# Patient Record
Sex: MALE | Race: OTHER | HISPANIC OR LATINO | URBAN - METROPOLITAN AREA
[De-identification: names, ages, dates, MRNs, and addresses within clinical notes are randomized per-mention and may not be internally consistent; named-entity substitution may affect disease eponyms.]

---

## 2018-05-01 ENCOUNTER — OUTPATIENT (OUTPATIENT)
Dept: OUTPATIENT SERVICES | Facility: HOSPITAL | Age: 83
LOS: 1 days | End: 2018-05-01

## 2018-05-02 ENCOUNTER — INPATIENT (INPATIENT)
Facility: HOSPITAL | Age: 83
LOS: 5 days | Discharge: HOME | DRG: 194 | End: 2018-05-08
Attending: INTERNAL MEDICINE | Admitting: INTERNAL MEDICINE
Payer: MEDICAID

## 2018-05-02 VITALS
WEIGHT: 125 LBS | HEART RATE: 85 BPM | HEIGHT: 62 IN | TEMPERATURE: 98 F | OXYGEN SATURATION: 97 % | DIASTOLIC BLOOD PRESSURE: 80 MMHG | RESPIRATION RATE: 18 BRPM | SYSTOLIC BLOOD PRESSURE: 146 MMHG

## 2018-05-02 DIAGNOSIS — N40.0 BENIGN PROSTATIC HYPERPLASIA WITHOUT LOWER URINARY TRACT SYMPTOMS: ICD-10-CM

## 2018-05-02 DIAGNOSIS — I10 ESSENTIAL (PRIMARY) HYPERTENSION: ICD-10-CM

## 2018-05-02 DIAGNOSIS — M79.89 OTHER SPECIFIED SOFT TISSUE DISORDERS: ICD-10-CM

## 2018-05-02 DIAGNOSIS — J18.9 PNEUMONIA, UNSPECIFIED ORGANISM: ICD-10-CM

## 2018-05-02 DIAGNOSIS — Z29.9 ENCOUNTER FOR PROPHYLACTIC MEASURES, UNSPECIFIED: ICD-10-CM

## 2018-05-02 LAB
ALBUMIN SERPL ELPH-MCNC: 2.9 G/DL — LOW (ref 3.5–5)
ALP SERPL-CCNC: 110 U/L — SIGNIFICANT CHANGE UP (ref 40–120)
ALT FLD-CCNC: 17 U/L DA — SIGNIFICANT CHANGE UP (ref 10–60)
ANION GAP SERPL CALC-SCNC: 5 MMOL/L — SIGNIFICANT CHANGE UP (ref 5–17)
APPEARANCE UR: CLEAR — SIGNIFICANT CHANGE UP
AST SERPL-CCNC: 41 U/L — HIGH (ref 10–40)
BASE EXCESS BLDV CALC-SCNC: 4.2 MMOL/L — HIGH (ref -2–2)
BASOPHILS # BLD AUTO: 0 K/UL — SIGNIFICANT CHANGE UP (ref 0–0.2)
BASOPHILS NFR BLD AUTO: 0.2 % — SIGNIFICANT CHANGE UP (ref 0–2)
BILIRUB SERPL-MCNC: 0.7 MG/DL — SIGNIFICANT CHANGE UP (ref 0.2–1.2)
BILIRUB UR-MCNC: NEGATIVE — SIGNIFICANT CHANGE UP
BUN SERPL-MCNC: 21 MG/DL — HIGH (ref 7–18)
CALCIUM SERPL-MCNC: 10.3 MG/DL — SIGNIFICANT CHANGE UP (ref 8.4–10.5)
CHLORIDE SERPL-SCNC: 105 MMOL/L — SIGNIFICANT CHANGE UP (ref 96–108)
CO2 SERPL-SCNC: 31 MMOL/L — SIGNIFICANT CHANGE UP (ref 22–31)
COLOR SPEC: YELLOW — SIGNIFICANT CHANGE UP
CREAT SERPL-MCNC: 1.06 MG/DL — SIGNIFICANT CHANGE UP (ref 0.5–1.3)
DIFF PNL FLD: ABNORMAL
EOSINOPHIL # BLD AUTO: 0 K/UL — SIGNIFICANT CHANGE UP (ref 0–0.5)
EOSINOPHIL NFR BLD AUTO: 0 % — SIGNIFICANT CHANGE UP (ref 0–6)
GLUCOSE SERPL-MCNC: 150 MG/DL — HIGH (ref 70–99)
GLUCOSE UR QL: NEGATIVE — SIGNIFICANT CHANGE UP
HCO3 BLDV-SCNC: 30 MMOL/L — HIGH (ref 21–29)
HCT VFR BLD CALC: 39.5 % — SIGNIFICANT CHANGE UP (ref 39–50)
HGB BLD-MCNC: 12.4 G/DL — LOW (ref 13–17)
HOROWITZ INDEX BLDV+IHG-RTO: 21 — SIGNIFICANT CHANGE UP
KETONES UR-MCNC: NEGATIVE — SIGNIFICANT CHANGE UP
LACTATE SERPL-SCNC: 2.1 MMOL/L — HIGH (ref 0.7–2)
LACTATE SERPL-SCNC: 2.9 MMOL/L — HIGH (ref 0.7–2)
LEUKOCYTE ESTERASE UR-ACNC: ABNORMAL
LYMPHOCYTES # BLD AUTO: 1.1 K/UL — SIGNIFICANT CHANGE UP (ref 1–3.3)
LYMPHOCYTES # BLD AUTO: 8.7 % — LOW (ref 13–44)
MCHC RBC-ENTMCNC: 27.7 PG — SIGNIFICANT CHANGE UP (ref 27–34)
MCHC RBC-ENTMCNC: 31.5 GM/DL — LOW (ref 32–36)
MCV RBC AUTO: 88 FL — SIGNIFICANT CHANGE UP (ref 80–100)
MONOCYTES # BLD AUTO: 0.5 K/UL — SIGNIFICANT CHANGE UP (ref 0–0.9)
MONOCYTES NFR BLD AUTO: 4.1 % — SIGNIFICANT CHANGE UP (ref 2–14)
NEUTROPHILS # BLD AUTO: 10.8 K/UL — HIGH (ref 1.8–7.4)
NEUTROPHILS NFR BLD AUTO: 87 % — HIGH (ref 43–77)
NITRITE UR-MCNC: NEGATIVE — SIGNIFICANT CHANGE UP
PCO2 BLDV: 54 MMHG — HIGH (ref 35–50)
PH BLDV: 7.37 — SIGNIFICANT CHANGE UP (ref 7.35–7.45)
PH UR: 6 — SIGNIFICANT CHANGE UP (ref 5–8)
PLATELET # BLD AUTO: 122 K/UL — LOW (ref 150–400)
PO2 BLDV: 45 MMHG — SIGNIFICANT CHANGE UP (ref 25–45)
POTASSIUM SERPL-MCNC: 3.2 MMOL/L — LOW (ref 3.5–5.3)
POTASSIUM SERPL-SCNC: 3.2 MMOL/L — LOW (ref 3.5–5.3)
PROT SERPL-MCNC: 7 G/DL — SIGNIFICANT CHANGE UP (ref 6–8.3)
PROT UR-MCNC: 30 MG/DL
RBC # BLD: 4.49 M/UL — SIGNIFICANT CHANGE UP (ref 4.2–5.8)
RBC # FLD: 13.1 % — SIGNIFICANT CHANGE UP (ref 10.3–14.5)
SAO2 % BLDV: 78 % — SIGNIFICANT CHANGE UP (ref 67–88)
SODIUM SERPL-SCNC: 141 MMOL/L — SIGNIFICANT CHANGE UP (ref 135–145)
SP GR SPEC: 1.02 — SIGNIFICANT CHANGE UP (ref 1.01–1.02)
TROPONIN I SERPL-MCNC: 0.04 NG/ML — SIGNIFICANT CHANGE UP (ref 0–0.04)
UROBILINOGEN FLD QL: 1
WBC # BLD: 12.4 K/UL — HIGH (ref 3.8–10.5)
WBC # FLD AUTO: 12.4 K/UL — HIGH (ref 3.8–10.5)

## 2018-05-02 PROCEDURE — 99285 EMERGENCY DEPT VISIT HI MDM: CPT

## 2018-05-02 PROCEDURE — 71045 X-RAY EXAM CHEST 1 VIEW: CPT | Mod: 26

## 2018-05-02 PROCEDURE — 73590 X-RAY EXAM OF LOWER LEG: CPT | Mod: 26,RT

## 2018-05-02 PROCEDURE — 93971 EXTREMITY STUDY: CPT | Mod: 26,RT

## 2018-05-02 PROCEDURE — 73620 X-RAY EXAM OF FOOT: CPT | Mod: 26,RT

## 2018-05-02 RX ORDER — SODIUM CHLORIDE 9 MG/ML
1000 INJECTION INTRAMUSCULAR; INTRAVENOUS; SUBCUTANEOUS
Qty: 0 | Refills: 0 | Status: DISCONTINUED | OUTPATIENT
Start: 2018-05-02 | End: 2018-05-02

## 2018-05-02 RX ORDER — CEFTRIAXONE 500 MG/1
1 INJECTION, POWDER, FOR SOLUTION INTRAMUSCULAR; INTRAVENOUS ONCE
Qty: 0 | Refills: 0 | Status: COMPLETED | OUTPATIENT
Start: 2018-05-02 | End: 2018-05-02

## 2018-05-02 RX ORDER — CEFTRIAXONE 500 MG/1
1 INJECTION, POWDER, FOR SOLUTION INTRAMUSCULAR; INTRAVENOUS EVERY 24 HOURS
Qty: 0 | Refills: 0 | Status: DISCONTINUED | OUTPATIENT
Start: 2018-05-02 | End: 2018-05-08

## 2018-05-02 RX ORDER — SODIUM CHLORIDE 9 MG/ML
1000 INJECTION INTRAMUSCULAR; INTRAVENOUS; SUBCUTANEOUS ONCE
Qty: 0 | Refills: 0 | Status: COMPLETED | OUTPATIENT
Start: 2018-05-02 | End: 2018-05-02

## 2018-05-02 RX ORDER — AZITHROMYCIN 500 MG/1
500 TABLET, FILM COATED ORAL EVERY 24 HOURS
Qty: 0 | Refills: 0 | Status: COMPLETED | OUTPATIENT
Start: 2018-05-02 | End: 2018-05-05

## 2018-05-02 RX ORDER — SODIUM CHLORIDE 9 MG/ML
1000 INJECTION, SOLUTION INTRAVENOUS
Qty: 0 | Refills: 0 | Status: DISCONTINUED | OUTPATIENT
Start: 2018-05-02 | End: 2018-05-04

## 2018-05-02 RX ORDER — AMLODIPINE BESYLATE 2.5 MG/1
5 TABLET ORAL DAILY
Qty: 0 | Refills: 0 | Status: DISCONTINUED | OUTPATIENT
Start: 2018-05-02 | End: 2018-05-07

## 2018-05-02 RX ORDER — ENOXAPARIN SODIUM 100 MG/ML
40 INJECTION SUBCUTANEOUS DAILY
Qty: 0 | Refills: 0 | Status: DISCONTINUED | OUTPATIENT
Start: 2018-05-02 | End: 2018-05-08

## 2018-05-02 RX ORDER — TAMSULOSIN HYDROCHLORIDE 0.4 MG/1
0.4 CAPSULE ORAL AT BEDTIME
Qty: 0 | Refills: 0 | Status: DISCONTINUED | OUTPATIENT
Start: 2018-05-02 | End: 2018-05-08

## 2018-05-02 RX ORDER — AZITHROMYCIN 500 MG/1
500 TABLET, FILM COATED ORAL ONCE
Qty: 0 | Refills: 0 | Status: COMPLETED | OUTPATIENT
Start: 2018-05-02 | End: 2018-05-02

## 2018-05-02 RX ORDER — POTASSIUM CHLORIDE 20 MEQ
40 PACKET (EA) ORAL EVERY 4 HOURS
Qty: 0 | Refills: 0 | Status: COMPLETED | OUTPATIENT
Start: 2018-05-02 | End: 2018-05-02

## 2018-05-02 RX ADMIN — SODIUM CHLORIDE 100 MILLILITER(S): 9 INJECTION, SOLUTION INTRAVENOUS at 18:08

## 2018-05-02 RX ADMIN — CEFTRIAXONE 100 GRAM(S): 500 INJECTION, POWDER, FOR SOLUTION INTRAMUSCULAR; INTRAVENOUS at 12:17

## 2018-05-02 RX ADMIN — Medication 40 MILLIEQUIVALENT(S): at 20:02

## 2018-05-02 RX ADMIN — AMLODIPINE BESYLATE 5 MILLIGRAM(S): 2.5 TABLET ORAL at 23:28

## 2018-05-02 RX ADMIN — SODIUM CHLORIDE 4000 MILLILITER(S): 9 INJECTION INTRAMUSCULAR; INTRAVENOUS; SUBCUTANEOUS at 10:50

## 2018-05-02 RX ADMIN — TAMSULOSIN HYDROCHLORIDE 0.4 MILLIGRAM(S): 0.4 CAPSULE ORAL at 23:27

## 2018-05-02 RX ADMIN — AZITHROMYCIN 250 MILLIGRAM(S): 500 TABLET, FILM COATED ORAL at 13:19

## 2018-05-02 RX ADMIN — Medication 40 MILLIEQUIVALENT(S): at 16:17

## 2018-05-02 RX ADMIN — SODIUM CHLORIDE 4000 MILLILITER(S): 9 INJECTION INTRAMUSCULAR; INTRAVENOUS; SUBCUTANEOUS at 13:17

## 2018-05-02 NOTE — H&P ADULT - ASSESSMENT
86 y/o M with no pmhx came in with c/o cough since 5 days. Patient is admitted for pnemumonia and R LE swelling.

## 2018-05-02 NOTE — ED ADULT TRIAGE NOTE - CHIEF COMPLAINT QUOTE
Cough x last Friday. No fever. Has swelling to both feet x Thursday. Also c/o Frequency of urination.

## 2018-05-02 NOTE — H&P ADULT - NSHPPHYSICALEXAM_GEN_ALL_CORE
Vital Signs Last 24 Hrs  T(C): 37.2 (02 May 2018 11:15), Max: 37.2 (02 May 2018 11:15)  T(F): 98.9 (02 May 2018 11:15), Max: 98.9 (02 May 2018 11:15)  HR: 65 (02 May 2018 11:15) (65 - 85)  BP: 155/71 (02 May 2018 11:15) (146/80 - 155/71)  BP(mean): --  RR: 16 (02 May 2018 11:15) (16 - 18)  SpO2: 100% (02 May 2018 11:15) (97% - 100%)    GENERAL: NAD  HEAD:  Atraumatic, Normocephalic  EYES: EOMI, PERRLA, conjunctiva and sclera clear  ENMT: Moist mucous membranes  NECK: Supple  NERVOUS SYSTEM:  Alert & Oriented X3  CHEST/LUNG: Clear to auscultation bilaterally  HEART: Regular rate and rhythm; No murmurs, rubs, or gallops  ABDOMEN: Soft, Nontender, Nondistended; Bowel sounds present  EXTREMITIES:  2+ Peripheral Pulses, R leg non pitting edema, no erythema or tenderness at the joints. Normal active and passive ROM. No pain, no bruises, no calf tenderness

## 2018-05-02 NOTE — H&P ADULT - NSHPREVIEWOFSYSTEMS_GEN_ALL_CORE
REVIEW OF SYSTEMS:  CONSTITUTIONAL: No fever, weight loss, or fatigue  EYES: No eye pain, visual disturbances, or discharge  ENMT:  No difficulty hearing, tinnitus, vertigo; No sinus or throat pain  RESPIRATORY: cough productive of yellow sputum; No shortness of breath  CARDIOVASCULAR: No chest pain, palpitations, dizziness, or leg swelling  GASTROINTESTINAL: No abdominal or epigastric pain. No nausea, vomiting, or hematemesis; No diarrhea or constipation. No melena or hematochezia.  GENITOURINARY: No dysuria, frequency, hematuria, or incontinence  NEUROLOGICAL: No headaches, memory loss, loss of strength, numbness, or tremors  ENDOCRINE: No heat or cold intolerance; No hair loss  MUSCULOSKELETAL: R leg swelling  HEME/LYMPH: No easy bruising, or bleeding gums  ALLERY AND IMMUNOLOGIC: No hives or eczema

## 2018-05-02 NOTE — H&P ADULT - PROBLEM SELECTOR PLAN 1
Leukocytosis, cough, and elevated lactate  CXR- R lower lung infilitrate  - start ceftriaxone and azithromycin  - f/u blood cx  - f/u sputum cx  - trend lactate

## 2018-05-02 NOTE — CONSULT NOTE ADULT - SUBJECTIVE AND OBJECTIVE BOX
PULMONARY CONSULT NOTE      ABDIRAHMAN PADILLA  MRN-[id number]    Patient is a 85y old  Male who presents with a chief complaint of cough (02 May 2018 13:43)    History of Present Illness:  Reason for Admission: cough	  History of Present Illness: 	  84 y/o M with no pmhx came in with c/o cough since 5 days. Patient said he has been using cough medication but it did not help. Cough is productive of yellow sputum. Denies fever, chills, nausea, or vomiting. Denies travel history. Denies sick contact. Denies SOB or respiratory distress. He also complaints of R LE swelling since fall almost 1 week ago. He said he fell on his right side while going to the bathroom. He denies pain, and is able to ambulate using cane since the fall.         HISTORY OF PRESENT ILLNESS: As above. Awake, alert, comfortable in bed in NAD.    MEDICATIONS  (STANDING):  amLODIPine   Tablet 5 milliGRAM(s) Oral daily  azithromycin  IVPB 500 milliGRAM(s) IV Intermittent every 24 hours  cefTRIAXone   IVPB 1 Gram(s) IV Intermittent every 24 hours  enoxaparin Injectable 40 milliGRAM(s) SubCutaneous daily  potassium chloride    Tablet ER 40 milliEquivalent(s) Oral every 4 hours  sodium chloride 0.9%. 1000 milliLiter(s) (75 mL/Hr) IV Continuous <Continuous>  tamsulosin 0.4 milliGRAM(s) Oral at bedtime      MEDICATIONS  (PRN):      Allergies    No Known Allergies    Intolerances        PAST MEDICAL & SURGICAL HISTORY:  No pertinent past medical history  No significant past surgical history      FAMILY HISTORY:      SOCIAL HISTORY  Smoking History:     REVIEW OF SYSTEMS:    CONSTITUTIONAL:  No fevers, chills, sweats    HEENT:  Eyes:  No diplopia or blurred vision. ENT:  No earache, sore throat or runny nose.    CARDIOVASCULAR:  No pressure, squeezing, tightness, or heaviness about the chest; no palpitations.    RESPIRATORY:  Per HPI    GASTROINTESTINAL:  No abdominal pain, nausea, vomiting or diarrhea.    GENITOURINARY:  No dysuria, frequency or urgency.    NEUROLOGIC:  No paresthesias, fasciculations, seizures or weakness.    PSYCHIATRIC:  No disorder of thought or mood.    Vital Signs Last 24 Hrs  T(C): 37.1 (02 May 2018 15:50), Max: 37.2 (02 May 2018 11:15)  T(F): 98.7 (02 May 2018 15:50), Max: 98.9 (02 May 2018 11:15)  HR: 70 (02 May 2018 15:50) (65 - 85)  BP: 151/63 (02 May 2018 15:50) (146/80 - 155/71)  BP(mean): --  RR: 16 (02 May 2018 15:50) (16 - 18)  SpO2: 97% (02 May 2018 15:50) (97% - 100%)  I&O's Detail      PHYSICAL EXAMINATION:    GENERAL: The patient is a well-developed, well-nourished _____in no apparent distress.     HEENT: Head is normocephalic and atraumatic. Extraocular muscles are intact. Mucous membranes are moist.     NECK: Supple.     LUNGS: Bilateral rales and ronchi    HEART: Regular rate and rhythm without murmur.    ABDOMEN: Soft, nontender, and nondistended.  No hepatosplenomegaly is noted.    EXTREMITIES: Without any cyanosis, clubbing, rash, lesions or edema.    NEUROLOGIC: Grossly intact.      LABS:                        12.4   12.4  )-----------( 122      ( 02 May 2018 10:25 )             39.5     05-    141  |  105  |  21<H>  ----------------------------<  150<H>  3.2<L>   |  31  |  1.06    Ca    10.3      02 May 2018 10:35    TPro  7.0  /  Alb  2.9<L>  /  TBili  0.7  /  DBili  x   /  AST  41<H>  /  ALT  17  /  AlkPhos  110  05-02      Urinalysis Basic - ( 02 May 2018 10:28 )    Color: Yellow / Appearance: Clear / S.020 / pH: x  Gluc: x / Ketone: Negative  / Bili: Negative / Urobili: 1   Blood: x / Protein: 30 mg/dL / Nitrite: Negative   Leuk Esterase: Small / RBC: 2-5 /HPF / WBC 0-2 /HPF   Sq Epi: x / Non Sq Epi: Moderate /HPF / Bacteria: Moderate /HPF        CARDIAC MARKERS ( 02 May 2018 10:36 )  0.041 ng/mL / x     / x     / x     / x              Lactate, Blood: 2.1 mmol/L (18 @ 15:09)  Lactate, Blood: 2.9 mmol/L (18 @ 10:25)        MICROBIOLOGY:    RADIOLOGY & ADDITIONAL STUDIES:    CXR:  < from: Xray Chest 1 View AP/PA (18 @ 11:25) >  IMPRESSION: Right lower lung field infiltrate.    < end of copied text >    Ct scan chest:    ekg;    echo:

## 2018-05-02 NOTE — H&P ADULT - NSHPLABSRESULTS_GEN_ALL_CORE
12.4   12.4  )-----------( 122      ( 02 May 2018 10:25 )             39.5     05-02    141  |  105  |  21<H>  ----------------------------<  150<H>  3.2<L>   |  31  |  1.06    Ca    10.3      02 May 2018 10:35    TPro  7.0  /  Alb  2.9<L>  /  TBili  0.7  /  DBili  x   /  AST  41<H>  /  ALT  17  /  AlkPhos  110  05-02    < from: Xray Chest 1 View AP/PA (05.02.18 @ 11:25) >    EXAM:  XR CHEST AP OR PA 1V                            PROCEDURE DATE:  05/02/2018          INTERPRETATION:  AP semierect chest on May 2, 2018 at 10:54 AM. Patient   has cough.    COMPARISON: None available.    The heart is magnified by technique. The aorta is somewhat dilated and   uncoiled.    Vaguely defined density in the right lower lung field suggests infiltrate.    IMPRESSION: Right lower lung field infiltrate.      < end of copied text >

## 2018-05-02 NOTE — ED PROVIDER NOTE - OBJECTIVE STATEMENT
84 y/o M pt with no significant PMHx presents to the ED c/o cough and leg swelling x a few days. Family member also reports tremors. Pt denies fever, chills, chest pain or any other complaints. NKDA. 84 y/o M pt with no significant PMHx presents to the ED c/o cough and leg swelling x a few days. Family member also reports tremors. Pt denies fever, chills, chest pain or any other complaints. NKDA. PMD Les [place]

## 2018-05-02 NOTE — H&P ADULT - PROBLEM SELECTOR PLAN 3
[] Previous VTE                                                3  [] Thrombophilia                                             2  [] Lower limb paralysis                                   2    [] Current Cancer                                             2   [X] Immobilization > 24 hrs                              1  [] ICU/CCU stay > 24 hours                             1  [X] Age > 60                                                         1    IMPROVE VTE Score: 2  Lovenox Elevated blood pressure x 2 until now  - will start norvasc with parameters

## 2018-05-02 NOTE — ED ADULT NURSE NOTE - OBJECTIVE STATEMENT
Pt presented w/ bilateral crackles lower lobes and nonproductive cough. A&Ox3, ambulatory with cane strong equal radial and pedal pulses, bilateral edema lower extremities , c/o cough and urine  frequency x today

## 2018-05-02 NOTE — H&P ADULT - HISTORY OF PRESENT ILLNESS
86 y/o M with no pmhx came in with c/o cough since 5 days. Patient said he has been using cough medication but it did not help. Cough is productive of yellow sputum. Denies fever, chills, nausea, or vomiting. Denies travel history. Denies sick contact. Denies SOB or respiratory distress. He also complaints of R LE swelling since fall almost 1 week ago. He said he fell on his right side while going to the bathroom. He denies pain, and is able to ambulate using cane since the fall.     SH: Denies smoking, alcohol or illicit drug use. Lives with daughter in law at home. Ambulates with cane

## 2018-05-03 DIAGNOSIS — J11.1 INFLUENZA DUE TO UNIDENTIFIED INFLUENZA VIRUS WITH OTHER RESPIRATORY MANIFESTATIONS: ICD-10-CM

## 2018-05-03 DIAGNOSIS — R69 ILLNESS, UNSPECIFIED: ICD-10-CM

## 2018-05-03 LAB
ANION GAP SERPL CALC-SCNC: 5 MMOL/L — SIGNIFICANT CHANGE UP (ref 5–17)
BASOPHILS # BLD AUTO: 0 K/UL — SIGNIFICANT CHANGE UP (ref 0–0.2)
BASOPHILS NFR BLD AUTO: 0.5 % — SIGNIFICANT CHANGE UP (ref 0–2)
BUN SERPL-MCNC: 16 MG/DL — SIGNIFICANT CHANGE UP (ref 7–18)
CALCIUM SERPL-MCNC: 8.9 MG/DL — SIGNIFICANT CHANGE UP (ref 8.4–10.5)
CHLORIDE SERPL-SCNC: 109 MMOL/L — HIGH (ref 96–108)
CHOLEST SERPL-MCNC: 72 MG/DL — SIGNIFICANT CHANGE UP (ref 10–199)
CO2 SERPL-SCNC: 27 MMOL/L — SIGNIFICANT CHANGE UP (ref 22–31)
CREAT SERPL-MCNC: 0.63 MG/DL — SIGNIFICANT CHANGE UP (ref 0.5–1.3)
CULTURE RESULTS: SIGNIFICANT CHANGE UP
EOSINOPHIL # BLD AUTO: 0 K/UL — SIGNIFICANT CHANGE UP (ref 0–0.5)
EOSINOPHIL NFR BLD AUTO: 0.1 % — SIGNIFICANT CHANGE UP (ref 0–6)
FLUBV RNA SPEC QL NAA+PROBE: DETECTED
GLUCOSE SERPL-MCNC: 94 MG/DL — SIGNIFICANT CHANGE UP (ref 70–99)
HBA1C BLD-MCNC: 5.5 % — SIGNIFICANT CHANGE UP (ref 4–5.6)
HCT VFR BLD CALC: 35.4 % — LOW (ref 39–50)
HDLC SERPL-MCNC: 27 MG/DL — LOW (ref 40–125)
HGB BLD-MCNC: 10.9 G/DL — LOW (ref 13–17)
LACTATE SERPL-SCNC: 1.1 MMOL/L — SIGNIFICANT CHANGE UP (ref 0.7–2)
LIPID PNL WITH DIRECT LDL SERPL: 32 MG/DL — SIGNIFICANT CHANGE UP
LYMPHOCYTES # BLD AUTO: 1.2 K/UL — SIGNIFICANT CHANGE UP (ref 1–3.3)
LYMPHOCYTES # BLD AUTO: 12.4 % — LOW (ref 13–44)
MCHC RBC-ENTMCNC: 27.1 PG — SIGNIFICANT CHANGE UP (ref 27–34)
MCHC RBC-ENTMCNC: 30.7 GM/DL — LOW (ref 32–36)
MCV RBC AUTO: 88.2 FL — SIGNIFICANT CHANGE UP (ref 80–100)
MONOCYTES # BLD AUTO: 0.7 K/UL — SIGNIFICANT CHANGE UP (ref 0–0.9)
MONOCYTES NFR BLD AUTO: 7.9 % — SIGNIFICANT CHANGE UP (ref 2–14)
NEUTROPHILS # BLD AUTO: 7.4 K/UL — SIGNIFICANT CHANGE UP (ref 1.8–7.4)
NEUTROPHILS NFR BLD AUTO: 79.1 % — HIGH (ref 43–77)
PLATELET # BLD AUTO: 118 K/UL — LOW (ref 150–400)
POTASSIUM SERPL-MCNC: 3.8 MMOL/L — SIGNIFICANT CHANGE UP (ref 3.5–5.3)
POTASSIUM SERPL-SCNC: 3.8 MMOL/L — SIGNIFICANT CHANGE UP (ref 3.5–5.3)
RAPID RVP RESULT: DETECTED
RBC # BLD: 4.01 M/UL — LOW (ref 4.2–5.8)
RBC # FLD: 12.9 % — SIGNIFICANT CHANGE UP (ref 10.3–14.5)
SODIUM SERPL-SCNC: 141 MMOL/L — SIGNIFICANT CHANGE UP (ref 135–145)
SPECIMEN SOURCE: SIGNIFICANT CHANGE UP
TOTAL CHOLESTEROL/HDL RATIO MEASUREMENT: 2.7 RATIO — LOW (ref 3.4–9.6)
TRIGL SERPL-MCNC: 63 MG/DL — SIGNIFICANT CHANGE UP (ref 10–149)
TSH SERPL-MCNC: 3.46 UU/ML — SIGNIFICANT CHANGE UP (ref 0.34–4.82)
WBC # BLD: 9.4 K/UL — SIGNIFICANT CHANGE UP (ref 3.8–10.5)
WBC # FLD AUTO: 9.4 K/UL — SIGNIFICANT CHANGE UP (ref 3.8–10.5)

## 2018-05-03 RX ORDER — LANOLIN ALCOHOL/MO/W.PET/CERES
3 CREAM (GRAM) TOPICAL AT BEDTIME
Qty: 0 | Refills: 0 | Status: DISCONTINUED | OUTPATIENT
Start: 2018-05-03 | End: 2018-05-08

## 2018-05-03 RX ADMIN — AZITHROMYCIN 250 MILLIGRAM(S): 500 TABLET, FILM COATED ORAL at 12:40

## 2018-05-03 RX ADMIN — Medication 3 MILLIGRAM(S): at 01:43

## 2018-05-03 RX ADMIN — SODIUM CHLORIDE 100 MILLILITER(S): 9 INJECTION, SOLUTION INTRAVENOUS at 01:45

## 2018-05-03 RX ADMIN — CEFTRIAXONE 100 GRAM(S): 500 INJECTION, POWDER, FOR SOLUTION INTRAMUSCULAR; INTRAVENOUS at 12:41

## 2018-05-03 RX ADMIN — Medication 75 MILLIGRAM(S): at 17:20

## 2018-05-03 RX ADMIN — ENOXAPARIN SODIUM 40 MILLIGRAM(S): 100 INJECTION SUBCUTANEOUS at 12:41

## 2018-05-03 RX ADMIN — AMLODIPINE BESYLATE 5 MILLIGRAM(S): 2.5 TABLET ORAL at 06:13

## 2018-05-03 RX ADMIN — TAMSULOSIN HYDROCHLORIDE 0.4 MILLIGRAM(S): 0.4 CAPSULE ORAL at 21:53

## 2018-05-03 NOTE — PATIENT PROFILE ADULT. - ABILITY TO HEAR (WITH HEARING AID OR HEARING APPLIANCE IF NORMALLY USED):
Mildly to Moderately Impaired: difficulty hearing in some environments or speaker may need to increase volume or speak distinctly/B/L [place]

## 2018-05-03 NOTE — PATIENT PROFILE ADULT. - INFORMATION COULD NOT BE OBTAINED DETAILS
pt doesn't understand but has several children all involved in his care dtr will bring dr name & phn # in the morning

## 2018-05-03 NOTE — PROGRESS NOTE ADULT - SUBJECTIVE AND OBJECTIVE BOX
PGY 1 Note discussed with supervising resident and primary attending    Patient is a 85y old  Male who presents with a chief complaint of cough (02 May 2018 13:43)      INTERVAL HPI/OVERNIGHT EVENTS: offers no new complaints; current symptoms resolving    MEDICATIONS  (STANDING):  amLODIPine   Tablet 5 milliGRAM(s) Oral daily  azithromycin  IVPB 500 milliGRAM(s) IV Intermittent every 24 hours  cefTRIAXone   IVPB 1 Gram(s) IV Intermittent every 24 hours  enoxaparin Injectable 40 milliGRAM(s) SubCutaneous daily  lactated ringers. 1000 milliLiter(s) (100 mL/Hr) IV Continuous <Continuous>  oseltamivir 75 milliGRAM(s) Oral two times a day  tamsulosin 0.4 milliGRAM(s) Oral at bedtime    MEDICATIONS  (PRN):  melatonin 3 milliGRAM(s) Oral at bedtime PRN Insomnia      __________________________________________________  REVIEW OF SYSTEMS:    CONSTITUTIONAL: No fever,   EYES: no acute visual disturbances  NECK: No pain or stiffness  RESPIRATORY: No cough; No shortness of breath  CARDIOVASCULAR: No chest pain, no palpitations  GASTROINTESTINAL: No pain. No nausea or vomiting; No diarrhea   NEUROLOGICAL: No headache or numbness, no tremors  MUSCULOSKELETAL: No joint pain, no muscle pain  GENITOURINARY: no dysuria, no frequency, no hesitancy  PSYCHIATRY: no depression , no anxiety  ALL OTHER  ROS negative        Vital Signs Last 24 Hrs  T(C): 37.1 (03 May 2018 04:52), Max: 37.3 (02 May 2018 21:39)  T(F): 98.8 (03 May 2018 04:52), Max: 99.2 (02 May 2018 21:39)  HR: 66 (03 May 2018 04:52) (65 - 76)  BP: 117/72 (03 May 2018 04:52) (117/72 - 158/87)  BP(mean): --  RR: 18 (03 May 2018 04:52) (16 - 19)  SpO2: 96% (03 May 2018 04:52) (96% - 100%)    ________________________________________________  PHYSICAL EXAM:  GENERAL: NAD  HEENT: Normocephalic;  conjunctivae and sclerae clear; moist mucous membranes;   NECK : supple  CHEST/LUNG: Clear to auscultation bilaterally with good air entry   HEART: S1 S2  regular; no murmurs, gallops or rubs  ABDOMEN: Soft, Nontender, Nondistended; Bowel sounds present  EXTREMITIES: no cyanosis; no edema; no calf tenderness  SKIN: warm and dry; no rash  NERVOUS SYSTEM:  Awake and alert; Oriented  to place, person and time ; no new deficits    _________________________________________________  LABS:                        10.9   9.4   )-----------( 118      ( 03 May 2018 06:07 )             35.4     05-03    141  |  109<H>  |  16  ----------------------------<  94  3.8   |  27  |  0.63    Ca    8.9      03 May 2018 06:07    TPro  7.0  /  Alb  2.9<L>  /  TBili  0.7  /  DBili  x   /  AST  41<H>  /  ALT  17  /  AlkPhos  110  05-02      Urinalysis Basic - ( 02 May 2018 10:28 )    Color: Yellow / Appearance: Clear / S.020 / pH: x  Gluc: x / Ketone: Negative  / Bili: Negative / Urobili: 1   Blood: x / Protein: 30 mg/dL / Nitrite: Negative   Leuk Esterase: Small / RBC: 2-5 /HPF / WBC 0-2 /HPF   Sq Epi: x / Non Sq Epi: Moderate /HPF / Bacteria: Moderate /HPF      CAPILLARY BLOOD GLUCOSE            RADIOLOGY & ADDITIONAL TESTS:    Imaging Personally Reviewed:  YES/NO    Consultant(s) Notes Reviewed:   YES/ No    Care Discussed with Consultants :     Plan of care was discussed with patient and /or primary care giver; all questions and concerns were addressed and care was aligned with patient's wishes. PGY 1 Note discussed with supervising resident and primary attending    Patient is a 85y old  Male who presents with a chief complaint of cough (02 May 2018 13:43)      INTERVAL HPI/OVERNIGHT EVENTS: complaints of cough with sputum.     MEDICATIONS  (STANDING):  amLODIPine   Tablet 5 milliGRAM(s) Oral daily  azithromycin  IVPB 500 milliGRAM(s) IV Intermittent every 24 hours  cefTRIAXone   IVPB 1 Gram(s) IV Intermittent every 24 hours  enoxaparin Injectable 40 milliGRAM(s) SubCutaneous daily  lactated ringers. 1000 milliLiter(s) (100 mL/Hr) IV Continuous <Continuous>  oseltamivir 75 milliGRAM(s) Oral two times a day  tamsulosin 0.4 milliGRAM(s) Oral at bedtime    MEDICATIONS  (PRN):  melatonin 3 milliGRAM(s) Oral at bedtime PRN Insomnia      __________________________________________________  REVIEW OF SYSTEMS:    CONSTITUTIONAL: No fever,   EYES: no acute visual disturbances  NECK: No pain or stiffness  RESPIRATORY: No cough; No shortness of breath  CARDIOVASCULAR: No chest pain, no palpitations  GASTROINTESTINAL: No pain. No nausea or vomiting; No diarrhea   NEUROLOGICAL: No headache or numbness, no tremors  MUSCULOSKELETAL: No joint pain, no muscle pain  GENITOURINARY: no dysuria, no frequency, no hesitancy  PSYCHIATRY: no depression , no anxiety  ALL OTHER  ROS negative        Vital Signs Last 24 Hrs  T(C): 37.1 (03 May 2018 04:52), Max: 37.3 (02 May 2018 21:39)  T(F): 98.8 (03 May 2018 04:52), Max: 99.2 (02 May 2018 21:39)  HR: 66 (03 May 2018 04:52) (65 - 76)  BP: 117/72 (03 May 2018 04:52) (117/72 - 158/87)  BP(mean): --  RR: 18 (03 May 2018 04:52) (16 - 19)  SpO2: 96% (03 May 2018 04:52) (96% - 100%)    ________________________________________________  PHYSICAL EXAM:  GENERAL: NAD  HEENT: Normocephalic;  conjunctivae and sclerae clear; moist mucous membranes;   NECK : supple  CHEST/LUNG: Clear to auscultation bilaterally with good air entry   HEART: S1 S2  regular; no murmurs, gallops or rubs  ABDOMEN: Soft, Nontender, Nondistended; Bowel sounds present  EXTREMITIES: no cyanosis; no edema; no calf tenderness  SKIN: warm and dry; no rash  NERVOUS SYSTEM:  Awake and alert; Oriented  to place, person and time ; no new deficits    _________________________________________________  LABS:                        10.9   9.4   )-----------( 118      ( 03 May 2018 06:07 )             35.4     05-03    141  |  109<H>  |  16  ----------------------------<  94  3.8   |  27  |  0.63    Ca    8.9      03 May 2018 06:07    TPro  7.0  /  Alb  2.9<L>  /  TBili  0.7  /  DBili  x   /  AST  41<H>  /  ALT  17  /  AlkPhos  110  05-02      Urinalysis Basic - ( 02 May 2018 10:28 )    Color: Yellow / Appearance: Clear / S.020 / pH: x  Gluc: x / Ketone: Negative  / Bili: Negative / Urobili: 1   Blood: x / Protein: 30 mg/dL / Nitrite: Negative   Leuk Esterase: Small / RBC: 2-5 /HPF / WBC 0-2 /HPF   Sq Epi: x / Non Sq Epi: Moderate /HPF / Bacteria: Moderate /HPF      CAPILLARY BLOOD GLUCOSE            RADIOLOGY & ADDITIONAL TESTS:    Imaging Personally Reviewed:  YES    Consultant(s) Notes Reviewed:   YES    Care Discussed with Consultants :     Plan of care was discussed with patient and /or primary care giver; all questions and concerns were addressed and care was aligned with patient's wishes.

## 2018-05-03 NOTE — PROGRESS NOTE ADULT - SUBJECTIVE AND OBJECTIVE BOX
Patient is a 85y old  Male who presents with a chief complaint of cough (02 May 2018 13:43)  Awake, alert, comfortable in bed in NAD. Having urinary incontinence.    INTERVAL HPI/OVERNIGHT EVENTS:      VITAL SIGNS:  T(F): 98.8 (18 @ 04:52)  HR: 75 (18 @ 11:37)  BP: 128/64 (18 @ 11:37)  RR: 18 (18 @ 04:52)  SpO2: 92% (18 @ 11:37)  Wt(kg): --  I&O's Detail          REVIEW OF SYSTEMS:    CONSTITUTIONAL:  No fevers, chills, sweats    HEENT:  Eyes:  No diplopia or blurred vision. ENT:  No earache, sore throat or runny nose.    CARDIOVASCULAR:  No pressure, squeezing, tightness, or heaviness about the chest; no palpitations.    RESPIRATORY:  Per HPI    GASTROINTESTINAL:  No abdominal pain, nausea, vomiting or diarrhea.    GENITOURINARY:  No dysuria, frequency or urgency.    NEUROLOGIC:  No paresthesias, fasciculations, seizures or weakness.    PSYCHIATRIC:  No disorder of thought or mood.      PHYSICAL EXAM:    Constitutional: Well developed and nourished  Eyes:Perrla  ENMT: normal  Neck:supple  Respiratory: rales and ronchi bilaterally  Cardiovascular: S1 S2 regular  Gastrointestinal: Soft, Non tender  Extremities: No edema  Vascular:normal  Neurological:Awake, alert,Ox3  Musculoskeletal:Normal      MEDICATIONS  (STANDING):  amLODIPine   Tablet 5 milliGRAM(s) Oral daily  azithromycin  IVPB 500 milliGRAM(s) IV Intermittent every 24 hours  cefTRIAXone   IVPB 1 Gram(s) IV Intermittent every 24 hours  enoxaparin Injectable 40 milliGRAM(s) SubCutaneous daily  lactated ringers. 1000 milliLiter(s) (100 mL/Hr) IV Continuous <Continuous>  oseltamivir 75 milliGRAM(s) Oral two times a day  tamsulosin 0.4 milliGRAM(s) Oral at bedtime    MEDICATIONS  (PRN):  melatonin 3 milliGRAM(s) Oral at bedtime PRN Insomnia      Allergies    No Known Allergies    Intolerances        LABS:                        10.9   9.4   )-----------( 118      ( 03 May 2018 06:07 )             35.4     05-03    141  |  109<H>  |  16  ----------------------------<  94  3.8   |  27  |  0.63    Ca    8.9      03 May 2018 06:07    TPro  7.0  /  Alb  2.9<L>  /  TBili  0.7  /  DBili  x   /  AST  41<H>  /  ALT  17  /  AlkPhos  110  05-02      Urinalysis Basic - ( 02 May 2018 10:28 )    Color: Yellow / Appearance: Clear / S.020 / pH: x  Gluc: x / Ketone: Negative  / Bili: Negative / Urobili: 1   Blood: x / Protein: 30 mg/dL / Nitrite: Negative   Leuk Esterase: Small / RBC: 2-5 /HPF / WBC 0-2 /HPF   Sq Epi: x / Non Sq Epi: Moderate /HPF / Bacteria: Moderate /HPF        CARDIAC MARKERS ( 02 May 2018 10:36 )  0.041 ng/mL / x     / x     / x     / x          CAPILLARY BLOOD GLUCOSE            RADIOLOGY & ADDITIONAL TESTS:    CXR:  < from: Xray Chest 1 View AP/PA (18 @ 11:25) >  IMPRESSION: Right lower lung field infiltrate.    < end of copied text >    Ct scan chest:    ekg;    echo:

## 2018-05-03 NOTE — PROGRESS NOTE ADULT - SUBJECTIVE AND OBJECTIVE BOX
86 y/o M with pmhx bph, htn came in with c/o cough since 5 days. Patient said he has been using cough medication but it did not help. Cough is productive of yellow sputum. Denies fever, chills, nausea, or vomiting. Denies travel history. Denies sick contact. Denies SOB or respiratory distress. He also complaints of R LE swelling since fall almost 1 week ago. He said he fell on his right side while going to the bathroom. He denies pain, and is able to ambulate using cane since the fall.     SH: Denies smoking, alcohol or illicit drug use. Lives with daughter in law at home. Ambulates with cane    Review of Systems:  Review of Systems: REVIEW OF SYSTEMS:  CONSTITUTIONAL: No fever, weight loss, or fatigue  EYES: No eye pain, visual disturbances, or discharge  ENMT:  No difficulty hearing, tinnitus, vertigo; No sinus or throat pain  RESPIRATORY: cough productive of yellow sputum; No shortness of breath  CARDIOVASCULAR: No chest pain, palpitations, dizziness, or leg swelling  GASTROINTESTINAL: No abdominal or epigastric pain. No nausea, vomiting, or hematemesis; No diarrhea or constipation. No melena or hematochezia.  GENITOURINARY: No dysuria, frequency, hematuria, or incontinence  NEUROLOGICAL: No headaches, memory loss, loss of strength, numbness, or tremors  ENDOCRINE: No heat or cold intolerance; No hair loss  MUSCULOSKELETAL: R leg swelling  HEME/LYMPH: No easy bruising, or bleeding gums ALLERY AND IMMUNOLOGIC: No hives or eczema	      pt seen in icu [  ], reg med floor [  x ], bed [ x ], chair at bedside [   ], a+o x3 [x  ], lethargic [  ],  nad [x  ]          Allergies    No Known Allergies        Vitals    T(F): 98.8 (05-03-18 @ 04:52), Max: 99.2 (05-02-18 @ 21:39)  HR: 66 (05-03-18 @ 04:52) (65 - 76)  BP: 117/72 (05-03-18 @ 04:52) (117/72 - 158/87)  RR: 18 (05-03-18 @ 04:52) (16 - 19)  SpO2: 96% (05-03-18 @ 04:52) (96% - 100%)  Wt(kg): --  CAPILLARY BLOOD GLUCOSE          Labs                          10.9   9.4   )-----------( 118      ( 03 May 2018 06:07 )             35.4       05-03    141  |  109<H>  |  16  ----------------------------<  94  3.8   |  27  |  0.63    Ca    8.9      03 May 2018 06:07    TPro  7.0  /  Alb  2.9<L>  /  TBili  0.7  /  DBili  x   /  AST  41<H>  /  ALT  17  /  AlkPhos  110  05-02      CARDIAC MARKERS ( 02 May 2018 10:36 )  0.041 ng/mL / x     / x     / x     / x            Radiology Results      < from: Xray Chest 1 View AP/PA (05.02.18 @ 11:25) >  IMPRESSION: Right lower lung field infiltrate.    < end of copied text >      < from: Xray Tibia + Fibula 2 Views, Right (05.02.18 @ 14:41) >  IMPRESSION:   1. No fracture-subluxation demonstrated.  2. Chronic degenerative changes as above.  3. Right ankle soft tissue swelling.    < end of copied text >      < from: Xray Foot AP + Lateral, Right (05.02.18 @ 14:40) >  IMPRESSION: No fracture-subluxation demonstrated.    < end of copied text >      Meds    MEDICATIONS  (STANDING):  amLODIPine   Tablet 5 milliGRAM(s) Oral daily  azithromycin  IVPB 500 milliGRAM(s) IV Intermittent every 24 hours  cefTRIAXone   IVPB 1 Gram(s) IV Intermittent every 24 hours  enoxaparin Injectable 40 milliGRAM(s) SubCutaneous daily  lactated ringers. 1000 milliLiter(s) (100 mL/Hr) IV Continuous <Continuous>  oseltamivir 75 milliGRAM(s) Oral two times a day  tamsulosin 0.4 milliGRAM(s) Oral at bedtime      MEDICATIONS  (PRN):  melatonin 3 milliGRAM(s) Oral at bedtime PRN Insomnia      Physical Exam    Neuro :  no focal deficits  Respiratory: r lower lobe ronchi  CV: RRR, S1S2, no murmurs,   Abdominal: Soft, NT, ND +BS,  Extremities: right ankle edema and tenderness, + peripheral pulses    ASSESSMENT    Pneumonia  flu b infxn  s/p fall  right ankle pain and swelling likely 2nd to djd vs sprain  htn  h/o bph        PLAN    cont rocephin and zith  cont tamiflu to complete 5 days  pulm f/u  cxr result noted above  add robitussin prn  cont sup O2  ibuprofen prn pain  foot and leg xrays noted above  ortho cons   phys tx eval  cont current meds

## 2018-05-04 LAB
ANION GAP SERPL CALC-SCNC: 7 MMOL/L — SIGNIFICANT CHANGE UP (ref 5–17)
BASOPHILS # BLD AUTO: 0 K/UL — SIGNIFICANT CHANGE UP (ref 0–0.2)
BASOPHILS NFR BLD AUTO: 0.6 % — SIGNIFICANT CHANGE UP (ref 0–2)
BUN SERPL-MCNC: 13 MG/DL — SIGNIFICANT CHANGE UP (ref 7–18)
CALCIUM SERPL-MCNC: 9 MG/DL — SIGNIFICANT CHANGE UP (ref 8.4–10.5)
CHLORIDE SERPL-SCNC: 107 MMOL/L — SIGNIFICANT CHANGE UP (ref 96–108)
CO2 SERPL-SCNC: 26 MMOL/L — SIGNIFICANT CHANGE UP (ref 22–31)
CREAT SERPL-MCNC: 0.72 MG/DL — SIGNIFICANT CHANGE UP (ref 0.5–1.3)
EOSINOPHIL # BLD AUTO: 0 K/UL — SIGNIFICANT CHANGE UP (ref 0–0.5)
EOSINOPHIL NFR BLD AUTO: 0.4 % — SIGNIFICANT CHANGE UP (ref 0–6)
GLUCOSE SERPL-MCNC: 100 MG/DL — HIGH (ref 70–99)
HCT VFR BLD CALC: 36 % — LOW (ref 39–50)
HGB BLD-MCNC: 11 G/DL — LOW (ref 13–17)
LYMPHOCYTES # BLD AUTO: 1.2 K/UL — SIGNIFICANT CHANGE UP (ref 1–3.3)
LYMPHOCYTES # BLD AUTO: 19.8 % — SIGNIFICANT CHANGE UP (ref 13–44)
MAGNESIUM SERPL-MCNC: 2 MG/DL — SIGNIFICANT CHANGE UP (ref 1.6–2.6)
MCHC RBC-ENTMCNC: 27.1 PG — SIGNIFICANT CHANGE UP (ref 27–34)
MCHC RBC-ENTMCNC: 30.6 GM/DL — LOW (ref 32–36)
MCV RBC AUTO: 88.6 FL — SIGNIFICANT CHANGE UP (ref 80–100)
MONOCYTES # BLD AUTO: 0.7 K/UL — SIGNIFICANT CHANGE UP (ref 0–0.9)
MONOCYTES NFR BLD AUTO: 11 % — SIGNIFICANT CHANGE UP (ref 2–14)
NEUTROPHILS # BLD AUTO: 4 K/UL — SIGNIFICANT CHANGE UP (ref 1.8–7.4)
NEUTROPHILS NFR BLD AUTO: 68.1 % — SIGNIFICANT CHANGE UP (ref 43–77)
PHOSPHATE SERPL-MCNC: 2.4 MG/DL — LOW (ref 2.5–4.5)
PLATELET # BLD AUTO: 152 K/UL — SIGNIFICANT CHANGE UP (ref 150–400)
POTASSIUM SERPL-MCNC: 3.4 MMOL/L — LOW (ref 3.5–5.3)
POTASSIUM SERPL-SCNC: 3.4 MMOL/L — LOW (ref 3.5–5.3)
RBC # BLD: 4.07 M/UL — LOW (ref 4.2–5.8)
RBC # FLD: 12.9 % — SIGNIFICANT CHANGE UP (ref 10.3–14.5)
SODIUM SERPL-SCNC: 140 MMOL/L — SIGNIFICANT CHANGE UP (ref 135–145)
WBC # BLD: 5.9 K/UL — SIGNIFICANT CHANGE UP (ref 3.8–10.5)
WBC # FLD AUTO: 5.9 K/UL — SIGNIFICANT CHANGE UP (ref 3.8–10.5)

## 2018-05-04 RX ORDER — LACTULOSE 10 G/15ML
10 SOLUTION ORAL DAILY
Qty: 0 | Refills: 0 | Status: DISCONTINUED | OUTPATIENT
Start: 2018-05-04 | End: 2018-05-05

## 2018-05-04 RX ORDER — POTASSIUM CHLORIDE 20 MEQ
20 PACKET (EA) ORAL
Qty: 0 | Refills: 0 | Status: COMPLETED | OUTPATIENT
Start: 2018-05-04 | End: 2018-05-04

## 2018-05-04 RX ORDER — SENNA PLUS 8.6 MG/1
2 TABLET ORAL AT BEDTIME
Qty: 0 | Refills: 0 | Status: DISCONTINUED | OUTPATIENT
Start: 2018-05-04 | End: 2018-05-08

## 2018-05-04 RX ORDER — POTASSIUM PHOSPHATE, MONOBASIC POTASSIUM PHOSPHATE, DIBASIC 236; 224 MG/ML; MG/ML
15 INJECTION, SOLUTION INTRAVENOUS ONCE
Qty: 0 | Refills: 0 | Status: COMPLETED | OUTPATIENT
Start: 2018-05-04 | End: 2018-05-04

## 2018-05-04 RX ORDER — POLYETHYLENE GLYCOL 3350 17 G/17G
17 POWDER, FOR SOLUTION ORAL DAILY
Qty: 0 | Refills: 0 | Status: DISCONTINUED | OUTPATIENT
Start: 2018-05-04 | End: 2018-05-05

## 2018-05-04 RX ADMIN — AZITHROMYCIN 250 MILLIGRAM(S): 500 TABLET, FILM COATED ORAL at 12:12

## 2018-05-04 RX ADMIN — POTASSIUM PHOSPHATE, MONOBASIC POTASSIUM PHOSPHATE, DIBASIC 62.5 MILLIMOLE(S): 236; 224 INJECTION, SOLUTION INTRAVENOUS at 12:12

## 2018-05-04 RX ADMIN — AMLODIPINE BESYLATE 5 MILLIGRAM(S): 2.5 TABLET ORAL at 06:06

## 2018-05-04 RX ADMIN — Medication 20 MILLIEQUIVALENT(S): at 13:42

## 2018-05-04 RX ADMIN — Medication 75 MILLIGRAM(S): at 17:35

## 2018-05-04 RX ADMIN — Medication 20 MILLIEQUIVALENT(S): at 12:12

## 2018-05-04 RX ADMIN — Medication 20 MILLIEQUIVALENT(S): at 17:35

## 2018-05-04 RX ADMIN — Medication 75 MILLIGRAM(S): at 06:07

## 2018-05-04 RX ADMIN — LACTULOSE 10 GRAM(S): 10 SOLUTION ORAL at 13:42

## 2018-05-04 RX ADMIN — ENOXAPARIN SODIUM 40 MILLIGRAM(S): 100 INJECTION SUBCUTANEOUS at 12:13

## 2018-05-04 RX ADMIN — POLYETHYLENE GLYCOL 3350 17 GRAM(S): 17 POWDER, FOR SOLUTION ORAL at 13:42

## 2018-05-04 RX ADMIN — TAMSULOSIN HYDROCHLORIDE 0.4 MILLIGRAM(S): 0.4 CAPSULE ORAL at 21:53

## 2018-05-04 RX ADMIN — CEFTRIAXONE 100 GRAM(S): 500 INJECTION, POWDER, FOR SOLUTION INTRAMUSCULAR; INTRAVENOUS at 13:42

## 2018-05-04 NOTE — PROGRESS NOTE ADULT - SUBJECTIVE AND OBJECTIVE BOX
Patient is a 85y old  Male who presents with a chief complaint of cough (02 May 2018 13:43)    pt seen in icu [  ], reg med floor [  x ], bed [ x ], chair at bedside [   ], a+o x3 [x  ], lethargic [  ],  nad [x  ]      Allergies    No Known Allergies        Vitals    T(F): 97.8 (05-04-18 @ 04:49), Max: 98.3 (05-03-18 @ 14:46)  HR: 106 (05-04-18 @ 04:49) (61 - 106)  BP: 114/58 (05-04-18 @ 04:49) (114/58 - 154/93)  RR: 17 (05-04-18 @ 04:49) (17 - 18)  SpO2: 96% (05-04-18 @ 04:49) (96% - 100%)  Wt(kg): --  CAPILLARY BLOOD GLUCOSE          Labs                          11.0   5.9   )-----------( 152      ( 04 May 2018 07:02 )             36.0       05-04    140  |  107  |  13  ----------------------------<  100<H>  3.4<L>   |  26  |  0.72    Ca    9.0      04 May 2018 07:02  Phos  2.4     05-04  Mg     2.0     05-04              .Urine Clean Catch (Midstream)  05-02 @ 21:56   <10,000 CFU/ml Normal Urogenital kevin present  --  --      .Blood Blood  05-02 @ 19:43   No growth to date.  --  --      .Blood Blood  05-02 @ 19:42   No growth to date.  --  --          Radiology Results      Meds    MEDICATIONS  (STANDING):  amLODIPine   Tablet 5 milliGRAM(s) Oral daily  azithromycin  IVPB 500 milliGRAM(s) IV Intermittent every 24 hours  cefTRIAXone   IVPB 1 Gram(s) IV Intermittent every 24 hours  enoxaparin Injectable 40 milliGRAM(s) SubCutaneous daily  lactulose Syrup 10 Gram(s) Oral daily  oseltamivir 75 milliGRAM(s) Oral two times a day  polyethylene glycol 3350 17 Gram(s) Oral daily  potassium chloride    Tablet ER 20 milliEquivalent(s) Oral every 2 hours  senna 2 Tablet(s) Oral at bedtime  tamsulosin 0.4 milliGRAM(s) Oral at bedtime      MEDICATIONS  (PRN):  melatonin 3 milliGRAM(s) Oral at bedtime PRN Insomnia      Physical Exam    Neuro :  no focal deficits  Respiratory: r lower lobe ronchi  CV: RRR, S1S2, no murmurs,   Abdominal: Soft, NT, ND +BS,  Extremities: right ankle mild edema, nontender, + peripheral pulses    ASSESSMENT    Pneumonia  flu b infxn  s/p fall  right ankle pain and swelling likely 2nd to djd vs sprain  htn  h/o bph        PLAN    cont rocephin and zith  bld cx neg noted above  ucx with normal urogenital kevin  cont tamiflu to complete 5 days  pulm f/u  cxr result noted above  cont robitussin prn  cont sup O2  ibuprofen prn pain  foot and leg xrays noted above  phys tx eval  cont current meds

## 2018-05-04 NOTE — PROGRESS NOTE ADULT - SUBJECTIVE AND OBJECTIVE BOX
PGY 1 Note discussed with supervising resident and primary attending    Patient is a 85y old  Male who presents with a chief complaint of cough (02 May 2018 13:43)      INTERVAL HPI/OVERNIGHT EVENTS: small amount of blood tinged sputum noted.     MEDICATIONS  (STANDING):  amLODIPine   Tablet 5 milliGRAM(s) Oral daily  azithromycin  IVPB 500 milliGRAM(s) IV Intermittent every 24 hours  cefTRIAXone   IVPB 1 Gram(s) IV Intermittent every 24 hours  enoxaparin Injectable 40 milliGRAM(s) SubCutaneous daily  lactulose Syrup 10 Gram(s) Oral daily  oseltamivir 75 milliGRAM(s) Oral two times a day  polyethylene glycol 3350 17 Gram(s) Oral daily  potassium chloride    Tablet ER 20 milliEquivalent(s) Oral every 2 hours  senna 2 Tablet(s) Oral at bedtime  tamsulosin 0.4 milliGRAM(s) Oral at bedtime    MEDICATIONS  (PRN):  melatonin 3 milliGRAM(s) Oral at bedtime PRN Insomnia      __________________________________________________  REVIEW OF SYSTEMS:    CONSTITUTIONAL: No fever,   EYES: no acute visual disturbances  NECK: No pain or stiffness  RESPIRATORY: No cough; No shortness of breath  CARDIOVASCULAR: No chest pain, no palpitations  GASTROINTESTINAL: No pain. No nausea or vomiting; No diarrhea   NEUROLOGICAL: No headache or numbness, no tremors  MUSCULOSKELETAL: No joint pain, no muscle pain  GENITOURINARY: no dysuria, no frequency, no hesitancy  PSYCHIATRY: no depression , no anxiety  ALL OTHER  ROS negative        Vital Signs Last 24 Hrs  T(C): 36.6 (04 May 2018 04:49), Max: 36.8 (03 May 2018 14:46)  T(F): 97.8 (04 May 2018 04:49), Max: 98.3 (03 May 2018 14:46)  HR: 106 (04 May 2018 04:49) (61 - 106)  BP: 114/58 (04 May 2018 04:49) (114/58 - 154/93)  BP(mean): --  RR: 17 (04 May 2018 04:49) (17 - 18)  SpO2: 96% (04 May 2018 04:49) (96% - 100%)    ________________________________________________  PHYSICAL EXAM:  GENERAL: NAD  HEENT: Normocephalic;  conjunctivae and sclerae clear; moist mucous membranes;   NECK : supple  CHEST/LUNG: Clear to auscultation bilaterally with good air entry   HEART: S1 S2  regular; no murmurs, gallops or rubs  ABDOMEN: Soft, Nontender, Nondistended; Bowel sounds present  EXTREMITIES: no cyanosis; no edema; no calf tenderness  SKIN: warm and dry; no rash  NERVOUS SYSTEM:  Awake and alert; Oriented  to place, person and time ; no new deficits    _________________________________________________  LABS:                        11.0   5.9   )-----------( 152      ( 04 May 2018 07:02 )             36.0     05-04    140  |  107  |  13  ----------------------------<  100<H>  3.4<L>   |  26  |  0.72    Ca    9.0      04 May 2018 07:02  Phos  2.4     05-04  Mg     2.0     05-04          CAPILLARY BLOOD GLUCOSE            RADIOLOGY & ADDITIONAL TESTS:    Imaging Personally Reviewed:  YES    Consultant(s) Notes Reviewed:   YES    Care Discussed with Consultants :     Plan of care was discussed with patient and /or primary care giver; all questions and concerns were addressed and care was aligned with patient's wishes.

## 2018-05-04 NOTE — PROGRESS NOTE ADULT - SUBJECTIVE AND OBJECTIVE BOX
Patient is a 85y old  Male who presents with a chief complaint of cough (02 May 2018 13:43)  Awake, alert, comfortable in bed in NAD    INTERVAL HPI/OVERNIGHT EVENTS:      VITAL SIGNS:  T(F): 97.8 (05-04-18 @ 04:49)  HR: 106 (05-04-18 @ 04:49)  BP: 114/58 (05-04-18 @ 04:49)  RR: 17 (05-04-18 @ 04:49)  SpO2: 96% (05-04-18 @ 04:49)  Wt(kg): --  I&O's Detail          REVIEW OF SYSTEMS:    CONSTITUTIONAL:  No fevers, chills, sweats    HEENT:  Eyes:  No diplopia or blurred vision. ENT:  No earache, sore throat or runny nose.    CARDIOVASCULAR:  No pressure, squeezing, tightness, or heaviness about the chest; no palpitations.    RESPIRATORY:  Per HPI    GASTROINTESTINAL:  No abdominal pain, nausea, vomiting or diarrhea.    GENITOURINARY:  No dysuria, frequency or urgency.    NEUROLOGIC:  No paresthesias, fasciculations, seizures or weakness.    PSYCHIATRIC:  No disorder of thought or mood.      PHYSICAL EXAM:    Constitutional: Well developed and nourished  Eyes:Perrla  ENMT: normal  Neck:supple  Respiratory: good air entry  Cardiovascular: S1 S2 regular  Gastrointestinal: Soft, Non tender  Extremities: No edema  Vascular:normal  Neurological:Awake, alert  Musculoskeletal:Normal      MEDICATIONS  (STANDING):  amLODIPine   Tablet 5 milliGRAM(s) Oral daily  azithromycin  IVPB 500 milliGRAM(s) IV Intermittent every 24 hours  cefTRIAXone   IVPB 1 Gram(s) IV Intermittent every 24 hours  enoxaparin Injectable 40 milliGRAM(s) SubCutaneous daily  lactulose Syrup 10 Gram(s) Oral daily  oseltamivir 75 milliGRAM(s) Oral two times a day  polyethylene glycol 3350 17 Gram(s) Oral daily  potassium chloride    Tablet ER 20 milliEquivalent(s) Oral every 2 hours  senna 2 Tablet(s) Oral at bedtime  tamsulosin 0.4 milliGRAM(s) Oral at bedtime    MEDICATIONS  (PRN):  melatonin 3 milliGRAM(s) Oral at bedtime PRN Insomnia      Allergies    No Known Allergies    Intolerances        LABS:                        11.0   5.9   )-----------( 152      ( 04 May 2018 07:02 )             36.0     05-04    140  |  107  |  13  ----------------------------<  100<H>  3.4<L>   |  26  |  0.72    Ca    9.0      04 May 2018 07:02  Phos  2.4     05-04  Mg     2.0     05-04                CAPILLARY BLOOD GLUCOSE            RADIOLOGY & ADDITIONAL TESTS:    CXR:  < from: Xray Chest 1 View AP/PA (05.02.18 @ 11:25) >  IMPRESSION: Right lower lung field infiltrate.    < end of copied text >    Ct scan chest:    ekg;    echo:

## 2018-05-05 LAB
ANION GAP SERPL CALC-SCNC: 5 MMOL/L — SIGNIFICANT CHANGE UP (ref 5–17)
APPEARANCE UR: CLEAR — SIGNIFICANT CHANGE UP
BASOPHILS # BLD AUTO: 0 K/UL — SIGNIFICANT CHANGE UP (ref 0–0.2)
BASOPHILS NFR BLD AUTO: 0.8 % — SIGNIFICANT CHANGE UP (ref 0–2)
BILIRUB UR-MCNC: NEGATIVE — SIGNIFICANT CHANGE UP
BUN SERPL-MCNC: 10 MG/DL — SIGNIFICANT CHANGE UP (ref 7–18)
CALCIUM SERPL-MCNC: 8.8 MG/DL — SIGNIFICANT CHANGE UP (ref 8.4–10.5)
CHLORIDE SERPL-SCNC: 108 MMOL/L — SIGNIFICANT CHANGE UP (ref 96–108)
CO2 SERPL-SCNC: 26 MMOL/L — SIGNIFICANT CHANGE UP (ref 22–31)
COLOR SPEC: YELLOW — SIGNIFICANT CHANGE UP
CREAT SERPL-MCNC: 0.69 MG/DL — SIGNIFICANT CHANGE UP (ref 0.5–1.3)
DIFF PNL FLD: ABNORMAL
EOSINOPHIL # BLD AUTO: 0 K/UL — SIGNIFICANT CHANGE UP (ref 0–0.5)
EOSINOPHIL NFR BLD AUTO: 0.9 % — SIGNIFICANT CHANGE UP (ref 0–6)
GLUCOSE SERPL-MCNC: 83 MG/DL — SIGNIFICANT CHANGE UP (ref 70–99)
GLUCOSE UR QL: NEGATIVE — SIGNIFICANT CHANGE UP
HCT VFR BLD CALC: 35.8 % — LOW (ref 39–50)
HGB BLD-MCNC: 11 G/DL — LOW (ref 13–17)
KETONES UR-MCNC: NEGATIVE — SIGNIFICANT CHANGE UP
LEUKOCYTE ESTERASE UR-ACNC: ABNORMAL
LYMPHOCYTES # BLD AUTO: 1.2 K/UL — SIGNIFICANT CHANGE UP (ref 1–3.3)
LYMPHOCYTES # BLD AUTO: 25.6 % — SIGNIFICANT CHANGE UP (ref 13–44)
MAGNESIUM SERPL-MCNC: 2 MG/DL — SIGNIFICANT CHANGE UP (ref 1.6–2.6)
MCHC RBC-ENTMCNC: 27.1 PG — SIGNIFICANT CHANGE UP (ref 27–34)
MCHC RBC-ENTMCNC: 30.7 GM/DL — LOW (ref 32–36)
MCV RBC AUTO: 88 FL — SIGNIFICANT CHANGE UP (ref 80–100)
MONOCYTES # BLD AUTO: 0.7 K/UL — SIGNIFICANT CHANGE UP (ref 0–0.9)
MONOCYTES NFR BLD AUTO: 14.6 % — HIGH (ref 2–14)
NEUTROPHILS # BLD AUTO: 2.8 K/UL — SIGNIFICANT CHANGE UP (ref 1.8–7.4)
NEUTROPHILS NFR BLD AUTO: 58.2 % — SIGNIFICANT CHANGE UP (ref 43–77)
NITRITE UR-MCNC: NEGATIVE — SIGNIFICANT CHANGE UP
PH UR: 7 — SIGNIFICANT CHANGE UP (ref 5–8)
PHOSPHATE SERPL-MCNC: 2.3 MG/DL — LOW (ref 2.5–4.5)
PLATELET # BLD AUTO: 193 K/UL — SIGNIFICANT CHANGE UP (ref 150–400)
POTASSIUM SERPL-MCNC: 3.8 MMOL/L — SIGNIFICANT CHANGE UP (ref 3.5–5.3)
POTASSIUM SERPL-SCNC: 3.8 MMOL/L — SIGNIFICANT CHANGE UP (ref 3.5–5.3)
PROT UR-MCNC: NEGATIVE — SIGNIFICANT CHANGE UP
RBC # BLD: 4.06 M/UL — LOW (ref 4.2–5.8)
RBC # FLD: 12.9 % — SIGNIFICANT CHANGE UP (ref 10.3–14.5)
SODIUM SERPL-SCNC: 139 MMOL/L — SIGNIFICANT CHANGE UP (ref 135–145)
SP GR SPEC: 1.01 — SIGNIFICANT CHANGE UP (ref 1.01–1.02)
UROBILINOGEN FLD QL: 1
WBC # BLD: 4.9 K/UL — SIGNIFICANT CHANGE UP (ref 3.8–10.5)
WBC # FLD AUTO: 4.9 K/UL — SIGNIFICANT CHANGE UP (ref 3.8–10.5)

## 2018-05-05 RX ADMIN — AMLODIPINE BESYLATE 5 MILLIGRAM(S): 2.5 TABLET ORAL at 05:47

## 2018-05-05 RX ADMIN — TAMSULOSIN HYDROCHLORIDE 0.4 MILLIGRAM(S): 0.4 CAPSULE ORAL at 22:23

## 2018-05-05 RX ADMIN — Medication 75 MILLIGRAM(S): at 05:47

## 2018-05-05 RX ADMIN — AZITHROMYCIN 250 MILLIGRAM(S): 500 TABLET, FILM COATED ORAL at 12:07

## 2018-05-05 RX ADMIN — Medication 75 MILLIGRAM(S): at 17:20

## 2018-05-05 RX ADMIN — ENOXAPARIN SODIUM 40 MILLIGRAM(S): 100 INJECTION SUBCUTANEOUS at 12:07

## 2018-05-05 RX ADMIN — Medication 3 MILLIGRAM(S): at 22:24

## 2018-05-05 RX ADMIN — CEFTRIAXONE 100 GRAM(S): 500 INJECTION, POWDER, FOR SOLUTION INTRAMUSCULAR; INTRAVENOUS at 13:07

## 2018-05-05 NOTE — PROGRESS NOTE ADULT - SUBJECTIVE AND OBJECTIVE BOX
Patient is a 85y old  Male who presents with a chief complaint of cough (02 May 2018 13:43)    pt seen in icu [  ], reg med floor [  x ], bed [ x ], chair at bedside [   ], a+o x3 [x  ], lethargic [  ],  nad [x  ]      Allergies    No Known Allergies        Vitals    T(F): 98.4 (05-05-18 @ 05:36), Max: 98.4 (05-05-18 @ 05:36)  HR: 83 (05-05-18 @ 05:36) (82 - 86)  BP: 152/85 (05-05-18 @ 05:36) (137/70 - 152/85)  RR: 16 (05-05-18 @ 05:36) (15 - 16)  SpO2: 97% (05-05-18 @ 05:36) (95% - 97%)  Wt(kg): --  CAPILLARY BLOOD GLUCOSE          Labs                          11.0   4.9   )-----------( 193      ( 05 May 2018 06:14 )             35.8       05-05    139  |  108  |  10  ----------------------------<  83  3.8   |  26  |  0.69    Ca    8.8      05 May 2018 06:14  Phos  2.3     05-05  Mg     2.0     05-05              .Urine Clean Catch (Midstream)  05-02 @ 21:56   <10,000 CFU/ml Normal Urogenital kevin present  --  --      .Blood Blood  05-02 @ 19:43   No growth to date.  --  --      .Blood Blood  05-02 @ 19:42   No growth to date.  --  --          Radiology Results      Meds    MEDICATIONS  (STANDING):  amLODIPine   Tablet 5 milliGRAM(s) Oral daily  azithromycin  IVPB 500 milliGRAM(s) IV Intermittent every 24 hours  cefTRIAXone   IVPB 1 Gram(s) IV Intermittent every 24 hours  enoxaparin Injectable 40 milliGRAM(s) SubCutaneous daily  lactulose Syrup 10 Gram(s) Oral daily  oseltamivir 75 milliGRAM(s) Oral two times a day  polyethylene glycol 3350 17 Gram(s) Oral daily  senna 2 Tablet(s) Oral at bedtime  tamsulosin 0.4 milliGRAM(s) Oral at bedtime      MEDICATIONS  (PRN):  melatonin 3 milliGRAM(s) Oral at bedtime PRN Insomnia      Physical Exam      Neuro :  no focal deficits  Respiratory: r lower lobe ronchi  CV: RRR, S1S2, no murmurs,   Abdominal: Soft, NT, ND +BS,  Extremities: right ankle mild edema, nontender, + peripheral pulses    ASSESSMENT    Pneumonia  flu b infxn  s/p fall  right ankle pain and swelling likely 2nd to djd vs sprain  htn  h/o bph        PLAN    cont rocephin and zith  bld cx neg noted above  ucx with normal urogenital kevin  cont tamiflu to complete 5 days  pulm f/u  cxr result noted above  cont robitussin prn  cont sup O2  ibuprofen prn pain  foot and leg xrays noted above  phys tx eval  cont current meds

## 2018-05-05 NOTE — PROGRESS NOTE ADULT - SUBJECTIVE AND OBJECTIVE BOX
Patient is a 85y old  Male who presents with a chief complaint of cough (02 May 2018 13:43)      INTERVAL HPI/OVERNIGHT EVENTS:    PT was examined at bedside, Pt is A&Ox3, in nad, reports no new complaints at this time.    VITAL SIGNS:  T(F): 97.8 (05-05-18 @ 15:01)  HR: 97 (05-05-18 @ 15:01)  BP: 133/81 (05-05-18 @ 15:01)  RR: 15 (05-05-18 @ 15:01)  SpO2: 96% (05-05-18 @ 15:01)  Wt(kg): --  I&O's Detail          REVIEW OF SYSTEMS:    CONSTITUTIONAL:  No fevers, chills, sweats    HEENT:  Eyes:  No diplopia or blurred vision. ENT:  No earache, sore throat or runny nose.    CARDIOVASCULAR:  No pressure, squeezing, tightness, or heaviness about the chest; no palpitations.    RESPIRATORY:  Per HPI    GASTROINTESTINAL:  No abdominal pain, nausea, vomiting or diarrhea.    GENITOURINARY:  No dysuria, frequency or urgency.    NEUROLOGIC:  No paresthesias, fasciculations, seizures or weakness.    PSYCHIATRIC:  No disorder of thought or mood.      PHYSICAL EXAM:    Constitutional: Well developed and nourished  Eyes:Perrla  ENMT: normal  Neck:supple  Respiratory: +R LL ronchi noted  Cardiovascular: S1 S2 regular  Gastrointestinal: Soft, Non tender  Extremities: right ankle mild edema, nontender, + peripheral pulses  Vascular:normal  Neurological:Awake, alert,Ox3  Musculoskeletal:Normal      MEDICATIONS  (STANDING):  amLODIPine   Tablet 5 milliGRAM(s) Oral daily  cefTRIAXone   IVPB 1 Gram(s) IV Intermittent every 24 hours  enoxaparin Injectable 40 milliGRAM(s) SubCutaneous daily  oseltamivir 75 milliGRAM(s) Oral two times a day  senna 2 Tablet(s) Oral at bedtime  tamsulosin 0.4 milliGRAM(s) Oral at bedtime    MEDICATIONS  (PRN):  melatonin 3 milliGRAM(s) Oral at bedtime PRN Insomnia      Allergies    No Known Allergies    Intolerances        LABS:                        11.0   4.9   )-----------( 193      ( 05 May 2018 06:14 )             35.8     05-05    139  |  108  |  10  ----------------------------<  83  3.8   |  26  |  0.69    Ca    8.8      05 May 2018 06:14  Phos  2.3     05-05  Mg     2.0     05-05                CAPILLARY BLOOD GLUCOSE            RADIOLOGY & ADDITIONAL TESTS:    CXR:    Ct scan chest:    ekg;    echo: Patient is a 85y old  Male who presents with a chief complaint of cough (02 May 2018 13:43)      INTERVAL HPI/OVERNIGHT EVENTS:    PT was examined at bedside, Pt is A&Ox3, in nad, reports no new complaints at this time.    VITAL SIGNS:  T(F): 97.8 (05-05-18 @ 15:01)  HR: 97 (05-05-18 @ 15:01)  BP: 133/81 (05-05-18 @ 15:01)  RR: 15 (05-05-18 @ 15:01)  SpO2: 96% (05-05-18 @ 15:01)  Wt(kg): --  I&O's Detail          REVIEW OF SYSTEMS:    CONSTITUTIONAL:  No fevers, chills, sweats    HEENT:  Eyes:  No diplopia or blurred vision. ENT:  No earache, sore throat or runny nose.    CARDIOVASCULAR:  No pressure, squeezing, tightness, or heaviness about the chest; no palpitations.    RESPIRATORY:  Per HPI    GASTROINTESTINAL:  No abdominal pain, nausea, vomiting or diarrhea.    GENITOURINARY:  No dysuria, frequency or urgency.    NEUROLOGIC:  No paresthesias, fasciculations, seizures or weakness.    PSYCHIATRIC:  No disorder of thought or mood.      PHYSICAL EXAM:    Constitutional: Well developed and nourished  Eyes:Perrla  ENMT: normal  Neck:supple  Respiratory: +R LL ronchi noted  Cardiovascular: S1 S2 regular  Gastrointestinal: Soft, Non tender  Extremities: right ankle mild edema, nontender, + peripheral pulses  Vascular:normal  Neurological:Awake, alert,Ox3  Musculoskeletal:Normal      MEDICATIONS  (STANDING):  amLODIPine   Tablet 5 milliGRAM(s) Oral daily  cefTRIAXone   IVPB 1 Gram(s) IV Intermittent every 24 hours  enoxaparin Injectable 40 milliGRAM(s) SubCutaneous daily  oseltamivir 75 milliGRAM(s) Oral two times a day  senna 2 Tablet(s) Oral at bedtime  tamsulosin 0.4 milliGRAM(s) Oral at bedtime    MEDICATIONS  (PRN):  melatonin 3 milliGRAM(s) Oral at bedtime PRN Insomnia      Allergies    No Known Allergies    Intolerances        LABS:                        11.0   4.9   )-----------( 193      ( 05 May 2018 06:14 )             35.8     05-05    139  |  108  |  10  ----------------------------<  83  3.8   |  26  |  0.69    Ca    8.8      05 May 2018 06:14  Phos  2.3     05-05  Mg     2.0     05-05                CAPILLARY BLOOD GLUCOSE            RADIOLOGY & ADDITIONAL TESTS:    CXR:  < from: Xray Chest 1 View AP/PA (05.02.18 @ 11:25) >  IMPRESSION: Right lower lung field infiltrate.    < end of copied text >    Ct scan chest:    ekg;    echo:

## 2018-05-06 RX ORDER — AZITHROMYCIN 500 MG/1
250 TABLET, FILM COATED ORAL DAILY
Qty: 0 | Refills: 0 | Status: DISCONTINUED | OUTPATIENT
Start: 2018-05-06 | End: 2018-05-08

## 2018-05-06 RX ADMIN — SENNA PLUS 2 TABLET(S): 8.6 TABLET ORAL at 21:34

## 2018-05-06 RX ADMIN — ENOXAPARIN SODIUM 40 MILLIGRAM(S): 100 INJECTION SUBCUTANEOUS at 12:12

## 2018-05-06 RX ADMIN — Medication 75 MILLIGRAM(S): at 06:21

## 2018-05-06 RX ADMIN — TAMSULOSIN HYDROCHLORIDE 0.4 MILLIGRAM(S): 0.4 CAPSULE ORAL at 21:34

## 2018-05-06 RX ADMIN — AZITHROMYCIN 250 MILLIGRAM(S): 500 TABLET, FILM COATED ORAL at 12:12

## 2018-05-06 RX ADMIN — Medication 75 MILLIGRAM(S): at 17:22

## 2018-05-06 RX ADMIN — Medication 3 MILLIGRAM(S): at 23:07

## 2018-05-06 RX ADMIN — CEFTRIAXONE 100 GRAM(S): 500 INJECTION, POWDER, FOR SOLUTION INTRAMUSCULAR; INTRAVENOUS at 12:12

## 2018-05-06 RX ADMIN — AMLODIPINE BESYLATE 5 MILLIGRAM(S): 2.5 TABLET ORAL at 06:21

## 2018-05-06 NOTE — PROGRESS NOTE ADULT - SUBJECTIVE AND OBJECTIVE BOX
Patient is a 85y old  Male who presents with a chief complaint of cough (02 May 2018 13:43)    PT was examined at bedside, Pt is A&Ox3, in nad, reports no new complaints at this time.    INTERVAL HPI/OVERNIGHT EVENTS:      VITAL SIGNS:  T(F): 97.1 (18 @ 05:30)  HR: 86 (18 @ 05:30)  BP: 154/85 (18 @ 05:30)  RR: 16 (18 @ 05:30)  SpO2: 96% (18 @ 05:30)  Wt(kg): --  I&O's Detail    05 May 2018 07:01  -  06 May 2018 07:00  --------------------------------------------------------  IN:    Oral Fluid: 240 mL  Total IN: 240 mL    OUT:  Total OUT: 0 mL    Total NET: 240 mL              REVIEW OF SYSTEMS:    CONSTITUTIONAL:  No fevers, chills, sweats    HEENT:  Eyes:  No diplopia or blurred vision. ENT:  No earache, sore throat or runny nose.    CARDIOVASCULAR:  No pressure, squeezing, tightness, or heaviness about the chest; no palpitations.    RESPIRATORY:  Per HPI    GASTROINTESTINAL:  No abdominal pain, nausea, vomiting or diarrhea.    GENITOURINARY:  No dysuria, frequency or urgency.    NEUROLOGIC:  No paresthesias, fasciculations, seizures or weakness.    PSYCHIATRIC:  No disorder of thought or mood.      PHYSICAL EXAM:    Constitutional: Well developed and nourished  Eyes:Perrla  ENMT: normal  Neck:supple  Respiratory: +R LL ronchi noted  Cardiovascular: S1 S2 regular  Gastrointestinal: Soft, Non tender  Extremities:  right ankle mild edema, nontender, + peripheral pulses  Vascular:normal  Neurological:Awake, alert,Ox3  Musculoskeletal:Normal      MEDICATIONS  (STANDING):  amLODIPine   Tablet 5 milliGRAM(s) Oral daily  azithromycin   Tablet 250 milliGRAM(s) Oral daily  cefTRIAXone   IVPB 1 Gram(s) IV Intermittent every 24 hours  enoxaparin Injectable 40 milliGRAM(s) SubCutaneous daily  oseltamivir 75 milliGRAM(s) Oral two times a day  senna 2 Tablet(s) Oral at bedtime  tamsulosin 0.4 milliGRAM(s) Oral at bedtime    MEDICATIONS  (PRN):  melatonin 3 milliGRAM(s) Oral at bedtime PRN Insomnia      Allergies    No Known Allergies    Intolerances        LABS:                        11.0   4.9   )-----------( 193      ( 05 May 2018 06:14 )             35.8     05-    139  |  108  |  10  ----------------------------<  83  3.8   |  26  |  0.69    Ca    8.8      05 May 2018 06:14  Phos  2.3     05-05  Mg     2.0     05-05        Urinalysis Basic - ( 05 May 2018 17:00 )    Color: Yellow / Appearance: Clear / S.010 / pH: x  Gluc: x / Ketone: Negative  / Bili: Negative / Urobili: 1   Blood: x / Protein: Negative / Nitrite: Negative   Leuk Esterase: Moderate / RBC: 2-5 /HPF / WBC 11-25 /HPF   Sq Epi: x / Non Sq Epi: Many /HPF / Bacteria: Moderate /HPF            CAPILLARY BLOOD GLUCOSE            RADIOLOGY & ADDITIONAL TESTS:    CXR:  < from: Xray Chest 1 View AP/PA (18 @ 11:25) >  IMPRESSION: Right lower lung field infiltrate.    < end of copied text >      Ct scan chest:        ekg;        echo: Patient is a 85y old  Male who presents with a chief complaint of cough (02 May 2018 13:43)    PT was examined at bedside, Pt is Awake, alert in NAD. Feels better    INTERVAL HPI/OVERNIGHT EVENTS:      VITAL SIGNS:  T(F): 97.1 (18 @ 05:30)  HR: 86 (18 @ 05:30)  BP: 154/85 (18 @ 05:30)  RR: 16 (18 @ 05:30)  SpO2: 96% (18 @ 05:30)  Wt(kg): --  I&O's Detail    05 May 2018 07:01  -  06 May 2018 07:00  --------------------------------------------------------  IN:    Oral Fluid: 240 mL  Total IN: 240 mL    OUT:  Total OUT: 0 mL    Total NET: 240 mL              REVIEW OF SYSTEMS:    CONSTITUTIONAL:  No fevers, chills, sweats    HEENT:  Eyes:  No diplopia or blurred vision. ENT:  No earache, sore throat or runny nose.    CARDIOVASCULAR:  No pressure, squeezing, tightness, or heaviness about the chest; no palpitations.    RESPIRATORY:  Per HPI    GASTROINTESTINAL:  No abdominal pain, nausea, vomiting or diarrhea.    GENITOURINARY:  No dysuria, frequency or urgency.    NEUROLOGIC:  No paresthesias, fasciculations, seizures or weakness.    PSYCHIATRIC:  No disorder of thought or mood.      PHYSICAL EXAM:    Constitutional: Well developed and nourished  Eyes:Perrla  ENMT: normal  Neck:supple  Respiratory: +R LL ronchi noted  Cardiovascular: S1 S2 regular  Gastrointestinal: Soft, Non tender  Extremities:  right ankle mild edema, nontender, + peripheral pulses  Vascular:normal  Neurological:Awake, alert,Ox3  Musculoskeletal:Normal      MEDICATIONS  (STANDING):  amLODIPine   Tablet 5 milliGRAM(s) Oral daily  azithromycin   Tablet 250 milliGRAM(s) Oral daily  cefTRIAXone   IVPB 1 Gram(s) IV Intermittent every 24 hours  enoxaparin Injectable 40 milliGRAM(s) SubCutaneous daily  oseltamivir 75 milliGRAM(s) Oral two times a day  senna 2 Tablet(s) Oral at bedtime  tamsulosin 0.4 milliGRAM(s) Oral at bedtime    MEDICATIONS  (PRN):  melatonin 3 milliGRAM(s) Oral at bedtime PRN Insomnia      Allergies    No Known Allergies    Intolerances        LABS:                        11.0   4.9   )-----------( 193      ( 05 May 2018 06:14 )             35.8     05-    139  |  108  |  10  ----------------------------<  83  3.8   |  26  |  0.69    Ca    8.8      05 May 2018 06:14  Phos  2.3     05-05  Mg     2.0     05-05        Urinalysis Basic - ( 05 May 2018 17:00 )    Color: Yellow / Appearance: Clear / S.010 / pH: x  Gluc: x / Ketone: Negative  / Bili: Negative / Urobili: 1   Blood: x / Protein: Negative / Nitrite: Negative   Leuk Esterase: Moderate / RBC: 2-5 /HPF / WBC 11-25 /HPF   Sq Epi: x / Non Sq Epi: Many /HPF / Bacteria: Moderate /HPF            CAPILLARY BLOOD GLUCOSE            RADIOLOGY & ADDITIONAL TESTS:    CXR:  < from: Xray Chest 1 View AP/PA (18 @ 11:25) >  IMPRESSION: Right lower lung field infiltrate.    < end of copied text >      Ct scan chest:        ekg;        echo:

## 2018-05-06 NOTE — PROGRESS NOTE ADULT - SUBJECTIVE AND OBJECTIVE BOX
PGY 1 Note discussed with supervising resident and primary attending    Patient is a 85y old  Male who presents with a chief complaint of cough (02 May 2018 13:43)      INTERVAL HPI/OVERNIGHT EVENTS: offers no new complaints; current symptoms resolving    MEDICATIONS  (STANDING):  amLODIPine   Tablet 5 milliGRAM(s) Oral daily  cefTRIAXone   IVPB 1 Gram(s) IV Intermittent every 24 hours  enoxaparin Injectable 40 milliGRAM(s) SubCutaneous daily  oseltamivir 75 milliGRAM(s) Oral two times a day  senna 2 Tablet(s) Oral at bedtime  tamsulosin 0.4 milliGRAM(s) Oral at bedtime    MEDICATIONS  (PRN):  melatonin 3 milliGRAM(s) Oral at bedtime PRN Insomnia      __________________________________________________  REVIEW OF SYSTEMS:    CONSTITUTIONAL: No fever,   EYES: no acute visual disturbances  NECK: No pain or stiffness  RESPIRATORY: No cough; No shortness of breath  CARDIOVASCULAR: No chest pain, no palpitations  GASTROINTESTINAL: No pain. No nausea or vomiting; No diarrhea   NEUROLOGICAL: No headache or numbness, no tremors  MUSCULOSKELETAL: No joint pain, no muscle pain  GENITOURINARY: no dysuria, no frequency, no hesitancy  PSYCHIATRY: no depression , no anxiety  ALL OTHER  ROS negative        Vital Signs Last 24 Hrs  T(C): 36.2 (06 May 2018 05:30), Max: 36.6 (05 May 2018 15:01)  T(F): 97.1 (06 May 2018 05:30), Max: 97.8 (05 May 2018 15:01)  HR: 86 (06 May 2018 05:30) (76 - 97)  BP: 154/85 (06 May 2018 05:30) (133/81 - 154/85)  BP(mean): --  RR: 16 (06 May 2018 05:30) (15 - 16)  SpO2: 96% (06 May 2018 05:30) (96% - 97%)    ________________________________________________  PHYSICAL EXAM:  GENERAL: NAD  HEENT: Normocephalic;  conjunctivae and sclerae clear; moist mucous membranes;   NECK : supple  CHEST/LUNG: Clear to auscultation bilaterally with good air entry   HEART: S1 S2  regular; no murmurs, gallops or rubs  ABDOMEN: Soft, Nontender, Nondistended; Bowel sounds present  EXTREMITIES: no cyanosis; no edema; no calf tenderness  SKIN: warm and dry; no rash  NERVOUS SYSTEM:  Awake and alert; Oriented  to place, person and time ; no new deficits    _________________________________________________  LABS:                        11.0   4.9   )-----------( 193      ( 05 May 2018 06:14 )             35.8     05-05    139  |  108  |  10  ----------------------------<  83  3.8   |  26  |  0.69    Ca    8.8      05 May 2018 06:14  Phos  2.3     05-05  Mg     2.0     05-05        Urinalysis Basic - ( 05 May 2018 17:00 )    Color: Yellow / Appearance: Clear / S.010 / pH: x  Gluc: x / Ketone: Negative  / Bili: Negative / Urobili: 1   Blood: x / Protein: Negative / Nitrite: Negative   Leuk Esterase: Moderate / RBC: 2-5 /HPF / WBC 11-25 /HPF   Sq Epi: x / Non Sq Epi: Many /HPF / Bacteria: Moderate /HPF      CAPILLARY BLOOD GLUCOSE            RADIOLOGY & ADDITIONAL TESTS:    Imaging Personally Reviewed:  YES    Consultant(s) Notes Reviewed:   YES  Care Discussed with Consultants :     Plan of care was discussed with patient and /or primary care giver; all questions and concerns were addressed and care was aligned with patient's wishes.

## 2018-05-07 LAB
ANION GAP SERPL CALC-SCNC: 7 MMOL/L — SIGNIFICANT CHANGE UP (ref 5–17)
BASOPHILS # BLD AUTO: 0.1 K/UL — SIGNIFICANT CHANGE UP (ref 0–0.2)
BASOPHILS NFR BLD AUTO: 1.1 % — SIGNIFICANT CHANGE UP (ref 0–2)
BUN SERPL-MCNC: 8 MG/DL — SIGNIFICANT CHANGE UP (ref 7–18)
CALCIUM SERPL-MCNC: 9 MG/DL — SIGNIFICANT CHANGE UP (ref 8.4–10.5)
CHLORIDE SERPL-SCNC: 108 MMOL/L — SIGNIFICANT CHANGE UP (ref 96–108)
CO2 SERPL-SCNC: 26 MMOL/L — SIGNIFICANT CHANGE UP (ref 22–31)
CREAT SERPL-MCNC: 0.75 MG/DL — SIGNIFICANT CHANGE UP (ref 0.5–1.3)
CULTURE RESULTS: SIGNIFICANT CHANGE UP
CULTURE RESULTS: SIGNIFICANT CHANGE UP
EOSINOPHIL # BLD AUTO: 0.1 K/UL — SIGNIFICANT CHANGE UP (ref 0–0.5)
EOSINOPHIL NFR BLD AUTO: 2.1 % — SIGNIFICANT CHANGE UP (ref 0–6)
GLUCOSE SERPL-MCNC: 87 MG/DL — SIGNIFICANT CHANGE UP (ref 70–99)
HCT VFR BLD CALC: 39.6 % — SIGNIFICANT CHANGE UP (ref 39–50)
HGB BLD-MCNC: 12.3 G/DL — LOW (ref 13–17)
LYMPHOCYTES # BLD AUTO: 1.1 K/UL — SIGNIFICANT CHANGE UP (ref 1–3.3)
LYMPHOCYTES # BLD AUTO: 24 % — SIGNIFICANT CHANGE UP (ref 13–44)
MAGNESIUM SERPL-MCNC: 2.2 MG/DL — SIGNIFICANT CHANGE UP (ref 1.6–2.6)
MCHC RBC-ENTMCNC: 27 PG — SIGNIFICANT CHANGE UP (ref 27–34)
MCHC RBC-ENTMCNC: 31 GM/DL — LOW (ref 32–36)
MCV RBC AUTO: 87.2 FL — SIGNIFICANT CHANGE UP (ref 80–100)
MONOCYTES # BLD AUTO: 0.7 K/UL — SIGNIFICANT CHANGE UP (ref 0–0.9)
MONOCYTES NFR BLD AUTO: 13.9 % — SIGNIFICANT CHANGE UP (ref 2–14)
NEUTROPHILS # BLD AUTO: 2.8 K/UL — SIGNIFICANT CHANGE UP (ref 1.8–7.4)
NEUTROPHILS NFR BLD AUTO: 58.9 % — SIGNIFICANT CHANGE UP (ref 43–77)
PHOSPHATE SERPL-MCNC: 2.6 MG/DL — SIGNIFICANT CHANGE UP (ref 2.5–4.5)
PLATELET # BLD AUTO: 222 K/UL — SIGNIFICANT CHANGE UP (ref 150–400)
POTASSIUM SERPL-MCNC: 3.6 MMOL/L — SIGNIFICANT CHANGE UP (ref 3.5–5.3)
POTASSIUM SERPL-SCNC: 3.6 MMOL/L — SIGNIFICANT CHANGE UP (ref 3.5–5.3)
RBC # BLD: 4.54 M/UL — SIGNIFICANT CHANGE UP (ref 4.2–5.8)
RBC # FLD: 13.1 % — SIGNIFICANT CHANGE UP (ref 10.3–14.5)
SODIUM SERPL-SCNC: 141 MMOL/L — SIGNIFICANT CHANGE UP (ref 135–145)
SPECIMEN SOURCE: SIGNIFICANT CHANGE UP
SPECIMEN SOURCE: SIGNIFICANT CHANGE UP
WBC # BLD: 4.7 K/UL — SIGNIFICANT CHANGE UP (ref 3.8–10.5)
WBC # FLD AUTO: 4.7 K/UL — SIGNIFICANT CHANGE UP (ref 3.8–10.5)

## 2018-05-07 RX ORDER — AMLODIPINE BESYLATE 2.5 MG/1
5 TABLET ORAL ONCE
Qty: 0 | Refills: 0 | Status: COMPLETED | OUTPATIENT
Start: 2018-05-07 | End: 2018-05-07

## 2018-05-07 RX ORDER — DRONABINOL 2.5 MG
2.5 CAPSULE ORAL
Qty: 0 | Refills: 0 | Status: DISCONTINUED | OUTPATIENT
Start: 2018-05-07 | End: 2018-05-08

## 2018-05-07 RX ORDER — AMLODIPINE BESYLATE 2.5 MG/1
10 TABLET ORAL DAILY
Qty: 0 | Refills: 0 | Status: DISCONTINUED | OUTPATIENT
Start: 2018-05-07 | End: 2018-05-08

## 2018-05-07 RX ADMIN — ENOXAPARIN SODIUM 40 MILLIGRAM(S): 100 INJECTION SUBCUTANEOUS at 11:58

## 2018-05-07 RX ADMIN — Medication 75 MILLIGRAM(S): at 18:02

## 2018-05-07 RX ADMIN — AMLODIPINE BESYLATE 5 MILLIGRAM(S): 2.5 TABLET ORAL at 11:58

## 2018-05-07 RX ADMIN — Medication 2.5 MILLIGRAM(S): at 18:02

## 2018-05-07 RX ADMIN — AMLODIPINE BESYLATE 5 MILLIGRAM(S): 2.5 TABLET ORAL at 05:44

## 2018-05-07 RX ADMIN — AZITHROMYCIN 250 MILLIGRAM(S): 500 TABLET, FILM COATED ORAL at 11:58

## 2018-05-07 RX ADMIN — Medication 75 MILLIGRAM(S): at 05:44

## 2018-05-07 RX ADMIN — Medication 3 MILLIGRAM(S): at 22:44

## 2018-05-07 RX ADMIN — SENNA PLUS 2 TABLET(S): 8.6 TABLET ORAL at 22:43

## 2018-05-07 RX ADMIN — TAMSULOSIN HYDROCHLORIDE 0.4 MILLIGRAM(S): 0.4 CAPSULE ORAL at 22:43

## 2018-05-07 RX ADMIN — CEFTRIAXONE 100 GRAM(S): 500 INJECTION, POWDER, FOR SOLUTION INTRAMUSCULAR; INTRAVENOUS at 11:58

## 2018-05-07 NOTE — DISCHARGE NOTE ADULT - PLAN OF CARE
complete resolution of infection presented with cough- Chest xray was done and pneumonia noted   started on azithromycin and rocephin on admission   continue antibiotics as per guided in hospital Flu noted positive for influenza B  completed 5 days of tamiflu BP was noted high on admission   started on amlodipine during hospital stay   continue amlodipine 10 mg daily on discharge   follow up with PCP in 2 weeks for repeat BP check left sided chronic hernia noted   CT scan done and chronic hernia noted   please follow up with outpatient surgery for hernia repair.

## 2018-05-07 NOTE — PROGRESS NOTE ADULT - SUBJECTIVE AND OBJECTIVE BOX
Patient is a 85y old  Male who presents with a chief complaint of cough (02 May 2018 13:43)    pt seen in icu [  ], reg med floor [  x ], bed [ x ], chair at bedside [   ], a+o x3 [x  ], lethargic [  ],  nad [x  ]      Allergies    No Known Allergies        Vitals    T(F): 97 (05-07-18 @ 05:31), Max: 97.8 (05-06-18 @ 13:51)  HR: 80 (05-07-18 @ 05:31) (76 - 87)  BP: 154/67 (05-07-18 @ 05:31) (137/58 - 157/83)  RR: 17 (05-07-18 @ 05:31) (16 - 17)  SpO2: 99% (05-07-18 @ 05:31) (98% - 100%)  Wt(kg): --  CAPILLARY BLOOD GLUCOSE          Labs                          12.3   4.7   )-----------( 222      ( 07 May 2018 05:54 )             39.6       05-07    141  |  108  |  8   ----------------------------<  87  3.6   |  26  |  0.75    Ca    9.0      07 May 2018 05:54  Phos  2.6     05-07  Mg     2.2     05-07              .Urine Clean Catch (Midstream)  05-02 @ 21:56   <10,000 CFU/ml Normal Urogenital kevin present  --  --      .Blood Blood  05-02 @ 19:43   No growth to date.  --  --      .Blood Blood  05-02 @ 19:42   No growth to date.  --  --          Radiology Results      Meds    MEDICATIONS  (STANDING):  amLODIPine   Tablet 10 milliGRAM(s) Oral daily  azithromycin   Tablet 250 milliGRAM(s) Oral daily  cefTRIAXone   IVPB 1 Gram(s) IV Intermittent every 24 hours  enoxaparin Injectable 40 milliGRAM(s) SubCutaneous daily  oseltamivir 75 milliGRAM(s) Oral two times a day  senna 2 Tablet(s) Oral at bedtime  tamsulosin 0.4 milliGRAM(s) Oral at bedtime      MEDICATIONS  (PRN):  melatonin 3 milliGRAM(s) Oral at bedtime PRN Insomnia      Physical Exam      Neuro :  no focal deficits  Respiratory: b/l ronchi  CV: RRR, S1S2, no murmurs,   Abdominal: Soft, NT, ND +BS,  Extremities: no edema, + peripheral pulses    ASSESSMENT    Pneumonia  flu b infxn  s/p fall  right ankle pain and swelling likely 2nd to djd vs sprain  htn  uti  h/o bph        PLAN    cont rocephin and zith  bld cx neg noted above  ucx with normal urogenital kevin  rept ua positive noted above  f/u rept ucx  cont tamiflu to complete 5 days  pulm f/u  cxr result noted above  cont robitussin prn  cont sup O2  ibuprofen prn pain  foot and leg xrays noted above  phys tx   cont current meds

## 2018-05-07 NOTE — DISCHARGE NOTE ADULT - CARE PLAN
Principal Discharge DX:	Pneumonia  Goal:	complete resolution of infection  Assessment and plan of treatment:	presented with cough- Chest xray was done and pneumonia noted   started on azithromycin and rocephin on admission   continue antibiotics as per guided in hospital  Secondary Diagnosis:	Flu  Assessment and plan of treatment:	Flu noted positive for influenza B  completed 5 days of tamiflu  Secondary Diagnosis:	HTN (hypertension)  Assessment and plan of treatment:	BP was noted high on admission   started on amlodipine during hospital stay   continue amlodipine 10 mg daily on discharge   follow up with PCP in 2 weeks for repeat BP check Principal Discharge DX:	Pneumonia  Goal:	complete resolution of infection  Assessment and plan of treatment:	presented with cough- Chest xray was done and pneumonia noted   started on azithromycin and rocephin on admission   continue antibiotics as per guided in hospital  Secondary Diagnosis:	Flu  Assessment and plan of treatment:	Flu noted positive for influenza B  completed 5 days of tamiflu  Secondary Diagnosis:	HTN (hypertension)  Assessment and plan of treatment:	BP was noted high on admission   started on amlodipine during hospital stay   continue amlodipine 10 mg daily on discharge   follow up with PCP in 2 weeks for repeat BP check  Secondary Diagnosis:	Left inguinal hernia  Assessment and plan of treatment:	left sided chronic hernia noted   CT scan done and chronic hernia noted   please follow up with outpatient surgery for hernia repair.

## 2018-05-07 NOTE — DISCHARGE NOTE ADULT - ABILITY TO HEAR (WITH HEARING AID OR HEARING APPLIANCE IF NORMALLY USED):
B/L [place]/Mildly to Moderately Impaired: difficulty hearing in some environments or speaker may need to increase volume or speak distinctly

## 2018-05-07 NOTE — PROGRESS NOTE ADULT - SUBJECTIVE AND OBJECTIVE BOX
PGY 1 Note discussed with supervising resident and primary attending    Patient is a 85y old  Male who presents with a chief complaint of cough (02 May 2018 13:43)      INTERVAL HPI/OVERNIGHT EVENTS: no new complaints resolving cough    MEDICATIONS  (STANDING):  amLODIPine   Tablet 10 milliGRAM(s) Oral daily  azithromycin   Tablet 250 milliGRAM(s) Oral daily  cefTRIAXone   IVPB 1 Gram(s) IV Intermittent every 24 hours  dronabinol 2.5 milliGRAM(s) Oral two times a day  enoxaparin Injectable 40 milliGRAM(s) SubCutaneous daily  oseltamivir 75 milliGRAM(s) Oral two times a day  senna 2 Tablet(s) Oral at bedtime  tamsulosin 0.4 milliGRAM(s) Oral at bedtime    MEDICATIONS  (PRN):  melatonin 3 milliGRAM(s) Oral at bedtime PRN Insomnia      __________________________________________________  REVIEW OF SYSTEMS:    CONSTITUTIONAL: No fever,   EYES: no acute visual disturbances  NECK: No pain or stiffness  RESPIRATORY: No cough; No shortness of breath  CARDIOVASCULAR: No chest pain, no palpitations  GASTROINTESTINAL: No pain. No nausea or vomiting; No diarrhea   NEUROLOGICAL: No headache or numbness, no tremors  MUSCULOSKELETAL: No joint pain, no muscle pain  GENITOURINARY: no dysuria, no frequency, no hesitancy  PSYCHIATRY: no depression , no anxiety  ALL OTHER  ROS negative        Vital Signs Last 24 Hrs  T(C): 36.1 (07 May 2018 05:31), Max: 36.6 (06 May 2018 13:51)  T(F): 97 (07 May 2018 05:31), Max: 97.8 (06 May 2018 13:51)  HR: 80 (07 May 2018 05:31) (76 - 87)  BP: 154/67 (07 May 2018 05:31) (137/58 - 157/83)  BP(mean): --  RR: 17 (07 May 2018 05:31) (16 - 17)  SpO2: 99% (07 May 2018 05:31) (98% - 100%)    ________________________________________________  PHYSICAL EXAM:  GENERAL: NAD  HEENT: Normocephalic;  conjunctivae and sclerae clear; moist mucous membranes;   NECK : supple  CHEST/LUNG: Clear to auscultation bilaterally with good air entry   HEART: S1 S2  regular; no murmurs, gallops or rubs  ABDOMEN: Soft, Nontender, Nondistended; Bowel sounds present  EXTREMITIES: no cyanosis; no edema; no calf tenderness  SKIN: warm and dry; no rash  NERVOUS SYSTEM:  Awake and alert; Oriented  to place, person and time ; no new deficits    _________________________________________________  LABS:                        12.3   4.7   )-----------( 222      ( 07 May 2018 05:54 )             39.6     05-07    141  |  108  |  8   ----------------------------<  87  3.6   |  26  |  0.75    Ca    9.0      07 May 2018 05:54  Phos  2.6     05-07  Mg     2.2     05-07        Urinalysis Basic - ( 05 May 2018 17:00 )    Color: Yellow / Appearance: Clear / S.010 / pH: x  Gluc: x / Ketone: Negative  / Bili: Negative / Urobili: 1   Blood: x / Protein: Negative / Nitrite: Negative   Leuk Esterase: Moderate / RBC: 2-5 /HPF / WBC 11-25 /HPF   Sq Epi: x / Non Sq Epi: Many /HPF / Bacteria: Moderate /HPF      CAPILLARY BLOOD GLUCOSE            RADIOLOGY & ADDITIONAL TESTS:    Imaging Personally Reviewed:  YES    Consultant(s) Notes Reviewed:   YES    Care Discussed with Consultants :     Plan of care was discussed with patient and /or primary care giver; all questions and concerns were addressed and care was aligned with patient's wishes.

## 2018-05-07 NOTE — DISCHARGE NOTE ADULT - HOSPITAL COURSE
HPI:  84 y/o M with no pmhx came in with c/o cough since 5 days. Patient said he has been using cough medication but it did not help. Cough is productive of yellow sputum. Denies fever, chills, nausea, or vomiting. Denies travel history. Denies sick contact. Denies SOB or respiratory distress. He also complaints of R LE swelling since fall almost 1 week ago. He said he fell on his right side while going to the bathroom. He denies pain, and is able to ambulate using cane since the fall.     SH: Denies smoking, alcohol or illicit drug use. Lives with daughter in law at home. Ambulates with cane (02 May 2018 13:43)    Hospital course:  Patient was admitted with cough, Chest xray was done and pneumonia noted   started on azithromycin and rocephin on admission.  RVP noted influenza B positive - treated with Tamiflu for 5 days.  on admission urine culture - normal kevin noted, repeat urine culture was sent following continues dysuria.   patient is hemodynamically stable and ready for discharge.

## 2018-05-07 NOTE — PROGRESS NOTE ADULT - SUBJECTIVE AND OBJECTIVE BOX
Patient is a 85y old  Male who presents with a chief complaint of cough.   PT was examined at bedside, Pt is Awake, alert in NAD. Feels better.      INTERVAL HPI/OVERNIGHT EVENTS:      VITAL SIGNS:  T(F): 97 (18 @ 05:31)  HR: 80 (18 @ 05:31)  BP: 154/67 (18 @ 05:31)  RR: 17 (18 @ 05:31)  SpO2: 99% (18 @ 05:31)  Wt(kg): --  I&O's Detail          REVIEW OF SYSTEMS:    CONSTITUTIONAL:  No fevers, chills, sweats    HEENT:  Eyes:  No diplopia or blurred vision. ENT:  No earache, sore throat or runny nose.    CARDIOVASCULAR:  No pressure, squeezing, tightness, or heaviness about the chest; no palpitations.    RESPIRATORY:  Per HPI    GASTROINTESTINAL:  No abdominal pain, nausea, vomiting or diarrhea.    GENITOURINARY:  No dysuria, frequency or urgency.    NEUROLOGIC:  No paresthesias, fasciculations, seizures or weakness.    PSYCHIATRIC:  No disorder of thought or mood.      PHYSICAL EXAM:    Constitutional: Well developed and nourished  Eyes:Perrla  ENMT: normal  Neck:supple  Respiratory: good air entry  Cardiovascular: S1 S2 regular  Gastrointestinal: Soft, Non tender  Extremities: No edema  Vascular:normal  Neurological:Awake, alert,Ox3  Musculoskeletal:Normal      MEDICATIONS  (STANDING):  amLODIPine   Tablet 10 milliGRAM(s) Oral daily  amLODIPine   Tablet 5 milliGRAM(s) Oral once  azithromycin   Tablet 250 milliGRAM(s) Oral daily  cefTRIAXone   IVPB 1 Gram(s) IV Intermittent every 24 hours  enoxaparin Injectable 40 milliGRAM(s) SubCutaneous daily  oseltamivir 75 milliGRAM(s) Oral two times a day  senna 2 Tablet(s) Oral at bedtime  tamsulosin 0.4 milliGRAM(s) Oral at bedtime    MEDICATIONS  (PRN):  melatonin 3 milliGRAM(s) Oral at bedtime PRN Insomnia      Allergies    No Known Allergies    Intolerances        LABS:                        12.3   4.7   )-----------( 222      ( 07 May 2018 05:54 )             39.6     -07    141  |  108  |  8   ----------------------------<  87  3.6   |  26  |  0.75    Ca    9.0      07 May 2018 05:54  Phos  2.6     05-  Mg     2.2     05-07        Urinalysis Basic - ( 05 May 2018 17:00 )    Color: Yellow / Appearance: Clear / S.010 / pH: x  Gluc: x / Ketone: Negative  / Bili: Negative / Urobili: 1   Blood: x / Protein: Negative / Nitrite: Negative   Leuk Esterase: Moderate / RBC: 2-5 /HPF / WBC 11-25 /HPF   Sq Epi: x / Non Sq Epi: Many /HPF / Bacteria: Moderate /HPF            CAPILLARY BLOOD GLUCOSE            RADIOLOGY & ADDITIONAL TESTS:    CXR:    Ct scan chest:    ekg;    echo: Patient is a 85y old  Male who presents with a chief complaint of cough.   PT was examined at bedside, Pt is Awake, alert in NAD. Feels better. Poor appetite      INTERVAL HPI/OVERNIGHT EVENTS:      VITAL SIGNS:  T(F): 97 (18 @ 05:31)  HR: 80 (18 @ 05:31)  BP: 154/67 (18 @ 05:31)  RR: 17 (18 @ 05:31)  SpO2: 99% (18 @ 05:31)  Wt(kg): --  I&O's Detail          REVIEW OF SYSTEMS:    CONSTITUTIONAL:  No fevers, chills, sweats    HEENT:  Eyes:  No diplopia or blurred vision. ENT:  No earache, sore throat or runny nose.    CARDIOVASCULAR:  No pressure, squeezing, tightness, or heaviness about the chest; no palpitations.    RESPIRATORY:  Per HPI    GASTROINTESTINAL:  No abdominal pain, nausea, vomiting or diarrhea.    GENITOURINARY:  No dysuria, frequency or urgency.    NEUROLOGIC:  No paresthesias, fasciculations, seizures or weakness.    PSYCHIATRIC:  No disorder of thought or mood.      PHYSICAL EXAM:    Constitutional: Well developed and nourished  Eyes:Perrla  ENMT: normal  Neck:supple  Respiratory: good air entry  Cardiovascular: S1 S2 regular  Gastrointestinal: Soft, Non tender  Extremities: No edema  Vascular:normal  Neurological:Awake, alert  Musculoskeletal:Normal      MEDICATIONS  (STANDING):  amLODIPine   Tablet 10 milliGRAM(s) Oral daily  amLODIPine   Tablet 5 milliGRAM(s) Oral once  azithromycin   Tablet 250 milliGRAM(s) Oral daily  cefTRIAXone   IVPB 1 Gram(s) IV Intermittent every 24 hours  enoxaparin Injectable 40 milliGRAM(s) SubCutaneous daily  oseltamivir 75 milliGRAM(s) Oral two times a day  senna 2 Tablet(s) Oral at bedtime  tamsulosin 0.4 milliGRAM(s) Oral at bedtime    MEDICATIONS  (PRN):  melatonin 3 milliGRAM(s) Oral at bedtime PRN Insomnia      Allergies    No Known Allergies    Intolerances        LABS:                        12.3   4.7   )-----------( 222      ( 07 May 2018 05:54 )             39.6     -07    141  |  108  |  8   ----------------------------<  87  3.6   |  26  |  0.75    Ca    9.0      07 May 2018 05:54  Phos  2.6     05-07  Mg     2.2     05-07        Urinalysis Basic - ( 05 May 2018 17:00 )    Color: Yellow / Appearance: Clear / S.010 / pH: x  Gluc: x / Ketone: Negative  / Bili: Negative / Urobili: 1   Blood: x / Protein: Negative / Nitrite: Negative   Leuk Esterase: Moderate / RBC: 2-5 /HPF / WBC 11-25 /HPF   Sq Epi: x / Non Sq Epi: Many /HPF / Bacteria: Moderate /HPF            CAPILLARY BLOOD GLUCOSE            RADIOLOGY & ADDITIONAL TESTS:    CXR:  < from: Xray Chest 1 View AP/PA (18 @ 11:25) >  IMPRESSION: Right lower lung field infiltrate.    < end of copied text >    Ct scan chest:    ekg;    echo:

## 2018-05-07 NOTE — DISCHARGE NOTE ADULT - MEDICATION SUMMARY - MEDICATIONS TO TAKE
I will START or STAY ON the medications listed below when I get home from the hospital:    tamsulosin 0.4 mg oral capsule  -- 1 cap(s) by mouth once a day (at bedtime)  -- Indication: For BPH (benign prostatic hyperplasia)    amLODIPine 10 mg oral tablet  -- 1 tab(s) by mouth once a day  -- Indication: For HTN (hypertension)    senna oral tablet  -- 2 tab(s) by mouth once a day (at bedtime)  -- Indication: For constipation     Bactrim 400 mg-80 mg oral tablet  -- 1 tab(s) by mouth 2 times a day   -- Avoid prolonged or excessive exposure to direct and/or artificial sunlight while taking this medication.  Finish all this medication unless otherwise directed by prescriber.  Medication should be taken with plenty of water.    -- Indication: For Infection

## 2018-05-08 VITALS
HEART RATE: 80 BPM | RESPIRATION RATE: 17 BRPM | DIASTOLIC BLOOD PRESSURE: 59 MMHG | OXYGEN SATURATION: 99 % | SYSTOLIC BLOOD PRESSURE: 131 MMHG

## 2018-05-08 DIAGNOSIS — K40.90 UNILATERAL INGUINAL HERNIA, WITHOUT OBSTRUCTION OR GANGRENE, NOT SPECIFIED AS RECURRENT: ICD-10-CM

## 2018-05-08 LAB
-  AMPICILLIN: SIGNIFICANT CHANGE UP
-  CIPROFLOXACIN: SIGNIFICANT CHANGE UP
-  NITROFURANTOIN: SIGNIFICANT CHANGE UP
-  TETRACYCLINE: SIGNIFICANT CHANGE UP
-  VANCOMYCIN: SIGNIFICANT CHANGE UP
ANION GAP SERPL CALC-SCNC: 6 MMOL/L — SIGNIFICANT CHANGE UP (ref 5–17)
BASOPHILS # BLD AUTO: 0 K/UL — SIGNIFICANT CHANGE UP (ref 0–0.2)
BASOPHILS NFR BLD AUTO: 0.8 % — SIGNIFICANT CHANGE UP (ref 0–2)
BUN SERPL-MCNC: 13 MG/DL — SIGNIFICANT CHANGE UP (ref 7–18)
CALCIUM SERPL-MCNC: 9.4 MG/DL — SIGNIFICANT CHANGE UP (ref 8.4–10.5)
CHLORIDE SERPL-SCNC: 106 MMOL/L — SIGNIFICANT CHANGE UP (ref 96–108)
CO2 SERPL-SCNC: 28 MMOL/L — SIGNIFICANT CHANGE UP (ref 22–31)
CREAT SERPL-MCNC: 0.91 MG/DL — SIGNIFICANT CHANGE UP (ref 0.5–1.3)
CULTURE RESULTS: SIGNIFICANT CHANGE UP
EOSINOPHIL # BLD AUTO: 0.1 K/UL — SIGNIFICANT CHANGE UP (ref 0–0.5)
EOSINOPHIL NFR BLD AUTO: 1.1 % — SIGNIFICANT CHANGE UP (ref 0–6)
GLUCOSE SERPL-MCNC: 91 MG/DL — SIGNIFICANT CHANGE UP (ref 70–99)
HCT VFR BLD CALC: 40 % — SIGNIFICANT CHANGE UP (ref 39–50)
HGB BLD-MCNC: 12.3 G/DL — LOW (ref 13–17)
LYMPHOCYTES # BLD AUTO: 1.3 K/UL — SIGNIFICANT CHANGE UP (ref 1–3.3)
LYMPHOCYTES # BLD AUTO: 21.2 % — SIGNIFICANT CHANGE UP (ref 13–44)
MAGNESIUM SERPL-MCNC: 2.3 MG/DL — SIGNIFICANT CHANGE UP (ref 1.6–2.6)
MCHC RBC-ENTMCNC: 27.1 PG — SIGNIFICANT CHANGE UP (ref 27–34)
MCHC RBC-ENTMCNC: 30.6 GM/DL — LOW (ref 32–36)
MCV RBC AUTO: 88.4 FL — SIGNIFICANT CHANGE UP (ref 80–100)
METHOD TYPE: SIGNIFICANT CHANGE UP
MONOCYTES # BLD AUTO: 0.8 K/UL — SIGNIFICANT CHANGE UP (ref 0–0.9)
MONOCYTES NFR BLD AUTO: 12.8 % — SIGNIFICANT CHANGE UP (ref 2–14)
NEUTROPHILS # BLD AUTO: 4 K/UL — SIGNIFICANT CHANGE UP (ref 1.8–7.4)
NEUTROPHILS NFR BLD AUTO: 64.2 % — SIGNIFICANT CHANGE UP (ref 43–77)
ORGANISM # SPEC MICROSCOPIC CNT: SIGNIFICANT CHANGE UP
ORGANISM # SPEC MICROSCOPIC CNT: SIGNIFICANT CHANGE UP
PHOSPHATE SERPL-MCNC: 2.8 MG/DL — SIGNIFICANT CHANGE UP (ref 2.5–4.5)
PLATELET # BLD AUTO: 283 K/UL — SIGNIFICANT CHANGE UP (ref 150–400)
POTASSIUM SERPL-MCNC: 3.8 MMOL/L — SIGNIFICANT CHANGE UP (ref 3.5–5.3)
POTASSIUM SERPL-SCNC: 3.8 MMOL/L — SIGNIFICANT CHANGE UP (ref 3.5–5.3)
RBC # BLD: 4.53 M/UL — SIGNIFICANT CHANGE UP (ref 4.2–5.8)
RBC # FLD: 13.5 % — SIGNIFICANT CHANGE UP (ref 10.3–14.5)
SODIUM SERPL-SCNC: 140 MMOL/L — SIGNIFICANT CHANGE UP (ref 135–145)
SPECIMEN SOURCE: SIGNIFICANT CHANGE UP
WBC # BLD: 6.2 K/UL — SIGNIFICANT CHANGE UP (ref 3.8–10.5)
WBC # FLD AUTO: 6.2 K/UL — SIGNIFICANT CHANGE UP (ref 3.8–10.5)

## 2018-05-08 PROCEDURE — 74176 CT ABD & PELVIS W/O CONTRAST: CPT | Mod: 26,76

## 2018-05-08 PROCEDURE — 99222 1ST HOSP IP/OBS MODERATE 55: CPT

## 2018-05-08 RX ORDER — AMLODIPINE BESYLATE 2.5 MG/1
1 TABLET ORAL
Qty: 14 | Refills: 0 | OUTPATIENT
Start: 2018-05-08 | End: 2018-05-21

## 2018-05-08 RX ORDER — TAMSULOSIN HYDROCHLORIDE 0.4 MG/1
1 CAPSULE ORAL
Qty: 14 | Refills: 0 | OUTPATIENT
Start: 2018-05-08 | End: 2018-05-21

## 2018-05-08 RX ORDER — SENNA PLUS 8.6 MG/1
2 TABLET ORAL
Qty: 28 | Refills: 0 | OUTPATIENT
Start: 2018-05-08 | End: 2018-05-21

## 2018-05-08 RX ADMIN — AZITHROMYCIN 250 MILLIGRAM(S): 500 TABLET, FILM COATED ORAL at 13:32

## 2018-05-08 RX ADMIN — Medication 2.5 MILLIGRAM(S): at 18:07

## 2018-05-08 RX ADMIN — ENOXAPARIN SODIUM 40 MILLIGRAM(S): 100 INJECTION SUBCUTANEOUS at 13:32

## 2018-05-08 RX ADMIN — Medication 75 MILLIGRAM(S): at 06:08

## 2018-05-08 RX ADMIN — Medication 2.5 MILLIGRAM(S): at 06:07

## 2018-05-08 RX ADMIN — AMLODIPINE BESYLATE 10 MILLIGRAM(S): 2.5 TABLET ORAL at 06:08

## 2018-05-08 RX ADMIN — CEFTRIAXONE 100 GRAM(S): 500 INJECTION, POWDER, FOR SOLUTION INTRAMUSCULAR; INTRAVENOUS at 13:32

## 2018-05-08 NOTE — PROGRESS NOTE ADULT - PROBLEM SELECTOR PROBLEM 1
Pneumonia

## 2018-05-08 NOTE — CONSULT NOTE ADULT - SUBJECTIVE AND OBJECTIVE BOX
HPI:  84 y/o M with no pmhx came in with c/o cough since 5 days. Patient said he has been using cough medication but it did not help. Cough is productive of yellow sputum. Denies fever, chills, nausea, or vomiting. Denies travel history. Denies sick contact. Denies SOB or respiratory distress. He also complaints of R LE swelling since fall almost 1 week ago. He said he fell on his right side while going to the bathroom. He denies pain, and is able to ambulate using cane since the fall.     Consult called due to Left Inguinal Hernia. Patient states that he has had the hernia for greater then 3 years. +BM, tolerating regular diet. No nausea or vomiting.     PAST MEDICAL & SURGICAL HISTORY:  No pertinent past medical history  No significant past surgical history    Review of Systems: Contained within HPI    MEDICATIONS  (STANDING):  amLODIPine   Tablet 10 milliGRAM(s) Oral daily  azithromycin   Tablet 250 milliGRAM(s) Oral daily  cefTRIAXone   IVPB 1 Gram(s) IV Intermittent every 24 hours  dronabinol 2.5 milliGRAM(s) Oral two times a day  enoxaparin Injectable 40 milliGRAM(s) SubCutaneous daily  senna 2 Tablet(s) Oral at bedtime  tamsulosin 0.4 milliGRAM(s) Oral at bedtime    MEDICATIONS  (PRN):  melatonin 3 milliGRAM(s) Oral at bedtime PRN Insomnia    Allergies: No Known Allergies    SH: Denies smoking, alcohol or illicit drug use. Lives with daughter in law at home. Ambulates with cane (02 May 2018 13:43)    Vital Signs Last 24 Hrs  T(C): 37 (08 May 2018 05:21), Max: 37 (08 May 2018 05:21)  T(F): 98.6 (08 May 2018 05:21), Max: 98.6 (08 May 2018 05:21)  HR: 85 (08 May 2018 05:21) (79 - 88)  BP: 132/63 (08 May 2018 05:21) (120/91 - 132/63)  RR: 18 (08 May 2018 05:21) (17 - 18)  SpO2: 99% (08 May 2018 05:21) (98% - 100%)    Physical Exam:    General:  Appears stated age, well-groomed, well-nourished, no distress  Eyes : EOMI  Chest:  respirations nonlabored	  Abdomen:  soft, nontender, nondistended. Left Inguinal Hernia palpable, irreducible, pain with attempts to reduce hernia  Skin: warm and dry  Neuro:  Alert    LABS:                        12.3   6.2   )-----------( 283      ( 08 May 2018 06:41 )             40.0     05-08    140  |  106  |  13  ----------------------------<  91  3.8   |  28  |  0.91    Ca    9.4      08 May 2018 06:41  Phos  2.8     05-08  Mg     2.3     05-08    RADIOLOGY & ADDITIONAL STUDIES:  CT abdomen and pelvis with oral contrast pending

## 2018-05-08 NOTE — PROGRESS NOTE ADULT - PROBLEM SELECTOR PROBLEM 5
BPH (benign prostatic hyperplasia)
Inguinal hernia of left side without obstruction or gangrene
BPH (benign prostatic hyperplasia)

## 2018-05-08 NOTE — PROGRESS NOTE ADULT - SUBJECTIVE AND OBJECTIVE BOX
Patient is a 85y old  Male who presents with a chief complaint of cough (07 May 2018 16:25)    pt seen in icu [  ], reg med floor [  x ], bed [ x ], chair at bedside [   ], a+o x3 [x  ], lethargic [  ],  nad [x  ]      Allergies    No Known Allergies        Vitals    T(F): 98.6 (05-08-18 @ 05:21), Max: 98.6 (05-08-18 @ 05:21)  HR: 85 (05-08-18 @ 05:21) (72 - 88)  BP: 132/63 (05-08-18 @ 05:21) (120/91 - 149/89)  RR: 18 (05-08-18 @ 05:21) (17 - 18)  SpO2: 99% (05-08-18 @ 05:21) (96% - 100%)  Wt(kg): --  CAPILLARY BLOOD GLUCOSE          Labs                          12.3   6.2   )-----------( 283      ( 08 May 2018 06:41 )             40.0       05-08    140  |  106  |  13  ----------------------------<  91  3.8   |  28  |  0.91    Ca    9.4      08 May 2018 06:41  Phos  2.8     05-08  Mg     2.3     05-08              .Urine Clean Catch (Midstream)  05-06 @ 21:36   10,000 - 49,000 CFU/mL Enterococcus faecium  --  --      .Urine Clean Catch (Midstream)  05-02 @ 21:56   <10,000 CFU/ml Normal Urogenital kevin present  --  --      .Blood Blood  05-02 @ 19:43   No growth at 5 days.  --  --      .Blood Blood  05-02 @ 19:42   No growth at 5 days.  --  --          Radiology Results      Meds    MEDICATIONS  (STANDING):  amLODIPine   Tablet 10 milliGRAM(s) Oral daily  azithromycin   Tablet 250 milliGRAM(s) Oral daily  cefTRIAXone   IVPB 1 Gram(s) IV Intermittent every 24 hours  dronabinol 2.5 milliGRAM(s) Oral two times a day  enoxaparin Injectable 40 milliGRAM(s) SubCutaneous daily  senna 2 Tablet(s) Oral at bedtime  tamsulosin 0.4 milliGRAM(s) Oral at bedtime      MEDICATIONS  (PRN):  melatonin 3 milliGRAM(s) Oral at bedtime PRN Insomnia      Physical Exam      Neuro :  no focal deficits  Respiratory: b/l ronchi  CV: RRR, S1S2, no murmurs,   Abdominal: Soft, NT, ND +BS,  Extremities: no edema, + peripheral pulses    ASSESSMENT    Pneumonia  flu b infxn  s/p fall  right ankle pain and swelling likely 2nd to djd vs sprain  htn  uti  h/o bph        PLAN    cont rocephin and zith  bld cx neg noted above  ucx with normal urogenital kevin  rept ua positive noted above  rept ucx with Enterococcus faecium   completed tamiflu x 5 days  pulm f/u  cxr result noted   cont robitussin prn  cont sup O2  ibuprofen prn pain  foot and leg xrays noted   phys tx   cont current meds  d/c plan pending receipt of sens of ucx Patient is a 85y old  Male who presents with a chief complaint of cough (07 May 2018 16:25)    pt seen in icu [  ], reg med floor [  x ], bed [ x ], chair at bedside [   ], a+o x3 [x  ], lethargic [  ],  nad [x  ]      Allergies    No Known Allergies        Vitals    T(F): 98.6 (05-08-18 @ 05:21), Max: 98.6 (05-08-18 @ 05:21)  HR: 85 (05-08-18 @ 05:21) (72 - 88)  BP: 132/63 (05-08-18 @ 05:21) (120/91 - 149/89)  RR: 18 (05-08-18 @ 05:21) (17 - 18)  SpO2: 99% (05-08-18 @ 05:21) (96% - 100%)  Wt(kg): --  CAPILLARY BLOOD GLUCOSE          Labs                          12.3   6.2   )-----------( 283      ( 08 May 2018 06:41 )             40.0       05-08    140  |  106  |  13  ----------------------------<  91  3.8   |  28  |  0.91    Ca    9.4      08 May 2018 06:41  Phos  2.8     05-08  Mg     2.3     05-08              .Urine Clean Catch (Midstream)  05-06 @ 21:36   10,000 - 49,000 CFU/mL Enterococcus faecium  --  --      .Urine Clean Catch (Midstream)  05-02 @ 21:56   <10,000 CFU/ml Normal Urogenital kevin present  --  --      .Blood Blood  05-02 @ 19:43   No growth at 5 days.  --  --      .Blood Blood  05-02 @ 19:42   No growth at 5 days.  --  --          Radiology Results      Meds    MEDICATIONS  (STANDING):  amLODIPine   Tablet 10 milliGRAM(s) Oral daily  azithromycin   Tablet 250 milliGRAM(s) Oral daily  cefTRIAXone   IVPB 1 Gram(s) IV Intermittent every 24 hours  dronabinol 2.5 milliGRAM(s) Oral two times a day  enoxaparin Injectable 40 milliGRAM(s) SubCutaneous daily  senna 2 Tablet(s) Oral at bedtime  tamsulosin 0.4 milliGRAM(s) Oral at bedtime      MEDICATIONS  (PRN):  melatonin 3 milliGRAM(s) Oral at bedtime PRN Insomnia      Physical Exam      Neuro :  no focal deficits  Respiratory: b/l ronchi  CV: RRR, S1S2, no murmurs,   Abdominal: Soft, NT, ND +BS, left lq  non-reducible swelling  Extremities: no edema, + peripheral pulses    ASSESSMENT    Pneumonia  flu b infxn  s/p fall  right ankle pain and swelling likely 2nd to djd vs sprain  htn  uti  r/o incarcerated hernia  h/o bph        PLAN    cont rocephin and zith  bld cx neg noted above  ucx with normal urogenital kevin  rept ua positive noted above  rept ucx with Enterococcus faecium   f/u sens ucx   completed tamiflu x 5 days  pulm f/u  cxr result noted   cont robitussin prn  cont sup O2  ibuprofen prn pain  foot and leg xrays noted   phys tx   f/u ct abd  surgery cons  cont current meds

## 2018-05-08 NOTE — PROGRESS NOTE ADULT - SUBJECTIVE AND OBJECTIVE BOX
Patient is a 85y old  Male who presents with a chief complaint of cough (07 May 2018 16:25)  Awake, alert, comfortable in bed in NAD. Having pain on left groin    INTERVAL HPI/OVERNIGHT EVENTS:      VITAL SIGNS:  T(F): 98.6 (05-08-18 @ 05:21)  HR: 85 (05-08-18 @ 05:21)  BP: 132/63 (05-08-18 @ 05:21)  RR: 18 (05-08-18 @ 05:21)  SpO2: 99% (05-08-18 @ 05:21)  Wt(kg): --  I&O's Detail    07 May 2018 07:01  -  08 May 2018 07:00  --------------------------------------------------------  IN:    Oral Fluid: 240 mL  Total IN: 240 mL    OUT:  Total OUT: 0 mL    Total NET: 240 mL              REVIEW OF SYSTEMS:    CONSTITUTIONAL:  No fevers, chills, sweats    HEENT:  Eyes:  No diplopia or blurred vision. ENT:  No earache, sore throat or runny nose.    CARDIOVASCULAR:  No pressure, squeezing, tightness, or heaviness about the chest; no palpitations.    RESPIRATORY:  Per HPI    GASTROINTESTINAL: Left groin pain    GENITOURINARY:  No dysuria, frequency or urgency.    NEUROLOGIC:  No paresthesias, fasciculations, seizures or weakness.    PSYCHIATRIC:  No disorder of thought or mood.      PHYSICAL EXAM:    Constitutional: Well developed and nourished  Eyes:Perrla  ENMT: normal  Neck:supple  Respiratory: good air entry  Cardiovascular: S1 S2 regular  Gastrointestinal: Soft, left groin hernia  Extremities: No edema  Vascular:normal  Neurological:Awake, alert,Ox3  Musculoskeletal:Normal      MEDICATIONS  (STANDING):  amLODIPine   Tablet 10 milliGRAM(s) Oral daily  azithromycin   Tablet 250 milliGRAM(s) Oral daily  cefTRIAXone   IVPB 1 Gram(s) IV Intermittent every 24 hours  dronabinol 2.5 milliGRAM(s) Oral two times a day  enoxaparin Injectable 40 milliGRAM(s) SubCutaneous daily  senna 2 Tablet(s) Oral at bedtime  tamsulosin 0.4 milliGRAM(s) Oral at bedtime    MEDICATIONS  (PRN):  melatonin 3 milliGRAM(s) Oral at bedtime PRN Insomnia      Allergies    No Known Allergies    Intolerances        LABS:                        12.3   6.2   )-----------( 283      ( 08 May 2018 06:41 )             40.0     05-08    140  |  106  |  13  ----------------------------<  91  3.8   |  28  |  0.91    Ca    9.4      08 May 2018 06:41  Phos  2.8     05-08  Mg     2.3     05-08                CAPILLARY BLOOD GLUCOSE            RADIOLOGY & ADDITIONAL TESTS:    CXR:    Ct scan chest:    ekg;    echo:

## 2018-05-08 NOTE — CONSULT NOTE ADULT - ASSESSMENT
85 year old male with chronic incarcerated Left Inguinal Hernia. Admitted with pneumonia and influenza

## 2018-05-08 NOTE — PROGRESS NOTE ADULT - SUBJECTIVE AND OBJECTIVE BOX
PGY 1 Note discussed with supervising resident and primary attending    Patient is a 85y old  Male who presents with a chief complaint of cough (07 May 2018 16:25)      INTERVAL HPI/OVERNIGHT EVENTS: offers no new complaints; current symptoms resolving    MEDICATIONS  (STANDING):  amLODIPine   Tablet 10 milliGRAM(s) Oral daily  azithromycin   Tablet 250 milliGRAM(s) Oral daily  cefTRIAXone   IVPB 1 Gram(s) IV Intermittent every 24 hours  dronabinol 2.5 milliGRAM(s) Oral two times a day  enoxaparin Injectable 40 milliGRAM(s) SubCutaneous daily  senna 2 Tablet(s) Oral at bedtime  tamsulosin 0.4 milliGRAM(s) Oral at bedtime    MEDICATIONS  (PRN):  melatonin 3 milliGRAM(s) Oral at bedtime PRN Insomnia      __________________________________________________  REVIEW OF SYSTEMS:    CONSTITUTIONAL: No fever,   EYES: no acute visual disturbances  NECK: No pain or stiffness  RESPIRATORY: No cough; No shortness of breath  CARDIOVASCULAR: No chest pain, no palpitations  GASTROINTESTINAL: No pain. No nausea or vomiting; No diarrhea   NEUROLOGICAL: No headache or numbness, no tremors  MUSCULOSKELETAL: No joint pain, no muscle pain  GENITOURINARY: no dysuria, no frequency, no hesitancy  PSYCHIATRY: no depression , no anxiety  ALL OTHER  ROS negative        Vital Signs Last 24 Hrs  T(C): 37 (08 May 2018 05:21), Max: 37 (08 May 2018 05:21)  T(F): 98.6 (08 May 2018 05:21), Max: 98.6 (08 May 2018 05:21)  HR: 85 (08 May 2018 05:21) (79 - 88)  BP: 132/63 (08 May 2018 05:21) (120/91 - 132/63)  BP(mean): --  RR: 18 (08 May 2018 05:21) (17 - 18)  SpO2: 99% (08 May 2018 05:21) (98% - 100%)    ________________________________________________  PHYSICAL EXAM:  GENERAL: NAD  HEENT: Normocephalic;  conjunctivae and sclerae clear; moist mucous membranes;   NECK : supple  CHEST/LUNG: Clear to auscultation bilaterally with good air entry   HEART: S1 S2  regular; no murmurs, gallops or rubs  ABDOMEN: Soft, Nontender, Nondistended; Bowel sounds present  EXTREMITIES: no cyanosis; no edema; no calf tenderness  SKIN: warm and dry; no rash  NERVOUS SYSTEM:  AAox3, no motor or sensory deficit    _________________________________________________  LABS:                        12.3   6.2   )-----------( 283      ( 08 May 2018 06:41 )             40.0     05-08    140  |  106  |  13  ----------------------------<  91  3.8   |  28  |  0.91    Ca    9.4      08 May 2018 06:41  Phos  2.8     05-08  Mg     2.3     05-08          CAPILLARY BLOOD GLUCOSE            RADIOLOGY & ADDITIONAL TESTS:    Imaging Personally Reviewed:  YES    Consultant(s) Notes Reviewed:   YES    Care Discussed with Consultants :     Plan of care was discussed with patient and /or primary care giver; all questions and concerns were addressed and care was aligned with patient's wishes.

## 2018-05-08 NOTE — PROGRESS NOTE ADULT - PROBLEM SELECTOR PROBLEM 3
HTN (hypertension)
Leg swelling
BPH (benign prostatic hyperplasia)
HTN (hypertension)
Leg swelling
HTN (hypertension)

## 2018-05-08 NOTE — PROGRESS NOTE ADULT - PROVIDER SPECIALTY LIST ADULT
Internal Medicine
Pulmonology
Pulmonology
Internal Medicine
Pulmonology
Internal Medicine

## 2018-05-08 NOTE — PROGRESS NOTE ADULT - PROBLEM SELECTOR PLAN 4
cont meds  Urology follow up.
cont meds  Urology follow up.
Elevated blood pressure x 2 until now  - continue norvasc with parameters
cont meds  Urology follow up
cont meds  Urology follow up
cont meds  Urology follow up.
Elevated blood pressure x 2 until now  - continue norvasc with parameters

## 2018-05-08 NOTE — PROGRESS NOTE ADULT - PROBLEM SELECTOR PLAN 6
[] Previous VTE                                                3  [] Thrombophilia                                             2  [] Lower limb paralysis                                   2    [] Current Cancer                                             2   [X] Immobilization > 24 hrs                              1  [] ICU/CCU stay > 24 hours                             1  [X] Age > 60                                                         1    IMPROVE VTE Score: 2  Lovenox

## 2018-05-08 NOTE — PROGRESS NOTE ADULT - PROBLEM SELECTOR PLAN 1
cont antibiotics  follow up CXR.
Leukocytosis, cough  flu positive   CXR- R lower lung infilitrate  - start ceftriaxone and azithromycin  - blood cx negative   lactate normalized with IV fluids
check pending cultures  cont antibiotics  follow up CXR
cont antibiotics  follow up CXR
check pending cultures  cont antibiotics  follow up CXR
cont antibiotics  follow up CXR.
Leukocytosis, cough  flu positive   CXR- R lower lung infilitrate  - start ceftriaxone and azithromycin  - blood cx negative   lactate normalized with IV fluids
Leukocytosis, cough  flu positive   CXR- R lower lung infilitrate  - start ceftriaxone and azithromycin  - blood cx negative   lactate normalized with IV fluids  repeat UA is positive - repeat Urine culture sent
Leukocytosis, cough  flu positive   CXR- R lower lung infilitrate  - start ceftriaxone and azithromycin  - blood cx negative   lactate normalized with IV fluids  repeat UA is positive - repeat Urine culture sent
Leukocytosis, cough, and elevated lactate  flu positive   CXR- R lower lung infilitrate  - start ceftriaxone and azithromycin  - f/u blood cx  - f/u sputum cx  lactate normalized with IV fluids
cont antibiotics  follow up CXR.

## 2018-05-08 NOTE — PROGRESS NOTE ADULT - PROBLEM SELECTOR PLAN 3
monitor BP  cont meds.
monitor BP  cont meds.
monitor BP  cont meds
monitor BP  cont meds
s/p fall  - leg XR- right ankle soft tissue swelling, no fracture or subluxation noted   - doppler US no evidence of DVT  - f/u PT evaluation
cont meds  Urology follow up
monitor BP  cont meds.
s/p fall  - leg XR- right ankle soft tissue swelling, no fracture or subluxation noted   - doppler US no evidence of DVT  - f/u PT evaluation

## 2018-05-08 NOTE — CONSULT NOTE ADULT - PROBLEM SELECTOR RECOMMENDATION 9
panculture  antibiotics  follow up CXR  oxygen supp
follow up results of CT abdomen and pelvis with contrast to rule out obstruction within hernia. PRN pain control. Consider surgical repair when medical issues resolved

## 2018-05-08 NOTE — PROGRESS NOTE ADULT - ASSESSMENT
84 y/o M with no pmhx came in with c/o cough since 5 days. Patient is admitted for pnemumonia and R LE swelling.

## 2018-05-23 PROCEDURE — 82803 BLOOD GASES ANY COMBINATION: CPT

## 2018-05-23 PROCEDURE — 96374 THER/PROPH/DIAG INJ IV PUSH: CPT

## 2018-05-23 PROCEDURE — 99285 EMERGENCY DEPT VISIT HI MDM: CPT | Mod: 25

## 2018-05-23 PROCEDURE — 87798 DETECT AGENT NOS DNA AMP: CPT

## 2018-05-23 PROCEDURE — 83036 HEMOGLOBIN GLYCOSYLATED A1C: CPT

## 2018-05-23 PROCEDURE — 83735 ASSAY OF MAGNESIUM: CPT

## 2018-05-23 PROCEDURE — 87086 URINE CULTURE/COLONY COUNT: CPT

## 2018-05-23 PROCEDURE — 87186 SC STD MICRODIL/AGAR DIL: CPT

## 2018-05-23 PROCEDURE — 87633 RESP VIRUS 12-25 TARGETS: CPT

## 2018-05-23 PROCEDURE — 97530 THERAPEUTIC ACTIVITIES: CPT

## 2018-05-23 PROCEDURE — 73590 X-RAY EXAM OF LOWER LEG: CPT

## 2018-05-23 PROCEDURE — 81001 URINALYSIS AUTO W/SCOPE: CPT

## 2018-05-23 PROCEDURE — 87040 BLOOD CULTURE FOR BACTERIA: CPT

## 2018-05-23 PROCEDURE — 74176 CT ABD & PELVIS W/O CONTRAST: CPT

## 2018-05-23 PROCEDURE — 84484 ASSAY OF TROPONIN QUANT: CPT

## 2018-05-23 PROCEDURE — 80053 COMPREHEN METABOLIC PANEL: CPT

## 2018-05-23 PROCEDURE — 97110 THERAPEUTIC EXERCISES: CPT

## 2018-05-23 PROCEDURE — 84100 ASSAY OF PHOSPHORUS: CPT

## 2018-05-23 PROCEDURE — 87581 M.PNEUMON DNA AMP PROBE: CPT

## 2018-05-23 PROCEDURE — 84443 ASSAY THYROID STIM HORMONE: CPT

## 2018-05-23 PROCEDURE — 87486 CHLMYD PNEUM DNA AMP PROBE: CPT

## 2018-05-23 PROCEDURE — 71045 X-RAY EXAM CHEST 1 VIEW: CPT

## 2018-05-23 PROCEDURE — 80061 LIPID PANEL: CPT

## 2018-05-23 PROCEDURE — 80048 BASIC METABOLIC PNL TOTAL CA: CPT

## 2018-05-23 PROCEDURE — 83605 ASSAY OF LACTIC ACID: CPT

## 2018-05-23 PROCEDURE — 93971 EXTREMITY STUDY: CPT

## 2018-05-23 PROCEDURE — 97116 GAIT TRAINING THERAPY: CPT

## 2018-05-23 PROCEDURE — 85027 COMPLETE CBC AUTOMATED: CPT

## 2018-05-23 PROCEDURE — 73620 X-RAY EXAM OF FOOT: CPT

## 2018-10-01 ENCOUNTER — OUTPATIENT (OUTPATIENT)
Dept: OUTPATIENT SERVICES | Facility: HOSPITAL | Age: 83
LOS: 1 days | End: 2018-10-01
Payer: MEDICAID

## 2018-10-01 PROCEDURE — G9001: CPT

## 2018-10-08 DIAGNOSIS — Z71.89 OTHER SPECIFIED COUNSELING: ICD-10-CM

## 2019-01-01 NOTE — PHYSICAL THERAPY INITIAL EVALUATION ADULT - AMBULATION SKILLS, REHAB EVAL
[Dear  ___] : Dear  [unfilled], [Consult Letter:] : I had the pleasure of evaluating your patient, [unfilled]. [Please see my note below.] : Please see my note below. [Consult Closing:] : Thank you very much for allowing me to participate in the care of this patient.  If you have any questions, please do not hesitate to contact me. [Sincerely,] : Sincerely, [FreeTextEntry3] : Fady\par \par Fady Sharif MD\par Chief, Pediatric Urology\par Professor of Urology and Pediatrics\par [employer]\par  needs device/S.cane/independent

## 2019-02-07 ENCOUNTER — INPATIENT (INPATIENT)
Facility: HOSPITAL | Age: 84
LOS: 17 days | Discharge: NURSING, REHAB OR LONG-TERM CARE | DRG: 853 | End: 2019-02-25
Attending: INTERNAL MEDICINE | Admitting: INTERNAL MEDICINE
Payer: MEDICAID

## 2019-02-07 VITALS
HEART RATE: 120 BPM | DIASTOLIC BLOOD PRESSURE: 58 MMHG | OXYGEN SATURATION: 90 % | WEIGHT: 119.93 LBS | RESPIRATION RATE: 20 BRPM | HEIGHT: 65 IN | SYSTOLIC BLOOD PRESSURE: 99 MMHG | TEMPERATURE: 98 F

## 2019-02-07 DIAGNOSIS — I10 ESSENTIAL (PRIMARY) HYPERTENSION: ICD-10-CM

## 2019-02-07 DIAGNOSIS — S72.009A FRACTURE OF UNSPECIFIED PART OF NECK OF UNSPECIFIED FEMUR, INITIAL ENCOUNTER FOR CLOSED FRACTURE: Chronic | ICD-10-CM

## 2019-02-07 DIAGNOSIS — Z29.9 ENCOUNTER FOR PROPHYLACTIC MEASURES, UNSPECIFIED: ICD-10-CM

## 2019-02-07 DIAGNOSIS — R09.89 OTHER SPECIFIED SYMPTOMS AND SIGNS INVOLVING THE CIRCULATORY AND RESPIRATORY SYSTEMS: ICD-10-CM

## 2019-02-07 DIAGNOSIS — J18.1 LOBAR PNEUMONIA, UNSPECIFIED ORGANISM: ICD-10-CM

## 2019-02-07 DIAGNOSIS — N17.9 ACUTE KIDNEY FAILURE, UNSPECIFIED: ICD-10-CM

## 2019-02-07 DIAGNOSIS — J96.91 RESPIRATORY FAILURE, UNSPECIFIED WITH HYPOXIA: ICD-10-CM

## 2019-02-07 DIAGNOSIS — A41.9 SEPSIS, UNSPECIFIED ORGANISM: ICD-10-CM

## 2019-02-07 DIAGNOSIS — N40.0 BENIGN PROSTATIC HYPERPLASIA WITHOUT LOWER URINARY TRACT SYMPTOMS: ICD-10-CM

## 2019-02-07 LAB
ALBUMIN SERPL ELPH-MCNC: 1.6 G/DL — LOW (ref 3.5–5)
ALBUMIN SERPL ELPH-MCNC: 1.8 G/DL — LOW (ref 3.5–5)
ALP SERPL-CCNC: 234 U/L — HIGH (ref 40–120)
ALP SERPL-CCNC: 277 U/L — HIGH (ref 40–120)
ALT FLD-CCNC: 16 U/L DA — SIGNIFICANT CHANGE UP (ref 10–60)
ALT FLD-CCNC: 19 U/L DA — SIGNIFICANT CHANGE UP (ref 10–60)
ANION GAP SERPL CALC-SCNC: 22 MMOL/L — HIGH (ref 5–17)
ANION GAP SERPL CALC-SCNC: 24 MMOL/L — HIGH (ref 5–17)
APPEARANCE UR: ABNORMAL
APTT BLD: 51.9 SEC — HIGH (ref 27.5–36.3)
AST SERPL-CCNC: 40 U/L — SIGNIFICANT CHANGE UP (ref 10–40)
AST SERPL-CCNC: 42 U/L — HIGH (ref 10–40)
BASE EXCESS BLDA CALC-SCNC: -17.5 MMOL/L — LOW (ref -2–2)
BASE EXCESS BLDA CALC-SCNC: -19.2 MMOL/L — LOW (ref -2–2)
BILIRUB SERPL-MCNC: 0.6 MG/DL — SIGNIFICANT CHANGE UP (ref 0.2–1.2)
BILIRUB SERPL-MCNC: 0.6 MG/DL — SIGNIFICANT CHANGE UP (ref 0.2–1.2)
BILIRUB UR-MCNC: NEGATIVE — SIGNIFICANT CHANGE UP
BLOOD GAS COMMENTS ARTERIAL: SIGNIFICANT CHANGE UP
BLOOD GAS COMMENTS ARTERIAL: SIGNIFICANT CHANGE UP
BUN SERPL-MCNC: 161 MG/DL — HIGH (ref 7–18)
BUN SERPL-MCNC: 177 MG/DL — HIGH (ref 7–18)
CALCIUM SERPL-MCNC: 8.3 MG/DL — LOW (ref 8.4–10.5)
CALCIUM SERPL-MCNC: 8.7 MG/DL — SIGNIFICANT CHANGE UP (ref 8.4–10.5)
CHLORIDE SERPL-SCNC: 110 MMOL/L — HIGH (ref 96–108)
CHLORIDE SERPL-SCNC: 113 MMOL/L — HIGH (ref 96–108)
CO2 SERPL-SCNC: 8 MMOL/L — CRITICAL LOW (ref 22–31)
CO2 SERPL-SCNC: 8 MMOL/L — CRITICAL LOW (ref 22–31)
COLOR SPEC: YELLOW — SIGNIFICANT CHANGE UP
CREAT SERPL-MCNC: 7.81 MG/DL — HIGH (ref 0.5–1.3)
CREAT SERPL-MCNC: 8.69 MG/DL — HIGH (ref 0.5–1.3)
DIFF PNL FLD: ABNORMAL
FLU A RESULT: SIGNIFICANT CHANGE UP
FLU A RESULT: SIGNIFICANT CHANGE UP
FLUAV AG NPH QL: SIGNIFICANT CHANGE UP
FLUBV AG NPH QL: SIGNIFICANT CHANGE UP
GLUCOSE SERPL-MCNC: 169 MG/DL — HIGH (ref 70–99)
GLUCOSE SERPL-MCNC: 81 MG/DL — SIGNIFICANT CHANGE UP (ref 70–99)
GLUCOSE UR QL: NEGATIVE — SIGNIFICANT CHANGE UP
HCO3 BLDA-SCNC: 6 MMOL/L — LOW (ref 23–27)
HCO3 BLDA-SCNC: 7 MMOL/L — LOW (ref 23–27)
HCT VFR BLD CALC: 28.5 % — LOW (ref 39–50)
HCT VFR BLD CALC: 31.4 % — LOW (ref 39–50)
HGB BLD-MCNC: 8.3 G/DL — LOW (ref 13–17)
HGB BLD-MCNC: 9.3 G/DL — LOW (ref 13–17)
HOROWITZ INDEX BLDA+IHG-RTO: 100 — SIGNIFICANT CHANGE UP
HOROWITZ INDEX BLDA+IHG-RTO: 80 — SIGNIFICANT CHANGE UP
INR BLD: 1.27 RATIO — HIGH (ref 0.88–1.16)
KETONES UR-MCNC: NEGATIVE — SIGNIFICANT CHANGE UP
LACTATE SERPL-SCNC: 9.3 MMOL/L — CRITICAL HIGH (ref 0.7–2)
LEUKOCYTE ESTERASE UR-ACNC: ABNORMAL
LYMPHOCYTES # BLD AUTO: 4 % — LOW (ref 13–44)
MAGNESIUM SERPL-MCNC: 2.7 MG/DL — HIGH (ref 1.6–2.6)
MCHC RBC-ENTMCNC: 29.2 GM/DL — LOW (ref 32–36)
MCHC RBC-ENTMCNC: 29.7 GM/DL — LOW (ref 32–36)
MCHC RBC-ENTMCNC: 30.6 PG — SIGNIFICANT CHANGE UP (ref 27–34)
MCHC RBC-ENTMCNC: 30.7 PG — SIGNIFICANT CHANGE UP (ref 27–34)
MCV RBC AUTO: 103.4 FL — HIGH (ref 80–100)
MCV RBC AUTO: 104.7 FL — HIGH (ref 80–100)
MONOCYTES NFR BLD AUTO: 4 % — SIGNIFICANT CHANGE UP (ref 2–14)
NEUTROPHILS NFR BLD AUTO: 62 % — SIGNIFICANT CHANGE UP (ref 43–77)
NITRITE UR-MCNC: NEGATIVE — SIGNIFICANT CHANGE UP
NT-PROBNP SERPL-SCNC: 7244 PG/ML — HIGH (ref 0–450)
PCO2 BLDA: 13 MMHG — LOW (ref 32–46)
PCO2 BLDA: 14 MMHG — LOW (ref 32–46)
PH BLDA: 7.28 — LOW (ref 7.35–7.45)
PH BLDA: 7.35 — SIGNIFICANT CHANGE UP (ref 7.35–7.45)
PH UR: 5 — SIGNIFICANT CHANGE UP (ref 5–8)
PHOSPHATE SERPL-MCNC: 6.3 MG/DL — HIGH (ref 2.5–4.5)
PLATELET # BLD AUTO: 311 K/UL — SIGNIFICANT CHANGE UP (ref 150–400)
PLATELET # BLD AUTO: 356 K/UL — SIGNIFICANT CHANGE UP (ref 150–400)
PO2 BLDA: 106 MMHG — SIGNIFICANT CHANGE UP (ref 74–108)
PO2 BLDA: 354 MMHG — HIGH (ref 74–108)
POTASSIUM SERPL-MCNC: 5.8 MMOL/L — HIGH (ref 3.5–5.3)
POTASSIUM SERPL-MCNC: 6.4 MMOL/L — CRITICAL HIGH (ref 3.5–5.3)
POTASSIUM SERPL-SCNC: 5.8 MMOL/L — HIGH (ref 3.5–5.3)
POTASSIUM SERPL-SCNC: 6.4 MMOL/L — CRITICAL HIGH (ref 3.5–5.3)
PROT SERPL-MCNC: 5.6 G/DL — LOW (ref 6–8.3)
PROT SERPL-MCNC: 6.2 G/DL — SIGNIFICANT CHANGE UP (ref 6–8.3)
PROT UR-MCNC: 100
PROTHROM AB SERPL-ACNC: 14.2 SEC — HIGH (ref 10–12.9)
RBC # BLD: 2.72 M/UL — LOW (ref 4.2–5.8)
RBC # BLD: 3.04 M/UL — LOW (ref 4.2–5.8)
RBC # FLD: 18.7 % — HIGH (ref 10.3–14.5)
RBC # FLD: 19 % — HIGH (ref 10.3–14.5)
RSV RESULT: SIGNIFICANT CHANGE UP
RSV RNA RESP QL NAA+PROBE: SIGNIFICANT CHANGE UP
SAO2 % BLDA: 100 % — HIGH (ref 92–96)
SAO2 % BLDA: 98 % — HIGH (ref 92–96)
SODIUM SERPL-SCNC: 142 MMOL/L — SIGNIFICANT CHANGE UP (ref 135–145)
SODIUM SERPL-SCNC: 143 MMOL/L — SIGNIFICANT CHANGE UP (ref 135–145)
SP GR SPEC: 1.02 — SIGNIFICANT CHANGE UP (ref 1.01–1.02)
TROPONIN I SERPL-MCNC: 0.06 NG/ML — HIGH (ref 0–0.04)
UROBILINOGEN FLD QL: NEGATIVE — SIGNIFICANT CHANGE UP
WBC # BLD: 48.3 K/UL — CRITICAL HIGH (ref 3.8–10.5)
WBC # BLD: 57.6 K/UL — CRITICAL HIGH (ref 3.8–10.5)
WBC # FLD AUTO: 48.3 K/UL — CRITICAL HIGH (ref 3.8–10.5)
WBC # FLD AUTO: 57.6 K/UL — CRITICAL HIGH (ref 3.8–10.5)

## 2019-02-07 PROCEDURE — 99285 EMERGENCY DEPT VISIT HI MDM: CPT

## 2019-02-07 PROCEDURE — 72192 CT PELVIS W/O DYE: CPT | Mod: 26,59

## 2019-02-07 PROCEDURE — 70450 CT HEAD/BRAIN W/O DYE: CPT | Mod: 26

## 2019-02-07 PROCEDURE — 71045 X-RAY EXAM CHEST 1 VIEW: CPT | Mod: 26

## 2019-02-07 PROCEDURE — 74176 CT ABD & PELVIS W/O CONTRAST: CPT | Mod: 26

## 2019-02-07 PROCEDURE — 71250 CT THORAX DX C-: CPT | Mod: 26

## 2019-02-07 RX ORDER — AZITHROMYCIN 500 MG/1
500 TABLET, FILM COATED ORAL ONCE
Qty: 0 | Refills: 0 | Status: COMPLETED | OUTPATIENT
Start: 2019-02-07 | End: 2019-02-07

## 2019-02-07 RX ORDER — SODIUM BICARBONATE 1 MEQ/ML
100 SYRINGE (ML) INTRAVENOUS ONCE
Qty: 0 | Refills: 0 | Status: DISCONTINUED | OUTPATIENT
Start: 2019-02-07 | End: 2019-02-07

## 2019-02-07 RX ORDER — INSULIN HUMAN 100 [IU]/ML
10 INJECTION, SOLUTION SUBCUTANEOUS ONCE
Qty: 0 | Refills: 0 | Status: DISCONTINUED | OUTPATIENT
Start: 2019-02-07 | End: 2019-02-07

## 2019-02-07 RX ORDER — SODIUM CHLORIDE 9 MG/ML
1000 INJECTION, SOLUTION INTRAVENOUS
Qty: 0 | Refills: 0 | Status: DISCONTINUED | OUTPATIENT
Start: 2019-02-07 | End: 2019-02-08

## 2019-02-07 RX ORDER — FINASTERIDE 5 MG/1
1 TABLET, FILM COATED ORAL
Qty: 0 | Refills: 0 | COMMUNITY

## 2019-02-07 RX ORDER — TAMSULOSIN HYDROCHLORIDE 0.4 MG/1
1 CAPSULE ORAL
Qty: 0 | Refills: 0 | COMMUNITY

## 2019-02-07 RX ORDER — SODIUM BICARBONATE 1 MEQ/ML
200 SYRINGE (ML) INTRAVENOUS ONCE
Qty: 0 | Refills: 0 | Status: COMPLETED | OUTPATIENT
Start: 2019-02-07 | End: 2019-02-07

## 2019-02-07 RX ORDER — VANCOMYCIN HCL 1 G
1000 VIAL (EA) INTRAVENOUS ONCE
Qty: 0 | Refills: 0 | Status: COMPLETED | OUTPATIENT
Start: 2019-02-07 | End: 2019-02-07

## 2019-02-07 RX ORDER — IPRATROPIUM/ALBUTEROL SULFATE 18-103MCG
3 AEROSOL WITH ADAPTER (GRAM) INHALATION EVERY 6 HOURS
Qty: 0 | Refills: 0 | Status: DISCONTINUED | OUTPATIENT
Start: 2019-02-07 | End: 2019-02-08

## 2019-02-07 RX ORDER — CALCIUM GLUCONATE 100 MG/ML
1 VIAL (ML) INTRAVENOUS ONCE
Qty: 0 | Refills: 0 | Status: COMPLETED | OUTPATIENT
Start: 2019-02-07 | End: 2019-02-07

## 2019-02-07 RX ORDER — IPRATROPIUM BROMIDE 0.2 MG/ML
0 SOLUTION, NON-ORAL INHALATION
Qty: 0 | Refills: 0 | COMMUNITY

## 2019-02-07 RX ORDER — NOREPINEPHRINE BITARTRATE/D5W 8 MG/250ML
0.05 PLASTIC BAG, INJECTION (ML) INTRAVENOUS
Qty: 16 | Refills: 0 | Status: DISCONTINUED | OUTPATIENT
Start: 2019-02-07 | End: 2019-02-08

## 2019-02-07 RX ORDER — DEXTROSE 50 % IN WATER 50 %
50 SYRINGE (ML) INTRAVENOUS ONCE
Qty: 0 | Refills: 0 | Status: COMPLETED | OUTPATIENT
Start: 2019-02-07 | End: 2019-02-07

## 2019-02-07 RX ORDER — SODIUM CHLORIDE 9 MG/ML
500 INJECTION INTRAMUSCULAR; INTRAVENOUS; SUBCUTANEOUS ONCE
Qty: 0 | Refills: 0 | Status: COMPLETED | OUTPATIENT
Start: 2019-02-07 | End: 2019-02-07

## 2019-02-07 RX ORDER — DOCUSATE SODIUM 100 MG
1 CAPSULE ORAL
Qty: 0 | Refills: 0 | COMMUNITY

## 2019-02-07 RX ORDER — ALBUTEROL 90 UG/1
0 AEROSOL, METERED ORAL
Qty: 0 | Refills: 0 | COMMUNITY

## 2019-02-07 RX ORDER — VANCOMYCIN HCL 1 G
500 VIAL (EA) INTRAVENOUS EVERY 24 HOURS
Qty: 0 | Refills: 0 | Status: DISCONTINUED | OUTPATIENT
Start: 2019-02-07 | End: 2019-02-08

## 2019-02-07 RX ORDER — IPRATROPIUM/ALBUTEROL SULFATE 18-103MCG
3 AEROSOL WITH ADAPTER (GRAM) INHALATION ONCE
Qty: 0 | Refills: 0 | Status: COMPLETED | OUTPATIENT
Start: 2019-02-07 | End: 2019-02-07

## 2019-02-07 RX ORDER — VANCOMYCIN HCL 1 G
500 VIAL (EA) INTRAVENOUS EVERY 12 HOURS
Qty: 0 | Refills: 0 | Status: DISCONTINUED | OUTPATIENT
Start: 2019-02-07 | End: 2019-02-07

## 2019-02-07 RX ORDER — FAMOTIDINE 10 MG/ML
20 INJECTION INTRAVENOUS DAILY
Qty: 0 | Refills: 0 | Status: DISCONTINUED | OUTPATIENT
Start: 2019-02-07 | End: 2019-02-08

## 2019-02-07 RX ORDER — CEFEPIME 1 G/1
1000 INJECTION, POWDER, FOR SOLUTION INTRAMUSCULAR; INTRAVENOUS ONCE
Qty: 0 | Refills: 0 | Status: COMPLETED | OUTPATIENT
Start: 2019-02-07 | End: 2019-02-07

## 2019-02-07 RX ORDER — INSULIN HUMAN 100 [IU]/ML
6 INJECTION, SOLUTION SUBCUTANEOUS ONCE
Qty: 0 | Refills: 0 | Status: COMPLETED | OUTPATIENT
Start: 2019-02-07 | End: 2019-02-07

## 2019-02-07 RX ORDER — SODIUM CHLORIDE 9 MG/ML
250 INJECTION INTRAMUSCULAR; INTRAVENOUS; SUBCUTANEOUS ONCE
Qty: 0 | Refills: 0 | Status: DISCONTINUED | OUTPATIENT
Start: 2019-02-07 | End: 2019-02-07

## 2019-02-07 RX ORDER — CHLORHEXIDINE GLUCONATE 213 G/1000ML
1 SOLUTION TOPICAL EVERY 12 HOURS
Qty: 0 | Refills: 0 | Status: DISCONTINUED | OUTPATIENT
Start: 2019-02-07 | End: 2019-02-08

## 2019-02-07 RX ORDER — SODIUM BICARBONATE 1 MEQ/ML
0.55 SYRINGE (ML) INTRAVENOUS
Qty: 150 | Refills: 0 | Status: DISCONTINUED | OUTPATIENT
Start: 2019-02-07 | End: 2019-02-07

## 2019-02-07 RX ORDER — SODIUM CHLORIDE 9 MG/ML
1000 INJECTION INTRAMUSCULAR; INTRAVENOUS; SUBCUTANEOUS ONCE
Qty: 0 | Refills: 0 | Status: COMPLETED | OUTPATIENT
Start: 2019-02-07 | End: 2019-02-07

## 2019-02-07 RX ORDER — SODIUM CHLORIDE 9 MG/ML
1000 INJECTION, SOLUTION INTRAVENOUS
Qty: 0 | Refills: 0 | Status: DISCONTINUED | OUTPATIENT
Start: 2019-02-07 | End: 2019-02-07

## 2019-02-07 RX ORDER — LISINOPRIL 2.5 MG/1
1 TABLET ORAL
Qty: 0 | Refills: 0 | COMMUNITY

## 2019-02-07 RX ORDER — ALBUTEROL 90 UG/1
10 AEROSOL, METERED ORAL ONCE
Qty: 0 | Refills: 0 | Status: COMPLETED | OUTPATIENT
Start: 2019-02-07 | End: 2019-02-07

## 2019-02-07 RX ORDER — HYDROCORTISONE 20 MG
50 TABLET ORAL EVERY 6 HOURS
Qty: 0 | Refills: 0 | Status: DISCONTINUED | OUTPATIENT
Start: 2019-02-07 | End: 2019-02-08

## 2019-02-07 RX ORDER — HEPARIN SODIUM 5000 [USP'U]/ML
INJECTION INTRAVENOUS; SUBCUTANEOUS
Qty: 25000 | Refills: 0 | Status: DISCONTINUED | OUTPATIENT
Start: 2019-02-07 | End: 2019-02-08

## 2019-02-07 RX ORDER — MEROPENEM 1 G/30ML
500 INJECTION INTRAVENOUS EVERY 24 HOURS
Qty: 0 | Refills: 0 | Status: DISCONTINUED | OUTPATIENT
Start: 2019-02-07 | End: 2019-02-08

## 2019-02-07 RX ORDER — ACETAMINOPHEN 500 MG
0 TABLET ORAL
Qty: 0 | Refills: 0 | COMMUNITY

## 2019-02-07 RX ADMIN — Medication 3 MILLILITER(S): at 18:44

## 2019-02-07 RX ADMIN — Medication 50 MILLIGRAM(S): at 23:31

## 2019-02-07 RX ADMIN — Medication 50 MILLILITER(S): at 18:44

## 2019-02-07 RX ADMIN — CEFEPIME 100 MILLIGRAM(S): 1 INJECTION, POWDER, FOR SOLUTION INTRAMUSCULAR; INTRAVENOUS at 17:28

## 2019-02-07 RX ADMIN — AZITHROMYCIN 250 MILLIGRAM(S): 500 TABLET, FILM COATED ORAL at 17:31

## 2019-02-07 RX ADMIN — HEPARIN SODIUM 1000 UNIT(S)/HR: 5000 INJECTION INTRAVENOUS; SUBCUTANEOUS at 22:23

## 2019-02-07 RX ADMIN — Medication 50 MILLILITER(S): at 18:46

## 2019-02-07 RX ADMIN — ALBUTEROL 10 MILLIGRAM(S): 90 AEROSOL, METERED ORAL at 19:14

## 2019-02-07 RX ADMIN — SODIUM CHLORIDE 500 MILLILITER(S): 9 INJECTION INTRAMUSCULAR; INTRAVENOUS; SUBCUTANEOUS at 17:31

## 2019-02-07 RX ADMIN — INSULIN HUMAN 6 UNIT(S): 100 INJECTION, SOLUTION SUBCUTANEOUS at 18:44

## 2019-02-07 RX ADMIN — Medication 3 MILLILITER(S): at 21:55

## 2019-02-07 RX ADMIN — Medication 250 MILLIGRAM(S): at 17:28

## 2019-02-07 RX ADMIN — Medication 200 GRAM(S): at 18:43

## 2019-02-07 RX ADMIN — SODIUM CHLORIDE 1000 MILLILITER(S): 9 INJECTION INTRAMUSCULAR; INTRAVENOUS; SUBCUTANEOUS at 17:28

## 2019-02-07 RX ADMIN — SODIUM CHLORIDE 1000 MILLILITER(S): 9 INJECTION INTRAMUSCULAR; INTRAVENOUS; SUBCUTANEOUS at 23:30

## 2019-02-07 RX ADMIN — SODIUM CHLORIDE 200 MILLILITER(S): 9 INJECTION, SOLUTION INTRAVENOUS at 23:33

## 2019-02-07 RX ADMIN — Medication 200 MILLIEQUIVALENT(S): at 20:37

## 2019-02-07 NOTE — ED ADULT NURSE NOTE - OBJECTIVE STATEMENT
pt is here for difficulty breathing.  As per daughter, had hip sugary, right side of leg big bruise, c/o difficulty breath and pain, no distress noted at this time, edema lower extremities +3, pt calm at this time with family member.

## 2019-02-07 NOTE — H&P ADULT - ASSESSMENT
87 y/o M from [facility], PMH of HTN, BPH, dementia , had recent fall on 1/22/19 had fracture of Rt intertrochanteric fracture, underwent surgical fixation, sent in from NH for respiratory distress.    In ED, patient was tachycardic to 120/min, BP- 99/58 mmhg, RR- 20/min, spo2- 90 % . EKG-  afib @105 BPM , prolonged QTC - 520, no ST T wave changes. cardiac enzymes x1- 0.059, BNP- 7244, UA- >50 WBC and moderate LE , CXR s/o Patchy right lower lobe infiltrate. Cardiomegaly. Labs pertinent for WBC- 57.6, H.H of 9.3/31.4, lactate of 9.3, K of 6.4, bicarb of 8, AG of 24,bun/ creat- 177/8.69, s/p 1 dose of cefepime, vancomycin and azithromycin with 1.5 L NS bolus  in ED     Pt had barba catheter placed, about 750 cc of cloudy urine drained. ABG - 7.35/ 13/106/7/100% NRB. Pt was placed on NIV BIPAP for work of breathing.     Pt will be admitted to ICU for hypoxic respiratory failure with metabolic acidosis and respiratory alkalosis and sepsis secondary to UTI and ? PNA, management of Acute renal failure.

## 2019-02-07 NOTE — ED PROVIDER NOTE - NS ED ATTENDING STATEMENT MOD
Pt came to [facility] RN as he felt his HR was irregular.  Pt denied SOB.  This writer directed pt to the ED. Will follow pt's assessment on EPIC.  Pt agreed with plan   Attending Only

## 2019-02-07 NOTE — ED PROVIDER NOTE - MEDICAL DECISION MAKING DETAILS
85 y/o M presents to the ED with SOB x today.  Vitals show pt is tachycardic. Sepsis protocol activated, will give abx, check EKG, troponin, BMP, hold off on 300 cc/kilo given pedal edema, and reassess.

## 2019-02-07 NOTE — ED PROVIDER NOTE - OBJECTIVE STATEMENT
87 y/o M with no significant PMHx and no significant PSHx presents to the ED with c/o difficulty breathing x today. Pt's family noted that pt had difficulty breathing x today and was informed that he was diagnosed with PNA recently. Pt went to see orthopedics for hip fx f/u, which pt went to nursing home after fx. Pt is awake and alert, but is having difficulty speaking and is not answering to review of system. Pt's family reports that if pt needs to be intubated they will allow it.

## 2019-02-07 NOTE — H&P ADULT - SKIN COMMENTS
large areas of ecchymosis noted over RT hip region extending to medial aspect from lateral aspect of hip joint

## 2019-02-07 NOTE — H&P ADULT - PROBLEM SELECTOR PLAN 2
pt has bun/ creat- 177/8.69 on admission which could be secondary to pre and post renal etiology given normal creatinine levels 0.7 almost 1 week back.  pt has bladder distension on exam, barba catheter was placed which yielded about 750cc of cloudy urine  will check urine lytes  pt may have encephalopathy secondary to uremia  s/p 1.5 L NS bolus and 200 cc of sodium bicarb push X1  pt received 1 cocktail- calcium gluconate, duoneb, dextrose and regular insulin for hyperkalemia to 6.4  will repeat BMP in 2 hours  may need USG renal if no improvement in renal function

## 2019-02-07 NOTE — H&P ADULT - PROBLEM SELECTOR PLAN 1
PT was hypoxic and tachypneic on admission  ABG s/o Primary high anion gap Metabolic Acidosis with Secondary Respiratory Alkalosis  could be secondary to lactic acidosis and acute renal failure.  hypoxia could be due to pulmonary embolism given recent surgery ( cannot obtain CTA due to PAULINA) will start empirically on heparin gtt and get doppler LE r/o DVT  pt was started on NIV BIPAP for work of breathing.  will repeat ABG in an hour and wean BIPAP as tolerated, pt has low threshold for intubation.  will get CT head and CT chest to r/o any acute pathology ( given recent hx of fall and confusion)  will give sodium bicarbonate stat for severe acidosis and repeat BMP in 2 hours  serial ABGs  CXR s/o Patchy right lower lobe infiltrate. Cardiomegaly.   will be gentle with IV hydration given pt has cardiomegaly, may have underlying CHF, will trend cardiac enzymes and f/u echo

## 2019-02-07 NOTE — ED ADULT NURSE REASSESSMENT NOTE - NS ED NURSE REASSESS COMMENT FT1
Received pt from SHELBY Lowry, pt is observed laying in bed, breathing via BIPAP, tolerating well, A&O x1, not verbal. Bruises observed to pt's sacral area, no pressure ulcers. B/L hand #20Ga in place, 16fr barba in place, draining blood stained urine, 450ml observed in bag at start of shift. Meds administered as ordered, daughter at bedside. Admitted to ICU, report given to RN [place], awaiting transport, nursing monitoring continues. Received pt from SHEL[facility] Lowry, pt is observed laying in bed, breathing via BIPAP, tolerating well, A&O x1, not verbal. Bruises observed to pt's sacral area, no pressure ulcers. B/L hand #20Ga in place, 16fr Lenz in place, draining blood stained urine, 450ml observed in bag at start of shift. Rpt labs sent, meds administered as ordered, daughter at bedside. Admitted to ICU, report given to SHELBY Deras, pt transported on monitor, accompanied by ICU MD and RN. .

## 2019-02-07 NOTE — H&P ADULT - HISTORY OF PRESENT ILLNESS
87 y/o M from [facility], PMH of HTN, BPH, dementia , had recent fall on 1/22/19 had fracture of Rt intertrochanteric fracture, underwent surgical fixation, sent in from NH for respiratory distress. Pt is demented, hence hx obtained from NH papers and daughter at the bedside using  # [id number]. According to daughter pt is not doing well since surgery and barely walks, with worsening lower limb edema and SOB. Pt was started on Duoneb inhalation and today CXR was done, was told pt has PNA and was sent in for further evaluation. Pt's daughter also reports pt being more confused since last Sunday.      In ED, patient was tachycardic to 120/min, BP- 99/58 mmhg, RR- 20/min, spo2- 90 % . EKG-  afib @105 BPM , prolonged QTC - 520, no ST T wave changes. cardiac enzymes x1- 0.059, BNP- 7244, UA- >50 WBC and moderate LE , CXR s/o Patchy right lower lobe infiltrate. Cardiomegaly. Labs pertinent for WBC- 57.6, H.H of 9.3/31.4, lactate of 9.3, K of 6.4, bicarb of 8, AG of 24,bun/ creat- 177/8.69, s/p 1 dose of cefepime, vancomycin and azithromycin with 1.5 L NS bolus  in ED     Pt had barba catheter placed, about 750 cc of cloudy urine drained. ABG - 7.35/ 13/106/7/100% NRB. Pt was placed on NIV BIPAP for work of breathing.     Pt will be admitted to ICU for hypoxic respiratory failure with metabolic acidosis and respiratory alkalosis and sepsis secondary to UTI and ? PNA, management of Acute renal failure.

## 2019-02-07 NOTE — H&P ADULT - ATTENDING COMMENTS
87 y/o M from [facility], PMH of HTN, BPH, dementia , had recent fall on 1/22/19 had fracture of Rt intertrochanteric fracture, underwent surgical fixation, sent in from NH for respiratory distress.    In ED, patient was tachycardic to 120/min, BP- 99/58 mmhg, RR- 20/min, spo2- 90 % . EKG-  afib @105 BPM , prolonged QTC - 520, no ST T wave changes. cardiac enzymes x1- 0.059, BNP- 7244, UA- >50 WBC and moderate LE , CXR s/o Patchy right lower lobe infiltrate. Cardiomegaly. Labs pertinent for WBC- 57.6, H.H of 9.3/31.4, lactate of 9.3, K of 6.4, bicarb of 8, AG of 24,bun/ creat- 177/8.69, s/p 1 dose of cefepime, vancomycin and azithromycin with 1.5 L NS bolus  in ED     Pt had barba catheter placed, about 750 cc of cloudy urine drained. ABG - 7.35/ 13/106/7/100% NRB. Pt was placed on NIV BIPAP for work of breathing.     Assessment   Sepsis   Pneumonia and UTI   Septic shock   Acute kidney injury with hyperkalemia  HAGMA   Lactic acidosis    Acute metabolic encephalopathy   Acute hypoxic respiratory failure       Will need CT abdomen/Renal sonogram, CT head and chest   Broad spectrum abx stat after obtaining blood cultures   NIPPV for work of breathing   NPO   Will start Heparin infusion if no bleeding on CT to RO thromboembolic disease -DVT/PE   LE Dopplers   2d echo   Glycemic control keep Glu 140 -180   HoB at 30   Fall and aspiration precautions   IVF resuscitation    Trend lactate and ABG/VBG Q4   Barba's catheter for strict IO   Send UA, Urine electrolytes   Low threshold for intubation

## 2019-02-07 NOTE — H&P ADULT - PROBLEM SELECTOR PLAN 3
PT was  tachycardic to 120/min, tachypneic with leucocytosis to 57.6 on admission, meeting SIRS criteria, elevated lactate to 9.3  source being UTI , could have underlying PNA given Patchy right lower lobe infiltrate on CXR  will get CT abdomen and CT chest for further evaluation  PT could also have infected hardware as recently had ORIF of RT femur fracture  s/p 1 dose of  cefepime, vancomycin and azithromycin with 1.5 L NS bolus  in ED  will f/u blood cultures and urine cultures  will start on meropenem and vancomycin for broad coverage. ( renally dosed)  RVP - negative  ID consult in AM

## 2019-02-08 DIAGNOSIS — T82.9XXA UNSPECIFIED COMPLICATION OF CARDIAC AND VASCULAR PROSTHETIC DEVICE, IMPLANT AND GRAFT, INITIAL ENCOUNTER: ICD-10-CM

## 2019-02-08 LAB
ABO RH CONFIRMATION: SIGNIFICANT CHANGE UP
ALBUMIN SERPL ELPH-MCNC: 1.4 G/DL — LOW (ref 3.5–5)
ALBUMIN SERPL ELPH-MCNC: 1.5 G/DL — LOW (ref 3.5–5)
ALP SERPL-CCNC: 214 U/L — HIGH (ref 40–120)
ALP SERPL-CCNC: 215 U/L — HIGH (ref 40–120)
ALT FLD-CCNC: 18 U/L DA — SIGNIFICANT CHANGE UP (ref 10–60)
ALT FLD-CCNC: 19 U/L DA — SIGNIFICANT CHANGE UP (ref 10–60)
ANION GAP SERPL CALC-SCNC: 13 MMOL/L — SIGNIFICANT CHANGE UP (ref 5–17)
ANION GAP SERPL CALC-SCNC: 16 MMOL/L — SIGNIFICANT CHANGE UP (ref 5–17)
ANION GAP SERPL CALC-SCNC: 18 MMOL/L — HIGH (ref 5–17)
ANION GAP SERPL CALC-SCNC: 18 MMOL/L — HIGH (ref 5–17)
APTT BLD: 55.4 SEC — HIGH (ref 27.5–36.3)
APTT BLD: 57.6 SEC — HIGH (ref 27.5–36.3)
AST SERPL-CCNC: 28 U/L — SIGNIFICANT CHANGE UP (ref 10–40)
AST SERPL-CCNC: 33 U/L — SIGNIFICANT CHANGE UP (ref 10–40)
BASE EXCESS BLDA CALC-SCNC: -5.3 MMOL/L — LOW (ref -2–2)
BASE EXCESS BLDA CALC-SCNC: -8.6 MMOL/L — LOW (ref -2–2)
BASE EXCESS BLDV CALC-SCNC: -6.7 MMOL/L — LOW (ref -2–2)
BASE EXCESS BLDV CALC-SCNC: -9.3 MMOL/L — LOW (ref -2–2)
BILIRUB SERPL-MCNC: 0.6 MG/DL — SIGNIFICANT CHANGE UP (ref 0.2–1.2)
BILIRUB SERPL-MCNC: 0.7 MG/DL — SIGNIFICANT CHANGE UP (ref 0.2–1.2)
BLOOD GAS COMMENTS ARTERIAL: SIGNIFICANT CHANGE UP
BLOOD GAS COMMENTS ARTERIAL: SIGNIFICANT CHANGE UP
BLOOD GAS COMMENTS, VENOUS: SIGNIFICANT CHANGE UP
BLOOD GAS COMMENTS, VENOUS: SIGNIFICANT CHANGE UP
BUN SERPL-MCNC: 111 MG/DL — HIGH (ref 7–18)
BUN SERPL-MCNC: 143 MG/DL — HIGH (ref 7–18)
BUN SERPL-MCNC: 86 MG/DL — HIGH (ref 7–18)
BUN SERPL-MCNC: 92 MG/DL — HIGH (ref 7–18)
CALCIUM SERPL-MCNC: 8.1 MG/DL — LOW (ref 8.4–10.5)
CALCIUM SERPL-MCNC: 8.2 MG/DL — LOW (ref 8.4–10.5)
CHLORIDE SERPL-SCNC: 117 MMOL/L — HIGH (ref 96–108)
CHLORIDE SERPL-SCNC: 122 MMOL/L — HIGH (ref 96–108)
CHLORIDE SERPL-SCNC: 122 MMOL/L — HIGH (ref 96–108)
CHLORIDE SERPL-SCNC: 123 MMOL/L — HIGH (ref 96–108)
CK MB BLD-MCNC: 5.9 % — HIGH (ref 0–3.5)
CK MB BLD-MCNC: 6.2 % — HIGH (ref 0–3.5)
CK MB BLD-MCNC: 6.3 % — HIGH (ref 0–3.5)
CK MB BLD-MCNC: 7.3 % — HIGH (ref 0–3.5)
CK MB CFR SERPL CALC: 2.9 NG/ML — SIGNIFICANT CHANGE UP (ref 0–3.6)
CK MB CFR SERPL CALC: 3.2 NG/ML — SIGNIFICANT CHANGE UP (ref 0–3.6)
CK MB CFR SERPL CALC: 3.8 NG/ML — HIGH (ref 0–3.6)
CK MB CFR SERPL CALC: 4.3 NG/ML — HIGH (ref 0–3.6)
CK SERPL-CCNC: 49 U/L — SIGNIFICANT CHANGE UP (ref 35–232)
CK SERPL-CCNC: 51 U/L — SIGNIFICANT CHANGE UP (ref 35–232)
CK SERPL-CCNC: 52 U/L — SIGNIFICANT CHANGE UP (ref 35–232)
CK SERPL-CCNC: 69 U/L — SIGNIFICANT CHANGE UP (ref 35–232)
CO2 SERPL-SCNC: 17 MMOL/L — LOW (ref 22–31)
CO2 SERPL-SCNC: 19 MMOL/L — LOW (ref 22–31)
CO2 SERPL-SCNC: 21 MMOL/L — LOW (ref 22–31)
CO2 SERPL-SCNC: 22 MMOL/L — SIGNIFICANT CHANGE UP (ref 22–31)
CREAT ?TM UR-MCNC: 62 MG/DL — SIGNIFICANT CHANGE UP
CREAT SERPL-MCNC: 2.21 MG/DL — HIGH (ref 0.5–1.3)
CREAT SERPL-MCNC: 2.58 MG/DL — HIGH (ref 0.5–1.3)
CREAT SERPL-MCNC: 3.06 MG/DL — HIGH (ref 0.5–1.3)
CREAT SERPL-MCNC: 4.91 MG/DL — HIGH (ref 0.5–1.3)
E COLI DNA BLD POS QL NAA+NON-PROBE: SIGNIFICANT CHANGE UP
GLUCOSE SERPL-MCNC: 139 MG/DL — HIGH (ref 70–99)
GLUCOSE SERPL-MCNC: 144 MG/DL — HIGH (ref 70–99)
GLUCOSE SERPL-MCNC: 149 MG/DL — HIGH (ref 70–99)
GLUCOSE SERPL-MCNC: 161 MG/DL — HIGH (ref 70–99)
GRAM STN FLD: SIGNIFICANT CHANGE UP
HCO3 BLDA-SCNC: 14 MMOL/L — LOW (ref 23–27)
HCO3 BLDA-SCNC: 17 MMOL/L — LOW (ref 23–27)
HCO3 BLDV-SCNC: 14 MMOL/L — LOW (ref 21–29)
HCO3 BLDV-SCNC: 17 MMOL/L — LOW (ref 21–29)
HCT VFR BLD CALC: 26.2 % — LOW (ref 39–50)
HCT VFR BLD CALC: 26.6 % — LOW (ref 39–50)
HCT VFR BLD CALC: 26.8 % — LOW (ref 39–50)
HGB BLD-MCNC: 7.9 G/DL — LOW (ref 13–17)
HGB BLD-MCNC: 7.9 G/DL — LOW (ref 13–17)
HGB BLD-MCNC: 8 G/DL — LOW (ref 13–17)
HOROWITZ INDEX BLDA+IHG-RTO: 35 — SIGNIFICANT CHANGE UP
HOROWITZ INDEX BLDA+IHG-RTO: 60 — SIGNIFICANT CHANGE UP
HOROWITZ INDEX BLDV+IHG-RTO: 21 — SIGNIFICANT CHANGE UP
HOROWITZ INDEX BLDV+IHG-RTO: 21 — SIGNIFICANT CHANGE UP
INR BLD: 1.41 RATIO — HIGH (ref 0.88–1.16)
LACTATE SERPL-SCNC: 11.6 MMOL/L — CRITICAL HIGH (ref 0.7–2)
LACTATE SERPL-SCNC: 7.7 MMOL/L — CRITICAL HIGH (ref 0.7–2)
LACTATE SERPL-SCNC: 8.8 MMOL/L — CRITICAL HIGH (ref 0.7–2)
LACTATE SERPL-SCNC: 9 MMOL/L — CRITICAL HIGH (ref 0.7–2)
MAGNESIUM SERPL-MCNC: 2.4 MG/DL — SIGNIFICANT CHANGE UP (ref 1.6–2.6)
MAGNESIUM SERPL-MCNC: 2.5 MG/DL — SIGNIFICANT CHANGE UP (ref 1.6–2.6)
MCHC RBC-ENTMCNC: 29.6 GM/DL — LOW (ref 32–36)
MCHC RBC-ENTMCNC: 29.8 PG — SIGNIFICANT CHANGE UP (ref 27–34)
MCHC RBC-ENTMCNC: 30.1 GM/DL — LOW (ref 32–36)
MCHC RBC-ENTMCNC: 30.1 PG — SIGNIFICANT CHANGE UP (ref 27–34)
MCHC RBC-ENTMCNC: 30.3 GM/DL — LOW (ref 32–36)
MCHC RBC-ENTMCNC: 30.6 PG — SIGNIFICANT CHANGE UP (ref 27–34)
MCV RBC AUTO: 100.1 FL — HIGH (ref 80–100)
MCV RBC AUTO: 100.6 FL — HIGH (ref 80–100)
MCV RBC AUTO: 101.1 FL — HIGH (ref 80–100)
METHOD TYPE: SIGNIFICANT CHANGE UP
OSMOLALITY UR: 462 MOS/KG — SIGNIFICANT CHANGE UP (ref 50–1200)
PCO2 BLDA: 19 MMHG — LOW (ref 32–46)
PCO2 BLDA: 24 MMHG — LOW (ref 32–46)
PCO2 BLDV: 20 MMHG — LOW (ref 35–50)
PCO2 BLDV: 31 MMHG — LOW (ref 35–50)
PH BLDA: 7.46 — HIGH (ref 7.35–7.45)
PH BLDA: 7.47 — HIGH (ref 7.35–7.45)
PH BLDV: 7.37 — SIGNIFICANT CHANGE UP (ref 7.35–7.45)
PH BLDV: 7.44 — SIGNIFICANT CHANGE UP (ref 7.35–7.45)
PHOSPHATE SERPL-MCNC: 3.9 MG/DL — SIGNIFICANT CHANGE UP (ref 2.5–4.5)
PHOSPHATE SERPL-MCNC: 5.8 MG/DL — HIGH (ref 2.5–4.5)
PLATELET # BLD AUTO: 238 K/UL — SIGNIFICANT CHANGE UP (ref 150–400)
PLATELET # BLD AUTO: 275 K/UL — SIGNIFICANT CHANGE UP (ref 150–400)
PLATELET # BLD AUTO: 302 K/UL — SIGNIFICANT CHANGE UP (ref 150–400)
PO2 BLDA: 182 MMHG — HIGH (ref 74–108)
PO2 BLDA: 88 MMHG — SIGNIFICANT CHANGE UP (ref 74–108)
PO2 BLDV: 196 MMHG — HIGH (ref 25–45)
PO2 BLDV: 28 MMHG — SIGNIFICANT CHANGE UP (ref 25–45)
POTASSIUM SERPL-MCNC: 2.9 MMOL/L — CRITICAL LOW (ref 3.5–5.3)
POTASSIUM SERPL-MCNC: 3 MMOL/L — LOW (ref 3.5–5.3)
POTASSIUM SERPL-MCNC: 3.3 MMOL/L — LOW (ref 3.5–5.3)
POTASSIUM SERPL-MCNC: 3.5 MMOL/L — SIGNIFICANT CHANGE UP (ref 3.5–5.3)
POTASSIUM SERPL-SCNC: 2.9 MMOL/L — CRITICAL LOW (ref 3.5–5.3)
POTASSIUM SERPL-SCNC: 3 MMOL/L — LOW (ref 3.5–5.3)
POTASSIUM SERPL-SCNC: 3.3 MMOL/L — LOW (ref 3.5–5.3)
POTASSIUM SERPL-SCNC: 3.5 MMOL/L — SIGNIFICANT CHANGE UP (ref 3.5–5.3)
PROT ?TM UR-MCNC: 70 MG/DL — HIGH (ref 0–12)
PROT SERPL-MCNC: 5.2 G/DL — LOW (ref 6–8.3)
PROT SERPL-MCNC: 5.3 G/DL — LOW (ref 6–8.3)
PROTHROM AB SERPL-ACNC: 15.8 SEC — HIGH (ref 10–12.9)
RBC # BLD: 2.6 M/UL — LOW (ref 4.2–5.8)
RBC # BLD: 2.65 M/UL — LOW (ref 4.2–5.8)
RBC # BLD: 2.66 M/UL — LOW (ref 4.2–5.8)
RBC # FLD: 18.2 % — HIGH (ref 10.3–14.5)
RBC # FLD: 18.7 % — HIGH (ref 10.3–14.5)
RBC # FLD: 18.8 % — HIGH (ref 10.3–14.5)
SAO2 % BLDA: 100 % — HIGH (ref 92–96)
SAO2 % BLDA: 97 % — HIGH (ref 92–96)
SAO2 % BLDV: 100 % — HIGH (ref 67–88)
SAO2 % BLDV: 41 % — LOW (ref 67–88)
SODIUM SERPL-SCNC: 152 MMOL/L — HIGH (ref 135–145)
SODIUM SERPL-SCNC: 158 MMOL/L — HIGH (ref 135–145)
SODIUM SERPL-SCNC: 159 MMOL/L — HIGH (ref 135–145)
SODIUM SERPL-SCNC: 159 MMOL/L — HIGH (ref 135–145)
SODIUM UR-SCNC: 20 MMOL/L — LOW (ref 40–220)
SPECIMEN SOURCE: SIGNIFICANT CHANGE UP
SPECIMEN SOURCE: SIGNIFICANT CHANGE UP
TROPONIN I SERPL-MCNC: 0.06 NG/ML — HIGH (ref 0–0.04)
TROPONIN I SERPL-MCNC: 0.07 NG/ML — HIGH (ref 0–0.04)
TROPONIN I SERPL-MCNC: 0.09 NG/ML — HIGH (ref 0–0.04)
TROPONIN I SERPL-MCNC: 0.1 NG/ML — HIGH (ref 0–0.04)
WBC # BLD: 46.8 K/UL — CRITICAL HIGH (ref 3.8–10.5)
WBC # BLD: 47.5 K/UL — CRITICAL HIGH (ref 3.8–10.5)
WBC # BLD: 47.6 K/UL — CRITICAL HIGH (ref 3.8–10.5)
WBC # FLD AUTO: 46.8 K/UL — CRITICAL HIGH (ref 3.8–10.5)
WBC # FLD AUTO: 47.5 K/UL — CRITICAL HIGH (ref 3.8–10.5)
WBC # FLD AUTO: 47.6 K/UL — CRITICAL HIGH (ref 3.8–10.5)

## 2019-02-08 PROCEDURE — 35201 REPAIR BLOOD VESSEL DIR NECK: CPT

## 2019-02-08 PROCEDURE — 71045 X-RAY EXAM CHEST 1 VIEW: CPT | Mod: 26,77

## 2019-02-08 PROCEDURE — 70490 CT SOFT TISSUE NECK W/O DYE: CPT | Mod: 26

## 2019-02-08 PROCEDURE — 88300 SURGICAL PATH GROSS: CPT | Mod: 26

## 2019-02-08 PROCEDURE — 93970 EXTREMITY STUDY: CPT | Mod: 26

## 2019-02-08 PROCEDURE — 71045 X-RAY EXAM CHEST 1 VIEW: CPT | Mod: 26

## 2019-02-08 PROCEDURE — 71250 CT THORAX DX C-: CPT | Mod: 26

## 2019-02-08 RX ORDER — POTASSIUM CHLORIDE 20 MEQ
20 PACKET (EA) ORAL
Qty: 0 | Refills: 0 | Status: COMPLETED | OUTPATIENT
Start: 2019-02-08 | End: 2019-02-08

## 2019-02-08 RX ORDER — SODIUM CHLORIDE 9 MG/ML
1000 INJECTION, SOLUTION INTRAVENOUS
Qty: 0 | Refills: 0 | Status: DISCONTINUED | OUTPATIENT
Start: 2019-02-08 | End: 2019-02-08

## 2019-02-08 RX ORDER — HEPARIN SODIUM 5000 [USP'U]/ML
5000 INJECTION INTRAVENOUS; SUBCUTANEOUS EVERY 8 HOURS
Qty: 0 | Refills: 0 | Status: DISCONTINUED | OUTPATIENT
Start: 2019-02-08 | End: 2019-02-08

## 2019-02-08 RX ORDER — POTASSIUM CHLORIDE 20 MEQ
20 PACKET (EA) ORAL
Qty: 0 | Refills: 0 | Status: DISCONTINUED | OUTPATIENT
Start: 2019-02-08 | End: 2019-02-08

## 2019-02-08 RX ORDER — TAMSULOSIN HYDROCHLORIDE 0.4 MG/1
0.4 CAPSULE ORAL AT BEDTIME
Qty: 0 | Refills: 0 | Status: DISCONTINUED | OUTPATIENT
Start: 2019-02-08 | End: 2019-02-08

## 2019-02-08 RX ORDER — HEPARIN SODIUM 5000 [USP'U]/ML
INJECTION INTRAVENOUS; SUBCUTANEOUS
Qty: 25000 | Refills: 0 | Status: DISCONTINUED | OUTPATIENT
Start: 2019-02-08 | End: 2019-02-08

## 2019-02-08 RX ORDER — HEPARIN SODIUM 5000 [USP'U]/ML
5000 INJECTION INTRAVENOUS; SUBCUTANEOUS EVERY 12 HOURS
Qty: 0 | Refills: 0 | Status: DISCONTINUED | OUTPATIENT
Start: 2019-02-08 | End: 2019-02-08

## 2019-02-08 RX ORDER — HYDROMORPHONE HYDROCHLORIDE 2 MG/ML
0.5 INJECTION INTRAMUSCULAR; INTRAVENOUS; SUBCUTANEOUS ONCE
Qty: 0 | Refills: 0 | Status: DISCONTINUED | OUTPATIENT
Start: 2019-02-08 | End: 2019-02-08

## 2019-02-08 RX ORDER — PANTOPRAZOLE SODIUM 20 MG/1
40 TABLET, DELAYED RELEASE ORAL DAILY
Qty: 0 | Refills: 0 | Status: DISCONTINUED | OUTPATIENT
Start: 2019-02-08 | End: 2019-02-08

## 2019-02-08 RX ORDER — FINASTERIDE 5 MG/1
5 TABLET, FILM COATED ORAL DAILY
Qty: 0 | Refills: 0 | Status: DISCONTINUED | OUTPATIENT
Start: 2019-02-08 | End: 2019-02-08

## 2019-02-08 RX ORDER — SODIUM CHLORIDE 9 MG/ML
1000 INJECTION, SOLUTION INTRAVENOUS
Qty: 0 | Refills: 0 | Status: COMPLETED | OUTPATIENT
Start: 2019-02-08 | End: 2019-02-08

## 2019-02-08 RX ORDER — SODIUM CHLORIDE 9 MG/ML
500 INJECTION INTRAMUSCULAR; INTRAVENOUS; SUBCUTANEOUS ONCE
Qty: 0 | Refills: 0 | Status: COMPLETED | OUTPATIENT
Start: 2019-02-08 | End: 2019-02-08

## 2019-02-08 RX ORDER — METOPROLOL TARTRATE 50 MG
5 TABLET ORAL ONCE
Qty: 0 | Refills: 0 | Status: COMPLETED | OUTPATIENT
Start: 2019-02-08 | End: 2019-02-08

## 2019-02-08 RX ADMIN — PANTOPRAZOLE SODIUM 40 MILLIGRAM(S): 20 TABLET, DELAYED RELEASE ORAL at 12:01

## 2019-02-08 RX ADMIN — HEPARIN SODIUM 1100 UNIT(S)/HR: 5000 INJECTION INTRAVENOUS; SUBCUTANEOUS at 08:59

## 2019-02-08 RX ADMIN — HEPARIN SODIUM 5000 UNIT(S): 5000 INJECTION INTRAVENOUS; SUBCUTANEOUS at 13:55

## 2019-02-08 RX ADMIN — Medication 50 MILLIEQUIVALENT(S): at 18:19

## 2019-02-08 RX ADMIN — Medication 3 MILLILITER(S): at 02:08

## 2019-02-08 RX ADMIN — MEROPENEM 100 MILLIGRAM(S): 1 INJECTION INTRAVENOUS at 05:18

## 2019-02-08 RX ADMIN — Medication 2.55 MICROGRAM(S)/KG/MIN: at 13:55

## 2019-02-08 RX ADMIN — CHLORHEXIDINE GLUCONATE 1 APPLICATION(S): 213 SOLUTION TOPICAL at 05:02

## 2019-02-08 RX ADMIN — Medication 3 MILLILITER(S): at 09:32

## 2019-02-08 RX ADMIN — HEPARIN SODIUM 5000 UNIT(S): 5000 INJECTION INTRAVENOUS; SUBCUTANEOUS at 05:01

## 2019-02-08 RX ADMIN — SODIUM CHLORIDE 200 MILLILITER(S): 9 INJECTION, SOLUTION INTRAVENOUS at 05:17

## 2019-02-08 RX ADMIN — Medication 50 MILLIEQUIVALENT(S): at 19:54

## 2019-02-08 RX ADMIN — SODIUM CHLORIDE 1000 MILLILITER(S): 9 INJECTION INTRAMUSCULAR; INTRAVENOUS; SUBCUTANEOUS at 06:24

## 2019-02-08 RX ADMIN — SODIUM CHLORIDE 75 MILLILITER(S): 9 INJECTION, SOLUTION INTRAVENOUS at 10:41

## 2019-02-08 RX ADMIN — SODIUM CHLORIDE 999 MILLILITER(S): 9 INJECTION, SOLUTION INTRAVENOUS at 15:00

## 2019-02-08 RX ADMIN — Medication 50 MILLIGRAM(S): at 05:01

## 2019-02-08 RX ADMIN — SODIUM CHLORIDE 100 MILLILITER(S): 9 INJECTION, SOLUTION INTRAVENOUS at 13:55

## 2019-02-08 RX ADMIN — HYDROMORPHONE HYDROCHLORIDE 0.5 MILLIGRAM(S): 2 INJECTION INTRAMUSCULAR; INTRAVENOUS; SUBCUTANEOUS at 05:02

## 2019-02-08 RX ADMIN — SODIUM CHLORIDE 999 MILLILITER(S): 9 INJECTION, SOLUTION INTRAVENOUS at 14:47

## 2019-02-08 RX ADMIN — Medication 5 MILLIGRAM(S): at 04:35

## 2019-02-08 RX ADMIN — CHLORHEXIDINE GLUCONATE 1 APPLICATION(S): 213 SOLUTION TOPICAL at 18:20

## 2019-02-08 RX ADMIN — Medication 2.55 MICROGRAM(S)/KG/MIN: at 01:30

## 2019-02-08 RX ADMIN — Medication 100 MILLIGRAM(S): at 18:19

## 2019-02-08 RX ADMIN — Medication 3 MILLILITER(S): at 16:05

## 2019-02-08 RX ADMIN — HYDROMORPHONE HYDROCHLORIDE 0.5 MILLIGRAM(S): 2 INJECTION INTRAMUSCULAR; INTRAVENOUS; SUBCUTANEOUS at 05:22

## 2019-02-08 NOTE — PATIENT PROFILE ADULT - NSPROGENANESREACTION_GEN_A_NUR
Chief Complaint   Patient presents with   • Heart Problem     annual follow up     PROBLEM LIST:  1) A.fib  2) MVP  3) HTN    CHIEF COMPLAINT:  Check up    HISTORY OF PRESENT ILLNESS:  Micah Antonio is a 65 year old male presenting for annual follow up of  A.fib and mild MVP per Echo 2014.  Patient had DCCV 2012 but was back in A.fib in 2016.  Patient prefers rate control vs another cardioversion.  Patient states he is feeling well and offers no concerns today.  He is physically active and tolerates this well.  Pt denies chest pain, SOB, palpitations, dizziness and syncope.      Allergies, Medications, and Social history reviewed.  Denies Latex allergy or sensitivity.   TOB reviewed, update    PHYSICAL EXAMINATION  Blood pressure 116/78, pulse 76, resp. rate 16, weight 103.9 kg.  Neck: no JVD, 2+ carotid pulses bilaterally, no bruits  Pulmonary: normal chest excursion, clear to auscultation  Cardiovascular: normal S1, normal S2, irregular rythm, no murmur      Assessment and Plan:   A.fib-chronic- rate controlled  MVP- plan to do ECHO  HTN- well controlled    Follow up: in 1 year       no previous reaction

## 2019-02-08 NOTE — PROGRESS NOTE ADULT - PROBLEM SELECTOR PLAN 2
pt has bun/ creat- 177/8.69 on admission-> trended down 122/3.06  pt has bladder distension on exam, barba catheter was placed which yielded about 750cc of cloudy urine  -urinelytes shows FeNa 1%. Likely intrinsic abnormality. Most likely ATN  -c/w D5 0.45% NS @ 100cc/hr  -Hyperkalemia resolved   -continue to Monitor BMP  Nephrology consulted Dr Berman

## 2019-02-08 NOTE — PROGRESS NOTE ADULT - PROBLEM SELECTOR PLAN 4
hold home meds due to septic shock -Patient's right sided central line is mal position into common carotid artery with tip in aortic arch, confirmed with CT scan on 2/8/19  -Vascular surgery consulted for removal of central venous catheter.   -Will continue to follow up  -Vascular surgeon Dr Hernandez

## 2019-02-08 NOTE — PROGRESS NOTE ADULT - SUBJECTIVE AND OBJECTIVE BOX
INTERVAL HPI/OVERNIGHT EVENTS: *** Patient was seen and examined at bed side.     PRESSORS: [x ] YES [ ] NO  WHICH: Levophed 1mcg    Antimicrobial:  meropenem  IVPB 500 milliGRAM(s) IV Intermittent every 24 hours 19  vancomycin  IVPB 500 milliGRAM(s) IV Intermittent every 24 hours 19    Cardiovascular:  norepinephrine Infusion 0.05 MICROgram(s)/kG/Min IV Continuous <Continuous>  tamsulosin 0.4 milliGRAM(s) Oral at bedtime    Pulmonary:  ALBUTerol/ipratropium for Nebulization 3 milliLiter(s) Nebulizer every 6 hours    Hematalogic:  heparin  Injectable 5000 Unit(s) SubCutaneous every 8 hours    Other:  chlorhexidine 4% Liquid 1 Application(s) Topical every 12 hours  dextrose 5% + sodium chloride 0.45%. 1000 milliLiter(s) IV Continuous <Continuous>  finasteride 5 milliGRAM(s) Oral daily  pantoprazole  Injectable 40 milliGRAM(s) IV Push daily    ALBUTerol/ipratropium for Nebulization 3 milliLiter(s) Nebulizer every 6 hours  chlorhexidine 4% Liquid 1 Application(s) Topical every 12 hours  dextrose 5% + sodium chloride 0.45%. 1000 milliLiter(s) IV Continuous <Continuous>  finasteride 5 milliGRAM(s) Oral daily  heparin  Injectable 5000 Unit(s) SubCutaneous every 8 hours  meropenem  IVPB 500 milliGRAM(s) IV Intermittent every 24 hours  norepinephrine Infusion 0.05 MICROgram(s)/kG/Min IV Continuous <Continuous>  pantoprazole  Injectable 40 milliGRAM(s) IV Push daily  tamsulosin 0.4 milliGRAM(s) Oral at bedtime  vancomycin  IVPB 500 milliGRAM(s) IV Intermittent every 24 hours    Drug Dosing Weight  Height (cm): 165.1 (2019 16:08)  Weight (kg): 59.6 (2019 21:36)  BMI (kg/m2): 21.9 (2019 21:36)  BSA (m2): 1.65 (2019 21:36)    CENTRAL LINE: [ ] YES [ ] NO  LOCATION:   DATE INSERTED:  REMOVE: [ ] YES [ ] NO  EXPLAIN:    ZAFAR: [ ] YES [ ] NO    DATE INSERTED:  REMOVE:  [ ] YES [ ] NO  EXPLAIN:    A-LINE:  [ ] YES [ ] NO  LOCATION:   DATE INSERTED:  REMOVE:  [ ] YES [ ] NO  EXPLAIN:        ICU Vital Signs Last 24 Hrs  T(C): 36.1 (2019 08:30), Max: 36.7 (2019 16:08)  T(F): 97 (2019 08:30), Max: 98 (2019 16:08)  HR: 94 (2019 12:00) (87 - 129)  BP: 106/55 (2019 12:00) (72/41 - 142/77)  BP(mean): 68 (2019 12:00) (48 - 104)  ABP: --  ABP(mean): --  RR: 14 (2019 12:00) (10 - 30)  SpO2: 96% (2019 12:00) (76% - 100%)      ABG - ( 2019 04:47 )  pH, Arterial: 7.46  pH, Blood: x     /  pCO2: 24    /  pO2: 88    / HCO3: 17    / Base Excess: -5.3  /  SaO2: 97                     @ 07:01  -  -08 @ 07:00  --------------------------------------------------------  IN: 2067.4 mL / OUT: 2000 mL / NET: 67.4 mL            PHYSICAL EXAM:    GENERAL:NAD, well-groomed, well-developed  HEAD:  Atraumatic, Normocephalic  EYES: EOMI, PERRLA, conjunctiva and sclera clear  ENMT: No tonsillar erythema, exudates, or enlargement; dry mucous membranes, Good dentition, No lesions, Right central line.   NECK: Supple, normal appearance, No JVD; Normal thyroid; Trachea midline  NERVOUS SYSTEM: Alert and awake x 0.  CHEST/LUNG: breath sounds diminished on RLL   HEART: Regular rate and rhythm; No murmurs, rubs, or gallops  ABDOMEN: Soft, Nontender, Nondistended; Bowel sounds present  EXTREMITIES: 2+ Peripheral Pulses, No clubbing, cyanosis, or edema, left femoral line in place.   LYMPH: No lymphadenopathy noted  SKIN:No rashes or lesions; Good capillary refill      LABS:  CBC Full  -  ( 2019 13:37 )  WBC Count : 47.5 K/uL  Hemoglobin : 7.9 g/dL  Hematocrit : 26.8 %  Platelet Count - Automated : 275 K/uL  Mean Cell Volume : 100.6 fl  Mean Cell Hemoglobin : 29.8 pg  Mean Cell Hemoglobin Concentration : 29.6 gm/dL  Auto Neutrophil # : x  Auto Lymphocyte # : x  Auto Monocyte # : x  Auto Eosinophil # : x  Auto Basophil # : x  Auto Neutrophil % : x  Auto Lymphocyte % : x  Auto Monocyte % : x  Auto Eosinophil % : x  Auto Basophil % : x        152<H>  |  117<H>  |  143<H>  ----------------------------<  149<H>  3.5   |  17<L>  |  4.91<H>    Ca    8.2<L>      2019 04:32  Phos  5.8       Mg     2.5         TPro  5.6<L>  /  Alb  1.6<L>  /  TBili  0.6  /  DBili  x   /  AST  42<H>  /  ALT  16  /  AlkPhos  234<H>      PT/INR - ( 2019 16:55 )   PT: 14.2 sec;   INR: 1.27 ratio         PTT - ( 2019 04:33 )  PTT:55.4 sec  Urinalysis Basic - ( 2019 18:26 )    Color: Yellow / Appearance: Turbid / S.020 / pH: x  Gluc: x / Ketone: Negative  / Bili: Negative / Urobili: Negative   Blood: x / Protein: 100 / Nitrite: Negative   Leuk Esterase: Moderate / RBC: 10-25 /HPF / WBC >50 /HPF   Sq Epi: x / Non Sq Epi: Occasional /HPF / Bacteria: Few /HPF      Culture Results:   Growth in aerobic bottle: Gram Negative Rods  "Due to technical problems, Proteus sp. will Not be reported as part of  the BCID panel until further notice"  ***Blood Panel PCR results on this specimen are available  approximately 3 hours after the Gram stain result.***  Gram stain, PCR, and/or culture results may not always  correspond due to difference in methodologies.  ************************************************************  This PCR assay was performed using [employer].  The following targets are tested for: Enterococcus,  vancomycin resistant enterococci, Listeria monocytogenes,  coagulase negative staphylococci, S. aureus,  methicillin resistant S. aureus, Streptococcus agalactiae  (Group B), S. pneumoniae, S. pyogenes (Group A),  Acinetobacter baumannii, Enterobacter cloacae, E. coli,  Klebsiella oxytoca, K. pneumoniae, Proteus sp.,  Serratia marcescens, Haemophilus influenzae,  Neisseria meningitidis, Pseudomonas aeruginosa, Candida  albicans, C. glabrata, C krusei, C parapsilosis,  C. tropicalis and the KPC resistance gene.  Growth in anaerobic bottle: Gram Negative Rods ( @ 23:09)  Culture Results:   Growth in aerobic bottle: Gram Negative Rods  Growth in anaerobic bottle: Gram Negative Rods ( @ 23:09)      RADIOLOGY & ADDITIONAL STUDIES REVIEWED:  ***  < from: CT Chest No Cont (19 @ 12:53) >    IMPRESSION:     1.  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     2.  New patchy airspace opacity within the anterior margin of right upper   lobe. Stable dense consolidation within the basilar right lower lobe.   These findings likely represent multifocal pneumonia.    3.  Small bilateral pleural effusions. Partial atelectasis of left lower   lobe.    4.  1.4 cm right thyroid nodule. Further evaluation can performed with   thyroid ultrasound.    < end of copied text >      GOALS OF CARE DISCUSSION WITH PATIENT/FAMILY/PROXY: full code/DNR/DNI    CRITICAL CARE TIME SPENT: 35 minutes INTERVAL HPI/OVERNIGHT EVENTS: *** Patient was seen and examined at bed side.     PRESSORS: [x ] YES [ ] NO  WHICH: Levophed 1mcg    Antimicrobial:  meropenem  IVPB 500 milliGRAM(s) IV Intermittent every 24 hours 19  vancomycin  IVPB 500 milliGRAM(s) IV Intermittent every 24 hours 19    Cardiovascular:  norepinephrine Infusion 0.05 MICROgram(s)/kG/Min IV Continuous <Continuous>  tamsulosin 0.4 milliGRAM(s) Oral at bedtime    Pulmonary:  ALBUTerol/ipratropium for Nebulization 3 milliLiter(s) Nebulizer every 6 hours    Hematalogic:  heparin  Injectable 5000 Unit(s) SubCutaneous every 8 hours    Other:  chlorhexidine 4% Liquid 1 Application(s) Topical every 12 hours  dextrose 5% + sodium chloride 0.45%. 1000 milliLiter(s) IV Continuous <Continuous>  finasteride 5 milliGRAM(s) Oral daily  pantoprazole  Injectable 40 milliGRAM(s) IV Push daily    ALBUTerol/ipratropium for Nebulization 3 milliLiter(s) Nebulizer every 6 hours  chlorhexidine 4% Liquid 1 Application(s) Topical every 12 hours  dextrose 5% + sodium chloride 0.45%. 1000 milliLiter(s) IV Continuous <Continuous>  finasteride 5 milliGRAM(s) Oral daily  heparin  Injectable 5000 Unit(s) SubCutaneous every 8 hours  meropenem  IVPB 500 milliGRAM(s) IV Intermittent every 24 hours  norepinephrine Infusion 0.05 MICROgram(s)/kG/Min IV Continuous <Continuous>  pantoprazole  Injectable 40 milliGRAM(s) IV Push daily  tamsulosin 0.4 milliGRAM(s) Oral at bedtime  vancomycin  IVPB 500 milliGRAM(s) IV Intermittent every 24 hours    Drug Dosing Weight  Height (cm): 165.1 (2019 16:08)  Weight (kg): 59.6 (2019 21:36)  BMI (kg/m2): 21.9 (2019 21:36)  BSA (m2): 1.65 (2019 21:36)    CENTRAL LINE: [x ] YES [ ] NO  LOCATION:   Left femoral DATE INSERTED: 19  REMOVE: [ ] YES [ ] NO  EXPLAIN:    ZAFAR: [x ] YES [ ] NO    DATE INSERTED: 19  REMOVE:  [ ] YES [ ] NO  EXPLAIN:            ICU Vital Signs Last 24 Hrs  T(C): 36.1 (2019 08:30), Max: 36.7 (2019 16:08)  T(F): 97 (2019 08:30), Max: 98 (2019 16:08)  HR: 94 (2019 12:00) (87 - 129)  BP: 106/55 (2019 12:00) (72/41 - 142/77)  BP(mean): 68 (2019 12:00) (48 - 104)  ABP: --  ABP(mean): --  RR: 14 (2019 12:00) (10 - 30)  SpO2: 96% (2019 12:00) (76% - 100%)      ABG - ( 2019 04:47 )  pH, Arterial: 7.46  pH, Blood: x     /  pCO2: 24    /  pO2: 88    / HCO3: 17    / Base Excess: -5.3  /  SaO2: 97                    - @ 07:01  -  02-08 @ 07:00  --------------------------------------------------------  IN: 2067.4 mL / OUT: 2000 mL / NET: 67.4 mL            PHYSICAL EXAM:    GENERAL:NAD, well-groomed, well-developed  HEAD:  Atraumatic, Normocephalic  EYES: EOMI, PERRLA, conjunctiva and sclera clear  ENMT: No tonsillar erythema, exudates, or enlargement; dry mucous membranes, Good dentition, No lesions, Right central line.   NECK: Supple, normal appearance, No JVD; Normal thyroid; Trachea midline  NERVOUS SYSTEM: Alert and awake x 0.  CHEST/LUNG: breath sounds diminished on RLL   HEART: Regular rate and rhythm; No murmurs, rubs, or gallops  ABDOMEN: Soft, Nontender, Nondistended; Bowel sounds present  EXTREMITIES: 2+ Peripheral Pulses, No clubbing, cyanosis, or edema, left femoral line in place.   LYMPH: No lymphadenopathy noted  SKIN:No rashes or lesions; Good capillary refill      LABS:  CBC Full  -  ( 2019 13:37 )  WBC Count : 47.5 K/uL  Hemoglobin : 7.9 g/dL  Hematocrit : 26.8 %  Platelet Count - Automated : 275 K/uL  Mean Cell Volume : 100.6 fl  Mean Cell Hemoglobin : 29.8 pg  Mean Cell Hemoglobin Concentration : 29.6 gm/dL  Auto Neutrophil # : x  Auto Lymphocyte # : x  Auto Monocyte # : x  Auto Eosinophil # : x  Auto Basophil # : x  Auto Neutrophil % : x  Auto Lymphocyte % : x  Auto Monocyte % : x  Auto Eosinophil % : x  Auto Basophil % : x        152<H>  |  117<H>  |  143<H>  ----------------------------<  149<H>  3.5   |  17<L>  |  4.91<H>    Ca    8.2<L>      2019 04:32  Phos  5.8       Mg     2.5         TPro  5.6<L>  /  Alb  1.6<L>  /  TBili  0.6  /  DBili  x   /  AST  42<H>  /  ALT  16  /  AlkPhos  234<H>      PT/INR - ( 2019 16:55 )   PT: 14.2 sec;   INR: 1.27 ratio         PTT - ( 2019 04:33 )  PTT:55.4 sec  Urinalysis Basic - ( 2019 18:26 )    Color: Yellow / Appearance: Turbid / S.020 / pH: x  Gluc: x / Ketone: Negative  / Bili: Negative / Urobili: Negative   Blood: x / Protein: 100 / Nitrite: Negative   Leuk Esterase: Moderate / RBC: 10-25 /HPF / WBC >50 /HPF   Sq Epi: x / Non Sq Epi: Occasional /HPF / Bacteria: Few /HPF      Culture Results:   Growth in aerobic bottle: Gram Negative Rods  "Due to technical problems, Proteus sp. will Not be reported as part of  the BCID panel until further notice"  ***Blood Panel PCR results on this specimen are available  approximately 3 hours after the Gram stain result.***  Gram stain, PCR, and/or culture results may not always  correspond due to difference in methodologies.  ************************************************************  This PCR assay was performed using [employer].  The following targets are tested for: Enterococcus,  vancomycin resistant enterococci, Listeria monocytogenes,  coagulase negative staphylococci, S. aureus,  methicillin resistant S. aureus, Streptococcus agalactiae  (Group B), S. pneumoniae, S. pyogenes (Group A),  Acinetobacter baumannii, Enterobacter cloacae, E. coli,  Klebsiella oxytoca, K. pneumoniae, Proteus sp.,  Serratia marcescens, Haemophilus influenzae,  Neisseria meningitidis, Pseudomonas aeruginosa, Candida  albicans, C. glabrata, C krusei, C parapsilosis,  C. tropicalis and the KPC resistance gene.  Growth in anaerobic bottle: Gram Negative Rods ( @ 23:09)  Culture Results:   Growth in aerobic bottle: Gram Negative Rods  Growth in anaerobic bottle: Gram Negative Rods ( @ 23:09)      RADIOLOGY & ADDITIONAL STUDIES REVIEWED:  ***  < from: CT Chest No Cont (19 @ 12:53) >    IMPRESSION:     1.  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     2.  New patchy airspace opacity within the anterior margin of right upper   lobe. Stable dense consolidation within the basilar right lower lobe.   These findings likely represent multifocal pneumonia.    3.  Small bilateral pleural effusions. Partial atelectasis of left lower   lobe.    4.  1.4 cm right thyroid nodule. Further evaluation can performed with   thyroid ultrasound.    < end of copied text >      GOALS OF CARE DISCUSSION WITH PATIENT/FAMILY/PROXY: full code/DNR/DNI    CRITICAL CARE TIME SPENT: 35 minutes

## 2019-02-08 NOTE — PROGRESS NOTE ADULT - SUBJECTIVE AND OBJECTIVE BOX
Reviewed case and CT with Dr Hernandez,  Dr Hernandez indicates plan for Operative intervention tonight pending family consent, and pt stablized/optimized for anesthesia.  Informed medical ICU team of plan. Resident will discuss with ICU attending for clearance/optimization

## 2019-02-08 NOTE — CONSULT NOTE ADULT - SUBJECTIVE AND OBJECTIVE BOX
Patient is a 86y Male whom presented to the hospital with     HPI:  87 y/o M from [facility], PMH of HTN, BPH, dementia , had recent fall on 19 had fracture of Rt intertrochanteric fracture, underwent surgical fixation, sent in from NH for respiratory distress. Pt is demented, hence hx obtained from NH papers and daughter at the bedside using  # [id number]. According to daughter pt is not doing well since surgery and barely walks, with worsening lower limb edema and SOB. Pt was started on Duoneb inhalation and today CXR was done, was told pt has PNA and was sent in for further evaluation. Pt's daughter also reports pt being more confused since last .      In ED, patient was tachycardic to 120/min, BP- 99/58 mmhg, RR- 20/min, spo2- 90 % . EKG-  afib @105 BPM , prolonged QTC - 520, no ST T wave changes. cardiac enzymes x1- 0.059, BNP- 7244, UA- >50 WBC and moderate LE , CXR s/o Patchy right lower lobe infiltrate. Cardiomegaly. Labs pertinent for WBC- 57.6, H.H of 9.3/31.4, lactate of 9.3, K of 6.4, bicarb of 8, AG of 24,bun/ creat- 177/8.69, s/p 1 dose of cefepime, vancomycin and azithromycin with 1.5 L NS bolus  in ED     Pt had barba catheter placed, about 750 cc of cloudy urine drained. ABG - 7.35/ 13/106/7/100% NRB. Pt was placed on NIV BIPAP for work of breathing.     Pt will be admitted to ICU for hypoxic respiratory failure with metabolic acidosis and respiratory alkalosis and sepsis secondary to UTI and ? PNA, management of Acute renal failure. (2019 19:25)    pts current chart reviewed and case discussed with resident  pt denies pmh of renal ds, proteinuria, renal stones, recurrent uti or nephrotic edema or nephrotic syndrome  pt give pmh of retinopathy and s/p laser treatment  pt denies Nsaids use or otc other nephrotoxic supplements  PAST MEDICAL & SURGICAL HISTORY:  Dementia  BPH (benign prostatic hyperplasia)  Hypertension  Closed hip fracture      Home Medications: Reviewed    MEDICATIONS  (STANDING):  ALBUTerol/ipratropium for Nebulization 3 milliLiter(s) Nebulizer every 6 hours  chlorhexidine 4% Liquid 1 Application(s) Topical every 12 hours  dextrose 5% + sodium chloride 0.45%. 1000 milliLiter(s) (100 mL/Hr) IV Continuous <Continuous>  finasteride 5 milliGRAM(s) Oral daily  heparin  Injectable 5000 Unit(s) SubCutaneous every 8 hours  meropenem  IVPB 500 milliGRAM(s) IV Intermittent every 24 hours  norepinephrine Infusion 0.05 MICROgram(s)/kG/Min (2.55 mL/Hr) IV Continuous <Continuous>  pantoprazole  Injectable 40 milliGRAM(s) IV Push daily  sodium chloride 0.45%. 1000 milliLiter(s) (999 mL/Hr) IV Continuous <Continuous>  tamsulosin 0.4 milliGRAM(s) Oral at bedtime  vancomycin  IVPB 500 milliGRAM(s) IV Intermittent every 24 hours    MEDICATIONS  (PRN):      Allergies    No Known Allergies    Intolerances        SOCIAL HISTORY:  Alcohol use [X ] No  [ ] Yes  Smoking  [ X] No  [ ] Yes  Drug Abuse [X ] No  [ ] Yes  Tattoo [X ] No  [ ] Yes  History of Blood transfusion [ X] No  [ ] Yes    FAMILY HISTORY:      Diabetes [ ]  Hypertension [ ]  Kidney Stones [ ]  Heart Disease [ ]  Hyperlipidemia [ ]  Cancer [ ]    REVIEW OF SYSTEMS:  CONSTITUTIONAL: No weakness, fevers or chills  EYES/ENT: No visual changes;  No vertigo or throat pain   NECK: No pain or stiffness  RESPIRATORY: No cough, wheezing, hemoptysis; No shortness of breath  CARDIOVASCULAR: No chest pain or palpitations  GASTROINTESTINAL: No abdominal or epigastric pain. No nausea, vomiting, or hematemesis; No diarrhea or constipation. No melena or hematochezia.  GENITOURINARY: No dysuria, frequency or hematuria  NEUROLOGICAL: No numbness or weakness  SKIN: No itching, burning, rashes, or lesions   All other review of systems is negative unless indicated above    Vital Signs  T(F): 97 (19 @ 16:08), Max: 97.9 (19 @ 04:30)  HR: 86 (19 @ 16:00) (83 - 129)  BP: 142/75 (19 @ 16:00) (72/41 - 142/77)  ABP: --  RR: 14 (19 @ 16:00) (10 - 30)  SpO2: 100% (19 @ 16:00) (76% - 100%)  Wt(kg): --  CVP(cm H2O): --  CO: --  PCWP: --    I and O's:     @ 07:01  -   @ 07:00  --------------------------------------------------------  IN:    dextrose 5% + sodium chloride 0.45%: 1400 mL    norepinephrine Infusion: 167.4 mL    Sodium Chloride 0.9% IV Bolus: 500 mL  Total IN: 2067.4 mL    OUT:    Indwelling Catheter - Urethral: 2000 mL  Total OUT: 2000 mL    Total NET: 67.4 mL       @ 07:01  -   @ 16:28  --------------------------------------------------------  IN:    dextrose 5% + sodium chloride 0.45%: 400 mL    dextrose 5% + sodium chloride 0.45%.: 300 mL    dextrose 5% + sodium chloride 0.45%.: 300 mL    norepinephrine Infusion: 130.6 mL    sodium chloride 0.45%.: 1000 mL  Total IN: 2130.6 mL    OUT:    Indwelling Catheter - Urethral: 1850 mL  Total OUT: 1850 mL    Total NET: 280.6 mL        Daily     Daily Weight in k.6 (2019 21:36)    PHYSICAL EXAM:  Constitutional: well developed, well nourished  and in nad  HEENT: PERRLA,  no icteric sclera and mild pallor of conjunctiva noted  Neck: No JVD, thyromegaly or adenopathy  Respiratory: reduced air entry lower lungs with no rales, wheezing or rhonchi  Cardiovascular: S1 and S2 normally heard  Gastrointestinal: soft, nondistended, nontender and normal bowel sounds heard  Extremities: No peripheral edema or cyanosis  Neurological: A/O x 3, no focal deficits  Psychiatric: Normal mood, normal affect  : No flank tenderness  Skin: No rashes        LABS:                        7.9    47.5  )-----------( 275      ( 2019 13:37 )             26.8     5.8  --  6.3  --            159<H>  |  122<H>  |  111<H>  ----------------------------<  139<H>  3.0<L>   |  19<L>  |  3.06<H>      152<H>  |  117<H>  |  143<H>  ----------------------------<  149<H>  3.5   |  17<L>  |  4.91<H>      149<H>  |  114<H>  |  160<H>  ----------------------------<  113<H>  4.4   |  14<L>  |  7.03<H>      143  |  113<H>  |  161<H>  ----------------------------<  169<H>  5.8<H>   |  8<LL>  |  7.81<H>      142  |  110<H>  |  177<H>  ----------------------------<  81  6.4<HH>   |  8<LL>  |  8.69<H>    Ca    8.2<L>      2019 13:37  Ca    8.2<L>      2019 04:32  Ca    8.7      2019 22:33  Ca    8.3<L>      2019 20:40  Ca    8.7      2019 16:55  Phos  5.8     -08  Phos  6.3     -  Mg     2.5       Mg     2.7         TPro  5.2<L>  /  Alb  1.5<L>  /  TBili  0.6  /  DBili  x   /  AST  33  /  ALT  18  /  AlkPhos  215<H>    TPro  5.6<L>  /  Alb  1.6<L>  /  TBili  0.6  /  DBili  x   /  AST  42<H>  /  ALT  16  /  AlkPhos  234<H>    TPro  6.2  /  Alb  1.8<L>  /  TBili  0.6  /  DBili  x   /  AST  40  /  ALT  19  /  AlkPhos  277<H>            URINE STUDIES:  Urinalysis Basic - ( 2019 18:26 )    Color: Yellow / Appearance: Turbid / S.020 / pH: x  Gluc: x / Ketone: Negative  / Bili: Negative / Urobili: Negative   Blood: x / Protein: 100 / Nitrite: Negative   Leuk Esterase: Moderate / RBC: 10-25 /HPF / WBC >50 /HPF   Sq Epi: x / Non Sq Epi: Occasional /HPF / Bacteria: Few /HPF        Sodium, Random Urine: 20 mmol/L (19 @ 03:44)  Osmolality, Random Urine: 462 mos/kg (19 @ 03:44)  Creatinine, Random Urine: 62 mg/dL (19 @ 03:44)                RADIOLOGY & ADDITIONAL STUDIES: Patient is a 86y Male whom presented to the hospital with respiratory distress , now being treated for septic shock ,noted to have alka,hypernatremia and acidosis.    Medical HPI:  85 y/o M from [facility], PMH of HTN, BPH, dementia , had recent fall on 19 had fracture of Rt intertrochanteric fracture, underwent surgical fixation, sent in from NH for respiratory distress. Pt is demented, hence hx obtained from NH papers and daughter at the bedside using  # [id number]. According to daughter pt is not doing well since surgery and barely walks, with worsening lower limb edema and SOB. Pt was started on Duoneb inhalation and today CXR was done, was told pt has PNA and was sent in for further evaluation. Pt's daughter also reports pt being more confused since last .      In ED, patient was tachycardic to 120/min, BP- 99/58 mmhg, RR- 20/min, spo2- 90 % . EKG-  afib @105 BPM , prolonged QTC - 520, no ST T wave changes. cardiac enzymes x1- 0.059, BNP- 7244, UA- >50 WBC and moderate LE , CXR s/o Patchy right lower lobe infiltrate. Cardiomegaly. Labs pertinent for WBC- 57.6, H.H of 9.3/31.4, lactate of 9.3, K of 6.4, bicarb of 8, AG of 24,bun/ creat- 177/8.69, s/p 1 dose of cefepime, vancomycin and azithromycin with 1.5 L NS bolus  in ED     Pt had barba catheter placed, about 750 cc of cloudy urine drained. ABG - 7.35/ 13/106/7/100% NRB. Pt was placed on NIV BIPAP for work of breathing.     Pt will be admitted to ICU for hypoxic respiratory failure with metabolic acidosis and respiratory alkalosis and sepsis secondary to UTI and ? PNA, management of Acute renal failure. (2019 19:25)  Renal HPI:  pts current chart reviewed and case discussed with resident  Pt is unable to give any history as pt has ams/hx of dementia  pts baseline was normal 1 week back  pts renal function is better with high u/o 200 ml per hr since this am with worsening hypernatremia    PAST MEDICAL & SURGICAL HISTORY:  Dementia  BPH (benign prostatic hyperplasia)  Hypertension  Closed hip fracture      Home Medications: Reviewed    MEDICATIONS  (STANDING):  ALBUTerol/ipratropium for Nebulization 3 milliLiter(s) Nebulizer every 6 hours  chlorhexidine 4% Liquid 1 Application(s) Topical every 12 hours  dextrose 5% + sodium chloride 0.45%. 1000 milliLiter(s) (100 mL/Hr) IV Continuous <Continuous>  finasteride 5 milliGRAM(s) Oral daily  heparin  Injectable 5000 Unit(s) SubCutaneous every 8 hours  meropenem  IVPB 500 milliGRAM(s) IV Intermittent every 24 hours  norepinephrine Infusion 0.05 MICROgram(s)/kG/Min (2.55 mL/Hr) IV Continuous <Continuous>  pantoprazole  Injectable 40 milliGRAM(s) IV Push daily  sodium chloride 0.45%. 1000 milliLiter(s) (999 mL/Hr) IV Continuous <Continuous>  tamsulosin 0.4 milliGRAM(s) Oral at bedtime  vancomycin  IVPB 500 milliGRAM(s) IV Intermittent every 24 hours    MEDICATIONS  (PRN):      Allergies    No Known Allergies    Intolerances        SOCIAL HISTORY: unknown    FAMILY HISTORY:n/c        REVIEW OF SYSTEMS:  Unobtainable    Vital Signs  T(F): 97 (19 @ 16:08), Max: 97.9 (19 @ 04:30)  HR: 86 (19 @ 16:00) (83 - 129)  BP: 142/75 (19 @ 16:00) (72/41 - 142/77)  ABP: --  RR: 14 (19 @ 16:00) (10 - 30)  SpO2: 100% (19 @ 16:00) (76% - 100%)  Wt(kg): --  CVP(cm H2O): --  CO: --  PCWP: --    I and O's:     @ 07:01  -   @ 07:00  --------------------------------------------------------  IN:    dextrose 5% + sodium chloride 0.45%: 1400 mL    norepinephrine Infusion: 167.4 mL    Sodium Chloride 0.9% IV Bolus: 500 mL  Total IN: 2067.4 mL    OUT:    Indwelling Catheter - Urethral: 2000 mL  Total OUT: 2000 mL    Total NET: 67.4 mL       @ 07:01  -   @ 16:28  --------------------------------------------------------  IN:    dextrose 5% + sodium chloride 0.45%: 400 mL    dextrose 5% + sodium chloride 0.45%.: 300 mL    dextrose 5% + sodium chloride 0.45%.: 300 mL    norepinephrine Infusion: 130.6 mL    sodium chloride 0.45%.: 1000 mL  Total IN: 2130.6 mL    OUT:    Indwelling Catheter - Urethral: 1850 mL  Total OUT: 1850 mL    Total NET: 280.6 mL        Daily     Daily Weight in k.6 (2019 21:36)    PHYSICAL EXAM:  Constitutional: well developed, Mal-nourished  and in nad  HEENT: PERRLA,  no icteric sclera and mild pallor of conjunctiva noted  Neck: No JVD, thyromegaly or adenopathy  Respiratory: reduced air entry lower lungs with no rales, wheezing or rhonchi  Cardiovascular: S1 and S2 normally heard  Gastrointestinal: soft, nondistended, nontender and normal bowel sounds heard  Extremities: 2 plus B/L LE and upper thigh edema noted, no cyanosis  Neurological: A/O x 0, no focal deficits  Skin: No rashes        LABS:                        7.9    47.5  )-----------( 275      ( 2019 13:37 )             26.8     5.8  --  6.3  --            159<H>  |  122<H>  |  111<H>  ----------------------------<  139<H>  3.0<L>   |  19<L>  |  3.06<H>      152<H>  |  117<H>  |  143<H>  ----------------------------<  149<H>  3.5   |  17<L>  |  4.91<H>      149<H>  |  114<H>  |  160<H>  ----------------------------<  113<H>  4.4   |  14<L>  |  7.03<H>      143  |  113<H>  |  161<H>  ----------------------------<  169<H>  5.8<H>   |  8<LL>  |  7.81<H>      142  |  110<H>  |  177<H>  ----------------------------<  81  6.4<HH>   |  8<LL>  |  8.69<H>    Ca    8.2<L>      2019 13:37  Ca    8.2<L>      2019 04:32  Ca    8.7      2019 22:33  Ca    8.3<L>      2019 20:40  Ca    8.7      2019 16:55  Phos  5.8     -  Phos  6.3       Mg     2.5       Mg     2.7         TPro  5.2<L>  /  Alb  1.5<L>  /  TBili  0.6  /  DBili  x   /  AST  33  /  ALT  18  /  AlkPhos  215<H>    TPro  5.6<L>  /  Alb  1.6<L>  /  TBili  0.6  /  DBili  x   /  AST  42<H>  /  ALT  16  /  AlkPhos  234<H>    TPro  6.2  /  Alb  1.8<L>  /  TBili  0.6  /  DBili  x   /  AST  40  /  ALT  19  /  AlkPhos  277<H>            URINE STUDIES:  Urinalysis Basic - ( 2019 18:26 )    Color: Yellow / Appearance: Turbid / S.020 / pH: x  Gluc: x / Ketone: Negative  / Bili: Negative / Urobili: Negative   Blood: x / Protein: 100 / Nitrite: Negative   Leuk Esterase: Moderate / RBC: 10-25 /HPF / WBC >50 /HPF   Sq Epi: x / Non Sq Epi: Occasional /HPF / Bacteria: Few /HPF        Sodium, Random Urine: 20 mmol/L (19 @ 03:44)  Osmolality, Random Urine: 462 mos/kg (19 @ 03:44)  Creatinine, Random Urine: 62 mg/dL (19 @ 03:44)                RADIOLOGY & ADDITIONAL STUDIES:    < from: CT Abdomen and Pelvis No Cont (19 @ 21:40) >  EXAM:  CT ABDOMEN AND PELVIS                          EXAM:  CT CHEST                            PROCEDURE DATE:  2019          INTERPRETATION:  CLINICAL HISTORY: Shortness of breath. Abdominal   distention. Altered mental status. Evaluate for pneumonia.    TECHNIQUE: CT of the chest, abdomen and pelvis without IV contrast. Oral   contrast was withheld.  Transaxial images were acquired from the thoracic inlet through to the   pubic symphysis without the administration of IV contrast. Coronal and   sagittal reformatted images are provided.    COMPARISON:    FINDINGS:    Chest CT:  There are presumed small bilateral pleural effusions with airspace   consolidation in both lower lobes likely due to both atelectasis and   pneumonia, particularly on the right min there is increased air   bronchograms. There is linear atelectasis in the lingula of the left   upper lobe. The upper lobes are otherwise clear. There is no   pneumothorax. The trachea and main bronchi are clear.    The heart is mildly enlarged. There is no pericardial effusion. The   thoracic aorta and main pulmonary arteries are within normal limits of   size.    There is no significant supraclavicular, axillary, mediastinal or hilar   adenopathy.    There is a 1.7 cm hypodense nodule in the right lobe of the thyroid gland.    The osseous structures within the thorax have no acute finding.    CT abdomen/pelvis:  Evaluation of the solid organs and vascular structures is limited without   IV contrast. Suboptimal assessment of the bowel without oral contrast.    The unenhanced appearance of the liver, spleen, pancreas and gallbladder   are unremarkable aside from unchanged gallstones. Nodular thickening of   both adrenal glands, left greater than right is unchanged. There is new   mild bilateral symmetric-appearing hydroureteronephrosis without evidence   of obstructing ureteral calculus. Tiny bilateral calculi. To be near the   UVJs but without overlying them. Stable bilateral renal cysts and   subcentimeter hypodense renal lesions too small to adequately   characterize.    Stable appearance to moderate size left inguinal hernia containing   nonobstructed portion of the descending and sigmoid colons. There is no   bowel obstruction, free air or free fluid. The appendix is normal. There   is no abnormal bowel wall thickening or pericolonic inflammatory change.     The abdominal aorta is normal in caliber. There is no significant   adenopathy in the upper abdomen, retroperitoneum or pelvis.    The urinary bladder is partially collapsed around a Barba balloon   catheter. Decreased calcifications along the posterior dependent wall.   The prostate gland is again enlarged.    There is stable grade 1 anterior listhesis of L4 and L5. Degenerative   changes throughout the lumbar spine are also unchanged. Status post   fixation of prior right femoral fracture. The osseous structures within   the abdomen and pelvis demonstrate no acute abnormality. There is new   generalized anasarca and increased asymmetric soft tissue swelling over   the right hip and thigh. No gross intramuscular hematoma is seen.    IMPRESSION:  Chest CT: Presumed small bilateral pleural effusions with atelectasis and   likely superimposed pneumonia in the lung bases. Cardiomegaly.    CT abdomen/pelvis: No bowel obstruction. Mild bilateral   hydroureteronephrosis without obstructing ureteral calculus. Please   correlate clinically with urinalysis to exclude infection as an etiology.  Asymmetric soft tissue swelling over the right hip which could be due to   posttraumatic or postprocedural sequela versus infection. No evidence of   abscess or intramuscular hematoma.              < end of copied text >  < from: CT Chest No Cont (19 @ 12:53) >  NTERPRETATION:  CT CHEST WITHOUT CONTRAST    INDICATION: needs further evaluation of RIJ line, shortness of breath    TECHNIQUE: Unenhanced helical images were obtained of the chest. Coronal   and sagittal images were reconstructed. Maximum intensity projection   images were generated.     COMPARISON: CT chest 2019.    FINDINGS:     TUBES/LINES: The presumed right IJ line extends into the right common   carotidartery and the tip terminates within the aortic arch.     AIRWAYS, LUNGS, PLEURA: Mild retention secretions within the trachea and   right main bronchus. Patent central airways. No bronchial wall thickening   or bronchiectasis.    Dense consolidationwithin the right lower lobe, unchanged. New patchy   airspace opacity within the anterior margin of right upper lobe.   Subsegmental atelectasis/consolidation left lower lobe. Decreased   subsegmental atelectasis of the lingula. Small bilateral pleural   effusions.    MEDIASTINUM, LYMPH NODES: No lymphadenopathy.    HEART AND VASCULATURE: Normal heart size. Trace pericardial effusion. The   thoracic aorta and pulmonary artery are normal in course and caliber.    UPPER ABDOMEN: Minimal aortic atherosclerosis. Bilateral renal cysts,   incompletely evaluated. Cholelithiasis. Mild bilateral hydronephrosis,   unchanged.    BONES AND SOFT TISSUES: No aggressive osseous lesion. Spinal degenerative   changes.    LOWER NECK: 1.4 cm right thyroid nodule.    IMPRESSION:     1.  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     2.  New patchy airspace opacity within the anterior margin of right upper   lobe. Stable dense consolidation within the basilar right lower lobe.   These findings likely represent multifocal pneumonia.    3.  Small bilateral pleural effusions. Partial atelectasis of left lower   lobe.    4.  1.4 cm right thyroid nodule. Further evaluation can performed with   thyroid ultrasound.    < end of copied text >

## 2019-02-08 NOTE — PROGRESS NOTE ADULT - PROBLEM SELECTOR PLAN 1
PT was hypoxic and tachypneic on admission  ABG s/o Primary high anion gap Metabolic Acidosis with Secondary Respiratory Alkalosis. Likely lactic acidosis due to infectious etiology and acute renal failure.  -He is saturating 98% on 2L NC.    -f/u dopplers and echo for DVT and right heart strain to r/o PE, though less likely .  -weaned off Bipap   will get CT head and CT chest to r/o any acute pathology ( given recent hx of fall and confusion)  will give sodium bicarbonate stat for severe acidosis and repeat BMP in 2 hours  serial ABGs  CXR s/o Patchy right lower lobe infiltrate. Cardiomegaly.   will be gentle with IV hydration given pt has cardiomegaly, may have underlying CHF, will trend cardiac enzymes and f/u echo

## 2019-02-08 NOTE — CHART NOTE - NSCHARTNOTEFT_GEN_A_CORE
Had previously attempted a right IJ but CVP tracing, VBG and CXR all suggest it became an arterial line.   At this time, unknown how it became an arterial line when placed into the right IJ vein  Patient is hemodynamically stable  Will consult vascular in AM for removal Had previously attempted a right IJ but CVP tracing, VBG and CXR all suggest it became an arterial line.   At this time, unknown how it became an arterial line when placed into the right IJ vein  Patient is hemodynamically stable  Will consult vascular for removal

## 2019-02-08 NOTE — PROGRESS NOTE ADULT - PROBLEM SELECTOR PLAN 3
secondary to Pneumonia, UTI and gram negative rods bactremia   -WBC count trending down to 57.6-->47.5  -CT chest and neck noted as above   -c/w meropenem and vancomycin   -f/u repeat blood cultures in 48 hours.   RVP - negative  -ID Dr Norton following

## 2019-02-08 NOTE — CHART NOTE - NSCHARTNOTEFT_GEN_A_CORE
Spoke over the phone with Mr Vidal daughter, Toni Be ([phone]), updated regarding medical status and the fact that CVC went thru the artery and needs to be removed in the OR by Vascular surgery. Mrs Be is agreeable with procedure and will be in the hospital by 5-6pm. All questions answered.

## 2019-02-08 NOTE — CHART NOTE - NSCHARTNOTEFT_GEN_A_CORE
Patient's central line was placed over night and was suspected to be in Common carotid artery. Patient was taken to CT scan neck and check for confirmation. Vascular surgery is consulted emergently for removal of Line. Case discussed. Vascular team will reach back to ICU team after discussing with Attending Dr Hernandez.

## 2019-02-08 NOTE — CONSULT NOTE ADULT - ASSESSMENT
A/P:  1.PAULINA: most likley secondary to obstructive uropathy , now in diuretic phase (u/o 200 ml per hr)  -suggest to replace urinary losses with hypotonic iv fluid q hr as per u/o and serum Na level  -Keep patient euvolemic   -Avoid Nephrotoxic Meds/ Agents such as (NSAIDs, IV contrast, Aminoglycosides such as gentamicin, -Gadolinium contrast, Phosphate containing enemas, etc..)  -Adjust Medications according to eGFR  -f/u bmp daily  2.HYPOKALEMIA: secondary to urine k loss with diuretic phase   -replace kcl prn  -keep k >4 and <5  -f/u k level q 4 to 6 hrs x 24 hrs  3.HYPERNATREMIA: secondary to high u/o induced water losses with improved renal function  -suggest to correct water deficit with hypotonic iv fluid  -avoid Na correction >8 meq per 24 hrs  -f/u Na level q 4 hrs x 24 hrs  4.METABOLIC ACIDOSIS: secondary to septic shock plus paulina induced , now improved PH   -f/u co2 daily  5.HYPERPHOSPHATEMIA: secondary to paulina, now improving  -f/u phos level daily

## 2019-02-08 NOTE — BRIEF OPERATIVE NOTE - PROCEDURE
<<-----Click on this checkbox to enter Procedure Removal of central venous catheter  02/08/2019    Active  MIQUEL  Exploration of carotid artery of right side  02/08/2019    Active  MIQUEL

## 2019-02-08 NOTE — CONSULT NOTE ADULT - ASSESSMENT
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  Will need discuss with family risks vs benefits of operative intervention for repair carotid injury when pt is more stable and tolerating weaning off pressor support.

## 2019-02-08 NOTE — PROGRESS NOTE ADULT - SUBJECTIVE AND OBJECTIVE BOX
INTERVAL HPI/OVERNIGHT EVENTS:       Antimicrobial:  meropenem  IVPB 500 milliGRAM(s) IV Intermittent every 24 hours  vancomycin  IVPB 500 milliGRAM(s) IV Intermittent every 24 hours    Cardiovascular:  norepinephrine Infusion 0.05 MICROgram(s)/kG/Min IV Continuous <Continuous>  tamsulosin 0.4 milliGRAM(s) Oral at bedtime    Pulmonary:  ALBUTerol/ipratropium for Nebulization 3 milliLiter(s) Nebulizer every 6 hours    Hematalogic:  heparin  Injectable 5000 Unit(s) SubCutaneous every 8 hours    Other:  chlorhexidine 4% Liquid 1 Application(s) Topical every 12 hours  dextrose 5% + sodium chloride 0.45%. 1000 milliLiter(s) IV Continuous <Continuous>  finasteride 5 milliGRAM(s) Oral daily  pantoprazole  Injectable 40 milliGRAM(s) IV Push daily  sodium chloride 0.45%. 1000 milliLiter(s) IV Continuous <Continuous>      Drug Dosing Weight  Height (cm): 165.1 (2019 16:08)  Weight (kg): 59.6 (2019 21:36)  BMI (kg/m2): 21.9 (2019 21:36)  BSA (m2): 1.65 (2019 21:36)    CENTRAL LINE: [ ] YES [ ] NO  LOCATION:   DATE INSERTED:    BARBA: [ ] YES [ ] NO    DATE INSERTED:    A-LINE:  [ ] YES [ ] NO  LOCATION:   DATE INSERTED:    PMH/Social Hx/Fam Hx -reviewed admission note, no change since admission  PAST MEDICAL & SURGICAL HISTORY:  Dementia  BPH (benign prostatic hyperplasia)  Hypertension  Closed hip fracture      T(C): 36.1 (19 @ 08:30), Max: 36.6 (19 @ 04:30)  HR: 86 (19 @ 16:00)  BP: 142/75 (19 @ 16:00)  BP(mean): 93 (19 @ 16:00)  ABP: --  ABP(mean): --  RR: 14 (19 @ 16:00)  SpO2: 100% (19 @ 16:00)  Wt(kg): --    ABG - ( 2019 04:47 )  pH, Arterial: 7.46  pH, Blood: x     /  pCO2: 24    /  pO2: 88    / HCO3: 17    / Base Excess: -5.3  /  SaO2: 97            CT chest:  < from: CT Chest No Cont (19 @ 12:53) >    EXAM:  CT CHEST                            PROCEDURE DATE:  2019          INTERPRETATION:  CT CHEST WITHOUT CONTRAST    INDICATION: needs further evaluation of RIJ line, shortness of breath    TECHNIQUE: Unenhanced helical images were obtained of the chest. Coronal   and sagittal images were reconstructed. Maximum intensity projection   images were generated.     COMPARISON: CT chest 2019.    FINDINGS:     TUBES/LINES: The presumed right IJ line extends into the right common   carotidartery and the tip terminates within the aortic arch.     AIRWAYS, LUNGS, PLEURA: Mild retention secretions within the trachea and   right main bronchus. Patent central airways. No bronchial wall thickening   or bronchiectasis.    Dense consolidationwithin the right lower lobe, unchanged. New patchy   airspace opacity within the anterior margin of right upper lobe.   Subsegmental atelectasis/consolidation left lower lobe. Decreased   subsegmental atelectasis of the lingula. Small bilateral pleural   effusions.    MEDIASTINUM, LYMPH NODES: No lymphadenopathy.    HEART AND VASCULATURE: Normal heart size. Trace pericardial effusion. The   thoracic aorta and pulmonary artery are normal in course and caliber.    UPPER ABDOMEN: Minimal aortic atherosclerosis. Bilateral renal cysts,   incompletely evaluated. Cholelithiasis. Mild bilateral hydronephrosis,   unchanged.    BONES AND SOFT TISSUES: No aggressive osseous lesion. Spinal degenerative   changes.    LOWER NECK: 1.4 cm right thyroid nodule.    IMPRESSION:     1.  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     2.  New patchy airspace opacity within the anterior margin of right upper   lobe. Stable dense consolidation within the basilar right lower lobe.   These findings likely represent multifocal pneumonia.    3.  Small bilateral pleural effusions. Partial atelectasis of left lower   lobe.    4.  1.4 cm right thyroid nodule. Further evaluation can performed with   thyroid ultrasound.    These findings were discussed with Dr. BETTENCOURT, on 2019 1:03 PM                ARIELLE GOETZ M.D., ATTENDING RADIOLOGIST  This document has been electronically signed. 2019  1:04PM                < end of copied text >         @ 07:01  -   @ 07:00  --------------------------------------------------------  IN: 2067.4 mL / OUT: 2000 mL / NET: 67.4 mL            PHYSICAL EXAM:    GENERAL: No signs of distress, comfortable  HEAD: Atraumatic, Normocephalic  EYES: EOMI, PERRLA  ENMT: No erythema, exudates, or enlargement, Moist mucous membranes  NECK: Supple, normal appearance, right central line  CHEST/LUNG: No chest deformity, fair bilateral air entry;  HEART: Regular rate and rhythm; No murmurs, rubs, or gallops;   ABDOMEN: Soft, Nontender, Nondistended; Bowel sounds present  EXTREMITIES:  + Peripheral Pulses, No clubbing, cyanosis, or edema  NERVOUS SYSTEM: awake   LYMPH: No lymphadenopathy noted  SKIN: No rashes or lesions; good turgor, warm, dry      LABS:  CBC Full  -  ( 2019 13:37 )  WBC Count : 47.5 K/uL  Hemoglobin : 7.9 g/dL  Hematocrit : 26.8 %  Platelet Count - Automated : 275 K/uL  Mean Cell Volume : 100.6 fl  Mean Cell Hemoglobin : 29.8 pg  Mean Cell Hemoglobin Concentration : 29.6 gm/dL  Auto Neutrophil # : x  Auto Lymphocyte # : x  Auto Monocyte # : x  Auto Eosinophil # : x  Auto Basophil # : x  Auto Neutrophil % : x  Auto Lymphocyte % : x  Auto Monocyte % : x  Auto Eosinophil % : x  Auto Basophil % : x        159<H>  |  122<H>  |  111<H>  ----------------------------<  139<H>  3.0<L>   |  19<L>  |  3.06<H>    Ca    8.2<L>      2019 13:37  Phos  5.8       Mg     2.5         TPro  5.2<L>  /  Alb  1.5<L>  /  TBili  0.6  /  DBili  x   /  AST  33  /  ALT  18  /  AlkPhos  215<H>      PT/INR - ( 2019 14:22 )   PT: 15.8 sec;   INR: 1.41 ratio         PTT - ( 2019 14:22 )  PTT:57.6 sec  Urinalysis Basic - ( 2019 18:26 )    Color: Yellow / Appearance: Turbid / S.020 / pH: x  Gluc: x / Ketone: Negative  / Bili: Negative / Urobili: Negative   Blood: x / Protein: 100 / Nitrite: Negative   Leuk Esterase: Moderate / RBC: 10-25 /HPF / WBC >50 /HPF   Sq Epi: x / Non Sq Epi: Occasional /HPF / Bacteria: Few /HPF      Culture Results:   Growth in aerobic bottle: Gram Negative Rods  "Due to technical problems, Proteus sp. will Not be reported as part of  the BCID panel until further notice"  ***Blood Panel PCR results on this specimen are available  approximately 3 hours after the Gram stain result.***  Gram stain, PCR, and/or culture results may not always  correspond due to difference in methodologies.  ************************************************************  This PCR assay was performed using [employer].  The following targets are tested for: Enterococcus,  vancomycin resistant enterococci, Listeria monocytogenes,  coagulase negative staphylococci, S. aureus,  methicillin resistant S. aureus, Streptococcus agalactiae  (Group B), S. pneumoniae, S. pyogenes (Group A),  Acinetobacter baumannii, Enterobacter cloacae, E. coli,  Klebsiella oxytoca, K. pneumoniae, Proteus sp.,  Serratia marcescens, Haemophilus influenzae,  Neisseria meningitidis, Pseudomonas aeruginosa, Candida  albicans, C. glabrata, C krusei, C parapsilosis,  C. tropicalis and the KPC resistance gene.  Growth in anaerobic bottle: Gram Negative Rods ( @ 23:09)  Culture Results:   Growth in aerobic bottle: Gram Negative Rods  Growth in anaerobic bottle: Gram Negative Rods ( @ 23:09)      RADIOLOGY & ADDITIONAL STUDIES REVIEWED         IMPRESSION:  PAST MEDICAL & SURGICAL HISTORY:  Dementia  BPH (benign prostatic hyperplasia)  Hypertension  Closed hip fracture   p/w        IMP: This is a 85 y/o M from [facility], PMH of HTN, BPH, dementia , had recent fall on 19 had fracture of Rt intertrochanteric fracture, underwent surgical fixation, sent in from NH for respiratory distress.  In ED, patient was tachycardic to 120/min, BP- 99/58 mmhg, RR- 20/min, spo2- 90 % . EKG-  afib @105 BPM , prolonged QTC - 520, no ST T wave changes. cardiac enzymes x1- 0.059, BNP- 7244, UA- >50 WBC and moderate LE , CXR s/o Patchy right lower lobe infiltrate. Cardiomegaly. Labs pertinent for WBC- 57.6, H.H of 9.3/31.4, lactate of 9.3, K of 6.4, bicarb of 8, AG of 24,bun/ creat- 177/8.69, s/p 1 dose of cefepime, vancomycin and azithromycin with 1.5 L NS bolus  in ED   Pt had barba catheter placed, about 750 cc of cloudy urine drained. ABG - 7.35/ 13/106/7/100% NRB. Pt was placed on NIV BIPAP for work of breathing.     Pt is admitted to ICU on 18 for hypoxic respiratory failure with metabolic acidosis and respiratory alkalosis and sepsis secondary to UTI and ? PNA, ,2) Septic shock secondary to UTI and pneumonia 3) Acute renal failure secondary to UTI and septic shock.  Right neck central line was mal placed into right carotid.  He is bacteremic with elevated lactate,+ hypernatremia. + PAULINA . Severe metabolic acidosis plus pna resulted in resp failure requiring BiPaP. vascular eval noted.. going to OR          Plan:      CNS: no sedation    PULMONARY: O 2 supp, intubate as needed    CARDIAC: monitor    GI: npo    RENAL: ivf, bmp q6 , renal eval    SKIN: wound care    MUSCULOSKELETAL: boots    ID: cantibx, f/u sensitivity    HEME: monitor    DVT and GI Prophylaxis    GOALS OF CARE DISCUSSION WITH PATIENT/FAMILY/PROXY/ icu team     CRITICAL CARE TIME SPENT: 35 minutes

## 2019-02-08 NOTE — PROGRESS NOTE ADULT - ASSESSMENT
87 y/o M from [facility], PMH of HTN, BPH, dementia , had recent fall on 1/22/19 had fracture of Rt intertrochanteric fracture, underwent surgical fixation, sent in from NH for respiratory distress.  In ED, patient was tachycardic to 120/min, BP- 99/58 mmhg, RR- 20/min, spo2- 90 % . EKG-  afib @105 BPM , prolonged QTC - 520, no ST T wave changes. cardiac enzymes x1- 0.059, BNP- 7244, UA- >50 WBC and moderate LE , CXR s/o Patchy right lower lobe infiltrate. Cardiomegaly. Labs pertinent for WBC- 57.6, H.H of 9.3/31.4, lactate of 9.3, K of 6.4, bicarb of 8, AG of 24,bun/ creat- 177/8.69, s/p 1 dose of cefepime, vancomycin and azithromycin with 1.5 L NS bolus  in ED   Pt had barba catheter placed, about 750 cc of cloudy urine drained. ABG - 7.35/ 13/106/7/100% NRB. Pt was placed on NIV BIPAP for work of breathing.     Pt is admitted to ICU on 2/7/18 for hypoxic respiratory failure with metabolic acidosis and respiratory alkalosis and sepsis secondary to UTI and ? PNA, ,2) Septic shock secondary to UTI and pneumonia 3) Acute renal failure secondary to UTI and septic shock.

## 2019-02-08 NOTE — CONSULT NOTE ADULT - SUBJECTIVE AND OBJECTIVE BOX
Pt resting in bed in ICU. Awake.  ICU Vital Signs Last 24 Hrs  T(C): 36.1 (08 Feb 2019 08:30), Max: 36.7 (07 Feb 2019 16:08)  T(F): 97 (08 Feb 2019 08:30), Max: 98 (07 Feb 2019 16:08)  HR: 94 (08 Feb 2019 12:00) (87 - 129)  BP: 106/55 (08 Feb 2019 12:00) (72/41 - 142/77)  BP(mean): 68 (08 Feb 2019 12:00) (48 - 104)  ABP: --  ABP(mean): --  RR: 14 (08 Feb 2019 12:00) (10 - 30)  SpO2: 96% (08 Feb 2019 12:00) (76% - 100%)    on pressor. levofed.    Right neck central line secure, no crepitus, no hematoma. nt. neck supple.  lungs: cta bilaterally                          7.9    47.5  )-----------( 275      ( 08 Feb 2019 13:37 )             26.8     02-08    159<H>  |  122<H>  |  x   ----------------------------<  x   3.0<L>   |  x   |  3.06<H>    Ca    8.2<L>      08 Feb 2019 04:32  Phos  5.8     02-08  Mg     2.5     02-08    TPro  5.2<L>  /  Alb  x   /  TBili  0.6  /  DBili  x   /  AST  33  /  ALT  18  /  AlkPhos  215<H>  02-08    < from: Xray Chest 1 View- PORTABLE-Routine (02.08.19 @ 07:53) >  IMPRESSION:  Right central line again noted which appears more medial than expected   region of the SVC. Left central line noted tip overlying the region of   SVC. Correlate for venous return as it courses over the aortic arch. Or   CT chest recommended for better anatomic evaluation of the locations.    Mild increased interstitial lung markings without significant change. New   bilateral perihilar opacities right greater than left.    No significant pleural effusion.    Heart size cannot be accurately assessed in this projection.    < end of copied text >    < from: CT Chest No Cont (02.08.19 @ 12:53) >  IMPRESSION:     1.  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     2.  New patchy airspace opacity within the anterior margin of right upper   lobe. Stable dense consolidation within the basilar right lower lobe.   These findings likely represent multifocal pneumonia.    3.  Small bilateral pleural effusions. Partial atelectasis of left lower   lobe.    4.  1.4 cm right thyroid nodule. Further evaluation can performed with   thyroid ultrasound.    < end of copied text >

## 2019-02-09 LAB
ALBUMIN SERPL ELPH-MCNC: 1.4 G/DL — LOW (ref 3.5–5)
ALBUMIN SERPL ELPH-MCNC: 1.4 G/DL — LOW (ref 3.5–5)
ALP SERPL-CCNC: 226 U/L — HIGH (ref 40–120)
ALP SERPL-CCNC: 366 U/L — HIGH (ref 40–120)
ALT FLD-CCNC: 19 U/L DA — SIGNIFICANT CHANGE UP (ref 10–60)
ALT FLD-CCNC: 21 U/L DA — SIGNIFICANT CHANGE UP (ref 10–60)
ANION GAP SERPL CALC-SCNC: 11 MMOL/L — SIGNIFICANT CHANGE UP (ref 5–17)
ANION GAP SERPL CALC-SCNC: 12 MMOL/L — SIGNIFICANT CHANGE UP (ref 5–17)
ANION GAP SERPL CALC-SCNC: 8 MMOL/L — SIGNIFICANT CHANGE UP (ref 5–17)
ANION GAP SERPL CALC-SCNC: 9 MMOL/L — SIGNIFICANT CHANGE UP (ref 5–17)
AST SERPL-CCNC: 29 U/L — SIGNIFICANT CHANGE UP (ref 10–40)
AST SERPL-CCNC: 39 U/L — SIGNIFICANT CHANGE UP (ref 10–40)
BASOPHILS # BLD AUTO: 0 K/UL — SIGNIFICANT CHANGE UP (ref 0–0.2)
BASOPHILS NFR BLD AUTO: 0.1 % — SIGNIFICANT CHANGE UP (ref 0–2)
BILIRUB SERPL-MCNC: 0.6 MG/DL — SIGNIFICANT CHANGE UP (ref 0.2–1.2)
BILIRUB SERPL-MCNC: 1 MG/DL — SIGNIFICANT CHANGE UP (ref 0.2–1.2)
BUN SERPL-MCNC: 45 MG/DL — HIGH (ref 7–18)
BUN SERPL-MCNC: 51 MG/DL — HIGH (ref 7–18)
BUN SERPL-MCNC: 65 MG/DL — HIGH (ref 7–18)
BUN SERPL-MCNC: 75 MG/DL — HIGH (ref 7–18)
CALCIUM SERPL-MCNC: 8.4 MG/DL — SIGNIFICANT CHANGE UP (ref 8.4–10.5)
CALCIUM SERPL-MCNC: 8.5 MG/DL — SIGNIFICANT CHANGE UP (ref 8.4–10.5)
CALCIUM SERPL-MCNC: 8.7 MG/DL — SIGNIFICANT CHANGE UP (ref 8.4–10.5)
CALCIUM SERPL-MCNC: 9 MG/DL — SIGNIFICANT CHANGE UP (ref 8.4–10.5)
CHLORIDE SERPL-SCNC: 125 MMOL/L — HIGH (ref 96–108)
CHLORIDE SERPL-SCNC: 125 MMOL/L — HIGH (ref 96–108)
CHLORIDE SERPL-SCNC: 126 MMOL/L — HIGH (ref 96–108)
CHLORIDE SERPL-SCNC: 128 MMOL/L — HIGH (ref 96–108)
CO2 SERPL-SCNC: 21 MMOL/L — LOW (ref 22–31)
CO2 SERPL-SCNC: 22 MMOL/L — SIGNIFICANT CHANGE UP (ref 22–31)
CO2 SERPL-SCNC: 25 MMOL/L — SIGNIFICANT CHANGE UP (ref 22–31)
CO2 SERPL-SCNC: 25 MMOL/L — SIGNIFICANT CHANGE UP (ref 22–31)
CREAT SERPL-MCNC: 0.88 MG/DL — SIGNIFICANT CHANGE UP (ref 0.5–1.3)
CREAT SERPL-MCNC: 0.94 MG/DL — SIGNIFICANT CHANGE UP (ref 0.5–1.3)
CREAT SERPL-MCNC: 1.47 MG/DL — HIGH (ref 0.5–1.3)
CREAT SERPL-MCNC: 1.74 MG/DL — HIGH (ref 0.5–1.3)
EOSINOPHIL # BLD AUTO: 0 K/UL — SIGNIFICANT CHANGE UP (ref 0–0.5)
EOSINOPHIL NFR BLD AUTO: 0 % — SIGNIFICANT CHANGE UP (ref 0–6)
GLUCOSE SERPL-MCNC: 114 MG/DL — HIGH (ref 70–99)
GLUCOSE SERPL-MCNC: 125 MG/DL — HIGH (ref 70–99)
GLUCOSE SERPL-MCNC: 144 MG/DL — HIGH (ref 70–99)
GLUCOSE SERPL-MCNC: 90 MG/DL — SIGNIFICANT CHANGE UP (ref 70–99)
GRAM STN FLD: SIGNIFICANT CHANGE UP
HCT VFR BLD CALC: 33.4 % — LOW (ref 39–50)
HCT VFR BLD CALC: 34.2 % — LOW (ref 39–50)
HGB BLD-MCNC: 10.4 G/DL — LOW (ref 13–17)
HGB BLD-MCNC: 10.6 G/DL — LOW (ref 13–17)
LACTATE SERPL-SCNC: 3.8 MMOL/L — HIGH (ref 0.7–2)
LACTATE SERPL-SCNC: 3.9 MMOL/L — HIGH (ref 0.7–2)
LACTATE SERPL-SCNC: 5.9 MMOL/L — CRITICAL HIGH (ref 0.7–2)
LYMPHOCYTES # BLD AUTO: 0.4 K/UL — LOW (ref 1–3.3)
LYMPHOCYTES # BLD AUTO: 0.9 % — LOW (ref 13–44)
MAGNESIUM SERPL-MCNC: 2.2 MG/DL — SIGNIFICANT CHANGE UP (ref 1.6–2.6)
MAGNESIUM SERPL-MCNC: 2.2 MG/DL — SIGNIFICANT CHANGE UP (ref 1.6–2.6)
MCHC RBC-ENTMCNC: 29.4 PG — SIGNIFICANT CHANGE UP (ref 27–34)
MCHC RBC-ENTMCNC: 29.7 PG — SIGNIFICANT CHANGE UP (ref 27–34)
MCHC RBC-ENTMCNC: 30.9 GM/DL — LOW (ref 32–36)
MCHC RBC-ENTMCNC: 31.2 GM/DL — LOW (ref 32–36)
MCV RBC AUTO: 95.2 FL — SIGNIFICANT CHANGE UP (ref 80–100)
MCV RBC AUTO: 95.2 FL — SIGNIFICANT CHANGE UP (ref 80–100)
MONOCYTES # BLD AUTO: 1.2 K/UL — HIGH (ref 0–0.9)
MONOCYTES NFR BLD AUTO: 2.6 % — SIGNIFICANT CHANGE UP (ref 2–14)
NEUTROPHILS # BLD AUTO: 42.6 K/UL — HIGH (ref 1.8–7.4)
NEUTROPHILS NFR BLD AUTO: 96.4 % — HIGH (ref 43–77)
PHOSPHATE SERPL-MCNC: 2.6 MG/DL — SIGNIFICANT CHANGE UP (ref 2.5–4.5)
PLATELET # BLD AUTO: 186 K/UL — SIGNIFICANT CHANGE UP (ref 150–400)
PLATELET # BLD AUTO: 197 K/UL — SIGNIFICANT CHANGE UP (ref 150–400)
POTASSIUM SERPL-MCNC: 3.3 MMOL/L — LOW (ref 3.5–5.3)
POTASSIUM SERPL-MCNC: 3.4 MMOL/L — LOW (ref 3.5–5.3)
POTASSIUM SERPL-MCNC: 4 MMOL/L — SIGNIFICANT CHANGE UP (ref 3.5–5.3)
POTASSIUM SERPL-MCNC: 4 MMOL/L — SIGNIFICANT CHANGE UP (ref 3.5–5.3)
POTASSIUM SERPL-SCNC: 3.3 MMOL/L — LOW (ref 3.5–5.3)
POTASSIUM SERPL-SCNC: 3.4 MMOL/L — LOW (ref 3.5–5.3)
POTASSIUM SERPL-SCNC: 4 MMOL/L — SIGNIFICANT CHANGE UP (ref 3.5–5.3)
POTASSIUM SERPL-SCNC: 4 MMOL/L — SIGNIFICANT CHANGE UP (ref 3.5–5.3)
PROT SERPL-MCNC: 5.1 G/DL — LOW (ref 6–8.3)
PROT SERPL-MCNC: 5.2 G/DL — LOW (ref 6–8.3)
RBC # BLD: 3.51 M/UL — LOW (ref 4.2–5.8)
RBC # BLD: 3.59 M/UL — LOW (ref 4.2–5.8)
RBC # FLD: 19.2 % — HIGH (ref 10.3–14.5)
RBC # FLD: 19.7 % — HIGH (ref 10.3–14.5)
SODIUM SERPL-SCNC: 157 MMOL/L — HIGH (ref 135–145)
SODIUM SERPL-SCNC: 159 MMOL/L — HIGH (ref 135–145)
SODIUM SERPL-SCNC: 160 MMOL/L — CRITICAL HIGH (ref 135–145)
SODIUM SERPL-SCNC: 161 MMOL/L — CRITICAL HIGH (ref 135–145)
SPECIMEN SOURCE: SIGNIFICANT CHANGE UP
WBC # BLD: 42.4 K/UL — CRITICAL HIGH (ref 3.8–10.5)
WBC # BLD: 44.2 K/UL — CRITICAL HIGH (ref 3.8–10.5)
WBC # FLD AUTO: 42.4 K/UL — CRITICAL HIGH (ref 3.8–10.5)
WBC # FLD AUTO: 44.2 K/UL — CRITICAL HIGH (ref 3.8–10.5)

## 2019-02-09 PROCEDURE — 71045 X-RAY EXAM CHEST 1 VIEW: CPT | Mod: 26

## 2019-02-09 RX ORDER — FINASTERIDE 5 MG/1
5 TABLET, FILM COATED ORAL DAILY
Qty: 0 | Refills: 0 | Status: DISCONTINUED | OUTPATIENT
Start: 2019-02-09 | End: 2019-02-09

## 2019-02-09 RX ORDER — TAMSULOSIN HYDROCHLORIDE 0.4 MG/1
0.4 CAPSULE ORAL AT BEDTIME
Qty: 0 | Refills: 0 | Status: DISCONTINUED | OUTPATIENT
Start: 2019-02-09 | End: 2019-02-09

## 2019-02-09 RX ORDER — IPRATROPIUM/ALBUTEROL SULFATE 18-103MCG
3 AEROSOL WITH ADAPTER (GRAM) INHALATION EVERY 6 HOURS
Qty: 0 | Refills: 0 | Status: DISCONTINUED | OUTPATIENT
Start: 2019-02-09 | End: 2019-02-09

## 2019-02-09 RX ORDER — METOPROLOL TARTRATE 50 MG
5 TABLET ORAL ONCE
Qty: 0 | Refills: 0 | Status: COMPLETED | OUTPATIENT
Start: 2019-02-09 | End: 2019-02-09

## 2019-02-09 RX ORDER — VANCOMYCIN HCL 1 G
500 VIAL (EA) INTRAVENOUS EVERY 24 HOURS
Qty: 0 | Refills: 0 | Status: DISCONTINUED | OUTPATIENT
Start: 2019-02-09 | End: 2019-02-10

## 2019-02-09 RX ORDER — PANTOPRAZOLE SODIUM 20 MG/1
40 TABLET, DELAYED RELEASE ORAL DAILY
Qty: 0 | Refills: 0 | Status: DISCONTINUED | OUTPATIENT
Start: 2019-02-09 | End: 2019-02-09

## 2019-02-09 RX ORDER — TAMSULOSIN HYDROCHLORIDE 0.4 MG/1
0.4 CAPSULE ORAL AT BEDTIME
Qty: 0 | Refills: 0 | Status: DISCONTINUED | OUTPATIENT
Start: 2019-02-09 | End: 2019-02-25

## 2019-02-09 RX ORDER — SODIUM CHLORIDE 9 MG/ML
1000 INJECTION, SOLUTION INTRAVENOUS
Qty: 0 | Refills: 0 | Status: DISCONTINUED | OUTPATIENT
Start: 2019-02-09 | End: 2019-02-09

## 2019-02-09 RX ORDER — MEROPENEM 1 G/30ML
500 INJECTION INTRAVENOUS EVERY 24 HOURS
Qty: 0 | Refills: 0 | Status: DISCONTINUED | OUTPATIENT
Start: 2019-02-09 | End: 2019-02-11

## 2019-02-09 RX ORDER — FAMOTIDINE 10 MG/ML
20 INJECTION INTRAVENOUS DAILY
Qty: 0 | Refills: 0 | Status: DISCONTINUED | OUTPATIENT
Start: 2019-02-09 | End: 2019-02-25

## 2019-02-09 RX ORDER — FINASTERIDE 5 MG/1
5 TABLET, FILM COATED ORAL DAILY
Qty: 0 | Refills: 0 | Status: DISCONTINUED | OUTPATIENT
Start: 2019-02-09 | End: 2019-02-25

## 2019-02-09 RX ORDER — SODIUM CHLORIDE 9 MG/ML
1000 INJECTION, SOLUTION INTRAVENOUS
Qty: 0 | Refills: 0 | Status: DISCONTINUED | OUTPATIENT
Start: 2019-02-09 | End: 2019-02-10

## 2019-02-09 RX ORDER — FINASTERIDE 5 MG/1
5 TABLET, FILM COATED ORAL ONCE
Qty: 0 | Refills: 0 | Status: DISCONTINUED | OUTPATIENT
Start: 2019-02-09 | End: 2019-02-09

## 2019-02-09 RX ORDER — HEPARIN SODIUM 5000 [USP'U]/ML
5000 INJECTION INTRAVENOUS; SUBCUTANEOUS EVERY 8 HOURS
Qty: 0 | Refills: 0 | Status: DISCONTINUED | OUTPATIENT
Start: 2019-02-09 | End: 2019-02-13

## 2019-02-09 RX ORDER — IPRATROPIUM/ALBUTEROL SULFATE 18-103MCG
3 AEROSOL WITH ADAPTER (GRAM) INHALATION EVERY 6 HOURS
Qty: 0 | Refills: 0 | Status: DISCONTINUED | OUTPATIENT
Start: 2019-02-09 | End: 2019-02-11

## 2019-02-09 RX ORDER — HYDROMORPHONE HYDROCHLORIDE 2 MG/ML
1 INJECTION INTRAMUSCULAR; INTRAVENOUS; SUBCUTANEOUS ONCE
Qty: 0 | Refills: 0 | Status: DISCONTINUED | OUTPATIENT
Start: 2019-02-09 | End: 2019-02-09

## 2019-02-09 RX ORDER — HEPARIN SODIUM 5000 [USP'U]/ML
5000 INJECTION INTRAVENOUS; SUBCUTANEOUS EVERY 8 HOURS
Qty: 0 | Refills: 0 | Status: DISCONTINUED | OUTPATIENT
Start: 2019-02-09 | End: 2019-02-09

## 2019-02-09 RX ORDER — LABETALOL HCL 100 MG
5 TABLET ORAL ONCE
Qty: 0 | Refills: 0 | Status: DISCONTINUED | OUTPATIENT
Start: 2019-02-09 | End: 2019-02-09

## 2019-02-09 RX ORDER — POTASSIUM CHLORIDE 20 MEQ
20 PACKET (EA) ORAL
Qty: 0 | Refills: 0 | Status: COMPLETED | OUTPATIENT
Start: 2019-02-09 | End: 2019-02-09

## 2019-02-09 RX ORDER — HYDRALAZINE HCL 50 MG
5 TABLET ORAL ONCE
Qty: 0 | Refills: 0 | Status: COMPLETED | OUTPATIENT
Start: 2019-02-09 | End: 2019-02-09

## 2019-02-09 RX ORDER — TAMSULOSIN HYDROCHLORIDE 0.4 MG/1
0.4 CAPSULE ORAL ONCE
Qty: 0 | Refills: 0 | Status: DISCONTINUED | OUTPATIENT
Start: 2019-02-09 | End: 2019-02-09

## 2019-02-09 RX ADMIN — Medication 3 MILLILITER(S): at 02:17

## 2019-02-09 RX ADMIN — Medication 3 MILLILITER(S): at 09:04

## 2019-02-09 RX ADMIN — PANTOPRAZOLE SODIUM 40 MILLIGRAM(S): 20 TABLET, DELAYED RELEASE ORAL at 12:03

## 2019-02-09 RX ADMIN — HEPARIN SODIUM 5000 UNIT(S): 5000 INJECTION INTRAVENOUS; SUBCUTANEOUS at 22:09

## 2019-02-09 RX ADMIN — Medication 100 MILLIEQUIVALENT(S): at 20:01

## 2019-02-09 RX ADMIN — HYDROMORPHONE HYDROCHLORIDE 1 MILLIGRAM(S): 2 INJECTION INTRAMUSCULAR; INTRAVENOUS; SUBCUTANEOUS at 19:58

## 2019-02-09 RX ADMIN — Medication 100 MILLIEQUIVALENT(S): at 18:55

## 2019-02-09 RX ADMIN — SODIUM CHLORIDE 100 MILLILITER(S): 9 INJECTION, SOLUTION INTRAVENOUS at 15:27

## 2019-02-09 RX ADMIN — Medication 5 MILLIGRAM(S): at 06:15

## 2019-02-09 RX ADMIN — MEROPENEM 100 MILLIGRAM(S): 1 INJECTION INTRAVENOUS at 06:13

## 2019-02-09 RX ADMIN — HEPARIN SODIUM 5000 UNIT(S): 5000 INJECTION INTRAVENOUS; SUBCUTANEOUS at 15:27

## 2019-02-09 RX ADMIN — HYDROMORPHONE HYDROCHLORIDE 1 MILLIGRAM(S): 2 INJECTION INTRAMUSCULAR; INTRAVENOUS; SUBCUTANEOUS at 20:13

## 2019-02-09 RX ADMIN — SODIUM CHLORIDE 100 MILLILITER(S): 9 INJECTION, SOLUTION INTRAVENOUS at 00:35

## 2019-02-09 RX ADMIN — SODIUM CHLORIDE 100 MILLILITER(S): 9 INJECTION, SOLUTION INTRAVENOUS at 10:22

## 2019-02-09 RX ADMIN — SODIUM CHLORIDE 125 MILLILITER(S): 9 INJECTION, SOLUTION INTRAVENOUS at 20:01

## 2019-02-09 RX ADMIN — Medication 5 MILLIGRAM(S): at 23:37

## 2019-02-09 RX ADMIN — Medication 100 MILLIEQUIVALENT(S): at 18:13

## 2019-02-09 RX ADMIN — Medication 3 MILLILITER(S): at 20:58

## 2019-02-09 RX ADMIN — Medication 5 MILLIGRAM(S): at 15:28

## 2019-02-09 RX ADMIN — Medication 100 MILLIGRAM(S): at 06:13

## 2019-02-09 RX ADMIN — Medication 50 MILLIEQUIVALENT(S): at 00:15

## 2019-02-09 RX ADMIN — Medication 5 MILLIGRAM(S): at 21:40

## 2019-02-09 NOTE — CONSULT NOTE ADULT - SUBJECTIVE AND OBJECTIVE BOX
HPI:  85 y/o M from [facility], PMH of HTN, BPH, dementia , had recent fall on 19 had fracture of Rt intertrochanteric fracture, underwent surgical fixation, sent in from NH for respiratory distress. Pt is demented, hence hx obtained from NH papers and daughter at the bedside using  # [id number]. According to daughter pt is not doing well since surgery and barely walks, with worsening lower limb edema and SOB. Pt was started on Duoneb inhalation and today CXR was done, was told pt has PNA and was sent in for further evaluation. Pt's daughter also reports pt being more confused since last .      In ED, patient was tachycardic to 120/min, BP- 99/58 mmhg, RR- 20/min, spo2- 90 % . EKG-  afib @105 BPM , prolonged QTC - 520, no ST T wave changes. cardiac enzymes x1- 0.059, BNP- 7244, UA- >50 WBC and moderate LE , CXR s/o Patchy right lower lobe infiltrate. Cardiomegaly. Labs pertinent for WBC- 57.6, H.H of 9.3/31.4, lactate of 9.3, K of 6.4, bicarb of 8, AG of 24,bun/ creat- 177/8.69, s/p 1 dose of cefepime, vancomycin and azithromycin with 1.5 L NS bolus  in ED     Pt had barba catheter placed, about 750 cc of cloudy urine drained. ABG - 7.35/ 13/106/7/100% NRB. Pt was placed on NIV BIPAP for work of breathing.     Pt will be admitted to ICU for hypoxic respiratory failure with metabolic acidosis and respiratory alkalosis and sepsis secondary to UTI and ? PNA, management of Acute renal failure. (2019 19:25)    ID consult was called to evaluate 86 years old male in ICU with sepsis secondary to pneumonia, bacteremia, leukocytosis and UTI.  Pt is s/p removal of right jugular central venous catheter, exploration of right carotid artery, repair of right carotid artery and jugular injury by vascular service in OR last night, stable. At the moment pt is in bed, no distress, on supplemental O2 via nasal cannula. Pt has history of dementia, does not answer any questions, shakes his head. Pt is afebrile since admission, had a temperature of 96.2 on the day of admission, WBCs are high - 57.6 upon admission and now 44.2, positive UA, UCx grew >100K of gram negative rods, blood cultures collected on 19 grew E. Coli - awaiting for sensitivity. At this time pt is on Meropenem 500 IV q 24 hours and Vancomycin 500 mg IV q 24 hours, Cr is trending down - 1.47 today. CT chest performed yesterday: multifocal pneumonia.     PAST MEDICAL & SURGICAL HISTORY:  Dementia  BPH (benign prostatic hyperplasia)  Hypertension  Closed hip fracture      ALLERGIES: No Known Allergies      MEDS:  ALBUTerol/ipratropium for Nebulization 3 milliLiter(s) Nebulizer every 6 hours  dextrose 5% + sodium chloride 0.45%. 1000 milliLiter(s) IV Continuous <Continuous>  finasteride 5 milliGRAM(s) Oral daily  heparin  Injectable 5000 Unit(s) SubCutaneous every 8 hours  meropenem  IVPB 500 milliGRAM(s) IV Intermittent every 24 hours  pantoprazole  Injectable 40 milliGRAM(s) IV Push daily  tamsulosin 0.4 milliGRAM(s) Oral at bedtime  vancomycin  IVPB 500 milliGRAM(s) IV Intermittent every 24 hours      SOCIAL HISTORY:  Smoker: unknown  Family history: unknown      VITALS:  Vital Signs Last 24 Hrs  T(C): 35.9 (2019 12:00), Max: 36.6 (2019 00:00)  T(F): 96.7 (2019 12:00), Max: 97.9 (2019 00:00)  HR: 104 (2019 12:00) (83 - 124)  BP: 153/80 (2019 12:00) (87/75 - 161/93)  BP(mean): 93 (2019 12:00) (70 - 109)  RR: 13 (2019 12:00) (11 - 28)  SpO2: 99% (2019 12:00) (97% - 100%)    LABS/DIAGNOSTIC TESTS:                        10.6   44.2  )-----------( 186      ( 2019 05:51 )             34.2     WBC Count: 44.2 K/uL ( @ 05:51)  WBC Count: 42.4 K/uL ( @ 00:24)  WBC Count: 46.8 K/uL ( @ 16:49)  WBC Count: 47.5 K/uL ( @ 13:37)  WBC Count: 47.6 K/uL ( @ 04:32)  WBC Count: 48.3 K/uL ( @ 20:40)  WBC Count: 57.6 K/uL ( @ 16:55)        159<H>  |  125<H>  |  65<H>  ----------------------------<  125<H>  3.4<L>   |  22  |  1.47<H>    Ca    8.5      2019 05:51  Phos  2.6       Mg     2.2         TPro  5.2<L>  /  Alb  1.4<L>  /  TBili  0.6  /  DBili  x   /  AST  29  /  ALT  19  /  AlkPhos  226<H>        Urinalysis Basic - ( 2019 18:26 )    Color: Yellow / Appearance: Turbid / S.020 / pH: x  Gluc: x / Ketone: Negative  / Bili: Negative / Urobili: Negative   Blood: x / Protein: 100 / Nitrite: Negative   Leuk Esterase: Moderate / RBC: 10-25 /HPF / WBC >50 /HPF   Sq Epi: x / Non Sq Epi: Occasional /HPF / Bacteria: Few /HPF        LIVER FUNCTIONS - ( 2019 05:51 )  Alb: 1.4 g/dL / Pro: 5.2 g/dL / ALK PHOS: 226 U/L / ALT: 19 U/L DA / AST: 29 U/L / GGT: x             PT/INR - ( 2019 14:22 )   PT: 15.8 sec;   INR: 1.41 ratio         PTT - ( 2019 14:22 )  PTT:57.6 sec    LACTATE:Lactate, Blood: 5.9 mmol/L ( @ 05:51)  Lactate, Blood: 7.7 mmol/L ( @ 16:49)  Lactate, Blood: 9.0 mmol/L ( @ 13:37)      ABG - ABG - ( 2019 04:47 )  pH, Arterial: 7.46  pH, Blood: x     /  pCO2: 24    /  pO2: 88    / HCO3: 17    / Base Excess: -5.3  /  SaO2: 97                  CULTURES:   .Sputum   @ 19:52 --  --    Few Squamous epithelial cells per low power field  Few polymorphonuclear leukocytes per low power field  Few Yeast like cells per oil power field  Numerous Gram variable coccobacilli per oil power field      .Urine   @ 00:24   >100,000 CFU/ml Gram Negative Rods  --  --      .Blood   @ 23:09   Growth in aerobic and anaerobic bottles: Escherichia coli  "Due to technical problems, Proteus sp. will Not be reported as part of  the BCID panel until further notice"  ***Blood Panel PCR results on this specimen are available  approximately 3 hours after the Gram stain result.***  Gram stain, PCR, and/or culture results may not always  correspond due to difference in methodologies.  ************************************************************  This PCR assay was performed using [employer].  The following targets are tested for: Enterococcus,  vancomycin resistant enterococci, Listeria monocytogenes,  coagulase negative staphylococci, S. aureus,  methicillin resistant S. aureus, Streptococcus agalactiae  (Group B), S. pneumoniae, S.pyogenes (Group A),  Acinetobacter baumannii, Enterobacter cloacae, E. coli,  Klebsiella oxytoca, K. pneumoniae, Proteus sp.,  Serratia marcescens, Haemophilus influenzae,  Neisseria meningitidis, Pseudomonas aeruginosa, Candida  albicans, C. glabrata, C krusei, C parapsilosis,  C. tropicalis and the KPC resistance gene.  --  Blood Culture PCR    RADIOLOGY:    < from: Xray Chest 1 View-PORTABLE IMMEDIATE (19 @ 17:40) >  EXAM:  XR CHEST PORTABLE IMMED 1V                            PROCEDURE DATE:  2019          INTERPRETATION:  CLINICAL HISTORY: 86 years  Male with Sepsis.    COMPARISON: 2018    AP view of the chest demonstrates a patchy right lower lobe infiltrate.   The left lung is clear.. There is no pleural effusion. There is no   pneumothorax.    The heart is moderately enlarged. The aorta is atherosclerotic... The   mediastinum cannot be assessed due to rotation. There is no hilar mass.   The pulmonary vasculature is normal.     Mild thoracic degenerative changes are present.    IMPRESSION:    Patchy right lower lobe infiltrate. Cardiomegaly.      PAT LANGSTON M.D., ATTENDING RADIOLOGIST  This document has been electronically signed. 2019  5:41PM    < end of copied text >    < from: Xray Chest 1 View- PORTABLE-Routine (19 @ 11:19) >    EXAM:  XR CHEST PORTABLE ROUTINE 1V                            PROCEDURE DATE:  2019          INTERPRETATION:  Septic shock, renal failure.    AP chest. Prior dated 2019.    Right internal jugular line has been removed. 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 The vascular congestion bilateral predominantly basilar   airspace disease not significantly changed.  small hazy bilateral basilar   effusions slightly increased on the left. No pneumothorax.    Impression: As above    SHIRA REDDY M.D., ATTENDING RADIOLOGIST  This document has been electronically signed. 2019 11:28AM    < end of copied text >  < from: CT Head No Cont (19 @ 21:10) >  EXAM:  CT BRAIN                            PROCEDURE DATE:  2019          INTERPRETATION:    Exam Date: 2019 9:10 PM    History:Altered mental status, fall    Multiple axial sections were obtained from skull base to the vertex   without intravenous contrast.    Comparison: None    Findings:    Subcortical white matter hypoattenuation within the right temporal lobe   may represent prior infarct or sequela of prior trauma.    There is no intracranial hemorrhage, mass effect or midline shift. No   extra-axial collection is identified. There is no large acute territorial   infarct.    There is moderate prominence of the ventricles and subarachnoid spaces,   compatible with age related involutional changes. Prominent   periventricular lucency is present, compatible with microvascular   ischemic change.    The visualized skull and mastoid are unremarkable.    The paranasal sinuses and orbits are within normal limits.    Impression:    Age related involutional and microvascular ischemic changes, without   evidence of intracranial hemorrhage, mass effect or midline shift.    ERNESTO WATERS M.D., ATTENDING RADIOLOGIST  This document has been electronically signed. 2019  9:19PM    < end of copied text >  < from: CT Chest No Cont (19 @ 21:15) >  EXAM:  CT ABDOMEN AND PELVIS                          EXAM:  CT CHEST                            PROCEDURE DATE:  2019          INTERPRETATION:  CLINICAL HISTORY: Shortness of breath. Abdominal   distention. Altered mental status. Evaluate for pneumonia.    TECHNIQUE: CT of the chest, abdomen and pelvis without IV contrast. Oral   contrast was withheld.  Transaxial images were acquired from the thoracic inlet through to the   pubic symphysis without the administration of IV contrast. Coronal and   sagittal reformatted images are provided.    COMPARISON:    FINDINGS:    Chest CT:  There are presumed small bilateral pleural effusions with airspace   consolidation in both lower lobes likely due to both atelectasis and   pneumonia, particularly on the right min there is increased air   bronchograms. There is linear atelectasis in the lingula of the left   upper lobe. The upper lobes are otherwise clear. There is no   pneumothorax. The trachea and main bronchi are clear.    The heart is mildly enlarged. There is no pericardial effusion. The   thoracic aorta and main pulmonary arteries are within normal limits of   size.    There is no significant supraclavicular, axillary, mediastinal or hilar   adenopathy.    There is a 1.7 cm hypodense nodule in the right lobe of the thyroid gland.    The osseous structures within the thorax have no acute finding.    CT abdomen/pelvis:  Evaluation of the solid organs and vascular structures is limited without   IV contrast. Suboptimal assessment of the bowel without oral contrast.    The unenhanced appearance of the liver, spleen, pancreas and gallbladder   are unremarkable aside from unchanged gallstones. Nodular thickening of   both adrenal glands, left greater than right is unchanged. There is new   mild bilateral symmetric-appearing hydroureteronephrosis without evidence   of obstructing ureteral calculus. Tiny bilateral calculi. To be near the   UVJs but without overlying them. Stable bilateral renal cysts and   subcentimeter hypodense renal lesions too small to adequately   characterize.    Stable appearance to moderate size left inguinal hernia containing   nonobstructed portion of the descending and sigmoid colons. There is no   bowel obstruction, free air or free fluid. The appendix is normal. There   is no abnormal bowel wall thickening or pericolonic inflammatory change.     The abdominal aorta is normal in caliber. There is no significant   adenopathy in the upper abdomen, retroperitoneum or pelvis.    The urinary bladder is partially collapsed around a Barba balloon   catheter. Decreased calcifications along the posterior dependent wall.   The prostate gland is again enlarged.    There is stable grade 1 anterior listhesis of L4 and L5. Degenerative   changes throughout the lumbar spine are also unchanged. Status post   fixation of prior right femoral fracture. The osseous structures within   the abdomen and pelvis demonstrate no acute abnormality. There is new   generalized anasarca and increased asymmetric soft tissue swelling over   the right hip and thigh. No gross intramuscular hematoma is seen.    IMPRESSION:  Chest CT: Presumed small bilateral pleural effusions with atelectasis and   likely superimposed pneumonia in the lung bases. Cardiomegaly.    CT abdomen/pelvis: No bowel obstruction. Mild bilateral   hydroureteronephrosis without obstructing ureteral calculus. Please   correlate clinically with urinalysis to exclude infection as an etiology.  Asymmetric soft tissue swelling over the right hip which could be due to   posttraumatic or postprocedural sequela versus infection. No evidence of   abscess or intramuscular hematoma.    CONNIE CORTEZ M.D., RADIOLOGIST  This document has been electronically signed. 2019 12:40AM    < end of copied text >    < from: CT Pelvis Bony Only No Cont (19 @ 21:41) >  EXAM:  CT PELVIS BONY ONLY                            PROCEDURE DATE:  2019          INTERPRETATION:    CT PELVIS BONY ONLY dated 2019 9:41 PM     INDICATION: Recent surgery with bruising over the right hip.    COMPARISON: CT of the abdomen pelvis dated 2018    TECHNIQUE: CT imaging of the pelvis was performed. The data was   reformatted in the axial, coronal, and sagittal planes.     FINDINGS:    OSSEOUS STRUCTURES: The patient is status post ORIF of the right hip with   intramedullary paul in transcervical screw through a mildly impacted   intertrochanteric fracture of the right proximal femur. The fracture   lines at the right proximal femur are patent in keeping with acute   fracture. The hardware is intact. No additional fracture is identified.   There are degenerative changes of the bilateral hips with joint space   narrowing and mild subchondral cystic changes along the superoanterior   acetabulum. The pubic symphysis is relatively preserved. Minimal spurring   of both sacroiliac joints. Degenerative changes of the lower lumbar spine.  SYNOVIUM/ JOINT FLUID: No large hip joint effusion noted.  TENDONS: The tendons appear intact. No full-thickness tear or retraction   is identified.  MUSCLES: There is a symmetric appearance of the musculature. No   intramuscular hematoma is noted. Mild to moderate atrophy appreciated.  NEUROVASCULAR STRUCTURES: There are scattered vascular calcifications   present.  INTRAPELVIC SOFT TISSUES: There is an enlarged and heterogeneous prostate   with coarse central calcifications. A Barba catheter and small amount of   gas is present within a nondistended urinary bladder. There is a moderate   left facet and nonobstructed sigmoid colon containing left inguinal   hernia.  SUBCUTANEOUS SOFT TISSUES: There is moderate diffuse soft tissue swelling   present. Small hematomas are seen overlying the right hip and left hip.   One hematoma at the left hip measures approximately 1.9 x 1.2 x 2.0 cm at   the proximal/midportion of the left femur in the lateral subcutaneous   tissues (series 2, image 137. One hematoma overlying the right hip   measures approximately 1.7 x 1.8 x 2.6 cm. There are calcifications   overlying the left gluteus danni within the subcutaneous tissues,   likely the sequela of old injection.      IMPRESSION:    1.  Recent ORIF of the right hip  2.  Diffuse soft tissue swelling about the pelvis with several scattered   small subcutaneous hematomas.  3.  Enlarged and heterogeneous prostate. Correlate clinically. Left fat   and nonobstructed bowel containing inguinal hernia.      KARLA MONTES M.D., ATTENDING RADIOLOGIST  This document has been electronically signed. 2019  7:39AM    < end of copied text >    < from: CT Chest No Cont (19 @ 12:53) >  EXAM:  CT CHEST                            PROCEDURE DATE:  2019          INTERPRETATION:  CT CHEST WITHOUT CONTRAST    INDICATION: needs further evaluation of RIJ line, shortness of breath    TECHNIQUE: Unenhanced helical images were obtained of the chest. Coronal   and sagittal images were reconstructed. Maximum intensity projection   images were generated.     COMPARISON: CT chest 2019.    FINDINGS:     TUBES/LINES: The presumed right IJ line extends into the right common   carotidartery and the tip terminates within the aortic arch.     AIRWAYS, LUNGS, PLEURA: Mild retention secretions within the trachea and   right main bronchus. Patent central airways. No bronchial wall thickening   or bronchiectasis.    Dense consolidationwithin the right lower lobe, unchanged. New patchy   airspace opacity within the anterior margin of right upper lobe.   Subsegmental atelectasis/consolidation left lower lobe. Decreased   subsegmental atelectasis of the lingula. Small bilateral pleural   effusions.    MEDIASTINUM, LYMPH NODES: No lymphadenopathy.    HEART AND VASCULATURE: Normal heart size. Trace pericardial effusion. The   thoracic aorta and pulmonary artery are normal in course and caliber.    UPPER ABDOMEN: Minimal aortic atherosclerosis. Bilateral renal cysts,   incompletely evaluated. Cholelithiasis. Mild bilateral hydronephrosis,   unchanged.    BONES AND SOFT TISSUES: No aggressive osseous lesion. Spinal degenerative   changes.    LOWER NECK: 1.4 cm right thyroid nodule.    IMPRESSION:     1.  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     2.  New patchy airspace opacity within the anterior margin of right upper   lobe. Stable dense consolidation within the basilar right lower lobe.   These findings likely represent multifocal pneumonia.    3.  Small bilateral pleural effusions. Partial atelectasis of left lower   lobe.    4.  1.4 cm right thyroid nodule. Further evaluation can performed with   thyroid ultrasound.    These findings were discussed with Dr. BETTENCOURT, on 2019 1:03 PM      ARIELLE GOETZ M.D., ATTENDING RADIOLOGIST  This document has been electronically signed. 2019  1:04PM    < end of copied text >      ROS  [ x ] UNABLE TO ELICIT

## 2019-02-09 NOTE — PROGRESS NOTE ADULT - ASSESSMENT
A/P:  1.PAULINA: most likley secondary to obstructive uropathy , now in diuretic phase (u/o 200 ml per hr)  -suggest to replace urinary losses with hypotonic iv fluid q hr as per u/o and serum Na level  -Keep patient euvolemic   -Avoid Nephrotoxic Meds/ Agents such as (NSAIDs, IV contrast, Aminoglycosides such as gentamicin, -Gadolinium contrast, Phosphate containing enemas, etc..)  -Adjust Medications according to eGFR  -f/u bmp daily  2.HYPOKALEMIA: secondary to urine k loss with diuretic phase   -replace kcl prn  -keep k >4 and <5  -f/u k level q 4 to 6 hrs x 24 hrs  3.HYPERNATREMIA: secondary to high u/o induced water losses with improved renal function  -suggest to correct water deficit with hypotonic iv fluid  -avoid Na correction >8 meq per 24 hrs  -f/u Na level q 4 hrs x 24 hrs  4.METABOLIC ACIDOSIS: secondary to septic shock plus paulina induced , now improved PH   -f/u co2 daily  5.HYPERPHOSPHATEMIA: secondary to paulina, now improving  -f/u phos level daily A/P:  1.PAULINA: most likley secondary to obstructive uropathy , now improving  -suggest to replace urinary losses with hypotonic iv fluid q hr as per u/o and serum Na level  -Keep patient euvolemic   -Avoid Nephrotoxic Meds/ Agents such as (NSAIDs, IV contrast, Aminoglycosides such as gentamicin, -Gadolinium contrast, Phosphate containing enemas, etc..)  -Adjust Medications according to eGFR  -f/u bmp daily  2.HYPOKALEMIA: secondary to urine k loss with diuretic phase   -replace kcl prn  -keep k >4 and <5  -f/u k level q 4 to 6 hrs x 24 hrs  3.HYPERNATREMIA: secondary to high u/o induced water losses with improved renal function  -suggest to correct water deficit with hypotonic iv fluid  -avoid Na correction >8 meq per 24 hrs  -goal Na 158 by 4 pm today and  by 4 pm tomorrow  -f/u Na level q 4 hrs x 24 hrs  4.METABOLIC ACIDOSIS: secondary to septic shock plus paulina induced ,now improved  -f/u co2 daily  5.HYPERPHOSPHATEMIA: secondary to paulina, now improving  -f/u phos level daily  6.EDEMA: most likley secondary to paulina with obstructive uropathy induced retention, now improving   -keep pt evolemic

## 2019-02-09 NOTE — PROGRESS NOTE ADULT - PROBLEM SELECTOR PLAN 1
PT was hypoxic and tachypneic on admission  ABG s/o Primary high anion gap Metabolic Acidosis with Secondary Respiratory Alkalosis. Likely lactic acidosis due to infectious etiology and acute renal failure.  -He is saturating 98% on 2L NC.    -f/u dopplers and echo for DVT and right heart strain to r/o PE, though less likely .  -weaned off Bipap   will get CT head and CT chest to r/o any acute pathology ( given recent hx of fall and confusion)  will give sodium bicarbonate stat for severe acidosis and repeat BMP in 2 hours  serial ABGs  CXR s/o Patchy right lower lobe infiltrate. Cardiomegaly.   will be gentle with IV hydration given pt has cardiomegaly, may have underlying CHF, will trend cardiac enzymes and f/u echo PT was hypoxic and tachypneic on admission.  ABG s/o Primary high anion gap Metabolic Acidosis with Secondary Respiratory Alkalosis. Likely lactic acidosis due to infectious etiology and acute renal failure.  -He is saturating 98% on 2L NC.    -f/u dopplers and echo for DVT and right heart strain to r/o PE, though less likely .  -weaned off Bipap   -CT head negative for any acute stroke  -DC bicarb drip  CXR s/o Patchy right lower lobe infiltrate. Cardiomegaly.   -CT chest shows bilateral pleural effusions and superimposed pneumonia  -c/w Meropenem and vancomycin renally dosed

## 2019-02-09 NOTE — PROGRESS NOTE ADULT - PROBLEM SELECTOR PLAN 3
secondary to Pneumonia, UTI and gram negative rods bactremia   -WBC count trending down to 57.6-->47.5  -CT chest and neck noted as above   -c/w meropenem and vancomycin   -f/u repeat blood cultures in 48 hours.   RVP - negative  -ID Dr Norton following secondary to Pneumonia, UTI and gram negative rods bacteremia   -WBC count trending down to 57.6-->47.5->44.2  -CT chest and neck noted   -c/w meropenem and vancomycin   -f/u repeat blood cultures in 48 hours.   RVP - negative  -ID Dr Norton following

## 2019-02-09 NOTE — CONSULT NOTE ADULT - ASSESSMENT
1. Sepsis  2. Multifocal pneumonia  3. UTI  4. Bacteremia  5. Leukocytosis    Plan:  - Continue Meropenem 500 mg IV q 24 hours (day #3)  - Continue Vancomycin 500 mg IV q 24 hours (day #3)  - Awaiting for urine and blood cultures 1. Septic shock  2. Multifocal pneumonia  3. UTI  4. Bacteremia  5. Leukocytosis    Plan:  - Continue Meropenem 500 mg IV q 24 hours (day #3)  - Continue Vancomycin 500 mg IV q 24 hours (day #3)  - Awaiting for urine and blood cultures

## 2019-02-09 NOTE — PROGRESS NOTE ADULT - PROBLEM SELECTOR PLAN 4
-Patient's right sided central line is mal position into common carotid artery with tip in aortic arch, confirmed with CT scan on 2/8/19  -Vascular surgery consulted for removal of central venous catheter.   -Will continue to follow up  -Vascular surgeon Dr Hernandez -Patient's right sided central line was  mal positioned into common carotid artery with tip in aortic arch, confirmed with CT scan on 2/8/19  -removal of central venous catheter and repair of carotid artery on 2/8/19  -Line safely removed, with out any hematoma or excessive bleeding. DSD dressing in place.   -Vascular surgeon Dr Hernandez

## 2019-02-09 NOTE — PHYSICAL THERAPY INITIAL EVALUATION ADULT - GENERAL OBSERVATIONS, REHAB EVAL
Pt found in ICU bed with heart monitor, BP Cuff, 02 sat, 02 np, jameson all insitu; /90 RR=20 RA=249 95% 02 sat

## 2019-02-09 NOTE — PHYSICAL THERAPY INITIAL EVALUATION ADULT - AMBULATION SKILLS, REHAB EVAL
answering questions . She was also counseled on her preventative health maintenance recommendations and follow-up. Return for call in the am for quant level if above [phone] then will schedule dating US.     Electronically signed by: Dion West CNP needs device/needed assist

## 2019-02-09 NOTE — PROGRESS NOTE ADULT - SUBJECTIVE AND OBJECTIVE BOX
INTERVAL HPI/OVERNIGHT EVENTS:    Pt s/p rt carotid artery exploration, removal of central line 2/9, seen and examined at bedside, no acute events overnight.     Vital Signs Last 24 Hrs  T(C): 36 (09 Feb 2019 06:00), Max: 36.6 (09 Feb 2019 00:00)  T(F): 96.8 (09 Feb 2019 06:00), Max: 97.9 (09 Feb 2019 00:00)  HR: 99 (09 Feb 2019 09:00) (83 - 129)  BP: 161/93 (09 Feb 2019 08:00) (87/75 - 161/93)  BP(mean): 109 (09 Feb 2019 08:00) (67 - 109)  RR: 19 (09 Feb 2019 09:00) (11 - 28)  SpO2: 100% (09 Feb 2019 09:00) (95% - 100%)  I&O's Detail    08 Feb 2019 07:01  -  09 Feb 2019 07:00  --------------------------------------------------------  IN:    dextrose 5% + sodium chloride 0.45%: 400 mL    dextrose 5% + sodium chloride 0.45%.: 800 mL    dextrose 5% + sodium chloride 0.45%.: 300 mL    dextrose 5% + sodium chloride 0.45%.: 700 mL    IV PiggyBack: 100 mL    norepinephrine Infusion: 186.6 mL    sodium chloride 0.45%: 1000 mL    Solution: 300 mL    Solution: 250 mL    Solution: 50 mL  Total IN: 4086.6 mL    OUT:    Indwelling Catheter - Urethral: 3725 mL  Total OUT: 3725 mL    Total NET: 361.6 mL      09 Feb 2019 07:01  -  09 Feb 2019 09:43  --------------------------------------------------------  IN:    dextrose 5% + sodium chloride 0.45%.: 200 mL  Total IN: 200 mL    OUT:    Indwelling Catheter - Urethral: 125 mL  Total OUT: 125 mL    Total NET: 75 mL        meropenem  IVPB 500 milliGRAM(s) IV Intermittent every 24 hours  pantoprazole  Injectable 40 milliGRAM(s) IV Push daily  vancomycin  IVPB 500 milliGRAM(s) IV Intermittent every 24 hours      Physical Exam  General: Responsive, No acute distress  HEENT: neck supple, dressing C/D/I, no TTP   Extremities: lt UE and lt LE muscle strength present          Labs:                        10.6   44.2  )-----------( 186      ( 09 Feb 2019 05:51 )             34.2     02-09    159<H>  |  125<H>  |  65<H>  ----------------------------<  125<H>  3.4<L>   |  22  |  1.47<H>    Ca    8.5      09 Feb 2019 05:51  Phos  2.6     02-09  Mg     2.2     02-09    TPro  5.2<L>  /  Alb  1.4<L>  /  TBili  0.6  /  DBili  x   /  AST  29  /  ALT  19  /  AlkPhos  226<H>  02-09    PT/INR - ( 08 Feb 2019 14:22 )   PT: 15.8 sec;   INR: 1.41 ratio         PTT - ( 08 Feb 2019 14:22 )  PTT:57.6 sec

## 2019-02-09 NOTE — PROGRESS NOTE ADULT - SUBJECTIVE AND OBJECTIVE BOX
POSTOP NOTE    Patient is doing okay.      Vital Signs Last 24 Hrs  T(C): 36.6 (2019 00:00), Max: 36.6 (2019 04:30)  T(F): 97.9 (2019 00:00), Max: 97.9 (2019 04:30)  HR: 92 (2019 02:00) (83 - 129)  BP: 121/59 (2019 02:00) (87/75 - 145/74)  BP(mean): 72 (2019 02:00) (64 - 93)  RR: 15 (2019 02:00) (10 - 28)  SpO2: 97% (2019 01:30) (95% - 100%)    Daily     Daily     I&O's Detail    2019 07:01  -  2019 07:00  --------------------------------------------------------  IN:    dextrose 5% + sodium chloride 0.45%: 1400 mL    norepinephrine Infusion: 167.4 mL    Sodium Chloride 0.9% IV Bolus: 500 mL  Total IN: 2067.4 mL    OUT:    Indwelling Catheter - Urethral: 2000 mL  Total OUT: 2000 mL    Total NET: 67.4 mL      2019 07:01  -  2019 03:11  --------------------------------------------------------  IN:    dextrose 5% + sodium chloride 0.45%: 400 mL    dextrose 5% + sodium chloride 0.45%.: 300 mL    dextrose 5% + sodium chloride 0.45%.: 300 mL    dextrose 5% + sodium chloride 0.45%.: 700 mL    IV PiggyBack: 100 mL    norepinephrine Infusion: 172.6 mL    sodium chloride 0.45%: 1000 mL    Solution: 300 mL  Total IN: 3272.6 mL    OUT:    Indwelling Catheter - Urethral: 3050 mL  Total OUT: 3050 mL    Total NET: 222.6 mL          MEDICATIONS  (STANDING):  ALBUTerol/ipratropium for Nebulization 3 milliLiter(s) Nebulizer every 6 hours  dextrose 5% + sodium chloride 0.45%. 1000 milliLiter(s) (100 mL/Hr) IV Continuous <Continuous>  finasteride 5 milliGRAM(s) Oral daily  heparin  Injectable 5000 Unit(s) SubCutaneous every 8 hours  meropenem  IVPB 500 milliGRAM(s) IV Intermittent every 24 hours  pantoprazole  Injectable 40 milliGRAM(s) IV Push daily  tamsulosin 0.4 milliGRAM(s) Oral at bedtime  vancomycin  IVPB 500 milliGRAM(s) IV Intermittent every 24 hours    MEDICATIONS  (PRN):      PHYSICAL EXAM:-  CONSTITIONAL/GENERAL: In no acute distress  Rt neck dressing clean, +carotid pulse  RESPIRATORY/CHEST: Clear to percussion bilaterally; Normal respiratory effort  CARDIOVASCULAR/HEART: Regular rate and rhythm  GASTROINTESTINAL/ABDOMEN: Soft, Nontender, Nondistended; Bowel sounds present  MUSCULOSKELETAL/EXTREMITIES:  No clubbing, cyanosis, or edema    LABS:-                        10.4   42.4  )-----------( 197      ( 2019 00:24 )             33.4     Neutrophil %:       157<H>  |  125<H>  |  75<H>  ----------------------------<  144<H>  4.0   |  21<L>  |  1.74<H>    Ca    8.4      2019 00:24  Phos  3.9     -  Mg     2.2         TPro  5.3<L>  /  Alb  1.4<L>  /  TBili  0.7  /  DBili  x   /  AST  28  /  ALT  19  /  AlkPhos  214<H>      PT/INR - ( 2019 14:22 )   PT: 15.8 sec;   INR: 1.41 ratio         PTT - ( 2019 14:22 )  PTT:57.6 sec  Urinalysis Basic - ( 2019 18:26 )    Color: Yellow / Appearance: Turbid / S.020 / pH: x  Gluc: x / Ketone: Negative  / Bili: Negative / Urobili: Negative   Blood: x / Protein: 100 / Nitrite: Negative   Leuk Esterase: Moderate / RBC: 10-25 /HPF / WBC >50 /HPF   Sq Epi: x / Non Sq Epi: Occasional /HPF / Bacteria: Few /HPF          Lactate, Blood: 7.7 mmol/L ( @ 16:49)  Lactate, Blood: 9.0 mmol/L ( @ 13:37)  Lactate, Blood: 8.8 mmol/L ( @ 04:32)  Lactate, Blood: 11.6 mmol/L ( @ 22:55)  Lactate, Blood: 9.3 mmol/L ( @ 16:55)      A/P:  S/P Repair of Rt Carotid and Jugular Injury  Doing okay  Neurovasc checks  ICU care

## 2019-02-09 NOTE — PROGRESS NOTE ADULT - SUBJECTIVE AND OBJECTIVE BOX
INTERVAL HPI/OVERNIGHT EVENTS:       Antimicrobial:  meropenem  IVPB 500 milliGRAM(s) IV Intermittent every 24 hours  vancomycin  IVPB 500 milliGRAM(s) IV Intermittent every 24 hours    Cardiovascular:  tamsulosin 0.4 milliGRAM(s) Oral at bedtime    Pulmonary:  ALBUTerol/ipratropium for Nebulization 3 milliLiter(s) Nebulizer every 6 hours    Hematalogic:  heparin  Injectable 5000 Unit(s) SubCutaneous every 8 hours    Other:  dextrose 5%. 1000 milliLiter(s) IV Continuous <Continuous>  famotidine Injectable 20 milliGRAM(s) IV Push daily  finasteride 5 milliGRAM(s) Oral daily      Drug Dosing Weight  Height (cm): 165.1 (2019 16:08)  Weight (kg): 59.6 (2019 21:36)  BMI (kg/m2): 21.9 (2019 21:36)  BSA (m2): 1.65 (2019 21:36)    CENTRAL LINE: [x ] YES [ ] NO  LOCATION:   DATE INSERTED:    BARBA: [ x] YES [ ] NO    DATE INSERTED:    A-LINE:  [x ] YES [ ] NO  LOCATION:   DATE INSERTED:    PMH/Social Hx/Fam Hx -reviewed admission note, no change since admission  PAST MEDICAL & SURGICAL HISTORY:  Dementia  BPH (benign prostatic hyperplasia)  Hypertension  Closed hip fracture      T(C): 35.6 (19 @ 15:00), Max: 36.6 (19 @ 00:00)  HR: 118 (19 @ 15:33)  BP: 149/74 (19 @ 14:00)  BP(mean): 93 (19 @ 14:00)  ABP: 175/90 (19 @ 15:33)  ABP(mean): 129 (19 @ 15:00)  RR: 31 (19 @ 15:00)  SpO2: 95% (19 @ 15:33)  Wt(kg): --    ABG - ( 2019 04:47 )  pH, Arterial: 7.46  pH, Blood: x     /  pCO2: 24    /  pO2: 88    / HCO3: 17    / Base Excess: -5.3  /  SaO2: 97                     @ 07:01  -   @ 07:00  --------------------------------------------------------  IN: 4086.6 mL / OUT: 3725 mL / NET: 361.6 mL            PHYSICAL EXAM:    GENERAL: No signs of distress, comfortable  HEAD: Atraumatic, Normocephalic  EYES: EOMI, PERRLA  ENMT: No erythema, exudates, or enlargement, Moist mucous membranes  NECK: Supple, normal appearance, No JVD; [  ] central line (if applicable)  CHEST/LUNG: No chest deformity, fair bilateral air entry; No rales, rhonchi, wheezing; crackles  HEART: Regular rate and rhythm; No murmurs, rubs, or gallops;   ABDOMEN: Soft, Nontender, Nondistended; Bowel sounds present  EXTREMITIES:  + Peripheral Pulses, No clubbing, cyanosis, or edema  NERVOUS SYSTEM: awake and alert x 3, follows commands, upper and lower extremities  LYMPH: No lymphadenopathy noted  SKIN: No rashes or lesions; good turgor, warm, dry      LABS:  CBC Full  -  ( 2019 05:51 )  WBC Count : 44.2 K/uL  Hemoglobin : 10.6 g/dL  Hematocrit : 34.2 %  Platelet Count - Automated : 186 K/uL  Mean Cell Volume : 95.2 fl  Mean Cell Hemoglobin : 29.7 pg  Mean Cell Hemoglobin Concentration : 31.2 gm/dL  Auto Neutrophil # : 42.6 K/uL  Auto Lymphocyte # : 0.4 K/uL  Auto Monocyte # : 1.2 K/uL  Auto Eosinophil # : 0.0 K/uL  Auto Basophil # : 0.0 K/uL  Auto Neutrophil % : 96.4 %  Auto Lymphocyte % : 0.9 %  Auto Monocyte % : 2.6 %  Auto Eosinophil % : 0.0 %  Auto Basophil % : 0.1 %        159<H>  |  125<H>  |  65<H>  ----------------------------<  125<H>  3.4<L>   |  22  |  1.47<H>    Ca    8.5      2019 05:51  Phos  2.6       Mg     2.2         TPro  5.2<L>  /  Alb  1.4<L>  /  TBili  0.6  /  DBili  x   /  AST  29  /  ALT  19  /  AlkPhos  226<H>      PT/INR - ( 2019 14:22 )   PT: 15.8 sec;   INR: 1.41 ratio         PTT - ( 2019 14:22 )  PTT:57.6 sec  Urinalysis Basic - ( 2019 18:26 )    Color: Yellow / Appearance: Turbid / S.020 / pH: x  Gluc: x / Ketone: Negative  / Bili: Negative / Urobili: Negative   Blood: x / Protein: 100 / Nitrite: Negative   Leuk Esterase: Moderate / RBC: 10-25 /HPF / WBC >50 /HPF   Sq Epi: x / Non Sq Epi: Occasional /HPF / Bacteria: Few /HPF      Culture Results:   >100,000 CFU/ml Gram Negative Rods ( @ 00:24)  Culture Results:   Growth in aerobic and anaerobic bottles: Escherichia coli  "Due to technical problems, Proteus sp. will Not be reported as part of  the BCID panel until further notice"  ***Blood Panel PCR results on this specimen are available  approximately 3 hours after the Gram stain result.***  Gram stain, PCR, and/or culture results may not always  correspond due to difference in methodologies.  ************************************************************  This PCR assay was performed using [employer].  The following targets are tested for: Enterococcus,  vancomycin resistant enterococci, Listeria monocytogenes,  coagulase negative staphylococci, S. aureus,  methicillin resistant S. aureus, Streptococcus agalactiae  (Group B), S. pneumoniae, S.pyogenes (Group A),  Acinetobacter baumannii, Enterobacter cloacae, E. coli,  Klebsiella oxytoca, K. pneumoniae, Proteus sp.,  Serratia marcescens, Haemophilus influenzae,  Neisseria meningitidis, Pseudomonas aeruginosa, Candida  albicans, C. glabrata, C krusei, C parapsilosis,  C. tropicalis and the KPC resistance gene. ( @ 23:09)  Culture Results:   Growth in aerobic and anaerobic bottles: Escherichia coli  See previous culture [id number] ( @ 23:09)      RADIOLOGY & ADDITIONAL STUDIES REVIEWED     CXR:< from: Xray Chest 1 View- PORTABLE-Routine (19 @ 11:19) >    EXAM:  XR CHEST PORTABLE ROUTINE 1V                            PROCEDURE DATE:  2019          INTERPRETATION:  Septic shock, renal failure.    AP chest. Prior dated 2019.    Right internal jugular line has been removed. 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 The vascular congestion bilateral predominantly basilar   airspace disease not significantly changed.  small hazy bilateral basilar   effusions slightly increased on the left. No pneumothorax.    Impression: As above                SHIRA REDDY M.D., ATTENDING RADIOLOGIST  This document has been electronically signed. 2019 11:28AM                < end of copied text >      IMPRESSION:  PAST MEDICAL & SURGICAL HISTORY:  Dementia  BPH (benign prostatic hyperplasia)  Hypertension  Closed hip fracture   p/w        IMP: This is a 87 y/o M from [facility], PMH of HTN, BPH, dementia , had recent fall on 19 had fracture of Rt intertrochanteric fracture, underwent surgical fixation, sent in from NH for respiratory distress.  In ED, patient was tachycardic to 120/min, BP- 99/58 mmhg, RR- 20/min, spo2- 90 % . EKG-  afib @105 BPM , prolonged QTC - 520, no ST T wave changes. cardiac enzymes x1- 0.059, BNP- 7244, UA- >50 WBC and moderate LE , CXR s/o Patchy right lower lobe infiltrate. Cardiomegaly. Labs pertinent for WBC- 57.6, H.H of 9.3/31.4, lactate of 9.3, K of 6.4, bicarb of 8, AG of 24,bun/ creat- 177/8.69, s/p 1 dose of cefepime, vancomycin and azithromycin with 1.5 L NS bolus  in ED   Pt had barba catheter placed, about 750 cc of cloudy urine drained. ABG - 7.35/ 13/106/7/100% NRB. Pt was placed on NIV BIPAP for work of breathing.     Pt is admitted to ICU on 18 for hypoxic respiratory failure with metabolic acidosis and respiratory alkalosis and sepsis secondary to UTI and ? PNA, ,2) Septic shock secondary to UTI and pneumonia 3) Acute renal failure secondary to UTI and septic shock.  Right neck central line was mal placed into right carotid.  He is bacteremic with elevated lactate,+ hypernatremia. + PAULINA . Severe metabolic acidosis plus pna resulted in resp failure requiring BiPaP. vascular eval noted s/p right carotid artery exploration  . for removal of mal-placed central line.            Plan:      CNS: no sedation, neuro-checks    PULMONARY: O 2 supp,     CARDIAC: monitor    GI: npo    RENAL: ivf, bmp q6 , renal eval    SKIN: wound care    MUSCULOSKELETAL: boots    ID: antibx, f/u sensitivity    HEME: monitor    DVT and GI Prophylaxis    GOALS OF CARE DISCUSSION WITH PATIENT/FAMILY/PROXY/ icu team

## 2019-02-09 NOTE — PROGRESS NOTE ADULT - ASSESSMENT
87 y/o M from [facility], PMH of HTN, BPH, dementia , had recent fall on 1/22/19 had fracture of Rt intertrochanteric fracture, underwent surgical fixation, sent in from NH for respiratory distress.  In ED, patient was tachycardic to 120/min, BP- 99/58 mmhg, RR- 20/min, spo2- 90 % . EKG-  afib @105 BPM , prolonged QTC - 520, no ST T wave changes. cardiac enzymes x1- 0.059, BNP- 7244, UA- >50 WBC and moderate LE , CXR s/o Patchy right lower lobe infiltrate. Cardiomegaly. Labs pertinent for WBC- 57.6, H.H of 9.3/31.4, lactate of 9.3, K of 6.4, bicarb of 8, AG of 24,bun/ creat- 177/8.69, s/p 1 dose of cefepime, vancomycin and azithromycin with 1.5 L NS bolus  in ED   Pt had barba catheter placed, about 750 cc of cloudy urine drained. ABG - 7.35/ 13/106/7/100% NRB. Pt was placed on NIV BIPAP for work of breathing.     Pt is admitted to ICU on 2/7/18 for hypoxic respiratory failure with metabolic acidosis and respiratory alkalosis and sepsis secondary to UTI and ? PNA, ,2) Septic shock secondary to UTI and pneumonia 3) Acute renal failure secondary to UTI and septic shock.

## 2019-02-09 NOTE — PROGRESS NOTE ADULT - SUBJECTIVE AND OBJECTIVE BOX
INTERVAL HPI/OVERNIGHT EVENTS: Patient went to OR for removal of arterial line in neck, had repair of right carotid and jugular injury.     PRESSORS: [ ] YES [x ] NO  WHICH:        Antimicrobial:  meropenem  IVPB 500 milliGRAM(s) IV Intermittent every 24 hours  vancomycin  IVPB 500 milliGRAM(s) IV Intermittent every 24 hours    Cardiovascular:  tamsulosin 0.4 milliGRAM(s) Oral at bedtime    Pulmonary:  ALBUTerol/ipratropium for Nebulization 3 milliLiter(s) Nebulizer every 6 hours    Hematalogic:  heparin  Injectable 5000 Unit(s) SubCutaneous every 8 hours    Other:  dextrose 5% + sodium chloride 0.45%. 1000 milliLiter(s) IV Continuous <Continuous>  finasteride 5 milliGRAM(s) Oral daily  pantoprazole  Injectable 40 milliGRAM(s) IV Push daily    ALBUTerol/ipratropium for Nebulization 3 milliLiter(s) Nebulizer every 6 hours  dextrose 5% + sodium chloride 0.45%. 1000 milliLiter(s) IV Continuous <Continuous>  finasteride 5 milliGRAM(s) Oral daily  heparin  Injectable 5000 Unit(s) SubCutaneous every 8 hours  meropenem  IVPB 500 milliGRAM(s) IV Intermittent every 24 hours  pantoprazole  Injectable 40 milliGRAM(s) IV Push daily  tamsulosin 0.4 milliGRAM(s) Oral at bedtime  vancomycin  IVPB 500 milliGRAM(s) IV Intermittent every 24 hours    Drug Dosing Weight  Height (cm): 165.1 (2019 16:08)  Weight (kg): 59.6 (2019 21:36)  BMI (kg/m2): 21.9 (2019 21:36)  BSA (m2): 1.65 (2019 21:36)    CENTRAL LINE: [x ] YES [ ] NO  LOCATION:   Left femoral DATE INSERTED: 19  REMOVE: [ ] YES [ ] NO  EXPLAIN:    ANDRADE: [x ] YES [ ] NO    DATE INSERTED: 19  REMOVE:  [ ] YES [ ] NO  EXPLAIN:      PMH -reviewed admission note, no change since admission      ABG - ( 2019 04:47 )  pH, Arterial: 7.46  pH, Blood: x     /  pCO2: 24    /  pO2: 88    / HCO3: 17    / Base Excess: -5.3  /  SaO2: 97                    02-07 @ 07:01  -   @ 07:00  --------------------------------------------------------  IN: 2067.4 mL / OUT: 2000 mL / NET: 67.4 mL            PHYSICAL EXAM:  GENERAL:NAD, well-groomed, well-developed  HEAD:  Atraumatic, Normocephalic  EYES: EOMI, PERRLA, conjunctiva and sclera clear  ENMT: No tonsillar erythema, exudates, or enlargement; dry mucous membranes, Good dentition, No lesions, Right central line.   NECK: Supple, normal appearance, No JVD; Normal thyroid; Trachea midline  NERVOUS SYSTEM: Alert and awake x 0.  CHEST/LUNG: breath sounds diminished on RLL   HEART: Regular rate and rhythm; No murmurs, rubs, or gallops  ABDOMEN: Soft, Nontender, Nondistended; Bowel sounds present  EXTREMITIES: 2+ Peripheral Pulses, No clubbing, cyanosis, or edema, left femoral line in place.   LYMPH: No lymphadenopathy noted  SKIN:No rashes or lesions; Good capillary refill    LABS:  CBC Full  -  ( 2019 00:24 )  WBC Count : 42.4 K/uL  Hemoglobin : 10.4 g/dL  Hematocrit : 33.4 %  Platelet Count - Automated : 197 K/uL  Mean Cell Volume : 95.2 fl  Mean Cell Hemoglobin : 29.4 pg  Mean Cell Hemoglobin Concentration : 30.9 gm/dL  Auto Neutrophil # : x  Auto Lymphocyte # : x  Auto Monocyte # : x  Auto Eosinophil # : x  Auto Basophil # : x  Auto Neutrophil % : x  Auto Lymphocyte % : x  Auto Monocyte % : x  Auto Eosinophil % : x  Auto Basophil % : x        157<H>  |  125<H>  |  75<H>  ----------------------------<  144<H>  4.0   |  21<L>  |  1.74<H>    Ca    8.4      2019 00:24  Phos  3.9     -  Mg     2.2         TPro  5.3<L>  /  Alb  1.4<L>  /  TBili  0.7  /  DBili  x   /  AST  28  /  ALT  19  /  AlkPhos  214<H>  02-    PT/INR - ( 2019 14:22 )   PT: 15.8 sec;   INR: 1.41 ratio         PTT - ( 2019 14:22 )  PTT:57.6 sec  Urinalysis Basic - ( 2019 18:26 )    Color: Yellow / Appearance: Turbid / S.020 / pH: x  Gluc: x / Ketone: Negative  / Bili: Negative / Urobili: Negative   Blood: x / Protein: 100 / Nitrite: Negative   Leuk Esterase: Moderate / RBC: 10-25 /HPF / WBC >50 /HPF   Sq Epi: x / Non Sq Epi: Occasional /HPF / Bacteria: Few /HPF      Culture Results:   Growth in aerobic bottle: Gram Negative Rods  "Due to technical problems, Proteus sp. will Not be reported as part of  the BCID panel until further notice"  ***Blood Panel PCR results on this specimen are available  approximately 3 hours after the Gram stain result.***  Gram stain, PCR, and/or culture results may not always  correspond due to difference in methodologies.  ************************************************************  This PCR assay was performed using [employer].  The following targets are tested for: Enterococcus,  vancomycin resistant enterococci, Listeria monocytogenes,  coagulase negative staphylococci, S. aureus,  methicillin resistant S. aureus, Streptococcus agalactiae  (Group B), S. pneumoniae, S. pyogenes (Group A),  Acinetobacter baumannii, Enterobacter cloacae, E. coli,  Klebsiella oxytoca, K. pneumoniae, Proteus sp.,  Serratia marcescens, Haemophilus influenzae,  Neisseria meningitidis, Pseudomonas aeruginosa, Candida  albicans, C. glabrata, C krusei, C parapsilosis,  C. tropicalis and the KPC resistance gene.  Growth in anaerobic bottle: Gram Negative Rods ( @ 23:09)  Culture Results:   Growth in aerobic bottle: Gram Negative Rods  Growth in anaerobic bottle: Gram Negative Rods ( @ 23:09)      RADIOLOGY & ADDITIONAL STUDIES REVIEWED:  ***    [ ]GOALS OF CARE DISCUSSION WITH PATIENT/FAMILY/PROXY:    CRITICAL CARE TIME SPENT: 35 minutes INTERVAL HPI/OVERNIGHT EVENTS: Patient went to OR for removal of arterial line in neck, had repair of right carotid and jugular injury.     PRESSORS: [ ] YES [x ] NO  WHICH:        Antimicrobial:  meropenem  IVPB 500 milliGRAM(s) IV Intermittent every 24 hours  vancomycin  IVPB 500 milliGRAM(s) IV Intermittent every 24 hours    Cardiovascular:  tamsulosin 0.4 milliGRAM(s) Oral at bedtime    Pulmonary:  ALBUTerol/ipratropium for Nebulization 3 milliLiter(s) Nebulizer every 6 hours    Hematalogic:  heparin  Injectable 5000 Unit(s) SubCutaneous every 8 hours    Other:  dextrose 5% + sodium chloride 0.45%. 1000 milliLiter(s) IV Continuous <Continuous>  finasteride 5 milliGRAM(s) Oral daily  pantoprazole  Injectable 40 milliGRAM(s) IV Push daily    ALBUTerol/ipratropium for Nebulization 3 milliLiter(s) Nebulizer every 6 hours  dextrose 5% + sodium chloride 0.45%. 1000 milliLiter(s) IV Continuous <Continuous>  finasteride 5 milliGRAM(s) Oral daily  heparin  Injectable 5000 Unit(s) SubCutaneous every 8 hours  meropenem  IVPB 500 milliGRAM(s) IV Intermittent every 24 hours  pantoprazole  Injectable 40 milliGRAM(s) IV Push daily  tamsulosin 0.4 milliGRAM(s) Oral at bedtime  vancomycin  IVPB 500 milliGRAM(s) IV Intermittent every 24 hours    Drug Dosing Weight  Height (cm): 165.1 (2019 16:08)  Weight (kg): 59.6 (2019 21:36)  BMI (kg/m2): 21.9 (2019 21:36)  BSA (m2): 1.65 (2019 21:36)    CENTRAL LINE: [x ] YES [ ] NO  LOCATION:   Left femoral DATE INSERTED: 19  REMOVE: [ ] YES [ ] NO  EXPLAIN:    ANDRADE: [x ] YES [ ] NO    DATE INSERTED: 19  REMOVE:  [ ] YES [ ] NO  EXPLAIN:      PMH -reviewed admission note, no change since admission      ABG - ( 2019 04:47 )  pH, Arterial: 7.46  pH, Blood: x     /  pCO2: 24    /  pO2: 88    / HCO3: 17    / Base Excess: -5.3  /  SaO2: 97                    02-07 @ 07:01  -   @ 07:00  --------------------------------------------------------  IN: 2067.4 mL / OUT: 2000 mL / NET: 67.4 mL            PHYSICAL EXAM:  GENERAL:NAD, well-groomed, well-developed  HEAD:  Atraumatic, Normocephalic  EYES: EOMI, PERRLA, conjunctiva and sclera clear  ENMT: No tonsillar erythema, exudates, or enlargement; dry mucous membranes, Good dentition, No lesions. s/p removed on right central venous catheter. DSD in place.   NECK: Supple, normal appearance, No JVD; Normal thyroid; Trachea midline  NERVOUS SYSTEM: Alert and awake x 0.  CHEST/LUNG: breath sounds diminished on RLL   HEART: Regular rate and rhythm; No murmurs, rubs, or gallops  ABDOMEN: Soft, Nontender, Nondistended; Bowel sounds present  EXTREMITIES: 2+ Peripheral Pulses, No clubbing, cyanosis, or edema, left femoral line in place.   LYMPH: No lymphadenopathy noted  SKIN:No rashes or lesions; Good capillary refill    LABS:  CBC Full  -  ( 2019 00:24 )  WBC Count : 42.4 K/uL  Hemoglobin : 10.4 g/dL  Hematocrit : 33.4 %  Platelet Count - Automated : 197 K/uL  Mean Cell Volume : 95.2 fl  Mean Cell Hemoglobin : 29.4 pg  Mean Cell Hemoglobin Concentration : 30.9 gm/dL  Auto Neutrophil # : x  Auto Lymphocyte # : x  Auto Monocyte # : x  Auto Eosinophil # : x  Auto Basophil # : x  Auto Neutrophil % : x  Auto Lymphocyte % : x  Auto Monocyte % : x  Auto Eosinophil % : x  Auto Basophil % : x        157<H>  |  125<H>  |  75<H>  ----------------------------<  144<H>  4.0   |  21<L>  |  1.74<H>    Ca    8.4      2019 00:24  Phos  3.9       Mg     2.2         TPro  5.3<L>  /  Alb  1.4<L>  /  TBili  0.7  /  DBili  x   /  AST  28  /  ALT  19  /  AlkPhos  214<H>      PT/INR - ( 2019 14:22 )   PT: 15.8 sec;   INR: 1.41 ratio         PTT - ( 2019 14:22 )  PTT:57.6 sec  Urinalysis Basic - ( 2019 18:26 )    Color: Yellow / Appearance: Turbid / S.020 / pH: x  Gluc: x / Ketone: Negative  / Bili: Negative / Urobili: Negative   Blood: x / Protein: 100 / Nitrite: Negative   Leuk Esterase: Moderate / RBC: 10-25 /HPF / WBC >50 /HPF   Sq Epi: x / Non Sq Epi: Occasional /HPF / Bacteria: Few /HPF      Culture Results:   Growth in aerobic bottle: Gram Negative Rods  "Due to technical problems, Proteus sp. will Not be reported as part of  the BCID panel until further notice"  ***Blood Panel PCR results on this specimen are available  approximately 3 hours after the Gram stain result.***  Gram stain, PCR, and/or culture results may not always  correspond due to difference in methodologies.  ************************************************************  This PCR assay was performed using [employer].  The following targets are tested for: Enterococcus,  vancomycin resistant enterococci, Listeria monocytogenes,  coagulase negative staphylococci, S. aureus,  methicillin resistant S. aureus, Streptococcus agalactiae  (Group B), S. pneumoniae, S. pyogenes (Group A),  Acinetobacter baumannii, Enterobacter cloacae, E. coli,  Klebsiella oxytoca, K. pneumoniae, Proteus sp.,  Serratia marcescens, Haemophilus influenzae,  Neisseria meningitidis, Pseudomonas aeruginosa, Candida  albicans, C. glabrata, C krusei, C parapsilosis,  C. tropicalis and the KPC resistance gene.  Growth in anaerobic bottle: Gram Negative Rods ( @ 23:09)  Culture Results:   Growth in aerobic bottle: Gram Negative Rods  Growth in anaerobic bottle: Gram Negative Rods ( @ 23:09)      RADIOLOGY & ADDITIONAL STUDIES REVIEWED:  ***  < from: Xray Chest 1 View- PORTABLE-Routine (19 @ 11:19) >  INTERPRETATION:  Septic shock, renal failure.    AP chest. Prior dated 2019.    Right internal jugular line has been removed. 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 The vascular congestion bilateral predominantly basilar   airspace disease not significantly changed.  small hazy bilateral basilar   effusions slightly increased on the left. No pneumothorax.    [ ]GOALS OF CARE DISCUSSION WITH PATIENT/FAMILY/PROXY: Full code     CRITICAL CARE TIME SPENT: 35 minutes

## 2019-02-09 NOTE — PROGRESS NOTE ADULT - PROBLEM SELECTOR PLAN 5
hold home meds due to septic shock patient;s blood pressure is 185/93 via malu.  -Will give metoprolol 5 IV push x1 as patient is NPO.   -Will hold home dose of lisinopril 20mg due to renal failure.

## 2019-02-09 NOTE — PROGRESS NOTE ADULT - SUBJECTIVE AND OBJECTIVE BOX
pt seen and examined.pts current chart reviewed and case discussed with resident covering.    SUBJECTIVE:  pt feels fine and denies cp,sob,gi or gu/uremic  symptons    REVIEW OF SYSTEMS:  CONSTITUTIONAL: No weakness, fevers or chills  EYES/ENT: No visual changes;  No vertigo or throat pain   NECK: No pain or stiffness  RESPIRATORY: No cough, wheezing, hemoptysis; No shortness of breath  CARDIOVASCULAR: No chest pain or palpitations  GASTROINTESTINAL: No abdominal or epigastric pain. No nausea, vomiting, or hematemesis; No diarrhea or constipation. No melena or hematochezia.  GENITOURINARY: No dysuria, frequency , hematuria, flank pain or nocturia  NEUROLOGICAL: No numbness or weakness  SKIN: No itching, burning, rashes, or lesions   All other review of systems is negative unless indicated above    Current meds:    ALBUTerol/ipratropium for Nebulization 3 milliLiter(s) Nebulizer every 6 hours  dextrose 5% + sodium chloride 0.45%. 1000 milliLiter(s) IV Continuous <Continuous>  finasteride 5 milliGRAM(s) Oral daily  heparin  Injectable 5000 Unit(s) SubCutaneous every 8 hours  meropenem  IVPB 500 milliGRAM(s) IV Intermittent every 24 hours  pantoprazole  Injectable 40 milliGRAM(s) IV Push daily  tamsulosin 0.4 milliGRAM(s) Oral at bedtime  vancomycin  IVPB 500 milliGRAM(s) IV Intermittent every 24 hours      Vital Signs    T(F): 96.8 (19 @ 06:00), Max: 97.9 (19 @ 00:00)  HR: 97 (19 @ 11:00) (83 - 124)  BP: 161/93 (19 @ 08:00) (87/75 - 161/93)  ABP: 169/85 (19 @ 11:00) (133/67 - 198/103)  RR: 18 (19 @ 11:00) (11 - 28)  SpO2: 97% (19 @ 11:00) (97% - 100%)  Wt(kg): --  CVP(cm H2O): --  CO: --  PCWP: --    I and O's:     @ 07:01  -   @ 07:00  --------------------------------------------------------  IN:    dextrose 5% + sodium chloride 0.45%: 1400 mL    norepinephrine Infusion: 167.4 mL    Sodium Chloride 0.9% IV Bolus: 500 mL  Total IN: 2067.4 mL    OUT:    Indwelling Catheter - Urethral: 2000 mL  Total OUT: 2000 mL    Total NET: 67.4 mL       @ 07: @ 07:00  --------------------------------------------------------  IN:    dextrose 5% + sodium chloride 0.45%: 400 mL    dextrose 5% + sodium chloride 0.45%.: 800 mL    dextrose 5% + sodium chloride 0.45%.: 300 mL    dextrose 5% + sodium chloride 0.45%.: 700 mL    IV PiggyBack: 100 mL    norepinephrine Infusion: 186.6 mL    sodium chloride 0.45%: 1000 mL    Solution: 300 mL    Solution: 250 mL    Solution: 50 mL  Total IN: 4086.6 mL    OUT:    Indwelling Catheter - Urethral: 3725 mL  Total OUT: 3725 mL    Total NET: 361.6 mL       @ 07: @ 12:15  --------------------------------------------------------  IN:    dextrose 5% + sodium chloride 0.45%.: 500 mL  Total IN: 500 mL    OUT:    Indwelling Catheter - Urethral: 700 mL  Total OUT: 700 mL    Total NET: -200 mL        Daily     Daily Weight in k.1 (2019 07:30)    PHYSICAL EXAM:  Constitutional: well developed, Mal-nourished  and in nad  HEENT: PERRLA,  no icteric sclera and mild pallor of conjunctiva noted  Neck: No JVD, thyromegaly or adenopathy  Respiratory: reduced air entry lower lungs with no rales, wheezing or rhonchi  Cardiovascular: S1 and S2 normally heard  Gastrointestinal: soft, nondistended, nontender and normal bowel sounds heard  Extremities: 2 plus B/L LE and upper thigh edema noted, no cyanosis  Neurological: A/O x 0, no focal deficits  Skin: No rashes      LABS:    CBC:                          10.6   44.2  )-----------( 186      ( 2019 05:51 )             34.2           BMP:        159<H>  |  125<H>  |  65<H>  ----------------------------<  125<H>  3.4<L>   |  22  |  1.47<H>  02    157<H>  |  125<H>  |  75<H>  ----------------------------<  144<H>  4.0   |  21<L>  |  1.74<H>  0208    158<H>  |  123<H>  |  86<H>  ----------------------------<  161<H>  3.3<L>   |  22  |  2.21<H>  02-08    159<H>  |  122<H>  |  92<H>  ----------------------------<  144<H>  2.9<LL>   |  21<L>  |  2.58<H>  02-08    159<H>  |  122<H>  |  111<H>  ----------------------------<  139<H>  3.0<L>   |  19<L>  |  3.06<H>  02-08    152<H>  |  117<H>  |  143<H>  ----------------------------<  149<H>  3.5   |  17<L>  |  4.91<H>  02-07    149<H>  |  114<H>  |  160<H>  ----------------------------<  113<H>  4.4   |  14<L>  |  7.03<H>      143  |  113<H>  |  161<H>  ----------------------------<  169<H>  5.8<H>   |  8<LL>  |  7.81<H>      142  |  110<H>  |  177<H>  ----------------------------<  81  6.4<HH>   |  8<LL>  |  8.69<H>    Ca    8.5      2019 05:51  Ca    8.4      2019 00:24  Ca    8.2<L>      2019 20:10  Ca    8.1<L>      2019 16:49  Ca    8.2<L>      2019 13:37  Ca    8.2<L>      2019 04:32  Ca    8.7      2019 22:33  Ca    8.3<L>      2019 20:40  Ca    8.7      2019 16:55  Phos  2.6     -  Phos  3.9       Phos  5.8       Phos  6.3     -  Mg     2.2     -  Mg     2.2     -  Mg     2.4     -  Mg     2.5     -08  Mg     2.7         TPro  5.2<L>  /  Alb  1.4<L>  /  TBili  0.6  /  DBili  x   /  AST  29  /  ALT  19  /  AlkPhos  226<H>    TPro  5.3<L>  /  Alb  1.4<L>  /  TBili  0.7  /  DBili  x   /  AST  28  /  ALT  19  /  AlkPhos  214<H>    TPro  5.2<L>  /  Alb  1.5<L>  /  TBili  0.6  /  DBili  x   /  AST  33  /  ALT  18  /  AlkPhos  215<H>    TPro  5.6<L>  /  Alb  1.6<L>  /  TBili  0.6  /  DBili  x   /  AST  42<H>  /  ALT  16  /  AlkPhos  234<H>    TPro  6.2  /  Alb  1.8<L>  /  TBili  0.6  /  DBili  x   /  AST  40  /  ALT  19  /  AlkPhos  277<H>            URINE STUDIES:  Sodium, Random Urine: 20 mmol/L ( @ 03:44)  Osmolality, Random Urine: 462 mos/kg ( @ 03:44)  Creatinine, Random Urine: 62 mg/dL ( @ 03:44)                  RADIOLOGY & ADDITIONAL STUDIES:  < from: Xray Chest 1 View- PORTABLE-Routine (19 @ 11:19) >  EXAM:  XR CHEST PORTABLE ROUTINE 1V                            PROCEDURE DATE:  2019          INTERPRETATION:  Septic shock, renal failure.    AP chest. Prior dated 2019.    Right internal jugular line has been removed. 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 The vascular congestion bilateral predominantly basilar   airspace disease not significantly changed.  small hazy bilateral basilar   effusions slightly increased on the left. No pneumothorax.    Impression: As above    < end of copied text > pt seen and examined    SUBJECTIVE:  pt feels ok and in nad  pt with s/p Rt neck exploration for central line removal, seen by surgery  u/o good    REVIEW OF SYSTEMS:  RESPIRATORY: No cough, wheezing, hemoptysis; No shortness of breath  CARDIOVASCULAR: No chest pain or palpitations  GASTROINTESTINAL: No abdominal or epigastric pain. No nausea, vomiting, or hematemesis; No diarrhea or constipation. No melena or hematochezia.  GENITOURINARY: No dysuria, frequency , hematuria, flank pain or nocturia  NEUROLOGICAL: No numbness or weakness  SKIN: No itching, burning, rashes, or lesions     Current meds:    ALBUTerol/ipratropium for Nebulization 3 milliLiter(s) Nebulizer every 6 hours  dextrose 5% + sodium chloride 0.45%. 1000 milliLiter(s) IV Continuous <Continuous>  finasteride 5 milliGRAM(s) Oral daily  heparin  Injectable 5000 Unit(s) SubCutaneous every 8 hours  meropenem  IVPB 500 milliGRAM(s) IV Intermittent every 24 hours  pantoprazole  Injectable 40 milliGRAM(s) IV Push daily  tamsulosin 0.4 milliGRAM(s) Oral at bedtime  vancomycin  IVPB 500 milliGRAM(s) IV Intermittent every 24 hours      Vital Signs    T(F): 96.8 (19 @ 06:00), Max: 97.9 (19 @ 00:00)  HR: 97 (19 @ 11:00) (83 - 124)  BP: 161/93 (19 @ 08:00) (87/75 - 161/93)  ABP: 169/85 (19 @ 11:00) (133/67 - 198/103)  RR: 18 (19 @ 11:00) (11 - 28)  SpO2: 97% (19 @ 11:00) (97% - 100%)  Wt(kg): --  CVP(cm H2O): --  CO: --  PCWP: --    I and O's:     @ 07:01  -   @ 07:00  --------------------------------------------------------  IN:    dextrose 5% + sodium chloride 0.45%: 1400 mL    norepinephrine Infusion: 167.4 mL    Sodium Chloride 0.9% IV Bolus: 500 mL  Total IN: 2067.4 mL    OUT:    Indwelling Catheter - Urethral: 2000 mL  Total OUT: 2000 mL    Total NET: 67.4 mL       @ 07: @ 07:00  --------------------------------------------------------  IN:    dextrose 5% + sodium chloride 0.45%: 400 mL    dextrose 5% + sodium chloride 0.45%.: 800 mL    dextrose 5% + sodium chloride 0.45%.: 300 mL    dextrose 5% + sodium chloride 0.45%.: 700 mL    IV PiggyBack: 100 mL    norepinephrine Infusion: 186.6 mL    sodium chloride 0.45%: 1000 mL    Solution: 300 mL    Solution: 250 mL    Solution: 50 mL  Total IN: 4086.6 mL    OUT:    Indwelling Catheter - Urethral: 3725 mL  Total OUT: 3725 mL    Total NET: 361.6 mL       @ 07: @ 12:15  --------------------------------------------------------  IN:    dextrose 5% + sodium chloride 0.45%.: 500 mL  Total IN: 500 mL    OUT:    Indwelling Catheter - Urethral: 700 mL  Total OUT: 700 mL    Total NET: -200 mL        Daily     Daily Weight in k.1 (2019 07:30)    PHYSICAL EXAM:  Constitutional: well developed, Mal-nourished  and in nad  HEENT: PERRLA,  no icteric sclera and mild pallor of conjunctiva noted  Neck: No JVD, thyromegaly or adenopathy  Respiratory: reduced air entry lower lungs with no rales, wheezing or rhonchi  Cardiovascular: S1 and S2 normally heard  Gastrointestinal: soft, nondistended, nontender and normal bowel sounds heard  Extremities: 1 plus B/L LE and upper thigh edema noted, no cyanosis  Neurological: A/O x 1, no focal deficits  Skin: No rashes      LABS:    CBC:                          10.6   44.2  )-----------( 186      ( 2019 05:51 )             34.2           BMP:        159<H>  |  125<H>  |  65<H>  ----------------------------<  125<H>  3.4<L>   |  22  |  1.47<H>  09    157<H>  |  125<H>  |  75<H>  ----------------------------<  144<H>  4.0   |  21<L>  |  1.74<H>  0208    158<H>  |  123<H>  |  86<H>  ----------------------------<  161<H>  3.3<L>   |  22  |  2.21<H>  02-08    159<H>  |  122<H>  |  92<H>  ----------------------------<  144<H>  2.9<LL>   |  21<L>  |  2.58<H>  02-08    159<H>  |  122<H>  |  111<H>  ----------------------------<  139<H>  3.0<L>   |  19<L>  |  3.06<H>  02-08    152<H>  |  117<H>  |  143<H>  ----------------------------<  149<H>  3.5   |  17<L>  |  4.91<H>  02-07    149<H>  |  114<H>  |  160<H>  ----------------------------<  113<H>  4.4   |  14<L>  |  7.03<H>      143  |  113<H>  |  161<H>  ----------------------------<  169<H>  5.8<H>   |  8<LL>  |  7.81<H>      142  |  110<H>  |  177<H>  ----------------------------<  81  6.4<HH>   |  8<LL>  |  8.69<H>    Ca    8.5      2019 05:51  Ca    8.4      2019 00:24  Ca    8.2<L>      2019 20:10  Ca    8.1<L>      2019 16:49  Ca    8.2<L>      2019 13:37  Ca    8.2<L>      2019 04:32  Ca    8.7      2019 22:33  Ca    8.3<L>      2019 20:40  Ca    8.7      2019 16:55  Phos  2.6     -  Phos  3.9     -  Phos  5.8       Phos  6.3     -  Mg     2.2     -  Mg     2.2     -  Mg     2.4     -  Mg     2.5     -  Mg     2.7         TPro  5.2<L>  /  Alb  1.4<L>  /  TBili  0.6  /  DBili  x   /  AST  29  /  ALT  19  /  AlkPhos  226<H>    TPro  5.3<L>  /  Alb  1.4<L>  /  TBili  0.7  /  DBili  x   /  AST  28  /  ALT  19  /  AlkPhos  214<H>    TPro  5.2<L>  /  Alb  1.5<L>  /  TBili  0.6  /  DBili  x   /  AST  33  /  ALT  18  /  AlkPhos  215<H>    TPro  5.6<L>  /  Alb  1.6<L>  /  TBili  0.6  /  DBili  x   /  AST  42<H>  /  ALT  16  /  AlkPhos  234<H>    TPro  6.2  /  Alb  1.8<L>  /  TBili  0.6  /  DBili  x   /  AST  40  /  ALT  19  /  AlkPhos  277<H>            URINE STUDIES:  Sodium, Random Urine: 20 mmol/L ( @ 03:44)  Osmolality, Random Urine: 462 mos/kg ( @ 03:44)  Creatinine, Random Urine: 62 mg/dL ( @ 03:44)                  RADIOLOGY & ADDITIONAL STUDIES:  < from: Xray Chest 1 View- PORTABLE-Routine (19 @ 11:19) >  EXAM:  XR CHEST PORTABLE ROUTINE 1V                            PROCEDURE DATE:  2019          INTERPRETATION:  Septic shock, renal failure.    AP chest. Prior dated 2019.    Right internal jugular line has been removed. 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 The vascular congestion bilateral predominantly basilar   airspace disease not significantly changed.  small hazy bilateral basilar   effusions slightly increased on the left. No pneumothorax.    Impression: As above    < end of copied text >

## 2019-02-09 NOTE — PHYSICAL THERAPY INITIAL EVALUATION ADULT - ORIENTATION, REHAB EVAL
pt alert, but easily becomes lethargic over a few minutes, unable to answere questions but able to follow simple request with ++ visual cues only about 50 % of the time

## 2019-02-09 NOTE — PROGRESS NOTE ADULT - PROBLEM SELECTOR PLAN 2
pt has bun/ creat- 177/8.69 on admission-> trended down 122/3.06  pt has bladder distension on exam, barba catheter was placed which yielded about 750cc of cloudy urine  -urinelytes shows FeNa 1%. Likely intrinsic abnormality. Most likely ATN  -c/w D5 0.45% NS @ 100cc/hr  -Hyperkalemia resolved   -continue to Monitor BMP  Nephrology consulted Dr Berman pt has bun/ creat- 177/8.69 on admission 2/2 urinary retention  -> trended down 122/3.06->1.4  -urinelytes shows FeNa 1%.  -Now Hypernatremic. Sodium 158 till 4pm today and 150 by 4pm tomorrow.  -c/w D5W @ 100cc/hr    -continue to Monitor BMP  Nephrology Dr Berman following

## 2019-02-10 DIAGNOSIS — E87.0 HYPEROSMOLALITY AND HYPERNATREMIA: ICD-10-CM

## 2019-02-10 LAB
-  AMIKACIN: SIGNIFICANT CHANGE UP
-  AMIKACIN: SIGNIFICANT CHANGE UP
-  AMPICILLIN/SULBACTAM: SIGNIFICANT CHANGE UP
-  AMPICILLIN/SULBACTAM: SIGNIFICANT CHANGE UP
-  AMPICILLIN: SIGNIFICANT CHANGE UP
-  AMPICILLIN: SIGNIFICANT CHANGE UP
-  AZTREONAM: SIGNIFICANT CHANGE UP
-  AZTREONAM: SIGNIFICANT CHANGE UP
-  CEFAZOLIN: SIGNIFICANT CHANGE UP
-  CEFAZOLIN: SIGNIFICANT CHANGE UP
-  CEFEPIME: SIGNIFICANT CHANGE UP
-  CEFEPIME: SIGNIFICANT CHANGE UP
-  CEFOXITIN: SIGNIFICANT CHANGE UP
-  CEFOXITIN: SIGNIFICANT CHANGE UP
-  CEFTRIAXONE: SIGNIFICANT CHANGE UP
-  CEFTRIAXONE: SIGNIFICANT CHANGE UP
-  CIPROFLOXACIN: SIGNIFICANT CHANGE UP
-  CIPROFLOXACIN: SIGNIFICANT CHANGE UP
-  ERTAPENEM: SIGNIFICANT CHANGE UP
-  ERTAPENEM: SIGNIFICANT CHANGE UP
-  GENTAMICIN: SIGNIFICANT CHANGE UP
-  GENTAMICIN: SIGNIFICANT CHANGE UP
-  IMIPENEM: SIGNIFICANT CHANGE UP
-  IMIPENEM: SIGNIFICANT CHANGE UP
-  LEVOFLOXACIN: SIGNIFICANT CHANGE UP
-  LEVOFLOXACIN: SIGNIFICANT CHANGE UP
-  MEROPENEM: SIGNIFICANT CHANGE UP
-  MEROPENEM: SIGNIFICANT CHANGE UP
-  NITROFURANTOIN: SIGNIFICANT CHANGE UP
-  PIPERACILLIN/TAZOBACTAM: SIGNIFICANT CHANGE UP
-  PIPERACILLIN/TAZOBACTAM: SIGNIFICANT CHANGE UP
-  TIGECYCLINE: SIGNIFICANT CHANGE UP
-  TOBRAMYCIN: SIGNIFICANT CHANGE UP
-  TOBRAMYCIN: SIGNIFICANT CHANGE UP
-  TRIMETHOPRIM/SULFAMETHOXAZOLE: SIGNIFICANT CHANGE UP
-  TRIMETHOPRIM/SULFAMETHOXAZOLE: SIGNIFICANT CHANGE UP
ALBUMIN SERPL ELPH-MCNC: 1.5 G/DL — LOW (ref 3.5–5)
ALP SERPL-CCNC: 365 U/L — HIGH (ref 40–120)
ALT FLD-CCNC: 20 U/L DA — SIGNIFICANT CHANGE UP (ref 10–60)
ANION GAP SERPL CALC-SCNC: 5 MMOL/L — SIGNIFICANT CHANGE UP (ref 5–17)
ANION GAP SERPL CALC-SCNC: 6 MMOL/L — SIGNIFICANT CHANGE UP (ref 5–17)
ANION GAP SERPL CALC-SCNC: 7 MMOL/L — SIGNIFICANT CHANGE UP (ref 5–17)
ANION GAP SERPL CALC-SCNC: 9 MMOL/L — SIGNIFICANT CHANGE UP (ref 5–17)
ANION GAP SERPL CALC-SCNC: 9 MMOL/L — SIGNIFICANT CHANGE UP (ref 5–17)
AST SERPL-CCNC: 30 U/L — SIGNIFICANT CHANGE UP (ref 10–40)
BASE EXCESS BLDA CALC-SCNC: -0.8 MMOL/L — SIGNIFICANT CHANGE UP (ref -2–2)
BASE EXCESS BLDA CALC-SCNC: 1.5 MMOL/L — SIGNIFICANT CHANGE UP (ref -2–2)
BILIRUB SERPL-MCNC: 1 MG/DL — SIGNIFICANT CHANGE UP (ref 0.2–1.2)
BLOOD GAS COMMENTS ARTERIAL: SIGNIFICANT CHANGE UP
BLOOD GAS COMMENTS ARTERIAL: SIGNIFICANT CHANGE UP
BUN SERPL-MCNC: 38 MG/DL — HIGH (ref 7–18)
BUN SERPL-MCNC: 40 MG/DL — HIGH (ref 7–18)
BUN SERPL-MCNC: 41 MG/DL — HIGH (ref 7–18)
BUN SERPL-MCNC: 42 MG/DL — HIGH (ref 7–18)
BUN SERPL-MCNC: 42 MG/DL — HIGH (ref 7–18)
CALCIUM SERPL-MCNC: 8.1 MG/DL — LOW (ref 8.4–10.5)
CALCIUM SERPL-MCNC: 8.3 MG/DL — LOW (ref 8.4–10.5)
CALCIUM SERPL-MCNC: 8.3 MG/DL — LOW (ref 8.4–10.5)
CALCIUM SERPL-MCNC: 8.4 MG/DL — SIGNIFICANT CHANGE UP (ref 8.4–10.5)
CALCIUM SERPL-MCNC: 8.7 MG/DL — SIGNIFICANT CHANGE UP (ref 8.4–10.5)
CHLORIDE SERPL-SCNC: 123 MMOL/L — HIGH (ref 96–108)
CHLORIDE SERPL-SCNC: 124 MMOL/L — HIGH (ref 96–108)
CHLORIDE SERPL-SCNC: 125 MMOL/L — HIGH (ref 96–108)
CO2 SERPL-SCNC: 23 MMOL/L — SIGNIFICANT CHANGE UP (ref 22–31)
CO2 SERPL-SCNC: 23 MMOL/L — SIGNIFICANT CHANGE UP (ref 22–31)
CO2 SERPL-SCNC: 25 MMOL/L — SIGNIFICANT CHANGE UP (ref 22–31)
CREAT SERPL-MCNC: 0.67 MG/DL — SIGNIFICANT CHANGE UP (ref 0.5–1.3)
CREAT SERPL-MCNC: 0.7 MG/DL — SIGNIFICANT CHANGE UP (ref 0.5–1.3)
CREAT SERPL-MCNC: 0.76 MG/DL — SIGNIFICANT CHANGE UP (ref 0.5–1.3)
CREAT SERPL-MCNC: 0.88 MG/DL — SIGNIFICANT CHANGE UP (ref 0.5–1.3)
CREAT SERPL-MCNC: 0.9 MG/DL — SIGNIFICANT CHANGE UP (ref 0.5–1.3)
CULTURE RESULTS: SIGNIFICANT CHANGE UP
GLUCOSE SERPL-MCNC: 113 MG/DL — HIGH (ref 70–99)
GLUCOSE SERPL-MCNC: 80 MG/DL — SIGNIFICANT CHANGE UP (ref 70–99)
GLUCOSE SERPL-MCNC: 90 MG/DL — SIGNIFICANT CHANGE UP (ref 70–99)
GLUCOSE SERPL-MCNC: 91 MG/DL — SIGNIFICANT CHANGE UP (ref 70–99)
GLUCOSE SERPL-MCNC: 96 MG/DL — SIGNIFICANT CHANGE UP (ref 70–99)
HCO3 BLDA-SCNC: 22 MMOL/L — LOW (ref 23–27)
HCO3 BLDA-SCNC: 25 MMOL/L — SIGNIFICANT CHANGE UP (ref 23–27)
HCT VFR BLD CALC: 36.2 % — LOW (ref 39–50)
HGB BLD-MCNC: 11.2 G/DL — LOW (ref 13–17)
HOROWITZ INDEX BLDA+IHG-RTO: 100 — SIGNIFICANT CHANGE UP
HOROWITZ INDEX BLDA+IHG-RTO: 21 — SIGNIFICANT CHANGE UP
MAGNESIUM SERPL-MCNC: 1.8 MG/DL — SIGNIFICANT CHANGE UP (ref 1.6–2.6)
MCHC RBC-ENTMCNC: 29.6 PG — SIGNIFICANT CHANGE UP (ref 27–34)
MCHC RBC-ENTMCNC: 30.9 GM/DL — LOW (ref 32–36)
MCV RBC AUTO: 95.8 FL — SIGNIFICANT CHANGE UP (ref 80–100)
METHOD TYPE: SIGNIFICANT CHANGE UP
METHOD TYPE: SIGNIFICANT CHANGE UP
MONOCYTES NFR BLD AUTO: 2 % — SIGNIFICANT CHANGE UP (ref 2–14)
NEUTROPHILS NFR BLD AUTO: 94 % — HIGH (ref 43–77)
ORGANISM # SPEC MICROSCOPIC CNT: SIGNIFICANT CHANGE UP
PCO2 BLDA: 31 MMHG — LOW (ref 32–46)
PCO2 BLDA: 37 MMHG — SIGNIFICANT CHANGE UP (ref 32–46)
PH BLDA: 7.44 — SIGNIFICANT CHANGE UP (ref 7.35–7.45)
PH BLDA: 7.46 — HIGH (ref 7.35–7.45)
PHOSPHATE SERPL-MCNC: 1.6 MG/DL — LOW (ref 2.5–4.5)
PLATELET # BLD AUTO: 143 K/UL — LOW (ref 150–400)
PO2 BLDA: 250 MMHG — HIGH (ref 74–108)
PO2 BLDA: 51 MMHG — LOW (ref 74–108)
POTASSIUM SERPL-MCNC: 3.6 MMOL/L — SIGNIFICANT CHANGE UP (ref 3.5–5.3)
POTASSIUM SERPL-MCNC: 3.7 MMOL/L — SIGNIFICANT CHANGE UP (ref 3.5–5.3)
POTASSIUM SERPL-MCNC: 3.8 MMOL/L — SIGNIFICANT CHANGE UP (ref 3.5–5.3)
POTASSIUM SERPL-MCNC: 3.9 MMOL/L — SIGNIFICANT CHANGE UP (ref 3.5–5.3)
POTASSIUM SERPL-MCNC: 4 MMOL/L — SIGNIFICANT CHANGE UP (ref 3.5–5.3)
POTASSIUM SERPL-SCNC: 3.6 MMOL/L — SIGNIFICANT CHANGE UP (ref 3.5–5.3)
POTASSIUM SERPL-SCNC: 3.7 MMOL/L — SIGNIFICANT CHANGE UP (ref 3.5–5.3)
POTASSIUM SERPL-SCNC: 3.8 MMOL/L — SIGNIFICANT CHANGE UP (ref 3.5–5.3)
POTASSIUM SERPL-SCNC: 3.9 MMOL/L — SIGNIFICANT CHANGE UP (ref 3.5–5.3)
POTASSIUM SERPL-SCNC: 4 MMOL/L — SIGNIFICANT CHANGE UP (ref 3.5–5.3)
PROT SERPL-MCNC: 5.2 G/DL — LOW (ref 6–8.3)
RBC # BLD: 3.78 M/UL — LOW (ref 4.2–5.8)
RBC # FLD: 19.6 % — HIGH (ref 10.3–14.5)
SAO2 % BLDA: 100 % — HIGH (ref 92–96)
SAO2 % BLDA: 88 % — LOW (ref 92–96)
SODIUM SERPL-SCNC: 153 MMOL/L — HIGH (ref 135–145)
SODIUM SERPL-SCNC: 153 MMOL/L — HIGH (ref 135–145)
SODIUM SERPL-SCNC: 154 MMOL/L — HIGH (ref 135–145)
SODIUM SERPL-SCNC: 157 MMOL/L — HIGH (ref 135–145)
SODIUM SERPL-SCNC: 158 MMOL/L — HIGH (ref 135–145)
SPECIMEN SOURCE: SIGNIFICANT CHANGE UP
VANCOMYCIN TROUGH SERPL-MCNC: 5.7 UG/ML — LOW (ref 10–20)
WBC # BLD: 40.3 K/UL — CRITICAL HIGH (ref 3.8–10.5)
WBC # FLD AUTO: 40.3 K/UL — CRITICAL HIGH (ref 3.8–10.5)

## 2019-02-10 PROCEDURE — 71045 X-RAY EXAM CHEST 1 VIEW: CPT | Mod: 26

## 2019-02-10 RX ORDER — HYDROCHLOROTHIAZIDE 25 MG
12.5 TABLET ORAL DAILY
Qty: 0 | Refills: 0 | Status: DISCONTINUED | OUTPATIENT
Start: 2019-02-10 | End: 2019-02-12

## 2019-02-10 RX ORDER — HALOPERIDOL DECANOATE 100 MG/ML
1 INJECTION INTRAMUSCULAR ONCE
Qty: 0 | Refills: 0 | Status: COMPLETED | OUTPATIENT
Start: 2019-02-10 | End: 2019-02-10

## 2019-02-10 RX ORDER — POTASSIUM PHOSPHATE, MONOBASIC POTASSIUM PHOSPHATE, DIBASIC 236; 224 MG/ML; MG/ML
15 INJECTION, SOLUTION INTRAVENOUS ONCE
Qty: 0 | Refills: 0 | Status: COMPLETED | OUTPATIENT
Start: 2019-02-10 | End: 2019-02-10

## 2019-02-10 RX ORDER — HYDROCHLOROTHIAZIDE 25 MG
12.5 TABLET ORAL ONCE
Qty: 0 | Refills: 0 | Status: COMPLETED | OUTPATIENT
Start: 2019-02-10 | End: 2019-02-10

## 2019-02-10 RX ORDER — METOPROLOL TARTRATE 50 MG
5 TABLET ORAL ONCE
Qty: 0 | Refills: 0 | Status: COMPLETED | OUTPATIENT
Start: 2019-02-10 | End: 2019-02-10

## 2019-02-10 RX ORDER — FENTANYL CITRATE 50 UG/ML
0.5 INJECTION INTRAVENOUS
Qty: 2500 | Refills: 0 | Status: DISCONTINUED | OUTPATIENT
Start: 2019-02-10 | End: 2019-02-14

## 2019-02-10 RX ORDER — VANCOMYCIN HCL 1 G
750 VIAL (EA) INTRAVENOUS EVERY 12 HOURS
Qty: 0 | Refills: 0 | Status: DISCONTINUED | OUTPATIENT
Start: 2019-02-10 | End: 2019-02-11

## 2019-02-10 RX ORDER — METOPROLOL TARTRATE 50 MG
2 TABLET ORAL ONCE
Qty: 0 | Refills: 0 | Status: DISCONTINUED | OUTPATIENT
Start: 2019-02-10 | End: 2019-02-10

## 2019-02-10 RX ADMIN — TAMSULOSIN HYDROCHLORIDE 0.4 MILLIGRAM(S): 0.4 CAPSULE ORAL at 22:19

## 2019-02-10 RX ADMIN — Medication 12.5 MILLIGRAM(S): at 22:19

## 2019-02-10 RX ADMIN — HEPARIN SODIUM 5000 UNIT(S): 5000 INJECTION INTRAVENOUS; SUBCUTANEOUS at 05:38

## 2019-02-10 RX ADMIN — FINASTERIDE 5 MILLIGRAM(S): 5 TABLET, FILM COATED ORAL at 11:33

## 2019-02-10 RX ADMIN — FAMOTIDINE 20 MILLIGRAM(S): 10 INJECTION INTRAVENOUS at 11:34

## 2019-02-10 RX ADMIN — Medication 5 MILLIGRAM(S): at 05:38

## 2019-02-10 RX ADMIN — SODIUM CHLORIDE 125 MILLILITER(S): 9 INJECTION, SOLUTION INTRAVENOUS at 07:15

## 2019-02-10 RX ADMIN — HALOPERIDOL DECANOATE 1 MILLIGRAM(S): 100 INJECTION INTRAMUSCULAR at 01:40

## 2019-02-10 RX ADMIN — MEROPENEM 100 MILLIGRAM(S): 1 INJECTION INTRAVENOUS at 05:38

## 2019-02-10 RX ADMIN — HEPARIN SODIUM 5000 UNIT(S): 5000 INJECTION INTRAVENOUS; SUBCUTANEOUS at 22:19

## 2019-02-10 RX ADMIN — Medication 250 MILLIGRAM(S): at 17:20

## 2019-02-10 RX ADMIN — Medication 12.5 MILLIGRAM(S): at 16:10

## 2019-02-10 RX ADMIN — Medication 5 MILLIGRAM(S): at 03:16

## 2019-02-10 RX ADMIN — FENTANYL CITRATE 2.98 MICROGRAM(S)/KG/HR: 50 INJECTION INTRAVENOUS at 09:56

## 2019-02-10 RX ADMIN — POTASSIUM PHOSPHATE, MONOBASIC POTASSIUM PHOSPHATE, DIBASIC 62.5 MILLIMOLE(S): 236; 224 INJECTION, SOLUTION INTRAVENOUS at 11:33

## 2019-02-10 RX ADMIN — Medication 100 MILLIGRAM(S): at 07:13

## 2019-02-10 RX ADMIN — HEPARIN SODIUM 5000 UNIT(S): 5000 INJECTION INTRAVENOUS; SUBCUTANEOUS at 13:00

## 2019-02-10 RX ADMIN — Medication 3 MILLILITER(S): at 02:24

## 2019-02-10 NOTE — PROGRESS NOTE ADULT - SUBJECTIVE AND OBJECTIVE BOX
INTERVAL HPI/OVERNIGHT EVENTS:  ***Patient was seen and examined at the bedside.    PRESSORS: [ ] YES [x ] NO  WHICH:0    ANTIBIOTICS:                  DATE STARTED:  ANTIBIOTICS:                  DATE STARTED:  ANTIBIOTICS:                  DATE STARTED:    Antimicrobial:  meropenem  IVPB 500 milliGRAM(s) IV Intermittent every 24 hours  vancomycin  IVPB 500 milliGRAM(s) IV Intermittent every 24 hours    Cardiovascular:  tamsulosin 0.4 milliGRAM(s) Oral at bedtime    Pulmonary:  ALBUTerol/ipratropium for Nebulization 3 milliLiter(s) Nebulizer every 6 hours    Hematalogic:  heparin  Injectable 5000 Unit(s) SubCutaneous every 8 hours    Other:  dextrose 5%. 1000 milliLiter(s) IV Continuous <Continuous>  famotidine Injectable 20 milliGRAM(s) IV Push daily  finasteride 5 milliGRAM(s) Oral daily    ALBUTerol/ipratropium for Nebulization 3 milliLiter(s) Nebulizer every 6 hours  dextrose 5%. 1000 milliLiter(s) IV Continuous <Continuous>  famotidine Injectable 20 milliGRAM(s) IV Push daily  finasteride 5 milliGRAM(s) Oral daily  heparin  Injectable 5000 Unit(s) SubCutaneous every 8 hours  meropenem  IVPB 500 milliGRAM(s) IV Intermittent every 24 hours  tamsulosin 0.4 milliGRAM(s) Oral at bedtime  vancomycin  IVPB 500 milliGRAM(s) IV Intermittent every 24 hours    Drug Dosing Weight  Height (cm): 165.1 (07 Feb 2019 16:08)  Weight (kg): 59.6 (07 Feb 2019 21:36)  BMI (kg/m2): 21.9 (07 Feb 2019 21:36)  BSA (m2): 1.65 (07 Feb 2019 21:36)    CENTRAL LINE: [x ] YES [ ] NO  LOCATION:   Left femoral DATE INSERTED: 2/7/19  REMOVE: [ ] YES [ ] NO  EXPLAIN:    ZAFAR: [x ] YES [ ] NO    DATE INSERTED: 2/7/19  REMOVE:  [ ] YES [ ] NO  EXPLAIN:    ICU Vital Signs Last 24 Hrs  T(C): 35.6 (09 Feb 2019 23:30), Max: 36 (09 Feb 2019 06:00)  T(F): 96 (09 Feb 2019 23:30), Max: 96.8 (09 Feb 2019 06:00)  HR: 119 (10 Feb 2019 02:00) (87 - 126)  BP: 143/79 (10 Feb 2019 02:00) (142/81 - 165/95)  BP(mean): 95 (10 Feb 2019 02:00) (83 - 113)  ABP: 179/94 (10 Feb 2019 00:00) (135/68 - 198/103)  ABP(mean): 126 (10 Feb 2019 00:00) (96 - 143)  RR: 19 (10 Feb 2019 02:00) (11 - 31)  SpO2: 96% (10 Feb 2019 02:00) (91% - 100%)      ABG - ( 08 Feb 2019 04:47 )  pH, Arterial: 7.46  pH, Blood: x     /  pCO2: 24    /  pO2: 88    / HCO3: 17    / Base Excess: -5.3  /  SaO2: 97                    02-08 @ 07:01  -  02-09 @ 07:00  --------------------------------------------------------  IN: 4086.6 mL / OUT: 3725 mL / NET: 361.6 mL            PHYSICAL EXAM:    GENERAL:NAD, well-groomed, well-developed  HEAD:  Atraumatic, Normocephalic  EYES: EOMI, PERRLA, conjunctiva and sclera clear  ENMT: No tonsillar erythema, exudates, or enlargement; dry mucous membranes, Good dentition, No lesions. s/p removed on right central venous catheter. DSD in place.   NECK: Supple, normal appearance, No JVD; Normal thyroid; Trachea midline  NERVOUS SYSTEM: Alert and awake x 0.  CHEST/LUNG: breath sounds diminished on RLL   HEART: Regular rate and rhythm; No murmurs, rubs, or gallops  ABDOMEN: Soft, Nontender, Nondistended; Bowel sounds present  EXTREMITIES: 2+ Peripheral Pulses, No clubbing, cyanosis, or edema, left femoral line in place.   LYMPH: No lymphadenopathy noted  SKIN:No rashes or lesions; Good capillary refill    LABS:  CBC Full  -  ( 09 Feb 2019 05:51 )  WBC Count : 44.2 K/uL  Hemoglobin : 10.6 g/dL  Hematocrit : 34.2 %  Platelet Count - Automated : 186 K/uL  Mean Cell Volume : 95.2 fl  Mean Cell Hemoglobin : 29.7 pg  Mean Cell Hemoglobin Concentration : 31.2 gm/dL  Auto Neutrophil # : 42.6 K/uL  Auto Lymphocyte # : 0.4 K/uL  Auto Monocyte # : 1.2 K/uL  Auto Eosinophil # : 0.0 K/uL  Auto Basophil # : 0.0 K/uL  Auto Neutrophil % : 96.4 %  Auto Lymphocyte % : 0.9 %  Auto Monocyte % : 2.6 %  Auto Eosinophil % : 0.0 %  Auto Basophil % : 0.1 %    02-09    160<HH>  |  126<H>  |  45<H>  ----------------------------<  114<H>  4.0   |  25  |  0.88    Ca    8.7      09 Feb 2019 22:20  Phos  2.6     02-09  Mg     2.2     02-09    TPro  5.1<L>  /  Alb  1.4<L>  /  TBili  1.0  /  DBili  x   /  AST  39  /  ALT  21  /  AlkPhos  366<H>  02-09    PT/INR - ( 08 Feb 2019 14:22 )   PT: 15.8 sec;   INR: 1.41 ratio         PTT - ( 08 Feb 2019 14:22 )  PTT:57.6 sec    Culture Results:   Normal Respiratory Isa present (02-08 @ 19:52)  Culture Results:   >100,000 CFU/ml Gram Negative Rods (02-08 @ 00:24)  Culture Results:   Growth in aerobic and anaerobic bottles: Escherichia coli  "Due to technical problems, Proteus sp. will Not be reported as part of  the BCID panel until further notice"  ***Blood Panel PCR results on this specimen are available  approximately 3 hours after the Gram stain result.***  Gram stain, PCR, and/or culture results may not always  correspond due to difference in methodologies.  ************************************************************  This PCR assay was performed using [employer].  The following targets are tested for: Enterococcus,  vancomycin resistant enterococci, Listeria monocytogenes,  coagulase negative staphylococci, S. aureus,  methicillin resistant S. aureus, Streptococcus agalactiae  (Group B), S. pneumoniae, S.pyogenes (Group A),  Acinetobacter baumannii, Enterobacter cloacae, E. coli,  Klebsiella oxytoca, K. pneumoniae, Proteus sp.,  Serratia marcescens, Haemophilus influenzae,  Neisseria meningitidis, Pseudomonas aeruginosa, Candida  albicans, C. glabrata, C krusei, C parapsilosis,  C. tropicalis and the KPC resistance gene. (02-07 @ 23:09)  Culture Results:   Growth in aerobic and anaerobic bottles: Escherichia coli  See previous culture [id number] (02-07 @ 23:09)      RADIOLOGY & ADDITIONAL STUDIES REVIEWED:      [ ]GOALS OF CARE DISCUSSION WITH PATIENT/FAMILY/PROXY:    CRITICAL CARE TIME SPENT: 35 minutes INTERVAL HPI/OVERNIGHT EVENTS:  Patient became hypoxic in AM, w/ tchypnea. Bedside US revealed B lines. intubated subsequently 2/2 pulmonary edema. s/p Lasix 80 mg IVP. HCTZ 12.5 mg X1    PRESSORS: [ ] YES [x ] NO  WHICH:0    Antimicrobial:  meropenem  IVPB 500 milliGRAM(s) IV Intermittent every 24 hours  vancomycin  IVPB 500 milliGRAM(s) IV Intermittent every 24 hours    Cardiovascular:  tamsulosin 0.4 milliGRAM(s) Oral at bedtime    Pulmonary:  ALBUTerol/ipratropium for Nebulization 3 milliLiter(s) Nebulizer every 6 hours    Hematalogic:  heparin  Injectable 5000 Unit(s) SubCutaneous every 8 hours    Other:  dextrose 5%. 1000 milliLiter(s) IV Continuous <Continuous>  famotidine Injectable 20 milliGRAM(s) IV Push daily  finasteride 5 milliGRAM(s) Oral daily    ALBUTerol/ipratropium for Nebulization 3 milliLiter(s) Nebulizer every 6 hours  dextrose 5%. 1000 milliLiter(s) IV Continuous <Continuous>  famotidine Injectable 20 milliGRAM(s) IV Push daily  finasteride 5 milliGRAM(s) Oral daily  heparin  Injectable 5000 Unit(s) SubCutaneous every 8 hours  meropenem  IVPB 500 milliGRAM(s) IV Intermittent every 24 hours  tamsulosin 0.4 milliGRAM(s) Oral at bedtime  vancomycin  IVPB 500 milliGRAM(s) IV Intermittent every 24 hours    Drug Dosing Weight  Height (cm): 165.1 (07 Feb 2019 16:08)  Weight (kg): 59.6 (07 Feb 2019 21:36)  BMI (kg/m2): 21.9 (07 Feb 2019 21:36)  BSA (m2): 1.65 (07 Feb 2019 21:36)    CENTRAL LINE: [x ] YES [ ] NO  LOCATION:   Left femoral DATE INSERTED: 2/7/19  REMOVE: [ ] YES [ ] NO  EXPLAIN:    ZAFAR: [x ] YES [ ] NO    DATE INSERTED: 2/7/19  REMOVE:  [ ] YES [ ] NO  EXPLAIN:    ICU Vital Signs Last 24 Hrs  T(C): 35.6 (09 Feb 2019 23:30), Max: 36 (09 Feb 2019 06:00)  T(F): 96 (09 Feb 2019 23:30), Max: 96.8 (09 Feb 2019 06:00)  HR: 119 (10 Feb 2019 02:00) (87 - 126)  BP: 143/79 (10 Feb 2019 02:00) (142/81 - 165/95)  BP(mean): 95 (10 Feb 2019 02:00) (83 - 113)  ABP: 179/94 (10 Feb 2019 00:00) (135/68 - 198/103)  ABP(mean): 126 (10 Feb 2019 00:00) (96 - 143)  RR: 19 (10 Feb 2019 02:00) (11 - 31)  SpO2: 96% (10 Feb 2019 02:00) (91% - 100%)      ABG - ( 08 Feb 2019 04:47 )  pH, Arterial: 7.46  pH, Blood: x     /  pCO2: 24    /  pO2: 88    / HCO3: 17    / Base Excess: -5.3  /  SaO2: 97                    02-08 @ 07:01  -  02-09 @ 07:00  --------------------------------------------------------  IN: 4086.6 mL / OUT: 3725 mL / NET: 361.6 mL            PHYSICAL EXAM:    GENERAL:NAD, well-groomed, well-developed  HEAD:  Atraumatic, Normocephalic  EYES: EOMI, PERRLA, conjunctiva and sclera clear  ENMT: No tonsillar erythema, exudates, or enlargement; dry mucous membranes,   NECK: Supple, normal appearance, No JVD; Normal thyroid; Trachea midline  NERVOUS SYSTEM: Alert and awake x 1  CHEST/LUNG: bilateral rales   HEART: Regular rate and rhythm; No murmurs, rubs, or gallops  ABDOMEN: Soft, Nontender, Nondistended; Bowel sounds present  EXTREMITIES: 2+ Peripheral Pulses, No clubbing, cyanosis, or edema, left femoral line in place.   LYMPH: No lymphadenopathy noted  SKIN:No rashes or lesions; Good capillary refill    LABS:  CBC Full  -  ( 09 Feb 2019 05:51 )  WBC Count : 44.2 K/uL  Hemoglobin : 10.6 g/dL  Hematocrit : 34.2 %  Platelet Count - Automated : 186 K/uL  Mean Cell Volume : 95.2 fl  Mean Cell Hemoglobin : 29.7 pg  Mean Cell Hemoglobin Concentration : 31.2 gm/dL  Auto Neutrophil # : 42.6 K/uL  Auto Lymphocyte # : 0.4 K/uL  Auto Monocyte # : 1.2 K/uL  Auto Eosinophil # : 0.0 K/uL  Auto Basophil # : 0.0 K/uL  Auto Neutrophil % : 96.4 %  Auto Lymphocyte % : 0.9 %  Auto Monocyte % : 2.6 %  Auto Eosinophil % : 0.0 %  Auto Basophil % : 0.1 %    02-09    160<HH>  |  126<H>  |  45<H>  ----------------------------<  114<H>  4.0   |  25  |  0.88    Ca    8.7      09 Feb 2019 22:20  Phos  2.6     02-09  Mg     2.2     02-09    TPro  5.1<L>  /  Alb  1.4<L>  /  TBili  1.0  /  DBili  x   /  AST  39  /  ALT  21  /  AlkPhos  366<H>  02-09    PT/INR - ( 08 Feb 2019 14:22 )   PT: 15.8 sec;   INR: 1.41 ratio         PTT - ( 08 Feb 2019 14:22 )  PTT:57.6 sec    Culture Results:   Normal Respiratory Isa present (02-08 @ 19:52)  Culture Results:   >100,000 CFU/ml Gram Negative Rods (02-08 @ 00:24)  Culture Results:   Growth in aerobic and anaerobic bottles: Escherichia coli  "Due to technical problems, Proteus sp. will Not be reported as part of  the BCID panel until further notice"  ***Blood Panel PCR results on this specimen are available  approximately 3 hours after the Gram stain result.***  Gram stain, PCR, and/or culture results may not always  correspond due to difference in methodologies.  ************************************************************  This PCR assay was performed using [employer].  The following targets are tested for: Enterococcus,  vancomycin resistant enterococci, Listeria monocytogenes,  coagulase negative staphylococci, S. aureus,  methicillin resistant S. aureus, Streptococcus agalactiae  (Group B), S. pneumoniae, S.pyogenes (Group A),  Acinetobacter baumannii, Enterobacter cloacae, E. coli,  Klebsiella oxytoca, K. pneumoniae, Proteus sp.,  Serratia marcescens, Haemophilus influenzae,  Neisseria meningitidis, Pseudomonas aeruginosa, Candida  albicans, C. glabrata, C krusei, C parapsilosis,  C. tropicalis and the KPC resistance gene. (02-07 @ 23:09)  Culture Results:   Growth in aerobic and anaerobic bottles: Escherichia coli  See previous culture [id number] (02-07 @ 23:09)      RADIOLOGY & ADDITIONAL STUDIES REVIEWED:      [ ]GOALS OF CARE DISCUSSION WITH PATIENT/FAMILY/PROXY:    CRITICAL CARE TIME SPENT: 35 minutes

## 2019-02-10 NOTE — PROGRESS NOTE ADULT - PROBLEM SELECTOR PLAN 5
patient;s blood pressure is 185/93 via malu.  -Will give metoprolol 5 IV push x1 as patient is NPO.   -Will hold home dose of lisinopril 20mg due to renal failure. DVT ppx with heparin   GI PPX with pepcid resume tamsulosin via NGT

## 2019-02-10 NOTE — PROGRESS NOTE ADULT - PROBLEM SELECTOR PLAN 4
-Patient's right sided central line was  mal positioned into common carotid artery with tip in aortic arch, confirmed with CT scan on 2/8/19  -removal of central venous catheter and repair of carotid artery on 2/8/19  -Line safely removed, with out any hematoma or excessive bleeding. DSD dressing in place.   -Vascular surgeon Dr Hernandez resume tamsulosin via NGT

## 2019-02-10 NOTE — PROGRESS NOTE ADULT - PROBLEM SELECTOR PLAN 3
secondary to Pneumonia, UTI and gram negative rods bacteremia   -WBC count trending down to 57.6-->47.5->44.2  -CT chest and neck noted   -c/w meropenem and vancomycin   -f/u repeat blood cultures to be sent today morning  -RVP - negative  -ID Dr Norton following -Patient's right sided central line was  mal positioned into common carotid artery with tip in aortic arch, confirmed with CT scan on 2/8/19  -removal of central venous catheter and repair of carotid artery on 2/8/19  -Line safely removed, with out any hematoma or excessive bleeding. DSD dressing in place.   -Vascular surgeon Dr Hernandez Secondary to high UO (water losses)---> current Na: 153  Patient was treated initially w/ D5W but got overloaded  Avoid Na correction >8 meq per 24 hrs  Goal Na 150 by 4 pm today and 142 by 4 pm tomorrow  Follow up BMPq 4 hrs x 24 hrs  c/w HCTZ 12.5 mg QD   Dr Berman

## 2019-02-10 NOTE — PROGRESS NOTE ADULT - PROBLEM SELECTOR PLAN 1
PT was hypoxic and tachypneic on admission. ABG s/o Primary high anion gap Metabolic Acidosis with Secondary Respiratory Alkalosis. Likely lactic acidosis due to infectious etiology and acute renal failure.  -He is saturating mid-90s% on 2L NC  -Dopplers and echo for DVT and right heart strain to r/o PE. DVT ruled out. LV and RV functions could not be evaluated 2/2 poor visualization.  -Weaned off Bipap   -CT head negative for any acute stroke  -DC bicarb drip  -CXR s/o Patchy right lower lobe infiltrate. Cardiomegaly.   -CT chest shows bilateral pleural effusions and superimposed pneumonia  -C/w Meropenem and vancomycin renally dosed  -No sedation, neuro-checks 2/2 multifocal PNA  initially placed on BiPAP, intubated today for pulmonary edema-CT chest shows bilateral pleural effusions and superimposed pneumonia  C/w Meropenem and vancomycin renally dosed  c/w fentanyl

## 2019-02-10 NOTE — PROGRESS NOTE ADULT - ASSESSMENT
A/P:  1.PAULINA: most likley secondary to obstructive uropathy , now improved  -Keep patient euvolemic   -Avoid Nephrotoxic Meds/ Agents such as (NSAIDs, IV contrast, Aminoglycosides such as gentamicin, -Gadolinium contrast, Phosphate containing enemas, etc..)  -Adjust Medications according to eGFR  -f/u bmp daily  2.HYPOKALEMIA: secondary to urine k loss with diuretic phase   -replace kcl prn  -keep k >4 and <5  -f/u k level daily  3.HYPERNATREMIA: secondary to high u/o induced water losses with improved renal function  -suggest to correct water deficit with hypotonic iv fluid  -avoid Na correction >8 meq per 24 hrs  -goal Na 150 by 4 pm today and  by 4 pm tomorrow  -f/u Na level q 4 hrs x 24 hrs  -may add Hctz 12.5 MG daily for fluid overload   4.METABOLIC ACIDOSIS: secondary to septic shock plus paulina induced ,now improved  -f/u co2 daily  5.HYPOPHOSPHATEMIA: secondary to improved renal function  -replace prn to keep phos level >3 and <5  -f/u phos level daily  6.EDEMA: most likley secondary to paulina with obstructive uropathy induced retention, now improving   -keep pt evolemic  -may add Hctz for diuresis A/P:  1.PAULINA: most likley secondary to obstructive uropathy , now improved  -Keep patient euvolemic   -Avoid Nephrotoxic Meds/ Agents such as (NSAIDs, IV contrast, Aminoglycosides such as gentamicin, -Gadolinium contrast, Phosphate containing enemas, etc..)  -Adjust Medications according to eGFR  -f/u bmp daily  2.HYPOKALEMIA: secondary to urine k loss with diuretic phase   -replace kcl prn  -keep k >4 and <5  -f/u k level daily  3.HYPERNATREMIA: secondary to high u/o induced water losses with improved renal function  -suggest to correct water deficit with hypotonic iv fluid  -avoid Na correction >8 meq per 24 hrs  -goal Na 150 by 4 pm today and  by 4 pm tomorrow  -f/u Na level q 4 hrs x 24 hrs  -may add Hctz 12.5 MG daily for fluid overload   4.METABOLIC ACIDOSIS: secondary to septic shock plus paulina induced ,now improved  -f/u co2 daily  5.HYPOPHOSPHATEMIA: secondary to improved renal function  -replace prn to keep phos level >3 and <5  -f/u phos level daily  6.EDEMA: now with pulmonary edema   -keep pt evolemic  - add Hctz for diuresis daily

## 2019-02-10 NOTE — PROGRESS NOTE ADULT - PROBLEM SELECTOR PLAN 2
pt has bun/creat- 177/8.69 on admission 2/2 urinary retention  -> trended down 122/3.06->1.4  -urinelytes shows FeNa 1%.  -Hypernatremia secondary to high u/o induced water losses with improved renal function  -Sodium correction goal 158 by 4pm yesterday and 150 by 4pm today  -C/w D5W @ 100cc/hr  -Continue to Monitor BMP  Nephrology Dr Berman following  -F/u Na level q 4 hrs x 24 hrs 2/2 PNA and Strep bacteremia  f/up repeat Bcx  -c/w meropenem and vancomycin   -ID Dr Norton

## 2019-02-10 NOTE — PROGRESS NOTE ADULT - SUBJECTIVE AND OBJECTIVE BOX
86y Male under our care with Septic shock, Multifocal pneumonia, UTI, Bacteremia, Leukocytosis. Pt remains in ICU, last night the pt was in respiratory distress, was intubated. At the moment pt is on mechanical ventilation, not in distress, afebrile, leukocytosis is still high but trending down, UC and BC grew E. Coli, today's chest xray revealed slightly worsening insrstitial and airspace pulmonary edema, bilateral pleural effusion with no change. Will need to repeat blood cultures.     MEDS:  meropenem  IVPB 500 milliGRAM(s) IV Intermittent every 24 hours  vancomycin  IVPB 750 milliGRAM(s) IV Intermittent every 12 hours    No Known Allergies    VITALS:  Vital Signs Last 24 Hrs  T(C): 36.7 (10 Feb 2019 12:00), Max: 36.7 (10 Feb 2019 12:00)  T(F): 98.1 (10 Feb 2019 12:00), Max: 98.1 (10 Feb 2019 12:00)  HR: 111 (10 Feb 2019 12:00) (90 - 149)  BP: 117/67 (10 Feb 2019 12:00) (95/59 - 179/98)  BP(mean): 80 (10 Feb 2019 12:00) (67 - 117)  RR: 17 (10 Feb 2019 12:00) (11 - 31)  SpO2: 100% (10 Feb 2019 12:00) (88% - 100%)    LABS/DIAGNOSTIC TESTS:                          11.2   40.3  )-----------( 143      ( 10 Feb 2019 04:45 )             36.2     WBC trend  40.3  44.2  42.4    Lactate, Blood: 3.8 mmol/L (02-09 @ 23:18)  Lactate, Blood: 3.9 mmol/L (02-09 @ 16:41)      02-10    154<H>  |  123<H>  |  41<H>  ----------------------------<  96  3.7   |  25  |  0.88    Ca    8.3<L>      10 Feb 2019 09:36  Phos  1.6     02-10  Mg     1.8     02-10    TPro  5.2<L>  /  Alb  1.5<L>  /  TBili  1.0  /  DBili  x   /  AST  30  /  ALT  20  /  AlkPhos  365<H>  02-10    Vancomycin Level, Trough (02.10.19 @ 04:45)    Vancomycin Level, Trough: 5.7    Blood Gas Profile - Arterial (02.10.19 @ 10:41)    pH, Arterial: 7.44    pCO2, Arterial: 37 mmHg    pO2, Arterial: 250 mmHg    HCO3, Arterial: 25 mmol/L    Base Excess, Arterial: 1.5 mmol/L    Oxygen Saturation, Arterial: 100 %    FIO2, Arterial: 100    Blood Gas Comments Arterial: Rt. radial, AC/VC-450-100%-12-+5        CULTURES:   .Sputum  02-08 @ 19:52   Normal Respiratory Isa present  --    Few Squamous epithelial cells per low power field  Few polymorphonuclear leukocytes per low power field  Few Yeast like cells per oil power field  Numerous Gram variable coccobacilli per oil power field      .Urine  02-08 @ 10:30   50,000 - 99,000 CFU/mL Gram Negative Rods  --  --      .Urine  02-08 @ 00:24   >100,000 CFU/ml Escherichia coli  --  Escherichia coli      .Blood  02-07 @ 23:09   Growth in aerobic and anaerobic bottles: Escherichia coli  "Due to technical problems, Proteus sp. will Not be reported as part of  the BCID panel until further notice"  ***Blood Panel PCR results on this specimen are available  approximately 3 hours after the Gram stain result.***  Gram stain, PCR, and/or culture results may not always  correspond due to difference in methodologies.  ************************************************************  This PCR assay was performed using [employer].  The following targets are tested for: Enterococcus,  vancomycin resistant enterococci, Listeria monocytogenes,  coagulase negative staphylococci, S. aureus,  methicillin resistant S. aureus, Streptococcus agalactiae  (Group B), S. pneumoniae, S.pyogenes (Group A),  Acinetobacter baumannii, Enterobacter cloacae, E. coli,  Klebsiella oxytoca, K. pneumoniae, Proteus sp.,  Serratia marcescens, Haemophilus influenzae,  Neisseria meningitidis, Pseudomonas aeruginosa, Candida  albicans, C. glabrata, C krusei, C parapsilosis,  C. tropicalis and the KPC resistance gene.  --  Blood Culture PCR  Escherichia coli          RADIOLOGY:    < from: Xray Chest 1 View- PORTABLE-Routine (02.10.19 @ 11:05) >  EXAM:  XR CHEST PORTABLE ROUTINE 1V                            PROCEDURE DATE:  02/10/2019          INTERPRETATION:  CLINICAL HISTORY: 86 years  Male with Bilateral   pneumonia.    COMPARISON: 2/9/2019    The tip of the ET tube is in the mid trachea. The tip the NG tube is   below the diaphragm.    AP view of the chest demonstrates significantly worsened bilateral   perihilar interstitial and airspace infiltrates on the basis of pulmonary   edema area and small bilateral pleural effusions are unchanged. There is   no pneumothorax.    The heart is normal in size. The aorta is atherosclerotic. There is no   mediastinal mass. The hilar obscured.    Mild thoracic degenerative changes are present.    IMPRESSION:    ET tube and NG tube in good position.    Significantly worsened interstitial and airspace pulmonary edema.   Bilateral pleural effusions unchanged.    PAT LANGSTON M.D., ATTENDING RADIOLOGIST  This document has been electronically signed. Feb 10 2019 11:27AM    < end of copied text >      ROS:  [x] UNABLE TO ELICIT

## 2019-02-10 NOTE — PROGRESS NOTE ADULT - SUBJECTIVE AND OBJECTIVE BOX
pt seen and examined.pts current chart reviewed and case discussed with resident covering.    SUBJECTIVE:  pt feels fine and denies cp,sob,gi or gu/uremic  symptons    REVIEW OF SYSTEMS:  CONSTITUTIONAL: No weakness, fevers or chills  EYES/ENT: No visual changes;  No vertigo or throat pain   NECK: No pain or stiffness  RESPIRATORY: No cough, wheezing, hemoptysis; No shortness of breath  CARDIOVASCULAR: No chest pain or palpitations  GASTROINTESTINAL: No abdominal or epigastric pain. No nausea, vomiting, or hematemesis; No diarrhea or constipation. No melena or hematochezia.  GENITOURINARY: No dysuria, frequency , hematuria, flank pain or nocturia  NEUROLOGICAL: No numbness or weakness  SKIN: No itching, burning, rashes, or lesions   All other review of systems is negative unless indicated above    Current meds:    ALBUTerol/ipratropium for Nebulization 3 milliLiter(s) Nebulizer every 6 hours  famotidine Injectable 20 milliGRAM(s) IV Push daily  fentaNYL   Infusion. 0.5 MICROgram(s)/kG/Hr IV Continuous <Continuous>  finasteride 5 milliGRAM(s) Oral daily  heparin  Injectable 5000 Unit(s) SubCutaneous every 8 hours  meropenem  IVPB 500 milliGRAM(s) IV Intermittent every 24 hours  tamsulosin 0.4 milliGRAM(s) Oral at bedtime  vancomycin  IVPB 750 milliGRAM(s) IV Intermittent every 12 hours      Vital Signs    T(F): 98.1 (02-10-19 @ 12:00), Max: 98.1 (02-10-19 @ 12:00)  HR: 103 (02-10-19 @ 13:53) (90 - 149)  BP: 98/52 (02-10-19 @ 13:00) (95/59 - 179/98)  ABP: 179/94 (02-10-19 @ 00:00) (166/99 - 193/97)  RR: 12 (02-10-19 @ 13:00) (11 - 31)  SpO2: 100% (02-10-19 @ 13:53) (88% - 100%)  Wt(kg): --  CVP(cm H2O): --  CO: --  PCWP: --    I and O's:    02-08 @ 07:01  -  02-09 @ 07:00  --------------------------------------------------------  IN:    dextrose 5% + sodium chloride 0.45%: 400 mL    dextrose 5% + sodium chloride 0.45%.: 800 mL    dextrose 5% + sodium chloride 0.45%.: 300 mL    dextrose 5% + sodium chloride 0.45%.: 700 mL    IV PiggyBack: 100 mL    norepinephrine Infusion: 186.6 mL    sodium chloride 0.45%: 1000 mL    Solution: 300 mL    Solution: 250 mL    Solution: 50 mL  Total IN: 4086.6 mL    OUT:    Indwelling Catheter - Urethral: 3725 mL  Total OUT: 3725 mL    Total NET: 361.6 mL      02-09 @ 07:01  -  02-10 @ 07:00  --------------------------------------------------------  IN:    dextrose 5% + sodium chloride 0.45%.: 600 mL    dextrose 5%.: 1625 mL    dextrose 5%.: 300 mL    Solution: 300 mL    Solution: 50 mL    Solution: 100 mL  Total IN: 2975 mL    OUT:    Indwelling Catheter - Urethral: 2575 mL  Total OUT: 2575 mL    Total NET: 400 mL      02-10 @ 07:01  -  02-10 @ 13:56  --------------------------------------------------------  IN:    dextrose 5%.: 500 mL    fentaNYL Infusion.: 48 mL    Free Water: 300 mL    IV PiggyBack: 125 mL  Total IN: 973 mL    OUT:    Indwelling Catheter - Urethral: 305 mL  Total OUT: 305 mL    Total NET: 668 mL        Daily     Daily     PHYSICAL EXAM:  Constitutional: well developed, Mal-nourished  and in nad  HEENT: PERRLA,  no icteric sclera and mild pallor of conjunctiva noted  Neck: No JVD, thyromegaly or adenopathy  Respiratory: reduced air entry lower lungs with no rales, wheezing or rhonchi  Cardiovascular: S1 and S2 normally heard  Gastrointestinal: soft, nondistended, nontender and normal bowel sounds heard  Extremities: 1 plus B/L LE and upper thigh edema noted, no cyanosis  Neurological: A/O x 1, no focal deficits  Skin: No rashes      LABS:    CBC:                          11.2   40.3  )-----------( 143      ( 10 Feb 2019 04:45 )             36.2           BMP:    02-10    153<H>  |  123<H>  |  42<H>  ----------------------------<  90  3.8   |  25  |  0.76  02-10    154<H>  |  123<H>  |  41<H>  ----------------------------<  96  3.7   |  25  |  0.88  02-10    158<H>  |  124<H>  |  42<H>  ----------------------------<  113<H>  3.6   |  25  |  0.90  02-09    160<HH>  |  126<H>  |  45<H>  ----------------------------<  114<H>  4.0   |  25  |  0.88  02-09    161<HH>  |  128<H>  |  51<H>  ----------------------------<  90  3.3<L>   |  25  |  0.94  02-09    159<H>  |  125<H>  |  65<H>  ----------------------------<  125<H>  3.4<L>   |  22  |  1.47<H>  02-09    157<H>  |  125<H>  |  75<H>  ----------------------------<  144<H>  4.0   |  21<L>  |  1.74<H>  02-08    158<H>  |  123<H>  |  86<H>  ----------------------------<  161<H>  3.3<L>   |  22  |  2.21<H>  02-08    159<H>  |  122<H>  |  92<H>  ----------------------------<  144<H>  2.9<LL>   |  21<L>  |  2.58<H>  02-08    159<H>  |  122<H>  |  111<H>  ----------------------------<  139<H>  3.0<L>   |  19<L>  |  3.06<H>  02-08    152<H>  |  117<H>  |  143<H>  ----------------------------<  149<H>  3.5   |  17<L>  |  4.91<H>  02-07    149<H>  |  114<H>  |  160<H>  ----------------------------<  113<H>  4.4   |  14<L>  |  7.03<H>  02-07    143  |  113<H>  |  161<H>  ----------------------------<  169<H>  5.8<H>   |  8<LL>  |  7.81<H>  02-07    142  |  110<H>  |  177<H>  ----------------------------<  81  6.4<HH>   |  8<LL>  |  8.69<H>    Ca    8.4      10 Feb 2019 12:22  Ca    8.3<L>      10 Feb 2019 09:36  Ca    8.7      10 Feb 2019 04:45  Ca    8.7      09 Feb 2019 22:20  Ca    9.0      09 Feb 2019 15:49  Ca    8.5      09 Feb 2019 05:51  Ca    8.4      09 Feb 2019 00:24  Ca    8.2<L>      08 Feb 2019 20:10  Ca    8.1<L>      08 Feb 2019 16:49  Ca    8.2<L>      08 Feb 2019 13:37  Ca    8.2<L>      08 Feb 2019 04:32  Ca    8.7      07 Feb 2019 22:33  Ca    8.3<L>      07 Feb 2019 20:40  Ca    8.7      07 Feb 2019 16:55  Phos  1.6     02-10  Phos  2.6     02-09  Phos  3.9     02-08  Phos  5.8     02-08  Phos  6.3     02-07  Mg     1.8     02-10  Mg     2.2     02-09  Mg     2.2     02-09  Mg     2.4     02-08  Mg     2.5     02-08  Mg     2.7     02-07    TPro  5.2<L>  /  Alb  1.5<L>  /  TBili  1.0  /  DBili  x   /  AST  30  /  ALT  20  /  AlkPhos  365<H>  02-10  TPro  5.1<L>  /  Alb  1.4<L>  /  TBili  1.0  /  DBili  x   /  AST  39  /  ALT  21  /  AlkPhos  366<H>  02-09  TPro  5.2<L>  /  Alb  1.4<L>  /  TBili  0.6  /  DBili  x   /  AST  29  /  ALT  19  /  AlkPhos  226<H>  02-09  TPro  5.3<L>  /  Alb  1.4<L>  /  TBili  0.7  /  DBili  x   /  AST  28  /  ALT  19  /  AlkPhos  214<H>  02-08  TPro  5.2<L>  /  Alb  1.5<L>  /  TBili  0.6  /  DBili  x   /  AST  33  /  ALT  18  /  AlkPhos  215<H>  02-08  TPro  5.6<L>  /  Alb  1.6<L>  /  TBili  0.6  /  DBili  x   /  AST  42<H>  /  ALT  16  /  AlkPhos  234<H>  02-07  TPro  6.2  /  Alb  1.8<L>  /  TBili  0.6  /  DBili  x   /  AST  40  /  ALT  19  /  AlkPhos  277<H>  02-07          URINE STUDIES:        Sodium, Random Urine: 20 mmol/L (02-08 @ 03:44)  Osmolality, Random Urine: 462 mos/kg (02-08 @ 03:44)  Creatinine, Random Urine: 62 mg/dL (02-08 @ 03:44)                  RADIOLOGY & ADDITIONAL STUDIES:    < from: Xray Chest 1 View- PORTABLE-Routine (02.10.19 @ 11:05) >  EXAM:  XR CHEST PORTABLE ROUTINE 1V                            PROCEDURE DATE:  02/10/2019          INTERPRETATION:  CLINICAL HISTORY: 86 years  Male with Bilateral   pneumonia.    COMPARISON: 2/9/2019    The tip of the ET tube is in the mid trachea. The tip the NG tube is   below the diaphragm.    AP view of the chest demonstrates significantly worsened bilateral   perihilar interstitial and airspace infiltrates on the basis of pulmonary   edema area and small bilateral pleural effusions are unchanged. There is   no pneumothorax.    The heart is normal in size. The aorta is atherosclerotic. There is no   mediastinal mass. The hilar obscured.    Mild thoracic degenerative changes are present.    IMPRESSION:    ET tube and NG tube in good position.    Significantly worsened interstitial and airspace pulmonary edema.   Bilateral pleural effusions unchanged.      < end of copied text > pt seen and examined    SUBJECTIVE:  pt is now intubated for respiratory distress this am  pts daughter at bedside      REVIEW OF SYSTEMS:  Unobtainable  Current meds:    ALBUTerol/ipratropium for Nebulization 3 milliLiter(s) Nebulizer every 6 hours  famotidine Injectable 20 milliGRAM(s) IV Push daily  fentaNYL   Infusion. 0.5 MICROgram(s)/kG/Hr IV Continuous <Continuous>  finasteride 5 milliGRAM(s) Oral daily  heparin  Injectable 5000 Unit(s) SubCutaneous every 8 hours  meropenem  IVPB 500 milliGRAM(s) IV Intermittent every 24 hours  tamsulosin 0.4 milliGRAM(s) Oral at bedtime  vancomycin  IVPB 750 milliGRAM(s) IV Intermittent every 12 hours      Vital Signs    T(F): 98.1 (02-10-19 @ 12:00), Max: 98.1 (02-10-19 @ 12:00)  HR: 103 (02-10-19 @ 13:53) (90 - 149)  BP: 98/52 (02-10-19 @ 13:00) (95/59 - 179/98)  ABP: 179/94 (02-10-19 @ 00:00) (166/99 - 193/97)  RR: 12 (02-10-19 @ 13:00) (11 - 31)  SpO2: 100% (02-10-19 @ 13:53) (88% - 100%)  Wt(kg): --  CVP(cm H2O): --  CO: --  PCWP: --    I and O's:    02-08 @ 07:01  -  02-09 @ 07:00  --------------------------------------------------------  IN:    dextrose 5% + sodium chloride 0.45%: 400 mL    dextrose 5% + sodium chloride 0.45%.: 800 mL    dextrose 5% + sodium chloride 0.45%.: 300 mL    dextrose 5% + sodium chloride 0.45%.: 700 mL    IV PiggyBack: 100 mL    norepinephrine Infusion: 186.6 mL    sodium chloride 0.45%: 1000 mL    Solution: 300 mL    Solution: 250 mL    Solution: 50 mL  Total IN: 4086.6 mL    OUT:    Indwelling Catheter - Urethral: 3725 mL  Total OUT: 3725 mL    Total NET: 361.6 mL      02-09 @ 07:01  -  02-10 @ 07:00  --------------------------------------------------------  IN:    dextrose 5% + sodium chloride 0.45%.: 600 mL    dextrose 5%.: 1625 mL    dextrose 5%.: 300 mL    Solution: 300 mL    Solution: 50 mL    Solution: 100 mL  Total IN: 2975 mL    OUT:    Indwelling Catheter - Urethral: 2575 mL  Total OUT: 2575 mL    Total NET: 400 mL      02-10 @ 07:01  -  02-10 @ 13:56  --------------------------------------------------------  IN:    dextrose 5%.: 500 mL    fentaNYL Infusion.: 48 mL    Free Water: 300 mL    IV PiggyBack: 125 mL  Total IN: 973 mL    OUT:    Indwelling Catheter - Urethral: 305 mL  Total OUT: 305 mL    Total NET: 668 mL        Daily     Daily     PHYSICAL EXAM:  Constitutional: well developed, Mal-nourished  and in nad  HEENT: PERRLA,  no icteric sclera and mild pallor of conjunctiva noted  Neck: No JVD, thyromegaly or adenopathy  Respiratory: reduced air entry lower lungs with few rhonchi  Cardiovascular: S1 and S2 normally heard  Gastrointestinal: soft, nondistended, nontender and normal bowel sounds heard  Extremities: 1 plus B/L LE and upper thigh edema noted, no cyanosis  Neurological: sedated   Skin: No rashes      LABS:    CBC:                          11.2   40.3  )-----------( 143      ( 10 Feb 2019 04:45 )             36.2           BMP:    02-10    153<H>  |  123<H>  |  42<H>  ----------------------------<  90  3.8   |  25  |  0.76  02-10    154<H>  |  123<H>  |  41<H>  ----------------------------<  96  3.7   |  25  |  0.88  02-10    158<H>  |  124<H>  |  42<H>  ----------------------------<  113<H>  3.6   |  25  |  0.90  02-09    160<HH>  |  126<H>  |  45<H>  ----------------------------<  114<H>  4.0   |  25  |  0.88  02-09    161<HH>  |  128<H>  |  51<H>  ----------------------------<  90  3.3<L>   |  25  |  0.94  02-09    159<H>  |  125<H>  |  65<H>  ----------------------------<  125<H>  3.4<L>   |  22  |  1.47<H>  02-09    157<H>  |  125<H>  |  75<H>  ----------------------------<  144<H>  4.0   |  21<L>  |  1.74<H>  02-08    158<H>  |  123<H>  |  86<H>  ----------------------------<  161<H>  3.3<L>   |  22  |  2.21<H>  02-08    159<H>  |  122<H>  |  92<H>  ----------------------------<  144<H>  2.9<LL>   |  21<L>  |  2.58<H>  02-08    159<H>  |  122<H>  |  111<H>  ----------------------------<  139<H>  3.0<L>   |  19<L>  |  3.06<H>  02-08    152<H>  |  117<H>  |  143<H>  ----------------------------<  149<H>  3.5   |  17<L>  |  4.91<H>  02-07    149<H>  |  114<H>  |  160<H>  ----------------------------<  113<H>  4.4   |  14<L>  |  7.03<H>  02-07    143  |  113<H>  |  161<H>  ----------------------------<  169<H>  5.8<H>   |  8<LL>  |  7.81<H>  02-07    142  |  110<H>  |  177<H>  ----------------------------<  81  6.4<HH>   |  8<LL>  |  8.69<H>    Ca    8.4      10 Feb 2019 12:22  Ca    8.3<L>      10 Feb 2019 09:36  Ca    8.7      10 Feb 2019 04:45  Ca    8.7      09 Feb 2019 22:20  Ca    9.0      09 Feb 2019 15:49  Ca    8.5      09 Feb 2019 05:51  Ca    8.4      09 Feb 2019 00:24  Ca    8.2<L>      08 Feb 2019 20:10  Ca    8.1<L>      08 Feb 2019 16:49  Ca    8.2<L>      08 Feb 2019 13:37  Ca    8.2<L>      08 Feb 2019 04:32  Ca    8.7      07 Feb 2019 22:33  Ca    8.3<L>      07 Feb 2019 20:40  Ca    8.7      07 Feb 2019 16:55  Phos  1.6     02-10  Phos  2.6     02-09  Phos  3.9     02-08  Phos  5.8     02-08  Phos  6.3     02-07  Mg     1.8     02-10  Mg     2.2     02-09  Mg     2.2     02-09  Mg     2.4     02-08  Mg     2.5     02-08  Mg     2.7     02-07    TPro  5.2<L>  /  Alb  1.5<L>  /  TBili  1.0  /  DBili  x   /  AST  30  /  ALT  20  /  AlkPhos  365<H>  02-10  TPro  5.1<L>  /  Alb  1.4<L>  /  TBili  1.0  /  DBili  x   /  AST  39  /  ALT  21  /  AlkPhos  366<H>  02-09  TPro  5.2<L>  /  Alb  1.4<L>  /  TBili  0.6  /  DBili  x   /  AST  29  /  ALT  19  /  AlkPhos  226<H>  02-09  TPro  5.3<L>  /  Alb  1.4<L>  /  TBili  0.7  /  DBili  x   /  AST  28  /  ALT  19  /  AlkPhos  214<H>  02-08  TPro  5.2<L>  /  Alb  1.5<L>  /  TBili  0.6  /  DBili  x   /  AST  33  /  ALT  18  /  AlkPhos  215<H>  02-08  TPro  5.6<L>  /  Alb  1.6<L>  /  TBili  0.6  /  DBili  x   /  AST  42<H>  /  ALT  16  /  AlkPhos  234<H>  02-07  TPro  6.2  /  Alb  1.8<L>  /  TBili  0.6  /  DBili  x   /  AST  40  /  ALT  19  /  AlkPhos  277<H>  02-07          URINE STUDIES:  Sodium, Random Urine: 20 mmol/L (02-08 @ 03:44)  Osmolality, Random Urine: 462 mos/kg (02-08 @ 03:44)  Creatinine, Random Urine: 62 mg/dL (02-08 @ 03:44)                  RADIOLOGY & ADDITIONAL STUDIES:    < from: Xray Chest 1 View- PORTABLE-Routine (02.10.19 @ 11:05) >  EXAM:  XR CHEST PORTABLE ROUTINE 1V                            PROCEDURE DATE:  02/10/2019          INTERPRETATION:  CLINICAL HISTORY: 86 years  Male with Bilateral   pneumonia.    COMPARISON: 2/9/2019    The tip of the ET tube is in the mid trachea. The tip the NG tube is   below the diaphragm.    AP view of the chest demonstrates significantly worsened bilateral   perihilar interstitial and airspace infiltrates on the basis of pulmonary   edema area and small bilateral pleural effusions are unchanged. There is   no pneumothorax.    The heart is normal in size. The aorta is atherosclerotic. There is no   mediastinal mass. The hilar obscured.    Mild thoracic degenerative changes are present.    IMPRESSION:    ET tube and NG tube in good position.    Significantly worsened interstitial and airspace pulmonary edema.   Bilateral pleural effusions unchanged.      < end of copied text >

## 2019-02-10 NOTE — PROGRESS NOTE ADULT - ASSESSMENT
87 y/o M from [facility], PMH of HTN, BPH, dementia , had recent fall on 1/22/19 had fracture of Rt intertrochanteric fracture, underwent surgical fixation, sent in from NH for respiratory distress.  In ED, patient was tachycardic to 120/min, BP- 99/58 mmhg, RR- 20/min, spo2- 90 % . EKG-  afib @105 BPM , prolonged QTC - 520, no ST T wave changes. cardiac enzymes x1- 0.059, BNP- 7244, UA- >50 WBC and moderate LE , CXR s/o Patchy right lower lobe infiltrate. Cardiomegaly. Labs pertinent for WBC- 57.6, H.H of 9.3/31.4, lactate of 9.3, K of 6.4, bicarb of 8, AG of 24,bun/ creat- 177/8.69, s/p 1 dose of cefepime, vancomycin and azithromycin with 1.5 L NS bolus  in ED   Pt had barba catheter placed, about 750 cc of cloudy urine drained. ABG - 7.35/ 13/106/7/100% NRB. Pt was placed on NIV BIPAP for work of breathing.     Pt is admitted to ICU on 2/7/18 for hypoxic respiratory failure with metabolic acidosis and respiratory alkalosis and sepsis secondary to UTI and ? PNA, ,2) Septic shock secondary to UTI and pneumonia 3) Acute renal failure secondary to UTI and septic shock.

## 2019-02-11 LAB
-  AMIKACIN: SIGNIFICANT CHANGE UP
-  AMPICILLIN/SULBACTAM: SIGNIFICANT CHANGE UP
-  AMPICILLIN: SIGNIFICANT CHANGE UP
-  AZTREONAM: SIGNIFICANT CHANGE UP
-  CEFAZOLIN: SIGNIFICANT CHANGE UP
-  CEFEPIME: SIGNIFICANT CHANGE UP
-  CEFOXITIN: SIGNIFICANT CHANGE UP
-  CEFTRIAXONE: SIGNIFICANT CHANGE UP
-  CIPROFLOXACIN: SIGNIFICANT CHANGE UP
-  ERTAPENEM: SIGNIFICANT CHANGE UP
-  GENTAMICIN: SIGNIFICANT CHANGE UP
-  IMIPENEM: SIGNIFICANT CHANGE UP
-  LEVOFLOXACIN: SIGNIFICANT CHANGE UP
-  MEROPENEM: SIGNIFICANT CHANGE UP
-  NITROFURANTOIN: SIGNIFICANT CHANGE UP
-  PIPERACILLIN/TAZOBACTAM: SIGNIFICANT CHANGE UP
-  TIGECYCLINE: SIGNIFICANT CHANGE UP
-  TOBRAMYCIN: SIGNIFICANT CHANGE UP
-  TRIMETHOPRIM/SULFAMETHOXAZOLE: SIGNIFICANT CHANGE UP
ALBUMIN SERPL ELPH-MCNC: 1.4 G/DL — LOW (ref 3.5–5)
ALP SERPL-CCNC: 484 U/L — HIGH (ref 40–120)
ALT FLD-CCNC: 27 U/L DA — SIGNIFICANT CHANGE UP (ref 10–60)
ANION GAP SERPL CALC-SCNC: 5 MMOL/L — SIGNIFICANT CHANGE UP (ref 5–17)
ANION GAP SERPL CALC-SCNC: 6 MMOL/L — SIGNIFICANT CHANGE UP (ref 5–17)
ANION GAP SERPL CALC-SCNC: 7 MMOL/L — SIGNIFICANT CHANGE UP (ref 5–17)
ANION GAP SERPL CALC-SCNC: 8 MMOL/L — SIGNIFICANT CHANGE UP (ref 5–17)
AST SERPL-CCNC: 68 U/L — HIGH (ref 10–40)
BASE EXCESS BLDA CALC-SCNC: 2.6 MMOL/L — HIGH (ref -2–2)
BASOPHILS # BLD AUTO: 0.1 K/UL — SIGNIFICANT CHANGE UP (ref 0–0.2)
BASOPHILS NFR BLD AUTO: 0.6 % — SIGNIFICANT CHANGE UP (ref 0–2)
BILIRUB SERPL-MCNC: 1.5 MG/DL — HIGH (ref 0.2–1.2)
BLOOD GAS COMMENTS ARTERIAL: SIGNIFICANT CHANGE UP
BUN SERPL-MCNC: 36 MG/DL — HIGH (ref 7–18)
BUN SERPL-MCNC: 37 MG/DL — HIGH (ref 7–18)
BUN SERPL-MCNC: 38 MG/DL — HIGH (ref 7–18)
BUN SERPL-MCNC: 39 MG/DL — HIGH (ref 7–18)
CALCIUM SERPL-MCNC: 8 MG/DL — LOW (ref 8.4–10.5)
CALCIUM SERPL-MCNC: 8 MG/DL — LOW (ref 8.4–10.5)
CALCIUM SERPL-MCNC: 8.4 MG/DL — SIGNIFICANT CHANGE UP (ref 8.4–10.5)
CALCIUM SERPL-MCNC: 8.6 MG/DL — SIGNIFICANT CHANGE UP (ref 8.4–10.5)
CHLORIDE SERPL-SCNC: 117 MMOL/L — HIGH (ref 96–108)
CHLORIDE SERPL-SCNC: 118 MMOL/L — HIGH (ref 96–108)
CHLORIDE SERPL-SCNC: 122 MMOL/L — HIGH (ref 96–108)
CHLORIDE SERPL-SCNC: 123 MMOL/L — HIGH (ref 96–108)
CO2 SERPL-SCNC: 24 MMOL/L — SIGNIFICANT CHANGE UP (ref 22–31)
CO2 SERPL-SCNC: 25 MMOL/L — SIGNIFICANT CHANGE UP (ref 22–31)
CO2 SERPL-SCNC: 26 MMOL/L — SIGNIFICANT CHANGE UP (ref 22–31)
CO2 SERPL-SCNC: 27 MMOL/L — SIGNIFICANT CHANGE UP (ref 22–31)
CREAT SERPL-MCNC: 0.68 MG/DL — SIGNIFICANT CHANGE UP (ref 0.5–1.3)
CREAT SERPL-MCNC: 0.7 MG/DL — SIGNIFICANT CHANGE UP (ref 0.5–1.3)
CREAT SERPL-MCNC: 0.71 MG/DL — SIGNIFICANT CHANGE UP (ref 0.5–1.3)
CREAT SERPL-MCNC: 0.76 MG/DL — SIGNIFICANT CHANGE UP (ref 0.5–1.3)
CULTURE RESULTS: SIGNIFICANT CHANGE UP
EOSINOPHIL # BLD AUTO: 0.1 K/UL — SIGNIFICANT CHANGE UP (ref 0–0.5)
EOSINOPHIL NFR BLD AUTO: 0.3 % — SIGNIFICANT CHANGE UP (ref 0–6)
GLUCOSE SERPL-MCNC: 110 MG/DL — HIGH (ref 70–99)
GLUCOSE SERPL-MCNC: 81 MG/DL — SIGNIFICANT CHANGE UP (ref 70–99)
GLUCOSE SERPL-MCNC: 84 MG/DL — SIGNIFICANT CHANGE UP (ref 70–99)
GLUCOSE SERPL-MCNC: 89 MG/DL — SIGNIFICANT CHANGE UP (ref 70–99)
HCO3 BLDA-SCNC: 26 MMOL/L — SIGNIFICANT CHANGE UP (ref 23–27)
HCT VFR BLD CALC: 39.1 % — SIGNIFICANT CHANGE UP (ref 39–50)
HGB BLD-MCNC: 12 G/DL — LOW (ref 13–17)
HOROWITZ INDEX BLDA+IHG-RTO: 40 — SIGNIFICANT CHANGE UP
LYMPHOCYTES # BLD AUTO: 1.2 K/UL — SIGNIFICANT CHANGE UP (ref 1–3.3)
LYMPHOCYTES # BLD AUTO: 6.1 % — LOW (ref 13–44)
MAGNESIUM SERPL-MCNC: 1.7 MG/DL — SIGNIFICANT CHANGE UP (ref 1.6–2.6)
MCHC RBC-ENTMCNC: 29.4 PG — SIGNIFICANT CHANGE UP (ref 27–34)
MCHC RBC-ENTMCNC: 30.7 GM/DL — LOW (ref 32–36)
MCV RBC AUTO: 95.8 FL — SIGNIFICANT CHANGE UP (ref 80–100)
METHOD TYPE: SIGNIFICANT CHANGE UP
MONOCYTES # BLD AUTO: 1.1 K/UL — HIGH (ref 0–0.9)
MONOCYTES NFR BLD AUTO: 5.6 % — SIGNIFICANT CHANGE UP (ref 2–14)
NEUTROPHILS # BLD AUTO: 17 K/UL — HIGH (ref 1.8–7.4)
NEUTROPHILS NFR BLD AUTO: 87.4 % — HIGH (ref 43–77)
ORGANISM # SPEC MICROSCOPIC CNT: SIGNIFICANT CHANGE UP
ORGANISM # SPEC MICROSCOPIC CNT: SIGNIFICANT CHANGE UP
PCO2 BLDA: 36 MMHG — SIGNIFICANT CHANGE UP (ref 32–46)
PH BLDA: 7.46 — HIGH (ref 7.35–7.45)
PHOSPHATE SERPL-MCNC: 2.1 MG/DL — LOW (ref 2.5–4.5)
PLATELET # BLD AUTO: 110 K/UL — LOW (ref 150–400)
PO2 BLDA: 67 MMHG — LOW (ref 74–108)
POTASSIUM SERPL-MCNC: 3.4 MMOL/L — LOW (ref 3.5–5.3)
POTASSIUM SERPL-MCNC: 3.8 MMOL/L — SIGNIFICANT CHANGE UP (ref 3.5–5.3)
POTASSIUM SERPL-MCNC: 3.8 MMOL/L — SIGNIFICANT CHANGE UP (ref 3.5–5.3)
POTASSIUM SERPL-MCNC: 3.9 MMOL/L — SIGNIFICANT CHANGE UP (ref 3.5–5.3)
POTASSIUM SERPL-SCNC: 3.4 MMOL/L — LOW (ref 3.5–5.3)
POTASSIUM SERPL-SCNC: 3.8 MMOL/L — SIGNIFICANT CHANGE UP (ref 3.5–5.3)
POTASSIUM SERPL-SCNC: 3.8 MMOL/L — SIGNIFICANT CHANGE UP (ref 3.5–5.3)
POTASSIUM SERPL-SCNC: 3.9 MMOL/L — SIGNIFICANT CHANGE UP (ref 3.5–5.3)
PROT SERPL-MCNC: 5 G/DL — LOW (ref 6–8.3)
RBC # BLD: 4.08 M/UL — LOW (ref 4.2–5.8)
RBC # FLD: 19.1 % — HIGH (ref 10.3–14.5)
SAO2 % BLDA: 94 % — SIGNIFICANT CHANGE UP (ref 92–96)
SODIUM SERPL-SCNC: 150 MMOL/L — HIGH (ref 135–145)
SODIUM SERPL-SCNC: 151 MMOL/L — HIGH (ref 135–145)
SODIUM SERPL-SCNC: 153 MMOL/L — HIGH (ref 135–145)
SODIUM SERPL-SCNC: 154 MMOL/L — HIGH (ref 135–145)
SPECIMEN SOURCE: SIGNIFICANT CHANGE UP
VANCOMYCIN TROUGH SERPL-MCNC: 11 UG/ML — SIGNIFICANT CHANGE UP (ref 10–20)
WBC # BLD: 19.4 K/UL — HIGH (ref 3.8–10.5)
WBC # FLD AUTO: 19.4 K/UL — HIGH (ref 3.8–10.5)

## 2019-02-11 PROCEDURE — 71045 X-RAY EXAM CHEST 1 VIEW: CPT | Mod: 26

## 2019-02-11 RX ORDER — AZITHROMYCIN 500 MG/1
500 TABLET, FILM COATED ORAL ONCE
Refills: 0 | Status: COMPLETED | OUTPATIENT
Start: 2019-02-11 | End: 2019-02-11

## 2019-02-11 RX ORDER — CEFTRIAXONE 500 MG/1
1 INJECTION, POWDER, FOR SOLUTION INTRAMUSCULAR; INTRAVENOUS ONCE
Qty: 0 | Refills: 0 | Status: COMPLETED | OUTPATIENT
Start: 2019-02-11 | End: 2019-02-11

## 2019-02-11 RX ORDER — SODIUM CHLORIDE 9 MG/ML
1000 INJECTION, SOLUTION INTRAVENOUS
Refills: 0 | Status: DISCONTINUED | OUTPATIENT
Start: 2019-02-11 | End: 2019-02-12

## 2019-02-11 RX ORDER — POTASSIUM PHOSPHATE, MONOBASIC POTASSIUM PHOSPHATE, DIBASIC 236; 224 MG/ML; MG/ML
15 INJECTION, SOLUTION INTRAVENOUS ONCE
Qty: 0 | Refills: 0 | Status: COMPLETED | OUTPATIENT
Start: 2019-02-11 | End: 2019-02-11

## 2019-02-11 RX ORDER — CEFTRIAXONE 500 MG/1
INJECTION, POWDER, FOR SOLUTION INTRAMUSCULAR; INTRAVENOUS
Qty: 0 | Refills: 0 | Status: DISCONTINUED | OUTPATIENT
Start: 2019-02-11 | End: 2019-02-21

## 2019-02-11 RX ORDER — AZITHROMYCIN 500 MG/1
TABLET, FILM COATED ORAL
Refills: 0 | Status: DISCONTINUED | OUTPATIENT
Start: 2019-02-11 | End: 2019-02-21

## 2019-02-11 RX ORDER — POTASSIUM CHLORIDE 20 MEQ
20 PACKET (EA) ORAL ONCE
Qty: 0 | Refills: 0 | Status: COMPLETED | OUTPATIENT
Start: 2019-02-11 | End: 2019-02-11

## 2019-02-11 RX ORDER — CEFTRIAXONE 500 MG/1
1 INJECTION, POWDER, FOR SOLUTION INTRAMUSCULAR; INTRAVENOUS EVERY 24 HOURS
Qty: 0 | Refills: 0 | Status: DISCONTINUED | OUTPATIENT
Start: 2019-02-12 | End: 2019-02-21

## 2019-02-11 RX ORDER — AZITHROMYCIN 500 MG/1
500 TABLET, FILM COATED ORAL EVERY 24 HOURS
Refills: 0 | Status: DISCONTINUED | OUTPATIENT
Start: 2019-02-12 | End: 2019-02-21

## 2019-02-11 RX ORDER — ALBUTEROL 90 UG/1
2 AEROSOL, METERED ORAL EVERY 6 HOURS
Qty: 0 | Refills: 0 | Status: DISCONTINUED | OUTPATIENT
Start: 2019-02-11 | End: 2019-02-17

## 2019-02-11 RX ADMIN — Medication 20 MILLIEQUIVALENT(S): at 16:32

## 2019-02-11 RX ADMIN — Medication 12.5 MILLIGRAM(S): at 05:58

## 2019-02-11 RX ADMIN — Medication 1 DROP(S): at 23:06

## 2019-02-11 RX ADMIN — HEPARIN SODIUM 5000 UNIT(S): 5000 INJECTION INTRAVENOUS; SUBCUTANEOUS at 05:58

## 2019-02-11 RX ADMIN — MEROPENEM 100 MILLIGRAM(S): 1 INJECTION INTRAVENOUS at 05:57

## 2019-02-11 RX ADMIN — POTASSIUM PHOSPHATE, MONOBASIC POTASSIUM PHOSPHATE, DIBASIC 62.5 MILLIMOLE(S): 236; 224 INJECTION, SOLUTION INTRAVENOUS at 13:07

## 2019-02-11 RX ADMIN — HEPARIN SODIUM 5000 UNIT(S): 5000 INJECTION INTRAVENOUS; SUBCUTANEOUS at 13:08

## 2019-02-11 RX ADMIN — FINASTERIDE 5 MILLIGRAM(S): 5 TABLET, FILM COATED ORAL at 13:08

## 2019-02-11 RX ADMIN — TAMSULOSIN HYDROCHLORIDE 0.4 MILLIGRAM(S): 0.4 CAPSULE ORAL at 21:23

## 2019-02-11 RX ADMIN — SODIUM CHLORIDE 50 MILLILITER(S): 9 INJECTION, SOLUTION INTRAVENOUS at 19:37

## 2019-02-11 RX ADMIN — FENTANYL CITRATE 2.98 MICROGRAM(S)/KG/HR: 50 INJECTION INTRAVENOUS at 19:37

## 2019-02-11 RX ADMIN — FENTANYL CITRATE 2.98 MICROGRAM(S)/KG/HR: 50 INJECTION INTRAVENOUS at 05:55

## 2019-02-11 RX ADMIN — AZITHROMYCIN 250 MILLIGRAM(S): 500 TABLET, FILM COATED ORAL at 21:21

## 2019-02-11 RX ADMIN — CEFTRIAXONE 100 GRAM(S): 500 INJECTION, POWDER, FOR SOLUTION INTRAMUSCULAR; INTRAVENOUS at 16:32

## 2019-02-11 RX ADMIN — HEPARIN SODIUM 5000 UNIT(S): 5000 INJECTION INTRAVENOUS; SUBCUTANEOUS at 21:23

## 2019-02-11 RX ADMIN — Medication 250 MILLIGRAM(S): at 05:56

## 2019-02-11 RX ADMIN — FAMOTIDINE 20 MILLIGRAM(S): 10 INJECTION INTRAVENOUS at 13:07

## 2019-02-11 NOTE — PROGRESS NOTE ADULT - SUBJECTIVE AND OBJECTIVE BOX
INTERVAL HPI/OVERNIGHT EVENTS: ***    PRESSORS: [ ] YES [ ] NO  WHICH:    ANTIBIOTICS:                  DATE STARTED:  ANTIBIOTICS:                  DATE STARTED:  ANTIBIOTICS:                  DATE STARTED:    Antimicrobial:  meropenem  IVPB 500 milliGRAM(s) IV Intermittent every 24 hours  vancomycin  IVPB 750 milliGRAM(s) IV Intermittent every 12 hours    Cardiovascular:  hydrochlorothiazide 12.5 milliGRAM(s) Oral daily  tamsulosin 0.4 milliGRAM(s) Oral at bedtime    Pulmonary:  ALBUTerol/ipratropium for Nebulization 3 milliLiter(s) Nebulizer every 6 hours    Hematalogic:  heparin  Injectable 5000 Unit(s) SubCutaneous every 8 hours    Other:  famotidine Injectable 20 milliGRAM(s) IV Push daily  fentaNYL   Infusion. 0.5 MICROgram(s)/kG/Hr IV Continuous <Continuous>  finasteride 5 milliGRAM(s) Oral daily  potassium chloride   Powder 20 milliEquivalent(s) Enteral Tube once    ALBUTerol/ipratropium for Nebulization 3 milliLiter(s) Nebulizer every 6 hours  famotidine Injectable 20 milliGRAM(s) IV Push daily  fentaNYL   Infusion. 0.5 MICROgram(s)/kG/Hr IV Continuous <Continuous>  finasteride 5 milliGRAM(s) Oral daily  heparin  Injectable 5000 Unit(s) SubCutaneous every 8 hours  hydrochlorothiazide 12.5 milliGRAM(s) Oral daily  meropenem  IVPB 500 milliGRAM(s) IV Intermittent every 24 hours  potassium chloride   Powder 20 milliEquivalent(s) Enteral Tube once  tamsulosin 0.4 milliGRAM(s) Oral at bedtime  vancomycin  IVPB 750 milliGRAM(s) IV Intermittent every 12 hours    Drug Dosing Weight  Height (cm): 165.1 (07 Feb 2019 16:08)  Weight (kg): 59.6 (07 Feb 2019 21:36)  BMI (kg/m2): 21.9 (07 Feb 2019 21:36)  BSA (m2): 1.65 (07 Feb 2019 21:36)    CENTRAL LINE: [ ] YES [ ] NO  LOCATION:   DATE INSERTED:  REMOVE: [ ] YES [ ] NO  EXPLAIN:    ZAFAR: [ ] YES [ ] NO    DATE INSERTED:  REMOVE:  [ ] YES [ ] NO  EXPLAIN:    A-LINE:  [ ] YES [ ] NO  LOCATION:   DATE INSERTED:  REMOVE:  [ ] YES [ ] NO  EXPLAIN:    PMH -reviewed admission note, no change since admission  PAST MEDICAL & SURGICAL HISTORY:  Dementia  BPH (benign prostatic hyperplasia)  Hypertension  Closed hip fracture      ICU Vital Signs Last 24 Hrs  T(C): 36.7 (11 Feb 2019 13:00), Max: 36.7 (11 Feb 2019 13:00)  T(F): 98 (11 Feb 2019 13:00), Max: 98 (11 Feb 2019 13:00)  HR: 114 (11 Feb 2019 13:00) (94 - 140)  BP: 114/68 (11 Feb 2019 13:00) (95/64 - 148/85)  BP(mean): 80 (11 Feb 2019 13:00) (64 - 104)  ABP: --  ABP(mean): --  RR: 17 (11 Feb 2019 13:00) (10 - 18)  SpO2: 98% (11 Feb 2019 13:00) (95% - 100%)      ABG - ( 11 Feb 2019 04:07 )  pH, Arterial: 7.46  pH, Blood: x     /  pCO2: 36    /  pO2: 67    / HCO3: 26    / Base Excess: 2.6   /  SaO2: 94                    02-10 @ 07:01  -  02-11 @ 07:00  --------------------------------------------------------  IN: 3539 mL / OUT: 2040 mL / NET: 1499 mL        Mode: AC/ CMV (Assist Control/ Continuous Mandatory Ventilation)  RR (machine): 12  TV (machine): 450  FiO2: 40  PEEP: 5  ITime: 1  MAP: 12  PIP: 29      PHYSICAL EXAM:    GENERAL: [ ]NAD, [ ]well-groomed, [ ]well-developed  HEAD:  [ ]Atraumatic, [ ]Normocephalic  EYES: [ ]EOMI, [ ]PERRLA, [ ]conjunctiva and sclera clear  ENMT: [ ]No tonsillar erythema, exudates, or enlargement; [ ]Moist mucous membranes, [ ]Good dentition, [ ]No lesions  NECK: [ ]Supple, normal appearance, [ ]No JVD; [ ]Normal thyroid; [ ]Trachea midline  NERVOUS SYSTEM:  [ ]Alert & Oriented X3, [ ]Good concentration; [ ]Motor Strength 5/5 B/L upper and lower extremities; [ ]DTRs 2+ intact and symmetric  CHEST/LUNG: [ ]No chest deformity; [ ]Normal percussion bilaterally; [ ]No rales, rhonchi, wheezing   HEART: [ ]Regular rate and rhythm; [ ]No murmurs, rubs, or gallops  ABDOMEN: [ ]Soft, Nontender, Nondistended; [ ]Bowel sounds present  EXTREMITIES:  [ ]2+ Peripheral Pulses, [ ]No clubbing, cyanosis, or edema  LYMPH: [ ]No lymphadenopathy noted  SKIN: [ ]No rashes or lesions; [ ]Good capillary refill      LABS:  CBC Full  -  ( 11 Feb 2019 04:51 )  WBC Count : 19.4 K/uL  Hemoglobin : 12.0 g/dL  Hematocrit : 39.1 %  Platelet Count - Automated : 110 K/uL  Mean Cell Volume : 95.8 fl  Mean Cell Hemoglobin : 29.4 pg  Mean Cell Hemoglobin Concentration : 30.7 gm/dL  Auto Neutrophil # : 17.0 K/uL  Auto Lymphocyte # : 1.2 K/uL  Auto Monocyte # : 1.1 K/uL  Auto Eosinophil # : 0.1 K/uL  Auto Basophil # : 0.1 K/uL  Auto Neutrophil % : 87.4 %  Auto Lymphocyte % : 6.1 %  Auto Monocyte % : 5.6 %  Auto Eosinophil % : 0.3 %  Auto Basophil % : 0.6 %    02-11    150<H>  |  117<H>  |  37<H>  ----------------------------<  81  3.4<L>   |  27  |  0.76    Ca    8.4      11 Feb 2019 12:09  Phos  2.1     02-11  Mg     1.7     02-11    TPro  5.0<L>  /  Alb  1.4<L>  /  TBili  1.5<H>  /  DBili  x   /  AST  68<H>  /  ALT  27  /  AlkPhos  484<H>  02-11        Culture Results:   Normal Respiratory Isa present (02-08 @ 19:52)      RADIOLOGY & ADDITIONAL STUDIES REVIEWED:  ***    [ ]GOALS OF CARE DISCUSSION WITH PATIENT/FAMILY/PROXY:    CRITICAL CARE TIME SPENT: 35 minutes INTERVAL HPI/OVERNIGHT EVENTS: No acute events overnight.      ANTIBIOTICS: rocephin 1g qd    Antimicrobial:  meropenem  IVPB 500 milliGRAM(s) IV Intermittent every 24 hours  vancomycin  IVPB 750 milliGRAM(s) IV Intermittent every 12 hours    Cardiovascular:  hydrochlorothiazide 12.5 milliGRAM(s) Oral daily  tamsulosin 0.4 milliGRAM(s) Oral at bedtime    Pulmonary:  ALBUTerol/ipratropium for Nebulization 3 milliLiter(s) Nebulizer every 6 hours    Hematalogic:  heparin  Injectable 5000 Unit(s) SubCutaneous every 8 hours    Other:  famotidine Injectable 20 milliGRAM(s) IV Push daily  fentaNYL   Infusion. 0.5 MICROgram(s)/kG/Hr IV Continuous <Continuous>  finasteride 5 milliGRAM(s) Oral daily  potassium chloride   Powder 20 milliEquivalent(s) Enteral Tube once    ALBUTerol/ipratropium for Nebulization 3 milliLiter(s) Nebulizer every 6 hours  famotidine Injectable 20 milliGRAM(s) IV Push daily  fentaNYL   Infusion. 0.5 MICROgram(s)/kG/Hr IV Continuous <Continuous>  finasteride 5 milliGRAM(s) Oral daily  heparin  Injectable 5000 Unit(s) SubCutaneous every 8 hours  hydrochlorothiazide 12.5 milliGRAM(s) Oral daily  meropenem  IVPB 500 milliGRAM(s) IV Intermittent every 24 hours  potassium chloride   Powder 20 milliEquivalent(s) Enteral Tube once  tamsulosin 0.4 milliGRAM(s) Oral at bedtime  vancomycin  IVPB 750 milliGRAM(s) IV Intermittent every 12 hours    Drug Dosing Weight  Height (cm): 165.1 (07 Feb 2019 16:08)  Weight (kg): 59.6 (07 Feb 2019 21:36)  BMI (kg/m2): 21.9 (07 Feb 2019 21:36)  BSA (m2): 1.65 (07 Feb 2019 21:36)    CENTRAL LINE: [x ] YES [ ] NO  LOCATION:   Left femoral DATE INSERTED: 2/7/19  REMOVE: [X ] YES [ ] NO  EXPLAIN: removed on 2/11/19 - two peripheral lines inserted in b/l upper extremities on 2/11/19 beforehand    ZAFAR: [x ] YES [ ] NO    DATE INSERTED: 2/7/19  REMOVE:  [ ] YES [ ] NO  EXPLAIN:    PMH -reviewed admission note, no change since admission  PAST MEDICAL & SURGICAL HISTORY:  Dementia  BPH (benign prostatic hyperplasia)  Hypertension  Closed hip fracture      ICU Vital Signs Last 24 Hrs  T(C): 36.7 (11 Feb 2019 13:00), Max: 36.7 (11 Feb 2019 13:00)  T(F): 98 (11 Feb 2019 13:00), Max: 98 (11 Feb 2019 13:00)  HR: 114 (11 Feb 2019 13:00) (94 - 140)  BP: 114/68 (11 Feb 2019 13:00) (95/64 - 148/85)  BP(mean): 80 (11 Feb 2019 13:00) (64 - 104)  ABP: --  ABP(mean): --  RR: 17 (11 Feb 2019 13:00) (10 - 18)  SpO2: 98% (11 Feb 2019 13:00) (95% - 100%)      ABG - ( 11 Feb 2019 04:07 )  pH, Arterial: 7.46  pH, Blood: x     /  pCO2: 36    /  pO2: 67    / HCO3: 26    / Base Excess: 2.6   /  SaO2: 94          02-10 @ 07:01  -  02-11 @ 07:00  --------------------------------------------------------  IN: 3539 mL / OUT: 2040 mL / NET: 1499 mL        Mode: AC/ CMV (Assist Control/ Continuous Mandatory Ventilation)  RR (machine): 12  TV (machine): 450  FiO2: 40  PEEP: 5  ITime: 1  MAP: 12  PIP: 29      PHYSICAL EXAM:    GENERAL:NAD, well-groomed, well-developed  HEAD:  Atraumatic, Normocephalic  EYES: EOMI, PERRLA, conjunctiva and sclera clear  ENMT: No tonsillar erythema, exudates, or enlargement; dry mucous membranes,   NECK: Supple, normal appearance, No JVD; Normal thyroid; Trachea midline  NERVOUS SYSTEM: Alert and awake x 1  CHEST/LUNG: bilateral rales   HEART: Regular rate and rhythm; No murmurs, rubs, or gallops  ABDOMEN: Soft, Nontender, Nondistended; Bowel sounds present  EXTREMITIES: 2+ Peripheral Pulses, No clubbing, cyanosis, or edema, left femoral line in place.   LYMPH: No lymphadenopathy noted  SKIN:No rashes or lesions; Good capillary refill      LABS:  CBC Full  -  ( 11 Feb 2019 04:51 )  WBC Count : 19.4 K/uL  Hemoglobin : 12.0 g/dL  Hematocrit : 39.1 %  Platelet Count - Automated : 110 K/uL  Mean Cell Volume : 95.8 fl  Mean Cell Hemoglobin : 29.4 pg  Mean Cell Hemoglobin Concentration : 30.7 gm/dL  Auto Neutrophil # : 17.0 K/uL  Auto Lymphocyte # : 1.2 K/uL  Auto Monocyte # : 1.1 K/uL  Auto Eosinophil # : 0.1 K/uL  Auto Basophil # : 0.1 K/uL  Auto Neutrophil % : 87.4 %  Auto Lymphocyte % : 6.1 %  Auto Monocyte % : 5.6 %  Auto Eosinophil % : 0.3 %  Auto Basophil % : 0.6 %    02-11    150<H>  |  117<H>  |  37<H>  ----------------------------<  81  3.4<L>   |  27  |  0.76    Ca    8.4      11 Feb 2019 12:09  Phos  2.1     02-11  Mg     1.7     02-11    TPro  5.0<L>  /  Alb  1.4<L>  /  TBili  1.5<H>  /  DBili  x   /  AST  68<H>  /  ALT  27  /  AlkPhos  484<H>  02-11        Culture Results:   Normal Respiratory Isa present (02-08 @ 19:52)      CRITICAL CARE TIME SPENT: 35 minutes INTERVAL HPI/OVERNIGHT EVENTS: No acute events overnight.    ANTIBIOTICS: rocephin 1g qd    Antimicrobial:  meropenem  IVPB 500 milliGRAM(s) IV Intermittent every 24 hours  vancomycin  IVPB 750 milliGRAM(s) IV Intermittent every 12 hours    Cardiovascular:  hydrochlorothiazide 12.5 milliGRAM(s) Oral daily  tamsulosin 0.4 milliGRAM(s) Oral at bedtime    Pulmonary:  ALBUTerol/ipratropium for Nebulization 3 milliLiter(s) Nebulizer every 6 hours    Hematalogic:  heparin  Injectable 5000 Unit(s) SubCutaneous every 8 hours    Other:  famotidine Injectable 20 milliGRAM(s) IV Push daily  fentaNYL   Infusion. 0.5 MICROgram(s)/kG/Hr IV Continuous <Continuous>  finasteride 5 milliGRAM(s) Oral daily  potassium chloride   Powder 20 milliEquivalent(s) Enteral Tube once    ALBUTerol/ipratropium for Nebulization 3 milliLiter(s) Nebulizer every 6 hours  famotidine Injectable 20 milliGRAM(s) IV Push daily  fentaNYL   Infusion. 0.5 MICROgram(s)/kG/Hr IV Continuous <Continuous>  finasteride 5 milliGRAM(s) Oral daily  heparin  Injectable 5000 Unit(s) SubCutaneous every 8 hours  hydrochlorothiazide 12.5 milliGRAM(s) Oral daily  meropenem  IVPB 500 milliGRAM(s) IV Intermittent every 24 hours  potassium chloride   Powder 20 milliEquivalent(s) Enteral Tube once  tamsulosin 0.4 milliGRAM(s) Oral at bedtime  vancomycin  IVPB 750 milliGRAM(s) IV Intermittent every 12 hours    Drug Dosing Weight  Height (cm): 165.1 (07 Feb 2019 16:08)  Weight (kg): 59.6 (07 Feb 2019 21:36)  BMI (kg/m2): 21.9 (07 Feb 2019 21:36)  BSA (m2): 1.65 (07 Feb 2019 21:36)    CENTRAL LINE: [x ] YES [ ] NO  LOCATION:   Left femoral DATE INSERTED: 2/7/19  REMOVE: [X ] YES [ ] NO  EXPLAIN: removed on 2/11/19 - two peripheral lines inserted in b/l upper extremities on 2/11/19 beforehand    ZAFAR: [x ] YES [ ] NO    DATE INSERTED: 2/7/19  REMOVE:  [ ] YES [ ] NO  EXPLAIN:    PMH -reviewed admission note, no change since admission  PAST MEDICAL & SURGICAL HISTORY:  Dementia  BPH (benign prostatic hyperplasia)  Hypertension  Closed hip fracture      ICU Vital Signs Last 24 Hrs  T(C): 36.7 (11 Feb 2019 13:00), Max: 36.7 (11 Feb 2019 13:00)  T(F): 98 (11 Feb 2019 13:00), Max: 98 (11 Feb 2019 13:00)  HR: 114 (11 Feb 2019 13:00) (94 - 140)  BP: 114/68 (11 Feb 2019 13:00) (95/64 - 148/85)  BP(mean): 80 (11 Feb 2019 13:00) (64 - 104)  ABP: --  ABP(mean): --  RR: 17 (11 Feb 2019 13:00) (10 - 18)  SpO2: 98% (11 Feb 2019 13:00) (95% - 100%)      ABG - ( 11 Feb 2019 04:07 )  pH, Arterial: 7.46  pH, Blood: x     /  pCO2: 36    /  pO2: 67    / HCO3: 26    / Base Excess: 2.6   /  SaO2: 94          02-10 @ 07:01  -  02-11 @ 07:00  --------------------------------------------------------  IN: 3539 mL / OUT: 2040 mL / NET: 1499 mL        Mode: AC/ CMV (Assist Control/ Continuous Mandatory Ventilation)  RR (machine): 12  TV (machine): 450  FiO2: 40  PEEP: 5  ITime: 1  MAP: 12  PIP: 29      PHYSICAL EXAM:    GENERAL:NAD, well-groomed, well-developed  HEAD:  Atraumatic, Normocephalic  EYES: EOMI, PERRLA, conjunctiva and sclera clear  ENMT: No tonsillar erythema, exudates, or enlargement; dry mucous membranes,   NECK: Supple, normal appearance, No JVD; Normal thyroid; Trachea midline  NERVOUS SYSTEM: Alert and awake x 1  CHEST/LUNG: bilateral rales   HEART: Regular rate and rhythm; No murmurs, rubs, or gallops  ABDOMEN: Soft, Nontender, Nondistended; Bowel sounds present  EXTREMITIES: 2+ Peripheral Pulses, No clubbing, cyanosis, or edema, left femoral line in place.   LYMPH: No lymphadenopathy noted  SKIN:No rashes or lesions; Good capillary refill      LABS:  CBC Full  -  ( 11 Feb 2019 04:51 )  WBC Count : 19.4 K/uL  Hemoglobin : 12.0 g/dL  Hematocrit : 39.1 %  Platelet Count - Automated : 110 K/uL  Mean Cell Volume : 95.8 fl  Mean Cell Hemoglobin : 29.4 pg  Mean Cell Hemoglobin Concentration : 30.7 gm/dL  Auto Neutrophil # : 17.0 K/uL  Auto Lymphocyte # : 1.2 K/uL  Auto Monocyte # : 1.1 K/uL  Auto Eosinophil # : 0.1 K/uL  Auto Basophil # : 0.1 K/uL  Auto Neutrophil % : 87.4 %  Auto Lymphocyte % : 6.1 %  Auto Monocyte % : 5.6 %  Auto Eosinophil % : 0.3 %  Auto Basophil % : 0.6 %    02-11    150<H>  |  117<H>  |  37<H>  ----------------------------<  81  3.4<L>   |  27  |  0.76    Ca    8.4      11 Feb 2019 12:09  Phos  2.1     02-11  Mg     1.7     02-11    TPro  5.0<L>  /  Alb  1.4<L>  /  TBili  1.5<H>  /  DBili  x   /  AST  68<H>  /  ALT  27  /  AlkPhos  484<H>  02-11        Culture Results:   Normal Respiratory Isa present (02-08 @ 19:52)      CRITICAL CARE TIME SPENT: 35 minutes INTERVAL HPI/OVERNIGHT EVENTS: No acute events overnight.    ANTIBIOTICS: rocephin 1g qd, zithromax 500mg qd    Antimicrobial:  rocephin 1 g qd  zithromax 500mg qd    Cardiovascular:  hydrochlorothiazide 12.5 milliGRAM(s) Oral daily  tamsulosin 0.4 milliGRAM(s) Oral at bedtime    Pulmonary:  ALBUTerol/ipratropium for Nebulization 3 milliLiter(s) Nebulizer every 6 hours    Hematalogic:  heparin  Injectable 5000 Unit(s) SubCutaneous every 8 hours    Other:  famotidine Injectable 20 milliGRAM(s) IV Push daily  fentaNYL   Infusion. 0.5 MICROgram(s)/kG/Hr IV Continuous <Continuous>  finasteride 5 milliGRAM(s) Oral daily  potassium chloride   Powder 20 milliEquivalent(s) Enteral Tube once    ALBUTerol/ipratropium for Nebulization 3 milliLiter(s) Nebulizer every 6 hours  famotidine Injectable 20 milliGRAM(s) IV Push daily  fentaNYL   Infusion. 0.5 MICROgram(s)/kG/Hr IV Continuous <Continuous>  finasteride 5 milliGRAM(s) Oral daily  heparin  Injectable 5000 Unit(s) SubCutaneous every 8 hours  hydrochlorothiazide 12.5 milliGRAM(s) Oral daily  potassium chloride   Powder 20 milliEquivalent(s) Enteral Tube once  tamsulosin 0.4 milliGRAM(s) Oral at bedtime    Drug Dosing Weight  Height (cm): 165.1 (07 Feb 2019 16:08)  Weight (kg): 59.6 (07 Feb 2019 21:36)  BMI (kg/m2): 21.9 (07 Feb 2019 21:36)  BSA (m2): 1.65 (07 Feb 2019 21:36)    CENTRAL LINE: [x ] YES [ ] NO  LOCATION:   Left femoral DATE INSERTED: 2/7/19  REMOVE: [X ] YES [ ] NO  EXPLAIN: removed on 2/11/19 - two peripheral lines inserted in b/l upper extremities on 2/11/19 beforehand    ZAFAR: [x ] YES [ ] NO    DATE INSERTED: 2/7/19  REMOVE:  [ ] YES [ ] NO  EXPLAIN:    PMH -reviewed admission note, no change since admission  PAST MEDICAL & SURGICAL HISTORY:  Dementia  BPH (benign prostatic hyperplasia)  Hypertension  Closed hip fracture      ICU Vital Signs Last 24 Hrs  T(C): 36.7 (11 Feb 2019 13:00), Max: 36.7 (11 Feb 2019 13:00)  T(F): 98 (11 Feb 2019 13:00), Max: 98 (11 Feb 2019 13:00)  HR: 114 (11 Feb 2019 13:00) (94 - 140)  BP: 114/68 (11 Feb 2019 13:00) (95/64 - 148/85)  BP(mean): 80 (11 Feb 2019 13:00) (64 - 104)  ABP: --  ABP(mean): --  RR: 17 (11 Feb 2019 13:00) (10 - 18)  SpO2: 98% (11 Feb 2019 13:00) (95% - 100%)      ABG - ( 11 Feb 2019 04:07 )  pH, Arterial: 7.46  pH, Blood: x     /  pCO2: 36    /  pO2: 67    / HCO3: 26    / Base Excess: 2.6   /  SaO2: 94          02-10 @ 07:01  -  02-11 @ 07:00  --------------------------------------------------------  IN: 3539 mL / OUT: 2040 mL / NET: 1499 mL        Mode: AC/ CMV (Assist Control/ Continuous Mandatory Ventilation)  RR (machine): 12  TV (machine): 450  FiO2: 40  PEEP: 5  ITime: 1  MAP: 12  PIP: 29      PHYSICAL EXAM:    GENERAL:NAD, well-groomed, well-developed  HEAD:  Atraumatic, Normocephalic  EYES: EOMI, PERRLA, conjunctiva and sclera clear  ENMT: No tonsillar erythema, exudates, or enlargement; dry mucous membranes,   NECK: Supple, normal appearance, No JVD; Normal thyroid; Trachea midline  NERVOUS SYSTEM: Alert and awake x 1  CHEST/LUNG: bilateral rales   HEART: Regular rate and rhythm; No murmurs, rubs, or gallops  ABDOMEN: Soft, Nontender, Nondistended; Bowel sounds present  EXTREMITIES: 2+ Peripheral Pulses, No clubbing, cyanosis, or edema, left femoral line in place.   LYMPH: No lymphadenopathy noted  SKIN:No rashes or lesions; Good capillary refill      LABS:  CBC Full  -  ( 11 Feb 2019 04:51 )  WBC Count : 19.4 K/uL  Hemoglobin : 12.0 g/dL  Hematocrit : 39.1 %  Platelet Count - Automated : 110 K/uL  Mean Cell Volume : 95.8 fl  Mean Cell Hemoglobin : 29.4 pg  Mean Cell Hemoglobin Concentration : 30.7 gm/dL  Auto Neutrophil # : 17.0 K/uL  Auto Lymphocyte # : 1.2 K/uL  Auto Monocyte # : 1.1 K/uL  Auto Eosinophil # : 0.1 K/uL  Auto Basophil # : 0.1 K/uL  Auto Neutrophil % : 87.4 %  Auto Lymphocyte % : 6.1 %  Auto Monocyte % : 5.6 %  Auto Eosinophil % : 0.3 %  Auto Basophil % : 0.6 %    02-11    150<H>  |  117<H>  |  37<H>  ----------------------------<  81  3.4<L>   |  27  |  0.76    Ca    8.4      11 Feb 2019 12:09  Phos  2.1     02-11  Mg     1.7     02-11    TPro  5.0<L>  /  Alb  1.4<L>  /  TBili  1.5<H>  /  DBili  x   /  AST  68<H>  /  ALT  27  /  AlkPhos  484<H>  02-11        Culture Results:   Normal Respiratory Isa present (02-08 @ 19:52)      CRITICAL CARE TIME SPENT: 35 minutes

## 2019-02-11 NOTE — PROGRESS NOTE ADULT - PROBLEM SELECTOR PLAN 1
2/2 multifocal PNA  initially placed on BiPAP, intubated today for pulmonary edema-CT chest shows bilateral pleural effusions and superimposed pneumonia  C/w Meropenem and vancomycin renally dosed  c/w fentanyl 2/2 multifocal PNA  initially placed on BiPAP, intubated today for pulmonary edema-CT chest shows bilateral pleural effusions and superimposed pneumonia  c/w rocephin  c/w fentanyl

## 2019-02-11 NOTE — PROGRESS NOTE ADULT - PROBLEM SELECTOR PLAN 2
2/2 PNA and Strep bacteremia  f/up repeat Bcx  -c/w meropenem and vancomycin   -ID Dr Norton 2/2 PNA and E. coli bacteremia  f/u repeat Bcx  d/c meropenem and vancomycin  starting rocephin 1g qd  ID - Dr. Norton  -ID Dr Norton 2/2 PNA and E. coli bacteremia  f/u repeat Bcx  d/c meropenem and vancomycin  starting rocephin 1g qd  removed left femoral central line today; 2 peripheral lines placed in upper extremities b/l  ID - Dr. Norton

## 2019-02-11 NOTE — PROGRESS NOTE ADULT - SUBJECTIVE AND OBJECTIVE BOX
pt seen and examined.pts current chart reviewed and case discussed with resident covering.    SUBJECTIVE:  pt feels fine and denies cp,sob,gi or gu/uremic  symptons    REVIEW OF SYSTEMS:  CONSTITUTIONAL: No weakness, fevers or chills  EYES/ENT: No visual changes;  No vertigo or throat pain   NECK: No pain or stiffness  RESPIRATORY: No cough, wheezing, hemoptysis; No shortness of breath  CARDIOVASCULAR: No chest pain or palpitations  GASTROINTESTINAL: No abdominal or epigastric pain. No nausea, vomiting, or hematemesis; No diarrhea or constipation. No melena or hematochezia.  GENITOURINARY: No dysuria, frequency , hematuria, flank pain or nocturia  NEUROLOGICAL: No numbness or weakness  SKIN: No itching, burning, rashes, or lesions   All other review of systems is negative unless indicated above    Current meds:    ALBUTerol/ipratropium for Nebulization 3 milliLiter(s) Nebulizer every 6 hours  famotidine Injectable 20 milliGRAM(s) IV Push daily  fentaNYL   Infusion. 0.5 MICROgram(s)/kG/Hr IV Continuous <Continuous>  finasteride 5 milliGRAM(s) Oral daily  heparin  Injectable 5000 Unit(s) SubCutaneous every 8 hours  hydrochlorothiazide 12.5 milliGRAM(s) Oral daily  meropenem  IVPB 500 milliGRAM(s) IV Intermittent every 24 hours  tamsulosin 0.4 milliGRAM(s) Oral at bedtime  vancomycin  IVPB 750 milliGRAM(s) IV Intermittent every 12 hours      Vital Signs    T(F): 98 (19 @ 13:00), Max: 98 (19 @ 13:00)  HR: 114 (19 @ 13:00) (94 - 140)  BP: 114/68 (19 @ 13:00) (95/64 - 148/85)  ABP: --  RR: 17 (19 @ 13:00) (10 - 18)  SpO2: 98% (19 @ 13:00) (95% - 100%)  Wt(kg): --  CVP(cm H2O): --  CO: --  PCWP: --    I and O's:     @ 07:01  -  02-10 @ 07:00  --------------------------------------------------------  IN:    dextrose 5% + sodium chloride 0.45%.: 600 mL    dextrose 5%.: 1625 mL    dextrose 5%.: 300 mL    Solution: 300 mL    Solution: 50 mL    Solution: 100 mL  Total IN: 2975 mL    OUT:    Indwelling Catheter - Urethral: 2575 mL  Total OUT: 2575 mL    Total NET: 400 mL      02-10 @ 07: @ 07:00  --------------------------------------------------------  IN:    dextrose 5%.: 500 mL    Enteral Tube Flush: 160 mL    Enteral Tube Flush: 100 mL    fentaNYL Infusion.: 249 mL    Free Water: 1350 mL    Glucerna 1.5: 330 mL    IV PiggyBack: 350 mL    Solution: 500 mL  Total IN: 3539 mL    OUT:    Indwelling Catheter - Urethral: 2040 mL  Total OUT: 2040 mL    Total NET: 1499 mL       @ 07: @ 13:48  --------------------------------------------------------  IN:    fentaNYL Infusion.: 72 mL    Glucerna 1.5: 280 mL  Total IN: 352 mL    OUT:    Indwelling Catheter - Urethral: 365 mL  Total OUT: 365 mL    Total NET: -13 mL        Daily     Daily Weight in k.5 (2019 13:35)    PHYSICAL EXAM:  Constitutional: well developed, Mal-nourished  and in nad  HEENT: PERRLA,  no icteric sclera and mild pallor of conjunctiva noted  Neck: No JVD, thyromegaly or adenopathy  Respiratory: reduced air entry lower lungs with few rhonchi  Cardiovascular: S1 and S2 normally heard  Gastrointestinal: soft, nondistended, nontender and normal bowel sounds heard  Extremities: 1 plus B/L LE and upper thigh edema noted, no cyanosis  Neurological: sedated   Skin: No rashes      LABS:    CBC:                          12.0   19.4  )-----------( 110      ( 2019 04:51 )             39.1           BMP:    02-11    150<H>  |  117<H>  |  37<H>  ----------------------------<  81  3.4<L>   |  27  |  0.76  11    154<H>  |  123<H>  |  39<H>  ----------------------------<  84  3.9   |  24  |  0.70  11    153<H>  |  122<H>  |  38<H>  ----------------------------<  89  3.8   |  26  |  0.68  02-10    153<H>  |  123<H>  |  38<H>  ----------------------------<  91  3.9   |  23  |  0.70  02-10    157<H>  |  125<H>  |  40<H>  ----------------------------<  80  4.0   |  23  |  0.67  02-10    153<H>  |  123<H>  |  42<H>  ----------------------------<  90  3.8   |  25  |  0.76  02-10    154<H>  |  123<H>  |  41<H>  ----------------------------<  96  3.7   |  25  |  0.88  02-10    158<H>  |  124<H>  |  42<H>  ----------------------------<  113<H>  3.6   |  25  |  0.90  02-09    160<HH>  |  126<H>  |  45<H>  ----------------------------<  114<H>  4.0   |  25  |  0.88  02-09    161<HH>  |  128<H>  |  51<H>  ----------------------------<  90  3.3<L>   |  25  |  0.94  02-09    159<H>  |  125<H>  |  65<H>  ----------------------------<  125<H>  3.4<L>   |  22  |  1.47<H>  02-09    157<H>  |  125<H>  |  75<H>  ----------------------------<  144<H>  4.0   |  21<L>  |  1.74<H>  02-08    158<H>  |  123<H>  |  86<H>  ----------------------------<  161<H>  3.3<L>   |  22  |  2.21<H>  02-08    159<H>  |  122<H>  |  92<H>  ----------------------------<  144<H>  2.9<LL>   |  21<L>  |  2.58<H>    Ca    8.4      2019 12:09  Ca    8.0<L>      2019 04:51  Ca    8.6      2019 00:37  Ca    8.3<L>      10 Feb 2019 21:21  Ca    8.1<L>      10 Feb 2019 16:11  Ca    8.4      10 Feb 2019 12:22  Ca    8.3<L>      10 Feb 2019 09:36  Ca    8.7      10 Feb 2019 04:45  Ca    8.7      2019 22:20  Ca    9.0      2019 15:49  Ca    8.5      2019 05:51  Ca    8.4      2019 00:24  Ca    8.2<L>      2019 20:10  Ca    8.1<L>      2019 16:49  Phos  2.1       Phos  1.6     02-10  Phos  2.6       Phos  3.9       Mg     1.7       Mg     1.8     02-10  Mg     2.2       Mg     2.2     -  Mg     2.4         TPro  5.0<L>  /  Alb  1.4<L>  /  TBili  1.5<H>  /  DBili  x   /  AST  68<H>  /  ALT  27  /  AlkPhos  484<H>    TPro  5.2<L>  /  Alb  1.5<L>  /  TBili  1.0  /  DBili  x   /  AST  30  /  ALT  20  /  AlkPhos  365<H>  02-10  TPro  5.1<L>  /  Alb  1.4<L>  /  TBili  1.0  /  DBili  x   /  AST  39  /  ALT  21  /  AlkPhos  366<H>    TPro  5.2<L>  /  Alb  1.4<L>  /  TBili  0.6  /  DBili  x   /  AST  29  /  ALT  19  /  AlkPhos  226<H>    TPro  5.3<L>  /  Alb  1.4<L>  /  TBili  0.7  /  DBili  x   /  AST  28  /  ALT  19  /  AlkPhos  214<H>            URINE STUDIES:        Sodium, Random Urine: 20 mmol/L ( @ 03:44)  Osmolality, Random Urine: 462 mos/kg ( @ 03:44)  Creatinine, Random Urine: 62 mg/dL ( @ 03:44)                  RADIOLOGY & ADDITIONAL STUDIES: pt seen and examined.    SUBJECTIVE:  pt is intubated/sedated and in nad  pts daughter -in-law at bedside  U/O 50 to 60 ml per hr  pt gets free water 100 ml hr     REVIEW OF SYSTEMS:    Current meds:    ALBUTerol/ipratropium for Nebulization 3 milliLiter(s) Nebulizer every 6 hours  famotidine Injectable 20 milliGRAM(s) IV Push daily  fentaNYL   Infusion. 0.5 MICROgram(s)/kG/Hr IV Continuous <Continuous>  finasteride 5 milliGRAM(s) Oral daily  heparin  Injectable 5000 Unit(s) SubCutaneous every 8 hours  hydrochlorothiazide 12.5 milliGRAM(s) Oral daily  meropenem  IVPB 500 milliGRAM(s) IV Intermittent every 24 hours  tamsulosin 0.4 milliGRAM(s) Oral at bedtime  vancomycin  IVPB 750 milliGRAM(s) IV Intermittent every 12 hours      Vital Signs    T(F): 98 (19 @ 13:00), Max: 98 (19 @ 13:00)  HR: 114 (19 @ 13:00) (94 - 140)  BP: 114/68 (19 @ 13:00) (95/64 - 148/85)  ABP: --  RR: 17 (19 @ 13:00) (10 - 18)  SpO2: 98% (19 @ 13:00) (95% - 100%)  Wt(kg): --  CVP(cm H2O): --  CO: --  PCWP: --    I and O's:     @ 07:01  -  02-10 @ 07:00  --------------------------------------------------------  IN:    dextrose 5% + sodium chloride 0.45%.: 600 mL    dextrose 5%.: 1625 mL    dextrose 5%.: 300 mL    Solution: 300 mL    Solution: 50 mL    Solution: 100 mL  Total IN: 2975 mL    OUT:    Indwelling Catheter - Urethral: 2575 mL  Total OUT: 2575 mL    Total NET: 400 mL      02-10 @ 07:01  -   @ 07:00  --------------------------------------------------------  IN:    dextrose 5%.: 500 mL    Enteral Tube Flush: 160 mL    Enteral Tube Flush: 100 mL    fentaNYL Infusion.: 249 mL    Free Water: 1350 mL    Glucerna 1.5: 330 mL    IV PiggyBack: 350 mL    Solution: 500 mL  Total IN: 3539 mL    OUT:    Indwelling Catheter - Urethral: 2040 mL  Total OUT: 2040 mL    Total NET: 1499 mL       @ 07:01   @ 13:48  --------------------------------------------------------  IN:    fentaNYL Infusion.: 72 mL    Glucerna 1.5: 280 mL  Total IN: 352 mL    OUT:    Indwelling Catheter - Urethral: 365 mL  Total OUT: 365 mL    Total NET: -13 mL        Daily     Daily Weight in k.5 (2019 13:35)    PHYSICAL EXAM:  Constitutional: well developed, Mal-nourished  and in nad  HEENT: PERRLA,  no icteric sclera and mild pallor of conjunctiva noted  Neck: No JVD, thyromegaly or adenopathy  Respiratory: reduced air entry lower lungs with few rhonchi  Cardiovascular: S1 and S2 normally heard  Gastrointestinal: soft, nondistended, nontender and normal bowel sounds heard  Extremities: 1 plus B/L LE and upper thigh edema noted, no cyanosis  Neurological: sedated   Skin: No rashes      LABS:    CBC:                          12.0   19.4  )-----------( 110      ( 2019 04:51 )             39.1           BMP:      Basic Metabolic Panel (19 @ 17:11)    Sodium, Serum: 153 mmol/L    Potassium, Serum: 3.5 mmol/L    Chloride, Serum: 119 mmol/L    Carbon Dioxide, Serum: 27 mmol/L    Anion Gap, Serum: 7 mmol/L    Blood Urea Nitrogen, Serum: 37 mg/dL    Creatinine, Serum: 0.73 mg/dL    Glucose, Serum: 91 mg/dL    Calcium, Total Serum: 7.9 mg/dL    eGFR if Non : 84: Interpretative comment  in patients with unstable creatinine levels. mL/min/1.73M2    eGFR if African American: 97 mL/min/1.73M2      150<H>  |  117<H>  |  37<H>  ----------------------------<  81  3.4<L>   |  27  |  0.76  02-11    154<H>  |  123<H>  |  39<H>  ----------------------------<  84  3.9   |  24  |  0.70  02-11    153<H>  |  122<H>  |  38<H>  ----------------------------<  89  3.8   |  26  |  0.68  02-10    153<H>  |  123<H>  |  38<H>  ----------------------------<  91  3.9   |  23  |  0.70  02-10    157<H>  |  125<H>  |  40<H>  ----------------------------<  80  4.0   |  23  |  0.67  02-10    153<H>  |  123<H>  |  42<H>  ----------------------------<  90  3.8   |  25  |  0.76  02-10    154<H>  |  123<H>  |  41<H>  ----------------------------<  96  3.7   |  25  |  0.88  02-10    158<H>  |  124<H>  |  42<H>  ----------------------------<  113<H>  3.6   |  25  |  0.90  02-09    160<HH>  |  126<H>  |  45<H>  ----------------------------<  114<H>  4.0   |  25  |  0.88  02-09    161<HH>  |  128<H>  |  51<H>  ----------------------------<  90  3.3<L>   |  25  |  0.94  02-09    159<H>  |  125<H>  |  65<H>  ----------------------------<  125<H>  3.4<L>   |  22  |  1.47<H>  02-09    157<H>  |  125<H>  |  75<H>  ----------------------------<  144<H>  4.0   |  21<L>  |  1.74<H>  02-08    158<H>  |  123<H>  |  86<H>  ----------------------------<  161<H>  3.3<L>   |  22  |  2.21<H>  02-08    159<H>  |  122<H>  |  92<H>  ----------------------------<  144<H>  2.9<LL>   |  21<L>  |  2.58<H>    Ca    8.4      2019 12:09  Ca    8.0<L>      2019 04:51  Ca    8.6      2019 00:37  Ca    8.3<L>      10 Feb 2019 21:21  Ca    8.1<L>      10 Feb 2019 16:11  Ca    8.4      10 Feb 2019 12:22  Ca    8.3<L>      10 Feb 2019 09:36  Ca    8.7      10 Feb 2019 04:45  Ca    8.7      2019 22:20  Ca    9.0      2019 15:49  Ca    8.5      2019 05:51  Ca    8.4      2019 00:24  Ca    8.2<L>      2019 20:10  Ca    8.1<L>      2019 16:49  Phos  2.1       Phos  1.6     02-10  Phos  2.6       Phos  3.9       Mg     1.7       Mg     1.8     02-10  Mg     2.2       Mg     2.2       Mg     2.4         TPro  5.0<L>  /  Alb  1.4<L>  /  TBili  1.5<H>  /  DBili  x   /  AST  68<H>  /  ALT  27  /  AlkPhos  484<H>    TPro  5.2<L>  /  Alb  1.5<L>  /  TBili  1.0  /  DBili  x   /  AST  30  /  ALT  20  /  AlkPhos  365<H>  02-10  TPro  5.1<L>  /  Alb  1.4<L>  /  TBili  1.0  /  DBili  x   /  AST  39  /  ALT  21  /  AlkPhos  366<H>    TPro  5.2<L>  /  Alb  1.4<L>  /  TBili  0.6  /  DBili  x   /  AST  29  /  ALT  19  /  AlkPhos  226<H>    TPro  5.3<L>  /  Alb  1.4<L>  /  TBili  0.7  /  DBili  x   /  AST  28  /  ALT  19  /  AlkPhos  214<H>            URINE STUDIES:  Sodium, Random Urine: 20 mmol/L ( @ 03:44)  Osmolality, Random Urine: 462 mos/kg ( @ 03:44)  Creatinine, Random Urine: 62 mg/dL ( @ 03:44)                  RADIOLOGY & ADDITIONAL STUDIES:    < from: Xray Chest 1 View- PORTABLE-Routine (19 @ 12:38) >  EXAM:  XR CHEST PORTABLE ROUTINE 1V                            PROCEDURE DATE:  2019          INTERPRETATION:  PROCEDURE: AP view of the chest.    CLINICAL INFORMATION: Pulmonary edema    COMPARISON: 2/10/2019    FINDINGS:     Endotracheal and gastric tubes are noted.    There are bilateral lung opacities and pleural effusions without   significant change. The cardiac and mediastinal contours are prominent,   which may be due to magnification from AP technique and shallow   inspiration. The osseous structures are intact.    IMPRESSION:    No change in bilateral lung opacities and pleural effusions.    < end of copied text >

## 2019-02-11 NOTE — CHART NOTE - NSCHARTNOTEFT_GEN_A_CORE
Upon Nutritional Assessment by the Registered Dietitian your patient was determined to meet criteria / has evidence of the following diagnosis/diagnoses:          [ ]  Mild Protein Calorie Malnutrition        [x ]  Moderate Protein Calorie Malnutrition        [ ] Severe Protein Calorie Malnutrition        [ ] Unspecified Protein Calorie Malnutrition        [ ] Underweight / BMI <19        [ ] Morbid Obesity / BMI > 40      Findings as based on:  •  Comprehensive nutrition assessment and consultation  •  Calorie counts (nutrient intake analysis)  •  Food acceptance and intake status from observations by staff  •  Follow up  •  Patient education  •  Intervention secondary to interdisciplinary rounds  •   concerns      Treatment:    The following diet has been recommended:  Tube Feeding ordered    PROVIDER Section:     By signing this assessment you are acknowledging and agree with the diagnosis/diagnoses assigned by the Registered Dietitian    Comments:

## 2019-02-11 NOTE — PROGRESS NOTE ADULT - ASSESSMENT
A/P:  1.PAULINA: most likley secondary to obstructive uropathy , now improved  -Keep patient euvolemic   -Avoid Nephrotoxic Meds/ Agents such as (NSAIDs, IV contrast, Aminoglycosides such as gentamicin, -Gadolinium contrast, Phosphate containing enemas, etc..)  -Adjust Medications according to eGFR  -f/u bmp daily  2.HYPOKALEMIA: secondary to urine k loss with diuretic phase   -replace kcl prn  -keep k >4 and <5  -f/u k level daily  3.HYPERNATREMIA: secondary to high u/o induced water losses with improved renal function  -suggest to correct water deficit with hypotonic iv fluid  -avoid Na correction >8 meq per 24 hrs  -goal Na 150 by 4 pm today and  by 4 pm tomorrow  -f/u Na level q 4 hrs x 24 hrs  -may add Hctz 12.5 MG daily for fluid overload   4.METABOLIC ACIDOSIS: secondary to septic shock plus paulina induced ,now improved  -f/u co2 daily  5.HYPOPHOSPHATEMIA: secondary to improved renal function  -replace prn to keep phos level >3 and <5  -f/u phos level daily  6.EDEMA: now with pulmonary edema   -keep pt evolemic  - add Hctz for diuresis daily A/P:  1.PAULINA: most likley secondary to obstructive uropathy , now improved  -Keep patient euvolemic   -Avoid Nephrotoxic Meds/ Agents such as (NSAIDs, IV contrast, Aminoglycosides such as gentamicin, -Gadolinium contrast, Phosphate containing enemas, etc..)  -Adjust Medications according to eGFR  -f/u bmp daily  2.HYPOKALEMIA: secondary to urine k loss with diuretic phase   -replace kcl prn  -keep k >4 and <5  -f/u k level daily  3.HYPERNATREMIA: secondary to high u/o induced water losses with improved renal function, now worsening today  -suggest  hypotonic iv fluid like d5w plus free water as d/w resident  -suggest to change tube feeding to Osmolite to reduce solute load  -avoid Na correction >8 meq per 24 hrs  -f/u Na level q 4 hrs x 24 hrs  -continue  Hctz 12.5 MG daily for fluid overload   4.METABOLIC ACIDOSIS: secondary to septic shock plus paulina induced ,now improved  -f/u co2 daily  5.HYPOPHOSPHATEMIA: secondary to improved renal function  -replace prn to keep phos level >3 and <5  -f/u phos level daily  6.EDEMA: B/L Pleural effusion on cxr  -keep pt evolemic  - continue  Hctz for diuresis daily and avoid Lasix(may cause hypernatrmia)

## 2019-02-11 NOTE — ADVANCED PRACTICE NURSE CONSULT - RECOMMEDATIONS
-Elevate/float the patients heels suing heel protectors and reposition the patient Q 2hrs using wedges or pillows

## 2019-02-11 NOTE — DIETITIAN INITIAL EVALUATION ADULT. - OTHER INFO
TF infusing @ 40 ml/hr. Pressure injuries stage 1 noted. 2+ generalized edema noted. TF infusing @ 40 ml/hr.  2+ generalized edema noted.

## 2019-02-11 NOTE — PROGRESS NOTE ADULT - ASSESSMENT
87 y/o M from [facility], PMH of HTN, BPH, dementia , had recent fall on 1/22/19 had fracture of Rt intertrochanteric fracture, underwent surgical fixation, sent in from NH for respiratory distress.  In ED, patient was tachycardic to 120/min, BP- 99/58 mmhg, RR- 20/min, spo2- 90 % . EKG-  afib @105 BPM , prolonged QTC - 520, no ST T wave changes. cardiac enzymes x1- 0.059, BNP- 7244, UA- >50 WBC and moderate LE , CXR s/o Patchy right lower lobe infiltrate. Cardiomegaly. Labs pertinent for WBC- 57.6, H.H of 9.3/31.4, lactate of 9.3, K of 6.4, bicarb of 8, AG of 24,bun/ creat- 177/8.69, s/p 1 dose of cefepime, vancomycin and azithromycin with 1.5 L NS bolus  in ED   Pt had barba catheter placed, about 750 cc of cloudy urine drained. ABG - 7.35/ 13/106/7/100% NRB. Pt was placed on NIV BIPAP for work of breathing.     Pt is admitted to ICU on 2/7/18 for hypoxic respiratory failure with metabolic acidosis and respiratory alkalosis and sepsis secondary to UTI and ? PNA, ,2) Septic shock secondary to UTI and pneumonia 3) Acute renal failure secondary to UTI and septic shock. 87 y/o M from [facility], PMH of HTN, BPH, dementia , had recent fall on 1/22/19 had fracture of Rt intertrochanteric fracture, underwent surgical fixation, sent in from NH for respiratory distress.  In ED, patient was tachycardic to 120/min, BP- 99/58 mmhg, RR- 20/min, spo2- 90 % . EKG-  afib @105 BPM , prolonged QTC - 520, no ST T wave changes. cardiac enzymes x1- 0.059, BNP- 7244, UA- >50 WBC and moderate LE , CXR s/o Patchy right lower lobe infiltrate. Cardiomegaly. Labs pertinent for WBC- 57.6, H.H of 9.3/31.4, lactate of 9.3, K of 6.4, bicarb of 8, AG of 24,bun/ creat- 177/8.69, s/p 1 dose of cefepime, vancomycin and azithromycin with 1.5 L NS bolus  in ED   Pt had barba catheter placed, about 750 cc of cloudy urine drained. ABG - 7.35/ 13/106/7/100% NRB. Pt was placed on NIV BIPAP for work of breathing.     Pt is admitted to ICU on 2/7/18 for hypoxic respiratory failure with metabolic acidosis and respiratory alkalosis and sepsis secondary to UTI and ? PNA, ,2) Septic shock secondary to UTI and pneumonia 3) Acute renal failure secondary to UTI and septic shock.

## 2019-02-11 NOTE — PROGRESS NOTE ADULT - PROBLEM SELECTOR PLAN 4
-Patient's right sided central line was  mal positioned into common carotid artery with tip in aortic arch, confirmed with CT scan on 2/8/19  -removal of central venous catheter and repair of carotid artery on 2/8/19  -Line safely removed, with out any hematoma or excessive bleeding. DSD dressing in place.   -Vascular surgeon Dr Hernandez

## 2019-02-11 NOTE — DIETITIAN INITIAL EVALUATION ADULT. - PERTINENT LABORATORY DATA
02-11 Na150 mmol/L<H> Glu 81 mg/dL K+ 3.4 mmol/L<L> Cr  0.76 mg/dL BUN 37 mg/dL<H> 02-11 Phos 2.1 mg/dL<L> 02-11 Alb 1.4 g/dL<L>

## 2019-02-11 NOTE — ADVANCED PRACTICE NURSE CONSULT - ASSESSMENT
This is a 86yr old male patient admitted for Lobar Pneumonia, to which upon assessment, the patient is without pressure injury

## 2019-02-11 NOTE — PROGRESS NOTE ADULT - PROBLEM SELECTOR PLAN 3
Secondary to high UO (water losses)---> current Na: 153  Patient was treated initially w/ D5W but got overloaded  Avoid Na correction >8 meq per 24 hrs  Goal Na 150 by 4 pm today and 142 by 4 pm tomorrow  Follow up BMPq 4 hrs x 24 hrs  c/w HCTZ 12.5 mg QD   Dr Berman Secondary to high UO (water losses)---> current Na: 150  started continous free water administration today - will continue for 24 hour  Avoid Na correction >8 meq per 24 hrs  Goal Na 150 by 4 pm today and 142 by 4 pm tomorrow  Follow up BMPq 4 hrs x 24 hrs  c/w HCTZ 12.5 mg QD   nephro - Dr. Berman Secondary to high UO (water losses)---> current Na: 150  started continuous free water administration today - will continue for 24 hours  Avoid Na correction >8 meq per 24 hrs  Goal Na 150 by 4 pm today and 142 by 4 pm tomorrow  Follow up BMPq 4 hrs x 24 hrs  c/w HCTZ 12.5 mg QD   nephro - Dr. Breman

## 2019-02-12 LAB
ANION GAP SERPL CALC-SCNC: 5 MMOL/L — SIGNIFICANT CHANGE UP (ref 5–17)
ANION GAP SERPL CALC-SCNC: 6 MMOL/L — SIGNIFICANT CHANGE UP (ref 5–17)
ANION GAP SERPL CALC-SCNC: 6 MMOL/L — SIGNIFICANT CHANGE UP (ref 5–17)
BASE EXCESS BLDA CALC-SCNC: 3.3 MMOL/L — HIGH (ref -2–2)
BASE EXCESS BLDA CALC-SCNC: 4 MMOL/L — HIGH (ref -2–2)
BASOPHILS # BLD AUTO: 0.1 K/UL — SIGNIFICANT CHANGE UP (ref 0–0.2)
BASOPHILS NFR BLD AUTO: 0.6 % — SIGNIFICANT CHANGE UP (ref 0–2)
BLOOD GAS COMMENTS ARTERIAL: SIGNIFICANT CHANGE UP
BLOOD GAS COMMENTS ARTERIAL: SIGNIFICANT CHANGE UP
BUN SERPL-MCNC: 32 MG/DL — HIGH (ref 7–18)
BUN SERPL-MCNC: 32 MG/DL — HIGH (ref 7–18)
BUN SERPL-MCNC: 34 MG/DL — HIGH (ref 7–18)
CALCIUM SERPL-MCNC: 7.6 MG/DL — LOW (ref 8.4–10.5)
CALCIUM SERPL-MCNC: 7.7 MG/DL — LOW (ref 8.4–10.5)
CALCIUM SERPL-MCNC: 7.9 MG/DL — LOW (ref 8.4–10.5)
CHLORIDE SERPL-SCNC: 110 MMOL/L — HIGH (ref 96–108)
CHLORIDE SERPL-SCNC: 114 MMOL/L — HIGH (ref 96–108)
CHLORIDE SERPL-SCNC: 115 MMOL/L — HIGH (ref 96–108)
CO2 SERPL-SCNC: 25 MMOL/L — SIGNIFICANT CHANGE UP (ref 22–31)
CO2 SERPL-SCNC: 25 MMOL/L — SIGNIFICANT CHANGE UP (ref 22–31)
CO2 SERPL-SCNC: 28 MMOL/L — SIGNIFICANT CHANGE UP (ref 22–31)
CREAT SERPL-MCNC: 0.62 MG/DL — SIGNIFICANT CHANGE UP (ref 0.5–1.3)
CREAT SERPL-MCNC: 0.64 MG/DL — SIGNIFICANT CHANGE UP (ref 0.5–1.3)
CREAT SERPL-MCNC: 0.72 MG/DL — SIGNIFICANT CHANGE UP (ref 0.5–1.3)
EOSINOPHIL # BLD AUTO: 0.1 K/UL — SIGNIFICANT CHANGE UP (ref 0–0.5)
EOSINOPHIL NFR BLD AUTO: 0.9 % — SIGNIFICANT CHANGE UP (ref 0–6)
GLUCOSE SERPL-MCNC: 104 MG/DL — HIGH (ref 70–99)
GLUCOSE SERPL-MCNC: 104 MG/DL — HIGH (ref 70–99)
GLUCOSE SERPL-MCNC: 149 MG/DL — HIGH (ref 70–99)
HCO3 BLDA-SCNC: 27 MMOL/L — SIGNIFICANT CHANGE UP (ref 23–27)
HCO3 BLDA-SCNC: 27 MMOL/L — SIGNIFICANT CHANGE UP (ref 23–27)
HCT VFR BLD CALC: 30.9 % — LOW (ref 39–50)
HCT VFR BLD CALC: 33.8 % — LOW (ref 39–50)
HGB BLD-MCNC: 10.2 G/DL — LOW (ref 13–17)
HGB BLD-MCNC: 9.6 G/DL — LOW (ref 13–17)
HOROWITZ INDEX BLDA+IHG-RTO: 21 — SIGNIFICANT CHANGE UP
HOROWITZ INDEX BLDA+IHG-RTO: 40 — SIGNIFICANT CHANGE UP
LYMPHOCYTES # BLD AUTO: 0.8 K/UL — LOW (ref 1–3.3)
LYMPHOCYTES # BLD AUTO: 6.8 % — LOW (ref 13–44)
MAGNESIUM SERPL-MCNC: 1.4 MG/DL — LOW (ref 1.6–2.6)
MCHC RBC-ENTMCNC: 29.7 PG — SIGNIFICANT CHANGE UP (ref 27–34)
MCHC RBC-ENTMCNC: 29.9 PG — SIGNIFICANT CHANGE UP (ref 27–34)
MCHC RBC-ENTMCNC: 31.1 GM/DL — LOW (ref 32–36)
MCHC RBC-ENTMCNC: 31.4 GM/DL — LOW (ref 32–36)
MCV RBC AUTO: 95.1 FL — SIGNIFICANT CHANGE UP (ref 80–100)
MCV RBC AUTO: 95.7 FL — SIGNIFICANT CHANGE UP (ref 80–100)
MONOCYTES # BLD AUTO: 0.7 K/UL — SIGNIFICANT CHANGE UP (ref 0–0.9)
MONOCYTES NFR BLD AUTO: 5.7 % — SIGNIFICANT CHANGE UP (ref 2–14)
NEUTROPHILS # BLD AUTO: 10.6 K/UL — HIGH (ref 1.8–7.4)
NEUTROPHILS NFR BLD AUTO: 85.7 % — HIGH (ref 43–77)
NRBC # BLD: 0 /100 WBCS — SIGNIFICANT CHANGE UP (ref 0–0)
PCO2 BLDA: 36 MMHG — SIGNIFICANT CHANGE UP (ref 32–46)
PCO2 BLDA: 42 MMHG — SIGNIFICANT CHANGE UP (ref 32–46)
PH BLDA: 7.43 — SIGNIFICANT CHANGE UP (ref 7.35–7.45)
PH BLDA: 7.48 — HIGH (ref 7.35–7.45)
PHOSPHATE SERPL-MCNC: 2.3 MG/DL — LOW (ref 2.5–4.5)
PLATELET # BLD AUTO: 111 K/UL — LOW (ref 150–400)
PLATELET # BLD AUTO: 132 K/UL — LOW (ref 150–400)
PO2 BLDA: 52 MMHG — LOW (ref 74–108)
PO2 BLDA: 55 MMHG — LOW (ref 74–108)
POTASSIUM SERPL-MCNC: 3.3 MMOL/L — LOW (ref 3.5–5.3)
POTASSIUM SERPL-MCNC: 3.5 MMOL/L — SIGNIFICANT CHANGE UP (ref 3.5–5.3)
POTASSIUM SERPL-MCNC: 4 MMOL/L — SIGNIFICANT CHANGE UP (ref 3.5–5.3)
POTASSIUM SERPL-SCNC: 3.3 MMOL/L — LOW (ref 3.5–5.3)
POTASSIUM SERPL-SCNC: 3.5 MMOL/L — SIGNIFICANT CHANGE UP (ref 3.5–5.3)
POTASSIUM SERPL-SCNC: 4 MMOL/L — SIGNIFICANT CHANGE UP (ref 3.5–5.3)
RBC # BLD: 3.23 M/UL — LOW (ref 4.2–5.8)
RBC # BLD: 3.56 M/UL — LOW (ref 4.2–5.8)
RBC # FLD: 18.5 % — HIGH (ref 10.3–14.5)
RBC # FLD: 19.4 % — HIGH (ref 10.3–14.5)
SAO2 % BLDA: 86 % — LOW (ref 92–96)
SAO2 % BLDA: 92 % — SIGNIFICANT CHANGE UP (ref 92–96)
SODIUM SERPL-SCNC: 143 MMOL/L — SIGNIFICANT CHANGE UP (ref 135–145)
SODIUM SERPL-SCNC: 145 MMOL/L — SIGNIFICANT CHANGE UP (ref 135–145)
SODIUM SERPL-SCNC: 146 MMOL/L — HIGH (ref 135–145)
WBC # BLD: 12.22 K/UL — HIGH (ref 3.8–10.5)
WBC # BLD: 12.7 K/UL — HIGH (ref 3.8–10.5)
WBC # FLD AUTO: 12.22 K/UL — HIGH (ref 3.8–10.5)
WBC # FLD AUTO: 12.7 K/UL — HIGH (ref 3.8–10.5)

## 2019-02-12 PROCEDURE — 71045 X-RAY EXAM CHEST 1 VIEW: CPT | Mod: 26

## 2019-02-12 RX ORDER — PHENYLEPHRINE HYDROCHLORIDE 10 MG/ML
0.1 INJECTION INTRAVENOUS
Qty: 160 | Refills: 0 | Status: DISCONTINUED | OUTPATIENT
Start: 2019-02-12 | End: 2019-02-17

## 2019-02-12 RX ORDER — LACTULOSE 10 G/15ML
10 SOLUTION ORAL ONCE
Qty: 0 | Refills: 0 | Status: COMPLETED | OUTPATIENT
Start: 2019-02-12 | End: 2019-02-12

## 2019-02-12 RX ORDER — ALBUMIN HUMAN 25 %
50 VIAL (ML) INTRAVENOUS ONCE
Qty: 0 | Refills: 0 | Status: COMPLETED | OUTPATIENT
Start: 2019-02-12 | End: 2019-02-12

## 2019-02-12 RX ORDER — DEXMEDETOMIDINE HYDROCHLORIDE IN 0.9% SODIUM CHLORIDE 4 UG/ML
0.5 INJECTION INTRAVENOUS
Qty: 200 | Refills: 0 | Status: DISCONTINUED | OUTPATIENT
Start: 2019-02-12 | End: 2019-02-17

## 2019-02-12 RX ORDER — DOCUSATE SODIUM 100 MG
100 CAPSULE ORAL
Qty: 0 | Refills: 0 | Status: DISCONTINUED | OUTPATIENT
Start: 2019-02-12 | End: 2019-02-13

## 2019-02-12 RX ORDER — ALBUMIN HUMAN 25 %
100 VIAL (ML) INTRAVENOUS ONCE
Qty: 0 | Refills: 0 | Status: COMPLETED | OUTPATIENT
Start: 2019-02-12 | End: 2019-02-12

## 2019-02-12 RX ORDER — DOCUSATE SODIUM 100 MG
100 CAPSULE ORAL
Qty: 0 | Refills: 0 | Status: DISCONTINUED | OUTPATIENT
Start: 2019-02-12 | End: 2019-02-25

## 2019-02-12 RX ORDER — SODIUM CHLORIDE 9 MG/ML
500 INJECTION INTRAMUSCULAR; INTRAVENOUS; SUBCUTANEOUS ONCE
Qty: 0 | Refills: 0 | Status: COMPLETED | OUTPATIENT
Start: 2019-02-12 | End: 2019-02-12

## 2019-02-12 RX ORDER — ALBUMIN HUMAN 25 %
200 VIAL (ML) INTRAVENOUS ONCE
Qty: 0 | Refills: 0 | Status: DISCONTINUED | OUTPATIENT
Start: 2019-02-12 | End: 2019-02-12

## 2019-02-12 RX ORDER — MAGNESIUM SULFATE 500 MG/ML
1 VIAL (ML) INJECTION ONCE
Qty: 0 | Refills: 0 | Status: COMPLETED | OUTPATIENT
Start: 2019-02-12 | End: 2019-02-12

## 2019-02-12 RX ORDER — SENNA PLUS 8.6 MG/1
2 TABLET ORAL AT BEDTIME
Qty: 0 | Refills: 0 | Status: DISCONTINUED | OUTPATIENT
Start: 2019-02-12 | End: 2019-02-25

## 2019-02-12 RX ORDER — FUROSEMIDE 40 MG
20 TABLET ORAL ONCE
Qty: 0 | Refills: 0 | Status: COMPLETED | OUTPATIENT
Start: 2019-02-12 | End: 2019-02-12

## 2019-02-12 RX ORDER — POTASSIUM CHLORIDE 20 MEQ
40 PACKET (EA) ORAL EVERY 4 HOURS
Qty: 0 | Refills: 0 | Status: COMPLETED | OUTPATIENT
Start: 2019-02-12 | End: 2019-02-12

## 2019-02-12 RX ORDER — MORPHINE SULFATE 50 MG/1
2 CAPSULE, EXTENDED RELEASE ORAL ONCE
Qty: 0 | Refills: 0 | Status: DISCONTINUED | OUTPATIENT
Start: 2019-02-12 | End: 2019-02-12

## 2019-02-12 RX ADMIN — HEPARIN SODIUM 5000 UNIT(S): 5000 INJECTION INTRAVENOUS; SUBCUTANEOUS at 21:33

## 2019-02-12 RX ADMIN — Medication 1 DROP(S): at 05:34

## 2019-02-12 RX ADMIN — TAMSULOSIN HYDROCHLORIDE 0.4 MILLIGRAM(S): 0.4 CAPSULE ORAL at 21:33

## 2019-02-12 RX ADMIN — ALBUTEROL 2 PUFF(S): 90 AEROSOL, METERED ORAL at 08:46

## 2019-02-12 RX ADMIN — Medication 50 MILLILITER(S): at 12:05

## 2019-02-12 RX ADMIN — FINASTERIDE 5 MILLIGRAM(S): 5 TABLET, FILM COATED ORAL at 12:07

## 2019-02-12 RX ADMIN — ALBUTEROL 2 PUFF(S): 90 AEROSOL, METERED ORAL at 02:09

## 2019-02-12 RX ADMIN — MORPHINE SULFATE 2 MILLIGRAM(S): 50 CAPSULE, EXTENDED RELEASE ORAL at 14:25

## 2019-02-12 RX ADMIN — HEPARIN SODIUM 5000 UNIT(S): 5000 INJECTION INTRAVENOUS; SUBCUTANEOUS at 13:50

## 2019-02-12 RX ADMIN — Medication 50 MILLILITER(S): at 22:48

## 2019-02-12 RX ADMIN — Medication 20 MILLIGRAM(S): at 12:04

## 2019-02-12 RX ADMIN — AZITHROMYCIN 250 MILLIGRAM(S): 500 TABLET, FILM COATED ORAL at 19:51

## 2019-02-12 RX ADMIN — FENTANYL CITRATE 2.98 MICROGRAM(S)/KG/HR: 50 INJECTION INTRAVENOUS at 08:16

## 2019-02-12 RX ADMIN — FAMOTIDINE 20 MILLIGRAM(S): 10 INJECTION INTRAVENOUS at 12:07

## 2019-02-12 RX ADMIN — MORPHINE SULFATE 2 MILLIGRAM(S): 50 CAPSULE, EXTENDED RELEASE ORAL at 14:59

## 2019-02-12 RX ADMIN — Medication 50 GRAM(S): at 12:06

## 2019-02-12 RX ADMIN — LACTULOSE 10 GRAM(S): 10 SOLUTION ORAL at 12:06

## 2019-02-12 RX ADMIN — Medication 40 MILLIEQUIVALENT(S): at 02:43

## 2019-02-12 RX ADMIN — Medication 12.5 MILLIGRAM(S): at 05:34

## 2019-02-12 RX ADMIN — ALBUTEROL 2 PUFF(S): 90 AEROSOL, METERED ORAL at 15:55

## 2019-02-12 RX ADMIN — CEFTRIAXONE 100 GRAM(S): 500 INJECTION, POWDER, FOR SOLUTION INTRAMUSCULAR; INTRAVENOUS at 13:51

## 2019-02-12 RX ADMIN — Medication 40 MILLIEQUIVALENT(S): at 05:36

## 2019-02-12 RX ADMIN — HEPARIN SODIUM 5000 UNIT(S): 5000 INJECTION INTRAVENOUS; SUBCUTANEOUS at 05:34

## 2019-02-12 RX ADMIN — Medication 1 DROP(S): at 18:29

## 2019-02-12 RX ADMIN — Medication 100 MILLIGRAM(S): at 18:28

## 2019-02-12 RX ADMIN — SODIUM CHLORIDE 1000 MILLILITER(S): 9 INJECTION INTRAMUSCULAR; INTRAVENOUS; SUBCUTANEOUS at 21:33

## 2019-02-12 RX ADMIN — ALBUTEROL 2 PUFF(S): 90 AEROSOL, METERED ORAL at 20:46

## 2019-02-12 NOTE — PROGRESS NOTE ADULT - PROBLEM SELECTOR PLAN 1
2/2 multifocal PNA  initially placed on BiPAP, intubated today for pulmonary edema-CT chest shows bilateral pleural effusions and superimposed pneumonia  CXR worse today 2/2 to fluid overload - giving lasix 20mg iv once  f/u repeat CXR in AM  c/w rocephin  c/w zithromax  starting precedex  tapering off fentanyl

## 2019-02-12 NOTE — PROGRESS NOTE ADULT - PROBLEM SELECTOR PLAN 2
2/2 PNA and E. coli bacteremia  repeat BCx negative to date  c/w rocephin 1g qd, day 2  c/w zithromax qd, day 2  removed left femoral central line on 2/12/19  ID - Dr. Norton

## 2019-02-12 NOTE — PROGRESS NOTE ADULT - PROBLEM SELECTOR PLAN 3
Secondary to high UO (water losses)---> Na this   d/c D5W and free water as patient is becoming fluid overloaded  s/p lasix 20mg iv once today after albumin to remove excess fluid  Avoid Na correction >8 meq per 24 hrs  nephro - Dr. Berman

## 2019-02-12 NOTE — PROGRESS NOTE ADULT - SUBJECTIVE AND OBJECTIVE BOX
86y Male    Meds:  azithromycin  IVPB      azithromycin  IVPB 500 milliGRAM(s) IV Intermittent every 24 hours  cefTRIAXone   IVPB      cefTRIAXone   IVPB 1 Gram(s) IV Intermittent every 24 hours    Allergies    No Known Allergies    Intolerances        VITALS:  Vital Signs Last 24 Hrs  T(C): 37.6 (12 Feb 2019 04:00), Max: 37.8 (11 Feb 2019 20:00)  T(F): 99.7 (12 Feb 2019 04:00), Max: 100.1 (11 Feb 2019 20:00)  HR: 104 (12 Feb 2019 12:13) (94 - 129)  BP: 108/74 (12 Feb 2019 12:00) (84/43 - 135/75)  BP(mean): 83 (12 Feb 2019 12:00) (52 - 88)  RR: 13 (12 Feb 2019 12:00) (11 - 18)  SpO2: 98% (12 Feb 2019 12:13) (89% - 99%)    LABS/DIAGNOSTIC TESTS:                          10.2   12.7  )-----------( 111      ( 12 Feb 2019 05:02 )             33.8         02-12    145  |  114<H>  |  32<H>  ----------------------------<  104<H>  4.0   |  25  |  0.62    Ca    7.7<L>      12 Feb 2019 05:02  Phos  2.3     02-12  Mg     1.4     02-12    TPro  5.0<L>  /  Alb  1.4<L>  /  TBili  1.5<H>  /  DBili  x   /  AST  68<H>  /  ALT  27  /  AlkPhos  484<H>  02-11      LIVER FUNCTIONS - ( 11 Feb 2019 04:51 )  Alb: 1.4 g/dL / Pro: 5.0 g/dL / ALK PHOS: 484 U/L / ALT: 27 U/L DA / AST: 68 U/L / GGT: x             CULTURES: .Blood  02-11 @ 11:40   No growth to date.  --  --      .Blood  02-11 @ 11:38   No growth to date.  --  --      .Sputum  02-08 @ 19:52   Normal Respiratory Isa present  --    Few Squamous epithelial cells per low power field  Few polymorphonuclear leukocytes per low power field  Few Yeast like cells per oil power field  Numerous Gram variable coccobacilli per oil power field      .Urine  02-08 @ 10:30   50,000 - 99,000 CFU/mL Escherichia coli Multiple Morphological Strains  --  Escherichia coli      .Urine  02-08 @ 00:24   >100,000 CFU/ml Escherichia coli  --  Escherichia coli      .Blood  02-07 @ 23:09   Growth in aerobic and anaerobic bottles: Escherichia coli            RADIOLOGY:< from: Xray Chest 1 View- PORTABLE-Routine (02.12.19 @ 08:07) >  EXAM:  XR CHEST PORTABLE ROUTINE 1V                            PROCEDURE DATE:  02/12/2019          INTERPRETATION:  INDICATION: Respiratory failure    PRIORS: February 11, 2019.    VIEWS: Portable AP radiography of the chest performed.    FINDINGS: Since prior evaluation, no significant interval change is   identified. The position of the indwelling ETT is unchanged. The distal   aspect of the indwelling NGT overlies the expected region of the stomach.   Heart size cannot be quantitated on this portable evaluation. Extensive   airspace opacity and regions of consolidation identified bilaterally,   unchanged. Small pleural effusions cannot be excluded. There is no   evidence for pneumothorax. No mediastinal shift is noted. No osseous   fractures identified.    IMPRESSION: No significant interval change.        < end of copied text >        ROS:  [  ] UNABLE TO ELICIT 86y Male who remains in the ICU , he remains intubated and vent dependent and on sedation but wakes up to light tactile stimuli. He had a low grade fever only, his blood culture grew out E.coli and his CXR is unchanged.    Meds:  azithromycin  IVPB      azithromycin  IVPB 500 milliGRAM(s) IV Intermittent every 24 hours  cefTRIAXone   IVPB      cefTRIAXone   IVPB 1 Gram(s) IV Intermittent every 24 hours    Allergies    No Known Allergies    Intolerances        VITALS:  Vital Signs Last 24 Hrs  T(C): 37.6 (12 Feb 2019 04:00), Max: 37.8 (11 Feb 2019 20:00)  T(F): 99.7 (12 Feb 2019 04:00), Max: 100.1 (11 Feb 2019 20:00)  HR: 104 (12 Feb 2019 12:13) (94 - 129)  BP: 108/74 (12 Feb 2019 12:00) (84/43 - 135/75)  BP(mean): 83 (12 Feb 2019 12:00) (52 - 88)  RR: 13 (12 Feb 2019 12:00) (11 - 18)  SpO2: 98% (12 Feb 2019 12:13) (89% - 99%)    LABS/DIAGNOSTIC TESTS:                          10.2   12.7  )-----------( 111      ( 12 Feb 2019 05:02 )             33.8         02-12    145  |  114<H>  |  32<H>  ----------------------------<  104<H>  4.0   |  25  |  0.62    Ca    7.7<L>      12 Feb 2019 05:02  Phos  2.3     02-12  Mg     1.4     02-12    TPro  5.0<L>  /  Alb  1.4<L>  /  TBili  1.5<H>  /  DBili  x   /  AST  68<H>  /  ALT  27  /  AlkPhos  484<H>  02-11      LIVER FUNCTIONS - ( 11 Feb 2019 04:51 )  Alb: 1.4 g/dL / Pro: 5.0 g/dL / ALK PHOS: 484 U/L / ALT: 27 U/L DA / AST: 68 U/L / GGT: x             CULTURES: .Blood  02-11 @ 11:40   No growth to date.  --  --      .Blood  02-11 @ 11:38   No growth to date.  --  --      .Sputum  02-08 @ 19:52   Normal Respiratory Isa present  --    Few Squamous epithelial cells per low power field  Few polymorphonuclear leukocytes per low power field  Few Yeast like cells per oil power field  Numerous Gram variable coccobacilli per oil power field      .Urine  02-08 @ 10:30   50,000 - 99,000 CFU/mL Escherichia coli Multiple Morphological Strains  --  Escherichia coli      .Urine  02-08 @ 00:24   >100,000 CFU/ml Escherichia coli  --  Escherichia coli      .Blood  02-07 @ 23:09   Growth in aerobic and anaerobic bottles: Escherichia coli            RADIOLOGY:< from: Xray Chest 1 View- PORTABLE-Routine (02.12.19 @ 08:07) >  EXAM:  XR CHEST PORTABLE ROUTINE 1V                            PROCEDURE DATE:  02/12/2019          INTERPRETATION:  INDICATION: Respiratory failure    PRIORS: February 11, 2019.    VIEWS: Portable AP radiography of the chest performed.    FINDINGS: Since prior evaluation, no significant interval change is   identified. The position of the indwelling ETT is unchanged. The distal   aspect of the indwelling NGT overlies the expected region of the stomach.   Heart size cannot be quantitated on this portable evaluation. Extensive   airspace opacity and regions of consolidation identified bilaterally,   unchanged. Small pleural effusions cannot be excluded. There is no   evidence for pneumothorax. No mediastinal shift is noted. No osseous   fractures identified.    IMPRESSION: No significant interval change.        < end of copied text >        ROS:  [ x ] UNABLE TO ELICIT

## 2019-02-12 NOTE — PROGRESS NOTE ADULT - ASSESSMENT
A/P:  1.PAULINA: most likley secondary to obstructive uropathy , now improved  -Keep patient euvolemic   -Avoid Nephrotoxic Meds/ Agents such as (NSAIDs, IV contrast, Aminoglycosides such as gentamicin, -Gadolinium contrast, Phosphate containing enemas, etc..)  -Adjust Medications according to eGFR  -f/u bmp daily  2.HYPOKALEMIA: secondary to urine k loss with diuretic phase   -replace kcl prn  -keep k >4 and <5  -f/u k level daily  3.HYPERNATREMIA: secondary to high u/o induced water losses with improved renal function, now worsening today  -suggest  hypotonic iv fluid like d5w plus free water as d/w resident  -suggest to change tube feeding to Osmolite to reduce solute load  -avoid Na correction >8 meq per 24 hrs  -f/u Na level q 4 hrs x 24 hrs  -continue  Hctz 12.5 MG daily for fluid overload   4.METABOLIC ACIDOSIS: secondary to septic shock plus paulina induced ,now improved  -f/u co2 daily  5.HYPOPHOSPHATEMIA: secondary to improved renal function  -replace prn to keep phos level >3 and <5  -f/u phos level daily  6.EDEMA: B/L Pleural effusion on cxr  -keep pt evolemic  - continue  Hctz for diuresis daily and avoid Lasix(may cause hypernatrmia) A/P:  1.PAULINA: most likley secondary to obstructive uropathy , now improved  -Keep patient euvolemic   -Avoid Nephrotoxic Meds/ Agents such as (NSAIDs, IV contrast, Aminoglycosides such as gentamicin, -Gadolinium contrast, Phosphate containing enemas, etc..)  -Adjust Medications according to eGFR  -f/u bmp daily  2.HYPOKALEMIA: now improved  -keep k >4 and <5  -f/u k level daily  3.HYPERNATREMIA: now improved  -avoid Na correction >8 meq per 24 hrs  -f/u Na level q 12 hrs  -continue  Hctz 12.5 MG daily for fluid overload   4.METABOLIC ACIDOSIS: secondary to septic shock plus paulina induced ,now improved  -f/u co2 daily  5.HYPOPHOSPHATEMIA: secondary to improved renal function  -replace prn to keep phos level >3 and <5  -f/u phos level daily  6.EDEMA: B/L Pleural effusion on cxr  -keep pt evolemic  - continue  diuresis as per bp/na level

## 2019-02-12 NOTE — PROGRESS NOTE ADULT - PROBLEM SELECTOR PLAN 4
right sided central line was malpositioned into common carotid artery with tip in aortic arch, confirmed by CT scan on 2/8/19  s/p removal of central venous catheter and repair of carotid artery on 2/8/19 without complication  vascular - Dr. Hernandez

## 2019-02-12 NOTE — PROGRESS NOTE ADULT - SUBJECTIVE AND OBJECTIVE BOX
INTERVAL HPI/OVERNIGHT EVENTS: No acute events overnight.    ANTIBIOTICS: rocephin 1g qd, zithromax 500mg qd    Antimicrobial:  rocephin 1 g qd  zithromax 500mg qd    Antimicrobial:  azithromycin  IVPB      azithromycin  IVPB 500 milliGRAM(s) IV Intermittent every 24 hours  cefTRIAXone   IVPB      cefTRIAXone   IVPB 1 Gram(s) IV Intermittent every 24 hours    Cardiovascular:  hydrochlorothiazide 12.5 milliGRAM(s) Oral daily  tamsulosin 0.4 milliGRAM(s) Oral at bedtime    Pulmonary:  ALBUTerol    90 MICROgram(s) HFA Inhaler 2 Puff(s) Inhalation every 6 hours    Hematalogic:  heparin  Injectable 5000 Unit(s) SubCutaneous every 8 hours    Other:  artificial  tears Solution 1 Drop(s) Both EYES two times a day  dexmedetomidine Infusion 0.5 MICROgram(s)/kG/Hr IV Continuous <Continuous>  docusate sodium Liquid 100 milliGRAM(s) Oral two times a day  famotidine Injectable 20 milliGRAM(s) IV Push daily  fentaNYL   Infusion. 0.5 MICROgram(s)/kG/Hr IV Continuous <Continuous>  finasteride 5 milliGRAM(s) Oral daily    ALBUTerol    90 MICROgram(s) HFA Inhaler 2 Puff(s) Inhalation every 6 hours  artificial  tears Solution 1 Drop(s) Both EYES two times a day  azithromycin  IVPB      azithromycin  IVPB 500 milliGRAM(s) IV Intermittent every 24 hours  cefTRIAXone   IVPB      cefTRIAXone   IVPB 1 Gram(s) IV Intermittent every 24 hours  dexmedetomidine Infusion 0.5 MICROgram(s)/kG/Hr IV Continuous <Continuous>  docusate sodium Liquid 100 milliGRAM(s) Oral two times a day  famotidine Injectable 20 milliGRAM(s) IV Push daily  fentaNYL   Infusion. 0.5 MICROgram(s)/kG/Hr IV Continuous <Continuous>  finasteride 5 milliGRAM(s) Oral daily  heparin  Injectable 5000 Unit(s) SubCutaneous every 8 hours  hydrochlorothiazide 12.5 milliGRAM(s) Oral daily  tamsulosin 0.4 milliGRAM(s) Oral at bedtime    Drug Dosing Weight  Height (cm): 165.1 (11 Feb 2019 22:00)  Weight (kg): 59.6 (11 Feb 2019 22:00)  BMI (kg/m2): 21.9 (11 Feb 2019 22:00)  BSA (m2): 1.65 (11 Feb 2019 22:00)    CENTRAL LINE: [x ] YES [ ] NO  LOCATION:   Left femoral DATE INSERTED: 2/7/19  REMOVE: [X ] YES [ ] NO  EXPLAIN: removed on 2/11/19 - two peripheral lines inserted in b/l upper extremities on 2/11/19 beforehand    ZAFAR: [x ] YES [ ] NO    DATE INSERTED: 2/7/19  REMOVE:  [ ] YES [ ] NO  EXPLAIN:    PMH -reviewed admission note, no change since admission  PAST MEDICAL & SURGICAL HISTORY:  Dementia  BPH (benign prostatic hyperplasia)  Hypertension  Closed hip fracture      ICU Vital Signs Last 24 Hrs  T(C): 37.6 (12 Feb 2019 04:00), Max: 37.8 (11 Feb 2019 20:00)  T(F): 99.7 (12 Feb 2019 04:00), Max: 100.1 (11 Feb 2019 20:00)  HR: 104 (12 Feb 2019 12:13) (94 - 129)  BP: 108/74 (12 Feb 2019 12:00) (84/43 - 135/75)  BP(mean): 83 (12 Feb 2019 12:00) (52 - 88)  ABP: --  ABP(mean): --  RR: 13 (12 Feb 2019 12:00) (11 - 18)  SpO2: 98% (12 Feb 2019 12:13) (89% - 99%)      ABG - ( 12 Feb 2019 04:08 )  pH, Arterial: 7.43  pH, Blood: x     /  pCO2: 42    /  pO2: 52    / HCO3: 27    / Base Excess: 3.3   /  SaO2: 86              02-11 @ 07:01  -  02-12 @ 07:00  --------------------------------------------------------  IN: 4244 mL / OUT: 1425 mL / NET: 2819 mL        Mode: AC/ CMV (Assist Control/ Continuous Mandatory Ventilation)  RR (machine): 12  TV (machine): 450  FiO2: 55  PEEP: 5  ITime: 1  MAP: 10  PIP: 32      PHYSICAL EXAM:    GENERAL:NAD, well-groomed, well-developed  HEAD:  Atraumatic, Normocephalic  EYES: EOMI, PERRLA, conjunctiva and sclera clear  ENMT: No tonsillar erythema, exudates, or enlargement; dry mucous membranes,   NECK: Supple, normal appearance, No JVD; Normal thyroid; Trachea midline  NERVOUS SYSTEM: Alert and awake x 1  CHEST/LUNG: bilateral rales   HEART: Regular rate and rhythm; No murmurs, rubs, or gallops  ABDOMEN: Soft, Nontender, Nondistended; Bowel sounds present  EXTREMITIES: 2+ Peripheral Pulses, No clubbing, cyanosis, or edema, left femoral line in place.   LYMPH: No lymphadenopathy noted  SKIN:No rashes or lesions; Good capillary refill      LABS:  CBC Full  -  ( 12 Feb 2019 05:02 )  WBC Count : 12.7 K/uL  Hemoglobin : 10.2 g/dL  Hematocrit : 33.8 %  Platelet Count - Automated : 111 K/uL  Mean Cell Volume : 95.1 fl  Mean Cell Hemoglobin : 29.9 pg  Mean Cell Hemoglobin Concentration : 31.4 gm/dL  Auto Neutrophil # : 10.6 K/uL  Auto Lymphocyte # : 0.8 K/uL  Auto Monocyte # : 0.7 K/uL  Auto Eosinophil # : 0.1 K/uL  Auto Basophil # : 0.1 K/uL  Auto Neutrophil % : 85.7 %  Auto Lymphocyte % : 6.8 %  Auto Monocyte % : 5.7 %  Auto Eosinophil % : 0.9 %  Auto Basophil % : 0.6 %    02-12    145  |  114<H>  |  32<H>  ----------------------------<  104<H>  4.0   |  25  |  0.62    Ca    7.7<L>      12 Feb 2019 05:02  Phos  2.3     02-12  Mg     1.4     02-12    TPro  5.0<L>  /  Alb  1.4<L>  /  TBili  1.5<H>  /  DBili  x   /  AST  68<H>  /  ALT  27  /  AlkPhos  484<H>  02-11        Culture Results:   No growth to date. (02-11 @ 11:40)  Culture Results:   No growth to date. (02-11 @ 11:38)    CRITICAL CARE TIME SPENT: 35 minutes

## 2019-02-12 NOTE — PROGRESS NOTE ADULT - SUBJECTIVE AND OBJECTIVE BOX
pt seen and examined.pts current chart reviewed and case discussed with resident covering.    SUBJECTIVE:  pt feels fine and denies cp,sob,gi or gu/uremic  symptons    REVIEW OF SYSTEMS:  CONSTITUTIONAL: No weakness, fevers or chills  EYES/ENT: No visual changes;  No vertigo or throat pain   NECK: No pain or stiffness  RESPIRATORY: No cough, wheezing, hemoptysis; No shortness of breath  CARDIOVASCULAR: No chest pain or palpitations  GASTROINTESTINAL: No abdominal or epigastric pain. No nausea, vomiting, or hematemesis; No diarrhea or constipation. No melena or hematochezia.  GENITOURINARY: No dysuria, frequency , hematuria, flank pain or nocturia  NEUROLOGICAL: No numbness or weakness  SKIN: No itching, burning, rashes, or lesions   All other review of systems is negative unless indicated above    Current meds:    ALBUTerol    90 MICROgram(s) HFA Inhaler 2 Puff(s) Inhalation every 6 hours  artificial  tears Solution 1 Drop(s) Both EYES two times a day  azithromycin  IVPB      azithromycin  IVPB 500 milliGRAM(s) IV Intermittent every 24 hours  cefTRIAXone   IVPB      cefTRIAXone   IVPB 1 Gram(s) IV Intermittent every 24 hours  dexmedetomidine Infusion 0.5 MICROgram(s)/kG/Hr IV Continuous <Continuous>  docusate sodium Liquid 100 milliGRAM(s) Oral two times a day  famotidine Injectable 20 milliGRAM(s) IV Push daily  fentaNYL   Infusion. 0.5 MICROgram(s)/kG/Hr IV Continuous <Continuous>  finasteride 5 milliGRAM(s) Oral daily  heparin  Injectable 5000 Unit(s) SubCutaneous every 8 hours  hydrochlorothiazide 12.5 milliGRAM(s) Oral daily  tamsulosin 0.4 milliGRAM(s) Oral at bedtime      Vital Signs    T(F): 99.7 (02-12-19 @ 04:00), Max: 100.1 (02-11-19 @ 20:00)  HR: 104 (02-12-19 @ 12:13) (94 - 129)  BP: 108/74 (02-12-19 @ 12:00) (84/43 - 135/75)  ABP: --  RR: 13 (02-12-19 @ 12:00) (11 - 18)  SpO2: 98% (02-12-19 @ 12:13) (89% - 99%)  Wt(kg): --  CVP(cm H2O): --  CO: --  PCWP: --    I and O's:    02-10 @ 07:01  -  02-11 @ 07:00  --------------------------------------------------------  IN:    dextrose 5%.: 500 mL    Enteral Tube Flush: 160 mL    Enteral Tube Flush: 100 mL    fentaNYL Infusion.: 249 mL    Free Water: 1350 mL    Glucerna 1.5: 330 mL    IV PiggyBack: 350 mL    Solution: 500 mL  Total IN: 3539 mL    OUT:    Indwelling Catheter - Urethral: 2040 mL  Total OUT: 2040 mL    Total NET: 1499 mL      02-11 @ 07:01 - 02-12 @ 07:00  --------------------------------------------------------  IN:    dextrose 5%.: 450 mL    Enteral Tube Flush: 700 mL    fentaNYL Infusion.: 384 mL    Free Water: 1500 mL    Glucerna 1.5: 440 mL    Osmolite 1.2: 520 mL    Solution: 250 mL  Total IN: 4244 mL    OUT:    Indwelling Catheter - Urethral: 1425 mL  Total OUT: 1425 mL    Total NET: 2819 mL      02-12 @ 07:01 - 02-12 @ 13:40  --------------------------------------------------------  IN:    dexmedetomidine Infusion: 15 mL    fentaNYL Infusion.: 54 mL    Free Water: 300 mL    Osmolite 1.2: 200 mL  Total IN: 569 mL    OUT:    Indwelling Catheter - Urethral: 265 mL  Total OUT: 265 mL    Total NET: 304 mL        Daily Height in cm: 165.1 (11 Feb 2019 22:00)    Daily     PHYSICAL EXAM:  Constitutional: well developed, Mal-nourished  and in nad  HEENT: PERRLA,  no icteric sclera and mild pallor of conjunctiva noted  Neck: No JVD, thyromegaly or adenopathy  Respiratory: reduced air entry lower lungs with few rhonchi  Cardiovascular: S1 and S2 normally heard  Gastrointestinal: soft, nondistended, nontender and normal bowel sounds heard  Extremities: 1 plus B/L LE and upper thigh edema noted, no cyanosis  Neurological: sedated   Skin: No rashes        LABS:    CBC:                          10.2   12.7  )-----------( 111      ( 12 Feb 2019 05:02 )             33.8           BMP:    02-12    145  |  114<H>  |  32<H>  ----------------------------<  104<H>  4.0   |  25  |  0.62  02-12    146<H>  |  115<H>  |  34<H>  ----------------------------<  104<H>  3.3<L>   |  25  |  0.64  02-11    151<H>  |  118<H>  |  36<H>  ----------------------------<  110<H>  3.8   |  25  |  0.71  02-11    153<H>  |  119<H>  |  37<H>  ----------------------------<  91  3.5   |  27  |  0.73  02-11    150<H>  |  117<H>  |  37<H>  ----------------------------<  81  3.4<L>   |  27  |  0.76  02-11    154<H>  |  123<H>  |  39<H>  ----------------------------<  84  3.9   |  24  |  0.70  02-11    153<H>  |  122<H>  |  38<H>  ----------------------------<  89  3.8   |  26  |  0.68  02-10    153<H>  |  123<H>  |  38<H>  ----------------------------<  91  3.9   |  23  |  0.70  02-10    157<H>  |  125<H>  |  40<H>  ----------------------------<  80  4.0   |  23  |  0.67  02-10    153<H>  |  123<H>  |  42<H>  ----------------------------<  90  3.8   |  25  |  0.76  02-10    154<H>  |  123<H>  |  41<H>  ----------------------------<  96  3.7   |  25  |  0.88  02-10    158<H>  |  124<H>  |  42<H>  ----------------------------<  113<H>  3.6   |  25  |  0.90  02-09    160<HH>  |  126<H>  |  45<H>  ----------------------------<  114<H>  4.0   |  25  |  0.88  02-09    161<HH>  |  128<H>  |  51<H>  ----------------------------<  90  3.3<L>   |  25  |  0.94    Ca    7.7<L>      12 Feb 2019 05:02  Ca    7.6<L>      12 Feb 2019 01:53  Ca    8.0<L>      11 Feb 2019 20:14  Ca    7.9<L>      11 Feb 2019 17:11  Ca    8.4      11 Feb 2019 12:09  Ca    8.0<L>      11 Feb 2019 04:51  Ca    8.6      11 Feb 2019 00:37  Ca    8.3<L>      10 Feb 2019 21:21  Ca    8.1<L>      10 Feb 2019 16:11  Ca    8.4      10 Feb 2019 12:22  Ca    8.3<L>      10 Feb 2019 09:36  Ca    8.7      10 Feb 2019 04:45  Ca    8.7      09 Feb 2019 22:20  Ca    9.0      09 Feb 2019 15:49  Phos  2.3     02-12  Phos  2.1     02-11  Phos  1.6     02-10  Mg     1.4     02-12  Mg     1.7     02-11  Mg     1.8     02-10    TPro  5.0<L>  /  Alb  1.4<L>  /  TBili  1.5<H>  /  DBili  x   /  AST  68<H>  /  ALT  27  /  AlkPhos  484<H>  02-11  TPro  5.2<L>  /  Alb  1.5<L>  /  TBili  1.0  /  DBili  x   /  AST  30  /  ALT  20  /  AlkPhos  365<H>  02-10  TPro  5.1<L>  /  Alb  1.4<L>  /  TBili  1.0  /  DBili  x   /  AST  39  /  ALT  21  /  AlkPhos  366<H>  02-09          URINE STUDIES:        Sodium, Random Urine: 20 mmol/L (02-08 @ 03:44)  Osmolality, Random Urine: 462 mos/kg (02-08 @ 03:44)  Creatinine, Random Urine: 62 mg/dL (02-08 @ 03:44)                  RADIOLOGY & ADDITIONAL STUDIES: pt seen and examined.    SUBJECTIVE:  pt is awake, alert and comfortble on vent       Current meds:    ALBUTerol    90 MICROgram(s) HFA Inhaler 2 Puff(s) Inhalation every 6 hours  artificial  tears Solution 1 Drop(s) Both EYES two times a day  azithromycin  IVPB      azithromycin  IVPB 500 milliGRAM(s) IV Intermittent every 24 hours  cefTRIAXone   IVPB      cefTRIAXone   IVPB 1 Gram(s) IV Intermittent every 24 hours  dexmedetomidine Infusion 0.5 MICROgram(s)/kG/Hr IV Continuous <Continuous>  docusate sodium Liquid 100 milliGRAM(s) Oral two times a day  famotidine Injectable 20 milliGRAM(s) IV Push daily  fentaNYL   Infusion. 0.5 MICROgram(s)/kG/Hr IV Continuous <Continuous>  finasteride 5 milliGRAM(s) Oral daily  heparin  Injectable 5000 Unit(s) SubCutaneous every 8 hours  hydrochlorothiazide 12.5 milliGRAM(s) Oral daily  tamsulosin 0.4 milliGRAM(s) Oral at bedtime      Vital Signs    T(F): 99.7 (02-12-19 @ 04:00), Max: 100.1 (02-11-19 @ 20:00)  HR: 104 (02-12-19 @ 12:13) (94 - 129)  BP: 108/74 (02-12-19 @ 12:00) (84/43 - 135/75)  ABP: --  RR: 13 (02-12-19 @ 12:00) (11 - 18)  SpO2: 98% (02-12-19 @ 12:13) (89% - 99%)  Wt(kg): --  CVP(cm H2O): --  CO: --  PCWP: --    I and O's:    02-10 @ 07:01  -  02-11 @ 07:00  --------------------------------------------------------  IN:    dextrose 5%.: 500 mL    Enteral Tube Flush: 160 mL    Enteral Tube Flush: 100 mL    fentaNYL Infusion.: 249 mL    Free Water: 1350 mL    Glucerna 1.5: 330 mL    IV PiggyBack: 350 mL    Solution: 500 mL  Total IN: 3539 mL    OUT:    Indwelling Catheter - Urethral: 2040 mL  Total OUT: 2040 mL    Total NET: 1499 mL      02-11 @ 07:01 - 02-12 @ 07:00  --------------------------------------------------------  IN:    dextrose 5%.: 450 mL    Enteral Tube Flush: 700 mL    fentaNYL Infusion.: 384 mL    Free Water: 1500 mL    Glucerna 1.5: 440 mL    Osmolite 1.2: 520 mL    Solution: 250 mL  Total IN: 4244 mL    OUT:    Indwelling Catheter - Urethral: 1425 mL  Total OUT: 1425 mL    Total NET: 2819 mL      02-12 @ 07:01 - 02-12 @ 13:40  --------------------------------------------------------  IN:    dexmedetomidine Infusion: 15 mL    fentaNYL Infusion.: 54 mL    Free Water: 300 mL    Osmolite 1.2: 200 mL  Total IN: 569 mL    OUT:    Indwelling Catheter - Urethral: 265 mL  Total OUT: 265 mL    Total NET: 304 mL        Daily Height in cm: 165.1 (11 Feb 2019 22:00)    Daily     PHYSICAL EXAM:  Constitutional: well developed, Mal-nourished  and in nad  HEENT: PERRLA,  no icteric sclera and mild pallor of conjunctiva noted  Neck: No JVD, thyromegaly or adenopathy  Respiratory: reduced air entry lower lungs with few rhonchi  Cardiovascular: S1 and S2 normally heard  Gastrointestinal: soft, nondistended, nontender and normal bowel sounds heard  Extremities: 1 plus B/L LE and upper thigh edema noted, no cyanosis  Neurological: awake/alert   Skin: No rashes        LABS:    CBC:                          10.2   12.7  )-----------( 111      ( 12 Feb 2019 05:02 )             33.8           BMP:    02-12    145  |  114<H>  |  32<H>  ----------------------------<  104<H>  4.0   |  25  |  0.62  02-12    146<H>  |  115<H>  |  34<H>  ----------------------------<  104<H>  3.3<L>   |  25  |  0.64  02-11    151<H>  |  118<H>  |  36<H>  ----------------------------<  110<H>  3.8   |  25  |  0.71  02-11    153<H>  |  119<H>  |  37<H>  ----------------------------<  91  3.5   |  27  |  0.73  02-11    150<H>  |  117<H>  |  37<H>  ----------------------------<  81  3.4<L>   |  27  |  0.76  02-11    154<H>  |  123<H>  |  39<H>  ----------------------------<  84  3.9   |  24  |  0.70  02-11    153<H>  |  122<H>  |  38<H>  ----------------------------<  89  3.8   |  26  |  0.68  02-10    153<H>  |  123<H>  |  38<H>  ----------------------------<  91  3.9   |  23  |  0.70  02-10    157<H>  |  125<H>  |  40<H>  ----------------------------<  80  4.0   |  23  |  0.67  02-10    153<H>  |  123<H>  |  42<H>  ----------------------------<  90  3.8   |  25  |  0.76  02-10    154<H>  |  123<H>  |  41<H>  ----------------------------<  96  3.7   |  25  |  0.88  02-10    158<H>  |  124<H>  |  42<H>  ----------------------------<  113<H>  3.6   |  25  |  0.90  02-09    160<HH>  |  126<H>  |  45<H>  ----------------------------<  114<H>  4.0   |  25  |  0.88  02-09    161<HH>  |  128<H>  |  51<H>  ----------------------------<  90  3.3<L>   |  25  |  0.94    Ca    7.7<L>      12 Feb 2019 05:02  Ca    7.6<L>      12 Feb 2019 01:53  Ca    8.0<L>      11 Feb 2019 20:14  Ca    7.9<L>      11 Feb 2019 17:11  Ca    8.4      11 Feb 2019 12:09  Ca    8.0<L>      11 Feb 2019 04:51  Ca    8.6      11 Feb 2019 00:37  Ca    8.3<L>      10 Feb 2019 21:21  Ca    8.1<L>      10 Feb 2019 16:11  Ca    8.4      10 Feb 2019 12:22  Ca    8.3<L>      10 Feb 2019 09:36  Ca    8.7      10 Feb 2019 04:45  Ca    8.7      09 Feb 2019 22:20  Ca    9.0      09 Feb 2019 15:49  Phos  2.3     02-12  Phos  2.1     02-11  Phos  1.6     02-10  Mg     1.4     02-12  Mg     1.7     02-11  Mg     1.8     02-10    TPro  5.0<L>  /  Alb  1.4<L>  /  TBili  1.5<H>  /  DBili  x   /  AST  68<H>  /  ALT  27  /  AlkPhos  484<H>  02-11  TPro  5.2<L>  /  Alb  1.5<L>  /  TBili  1.0  /  DBili  x   /  AST  30  /  ALT  20  /  AlkPhos  365<H>  02-10  TPro  5.1<L>  /  Alb  1.4<L>  /  TBili  1.0  /  DBili  x   /  AST  39  /  ALT  21  /  AlkPhos  366<H>  02-09          URINE STUDIES:        Sodium, Random Urine: 20 mmol/L (02-08 @ 03:44)  Osmolality, Random Urine: 462 mos/kg (02-08 @ 03:44)  Creatinine, Random Urine: 62 mg/dL (02-08 @ 03:44)                  RADIOLOGY & ADDITIONAL STUDIES:  < from: Xray Chest 1 View- PORTABLE-Routine (02.12.19 @ 08:07) >  EXAM:  XR CHEST PORTABLE ROUTINE 1V                            PROCEDURE DATE:  02/12/2019          INTERPRETATION:  INDICATION: Respiratory failure    PRIORS: February 11, 2019.    VIEWS: Portable AP radiography of the chest performed.    FINDINGS: Since prior evaluation, no significant interval change is   identified. The position of the indwelling ETT is unchanged. The distal   aspect of the indwelling NGT overlies the expected region of the stomach.   Heart size cannot be quantitated on this portable evaluation. Extensive   airspace opacity and regions of consolidation identified bilaterally,   unchanged. Small pleural effusions cannot be excluded. There is no   evidence for pneumothorax. No mediastinal shift is noted. No osseous   fractures identified.    IMPRESSION: No significant interval change    < end of copied text >

## 2019-02-12 NOTE — PROGRESS NOTE ADULT - ASSESSMENT
85 y/o M from [facility], PMH of HTN, BPH, dementia , had recent fall on 1/22/19 had fracture of Rt intertrochanteric fracture, underwent surgical fixation, sent in from NH for respiratory distress.  In ED, patient was tachycardic to 120/min, BP- 99/58 mmhg, RR- 20/min, spo2- 90 % . EKG-  afib @105 BPM , prolonged QTC - 520, no ST T wave changes. cardiac enzymes x1- 0.059, BNP- 7244, UA- >50 WBC and moderate LE , CXR s/o Patchy right lower lobe infiltrate. Cardiomegaly. Labs pertinent for WBC- 57.6, H.H of 9.3/31.4, lactate of 9.3, K of 6.4, bicarb of 8, AG of 24,bun/ creat- 177/8.69, s/p 1 dose of cefepime, vancomycin and azithromycin with 1.5 L NS bolus  in ED   Pt had barba catheter placed, about 750 cc of cloudy urine drained. ABG - 7.35/ 13/106/7/100% NRB. Pt was placed on NIV BIPAP for work of breathing.     Pt is admitted to ICU on 2/7/18 for hypoxic respiratory failure with metabolic acidosis and respiratory alkalosis and sepsis secondary to UTI and ? PNA, ,2) Septic shock secondary to UTI and pneumonia 3) Acute renal failure secondary to UTI and septic shock.

## 2019-02-12 NOTE — PROGRESS NOTE ADULT - ASSESSMENT
1. Septic shock  2. Multifocal pneumonia  3. UTI  4. Bacteremia  5. Leukocytosis    Plan:  - pts abxs were switched to rocephin 1 gm iv q24hrs and azithromycin 500mgs iv q24hrs  - time spent - 30 minutes

## 2019-02-13 DIAGNOSIS — A41.9 SEPSIS, UNSPECIFIED ORGANISM: ICD-10-CM

## 2019-02-13 DIAGNOSIS — E43 UNSPECIFIED SEVERE PROTEIN-CALORIE MALNUTRITION: ICD-10-CM

## 2019-02-13 DIAGNOSIS — R53.81 OTHER MALAISE: ICD-10-CM

## 2019-02-13 DIAGNOSIS — Z51.5 ENCOUNTER FOR PALLIATIVE CARE: ICD-10-CM

## 2019-02-13 LAB
ANION GAP SERPL CALC-SCNC: 7 MMOL/L — SIGNIFICANT CHANGE UP (ref 5–17)
ANION GAP SERPL CALC-SCNC: 8 MMOL/L — SIGNIFICANT CHANGE UP (ref 5–17)
BASE EXCESS BLDA CALC-SCNC: 5 MMOL/L — HIGH (ref -2–2)
BASOPHILS # BLD AUTO: 0 K/UL — SIGNIFICANT CHANGE UP (ref 0–0.2)
BASOPHILS NFR BLD AUTO: 0 % — SIGNIFICANT CHANGE UP (ref 0–2)
BLOOD GAS COMMENTS ARTERIAL: SIGNIFICANT CHANGE UP
BUN SERPL-MCNC: 25 MG/DL — HIGH (ref 7–18)
BUN SERPL-MCNC: 27 MG/DL — HIGH (ref 7–18)
CALCIUM SERPL-MCNC: 7.5 MG/DL — LOW (ref 8.4–10.5)
CALCIUM SERPL-MCNC: 7.7 MG/DL — LOW (ref 8.4–10.5)
CHLORIDE SERPL-SCNC: 110 MMOL/L — HIGH (ref 96–108)
CHLORIDE SERPL-SCNC: 111 MMOL/L — HIGH (ref 96–108)
CO2 SERPL-SCNC: 27 MMOL/L — SIGNIFICANT CHANGE UP (ref 22–31)
CO2 SERPL-SCNC: 29 MMOL/L — SIGNIFICANT CHANGE UP (ref 22–31)
CREAT SERPL-MCNC: 0.58 MG/DL — SIGNIFICANT CHANGE UP (ref 0.5–1.3)
CREAT SERPL-MCNC: 0.62 MG/DL — SIGNIFICANT CHANGE UP (ref 0.5–1.3)
EOSINOPHIL # BLD AUTO: 0 K/UL — SIGNIFICANT CHANGE UP (ref 0–0.5)
EOSINOPHIL NFR BLD AUTO: 0 % — SIGNIFICANT CHANGE UP (ref 0–6)
GLUCOSE SERPL-MCNC: 93 MG/DL — SIGNIFICANT CHANGE UP (ref 70–99)
GLUCOSE SERPL-MCNC: 97 MG/DL — SIGNIFICANT CHANGE UP (ref 70–99)
HCO3 BLDA-SCNC: 28 MMOL/L — HIGH (ref 23–27)
HCT VFR BLD CALC: 30.6 % — LOW (ref 39–50)
HGB BLD-MCNC: 9.4 G/DL — LOW (ref 13–17)
HOROWITZ INDEX BLDA+IHG-RTO: 80 — SIGNIFICANT CHANGE UP
LYMPHOCYTES # BLD AUTO: 0.55 K/UL — LOW (ref 1–3.3)
LYMPHOCYTES # BLD AUTO: 4 % — LOW (ref 13–44)
MAGNESIUM SERPL-MCNC: 1.6 MG/DL — SIGNIFICANT CHANGE UP (ref 1.6–2.6)
MCHC RBC-ENTMCNC: 29.5 PG — SIGNIFICANT CHANGE UP (ref 27–34)
MCHC RBC-ENTMCNC: 30.7 GM/DL — LOW (ref 32–36)
MCV RBC AUTO: 95.9 FL — SIGNIFICANT CHANGE UP (ref 80–100)
MONOCYTES # BLD AUTO: 0.55 K/UL — SIGNIFICANT CHANGE UP (ref 0–0.9)
MONOCYTES NFR BLD AUTO: 4 % — SIGNIFICANT CHANGE UP (ref 2–14)
NEUTROPHILS # BLD AUTO: 12.72 K/UL — HIGH (ref 1.8–7.4)
NEUTROPHILS NFR BLD AUTO: 80 % — HIGH (ref 43–77)
PCO2 BLDA: 39 MMHG — SIGNIFICANT CHANGE UP (ref 32–46)
PH BLDA: 7.48 — HIGH (ref 7.35–7.45)
PHOSPHATE SERPL-MCNC: 2.3 MG/DL — LOW (ref 2.5–4.5)
PLATELET # BLD AUTO: 149 K/UL — LOW (ref 150–400)
PO2 BLDA: 92 MMHG — SIGNIFICANT CHANGE UP (ref 74–108)
POTASSIUM SERPL-MCNC: 2.6 MMOL/L — CRITICAL LOW (ref 3.5–5.3)
POTASSIUM SERPL-MCNC: 4 MMOL/L — SIGNIFICANT CHANGE UP (ref 3.5–5.3)
POTASSIUM SERPL-SCNC: 2.6 MMOL/L — CRITICAL LOW (ref 3.5–5.3)
POTASSIUM SERPL-SCNC: 4 MMOL/L — SIGNIFICANT CHANGE UP (ref 3.5–5.3)
RBC # BLD: 3.19 M/UL — LOW (ref 4.2–5.8)
RBC # FLD: 18.9 % — HIGH (ref 10.3–14.5)
SAO2 % BLDA: 98 % — HIGH (ref 92–96)
SODIUM SERPL-SCNC: 146 MMOL/L — HIGH (ref 135–145)
SODIUM SERPL-SCNC: 146 MMOL/L — HIGH (ref 135–145)
WBC # BLD: 13.83 K/UL — HIGH (ref 3.8–10.5)
WBC # FLD AUTO: 13.83 K/UL — HIGH (ref 3.8–10.5)

## 2019-02-13 PROCEDURE — 99223 1ST HOSP IP/OBS HIGH 75: CPT

## 2019-02-13 PROCEDURE — 99497 ADVNCD CARE PLAN 30 MIN: CPT | Mod: 25

## 2019-02-13 PROCEDURE — 71045 X-RAY EXAM CHEST 1 VIEW: CPT | Mod: 26

## 2019-02-13 RX ORDER — POTASSIUM CHLORIDE 20 MEQ
20 PACKET (EA) ORAL
Qty: 0 | Refills: 0 | Status: COMPLETED | OUTPATIENT
Start: 2019-02-13 | End: 2019-02-13

## 2019-02-13 RX ORDER — POTASSIUM PHOSPHATE, MONOBASIC POTASSIUM PHOSPHATE, DIBASIC 236; 224 MG/ML; MG/ML
30 INJECTION, SOLUTION INTRAVENOUS ONCE
Qty: 0 | Refills: 0 | Status: COMPLETED | OUTPATIENT
Start: 2019-02-13 | End: 2019-02-13

## 2019-02-13 RX ORDER — MAGNESIUM SULFATE 500 MG/ML
1 VIAL (ML) INJECTION ONCE
Qty: 0 | Refills: 0 | Status: COMPLETED | OUTPATIENT
Start: 2019-02-13 | End: 2019-02-13

## 2019-02-13 RX ORDER — ENOXAPARIN SODIUM 100 MG/ML
40 INJECTION SUBCUTANEOUS DAILY
Qty: 0 | Refills: 0 | Status: DISCONTINUED | OUTPATIENT
Start: 2019-02-13 | End: 2019-02-25

## 2019-02-13 RX ADMIN — HEPARIN SODIUM 5000 UNIT(S): 5000 INJECTION INTRAVENOUS; SUBCUTANEOUS at 13:03

## 2019-02-13 RX ADMIN — ALBUTEROL 2 PUFF(S): 90 AEROSOL, METERED ORAL at 02:04

## 2019-02-13 RX ADMIN — ALBUTEROL 2 PUFF(S): 90 AEROSOL, METERED ORAL at 21:12

## 2019-02-13 RX ADMIN — POTASSIUM PHOSPHATE, MONOBASIC POTASSIUM PHOSPHATE, DIBASIC 85 MILLIMOLE(S): 236; 224 INJECTION, SOLUTION INTRAVENOUS at 07:48

## 2019-02-13 RX ADMIN — TAMSULOSIN HYDROCHLORIDE 0.4 MILLIGRAM(S): 0.4 CAPSULE ORAL at 22:21

## 2019-02-13 RX ADMIN — DEXMEDETOMIDINE HYDROCHLORIDE IN 0.9% SODIUM CHLORIDE 7.45 MICROGRAM(S)/KG/HR: 4 INJECTION INTRAVENOUS at 07:30

## 2019-02-13 RX ADMIN — Medication 1 DROP(S): at 05:44

## 2019-02-13 RX ADMIN — ALBUTEROL 2 PUFF(S): 90 AEROSOL, METERED ORAL at 08:44

## 2019-02-13 RX ADMIN — Medication 100 MILLIGRAM(S): at 17:03

## 2019-02-13 RX ADMIN — Medication 50 MILLIEQUIVALENT(S): at 07:48

## 2019-02-13 RX ADMIN — FAMOTIDINE 20 MILLIGRAM(S): 10 INJECTION INTRAVENOUS at 11:48

## 2019-02-13 RX ADMIN — ENOXAPARIN SODIUM 40 MILLIGRAM(S): 100 INJECTION SUBCUTANEOUS at 22:21

## 2019-02-13 RX ADMIN — Medication 50 MILLIEQUIVALENT(S): at 09:40

## 2019-02-13 RX ADMIN — PHENYLEPHRINE HYDROCHLORIDE 1.12 MICROGRAM(S)/KG/MIN: 10 INJECTION INTRAVENOUS at 00:00

## 2019-02-13 RX ADMIN — FINASTERIDE 5 MILLIGRAM(S): 5 TABLET, FILM COATED ORAL at 11:48

## 2019-02-13 RX ADMIN — DEXMEDETOMIDINE HYDROCHLORIDE IN 0.9% SODIUM CHLORIDE 7.45 MICROGRAM(S)/KG/HR: 4 INJECTION INTRAVENOUS at 11:22

## 2019-02-13 RX ADMIN — Medication 50 MILLIEQUIVALENT(S): at 11:23

## 2019-02-13 RX ADMIN — HEPARIN SODIUM 5000 UNIT(S): 5000 INJECTION INTRAVENOUS; SUBCUTANEOUS at 05:44

## 2019-02-13 RX ADMIN — DEXMEDETOMIDINE HYDROCHLORIDE IN 0.9% SODIUM CHLORIDE 7.45 MICROGRAM(S)/KG/HR: 4 INJECTION INTRAVENOUS at 18:49

## 2019-02-13 RX ADMIN — CEFTRIAXONE 100 GRAM(S): 500 INJECTION, POWDER, FOR SOLUTION INTRAMUSCULAR; INTRAVENOUS at 14:25

## 2019-02-13 RX ADMIN — DEXMEDETOMIDINE HYDROCHLORIDE IN 0.9% SODIUM CHLORIDE 7.45 MICROGRAM(S)/KG/HR: 4 INJECTION INTRAVENOUS at 14:41

## 2019-02-13 RX ADMIN — Medication 100 GRAM(S): at 10:36

## 2019-02-13 RX ADMIN — ALBUTEROL 2 PUFF(S): 90 AEROSOL, METERED ORAL at 16:08

## 2019-02-13 RX ADMIN — Medication 1 DROP(S): at 17:01

## 2019-02-13 RX ADMIN — AZITHROMYCIN 250 MILLIGRAM(S): 500 TABLET, FILM COATED ORAL at 17:01

## 2019-02-13 RX ADMIN — DEXMEDETOMIDINE HYDROCHLORIDE IN 0.9% SODIUM CHLORIDE 7.45 MICROGRAM(S)/KG/HR: 4 INJECTION INTRAVENOUS at 05:45

## 2019-02-13 NOTE — CONSULT NOTE ADULT - PROBLEM SELECTOR RECOMMENDATION 5
Met with patient's son Carmelo as well as other family member in conjunction with medical resident and Dr Rodriguez ICU attending regarding current clinical condition and overall goals of care.  He said he was unaware of any HCP. They continue to remain hopeful that he will improve with medical treatment however they understand how grave his condition is at this time. If his heart were to stop, they would prefer to allow for natural death and not have CPR done given his debilitated condition however wish to speak w other siblings. I later spoke with patient's daughter Gerri Be via phone in the presence of Carmelo. She expressed that she is the patient's HCP, she will bring in the paperwork. She confirmed that the family does not want CPR if his heart stops. For now, they wish to continue all other treatments. [facility]ATT drafted for DNR. All questions answered, support provided.     Total advance care planning discussion time: 25 min.

## 2019-02-13 NOTE — PROGRESS NOTE ADULT - SUBJECTIVE AND OBJECTIVE BOX
INTERVAL HPI/OVERNIGHT EVENTS: Mr. Vidal is seen at the bedside. Responds nonverbally to stimuli. Hemodynamic instability with hypotension overnight noted. BP improved on pressor.    PRESSORS: [X ] YES [ ] NO  WHICH: phenylephrine    ANTIBIOTICS: rocephin 1g qd, zithromax 500mg qd    Antimicrobial:  azithromycin  IVPB      azithromycin  IVPB 500 milliGRAM(s) IV Intermittent every 24 hours  cefTRIAXone   IVPB      cefTRIAXone   IVPB 1 Gram(s) IV Intermittent every 24 hours    Cardiovascular:  phenylephrine    Infusion 0.1 MICROgram(s)/kG/Min IV Continuous <Continuous>  tamsulosin 0.4 milliGRAM(s) Oral at bedtime    Pulmonary:  ALBUTerol    90 MICROgram(s) HFA Inhaler 2 Puff(s) Inhalation every 6 hours    Hematalogic:  heparin  Injectable 5000 Unit(s) SubCutaneous every 8 hours    Other:  artificial  tears Solution 1 Drop(s) Both EYES two times a day  dexmedetomidine Infusion 0.5 MICROgram(s)/kG/Hr IV Continuous <Continuous>  docusate sodium 100 milliGRAM(s) Oral two times a day  docusate sodium Liquid 100 milliGRAM(s) Oral two times a day  famotidine Injectable 20 milliGRAM(s) IV Push daily  fentaNYL   Infusion. 0.5 MICROgram(s)/kG/Hr IV Continuous <Continuous>  finasteride 5 milliGRAM(s) Oral daily  senna 2 Tablet(s) Oral at bedtime    ALBUTerol    90 MICROgram(s) HFA Inhaler 2 Puff(s) Inhalation every 6 hours  artificial  tears Solution 1 Drop(s) Both EYES two times a day  azithromycin  IVPB      azithromycin  IVPB 500 milliGRAM(s) IV Intermittent every 24 hours  cefTRIAXone   IVPB      cefTRIAXone   IVPB 1 Gram(s) IV Intermittent every 24 hours  dexmedetomidine Infusion 0.5 MICROgram(s)/kG/Hr IV Continuous <Continuous>  docusate sodium 100 milliGRAM(s) Oral two times a day  docusate sodium Liquid 100 milliGRAM(s) Oral two times a day  famotidine Injectable 20 milliGRAM(s) IV Push daily  fentaNYL   Infusion. 0.5 MICROgram(s)/kG/Hr IV Continuous <Continuous>  finasteride 5 milliGRAM(s) Oral daily  heparin  Injectable 5000 Unit(s) SubCutaneous every 8 hours  phenylephrine    Infusion 0.1 MICROgram(s)/kG/Min IV Continuous <Continuous>  senna 2 Tablet(s) Oral at bedtime  tamsulosin 0.4 milliGRAM(s) Oral at bedtime    Drug Dosing Weight  Height (cm): 165.1 (11 Feb 2019 22:00)  Weight (kg): 59.6 (11 Feb 2019 22:00)  BMI (kg/m2): 21.9 (11 Feb 2019 22:00)  BSA (m2): 1.65 (11 Feb 2019 22:00)    CENTRAL LINE: [X] YES [ ] NO  LOCATION: right femoral DATE INSERTED:  2/13/19  REMOVE: [ ] YES [ ] NO  EXPLAIN:    ZAFAR: [x ] YES [ ] NO    DATE INSERTED: 2/7/19  REMOVE:  [ ] YES [ ] NO  EXPLAIN:    PMH -reviewed admission note, no change since admission  PAST MEDICAL & SURGICAL HISTORY:  Dementia  BPH (benign prostatic hyperplasia)  Hypertension  Closed hip fracture      ICU Vital Signs Last 24 Hrs  T(C): 36.2 (13 Feb 2019 11:30), Max: 37.4 (12 Feb 2019 20:21)  T(F): 97.2 (13 Feb 2019 11:30), Max: 99.4 (12 Feb 2019 20:21)  HR: 63 (13 Feb 2019 13:00) (63 - 117)  BP: 132/65 (13 Feb 2019 13:00) (73/37 - 181/80)  BP(mean): 80 (13 Feb 2019 13:00) (46 - 94)  ABP: --  ABP(mean): --  RR: 19 (13 Feb 2019 13:00) (16 - 29)  SpO2: 100% (13 Feb 2019 13:00) (82% - 100%)      ABG - ( 13 Feb 2019 03:51 )  pH, Arterial: 7.48  pH, Blood: x     /  pCO2: 39    /  pO2: 92    / HCO3: 28    / Base Excess: 5.0   /  SaO2: 98            02-12 @ 07:01  -  02-13 @ 07:00  --------------------------------------------------------  IN: 2019.8 mL / OUT: 4772 mL / NET: -2752.2 mL        Mode: AC/ CMV (Assist Control/ Continuous Mandatory Ventilation)  RR (machine): 12  TV (machine): 450  FiO2: 40  PEEP: 5  ITime: 1  MAP: 8  PIP: 17      PHYSICAL EXAM:    GENERAL:  NAD; on ventilator  HEAD:  Atraumatic, Normocephalic  EYES: EOMI, PERRLA, conjunctiva and sclera clear  ENMT: No tonsillar erythema, exudates, or enlargement; dry mucous membranes,   NECK: Supple, normal appearance, No JVD; Normal thyroid; Trachea midline  NERVOUS SYSTEM: awakens to verbal stimuli when prompted  CHEST/LUNG: bilateral rales   HEART: Regular rate and rhythm; No murmurs, rubs, or gallops  ABDOMEN: Soft, Nontender, Nondistended; Bowel sounds present  EXTREMITIES: marked edema of all four extremities; right femoral line in place  LYMPH: No lymphadenopathy noted      LABS:  CBC Full  -  ( 13 Feb 2019 05:38 )  WBC Count : 13.83 K/uL  Hemoglobin : 9.4 g/dL  Hematocrit : 30.6 %  Platelet Count - Automated : 149 K/uL  Mean Cell Volume : 95.9 fl  Mean Cell Hemoglobin : 29.5 pg  Mean Cell Hemoglobin Concentration : 30.7 gm/dL  Auto Neutrophil # : 12.72 K/uL  Auto Lymphocyte # : 0.55 K/uL  Auto Monocyte # : 0.55 K/uL  Auto Eosinophil # : 0.00 K/uL  Auto Basophil # : 0.00 K/uL  Auto Neutrophil % : 80.0 %  Auto Lymphocyte % : 4.0 %  Auto Monocyte % : 4.0 %  Auto Eosinophil % : 0.0 %  Auto Basophil % : 0.0 %    02-13    146<H>  |  110<H>  |  27<H>  ----------------------------<  93  2.6<LL>   |  29  |  0.62    Ca    7.5<L>      13 Feb 2019 05:38  Phos  2.3     02-13  Mg     1.6     02-13      Culture Results:   No growth to date. (02-11 @ 11:40)  Culture Results:   No growth to date. (02-11 @ 11:38)        CRITICAL CARE TIME SPENT: 35 minutes INTERVAL HPI/OVERNIGHT EVENTS: Mr. Vidal is seen at the bedside. Responds nonverbally to stimuli. Hemodynamic instability with hypotension overnight noted. BP improved on pressor.    PRESSORS: [X ] YES [ ] NO  WHICH: phenylephrine    ANTIBIOTICS: rocephin 1g qd, zithromax 500mg qd    Antimicrobial:  azithromycin  IVPB      azithromycin  IVPB 500 milliGRAM(s) IV Intermittent every 24 hours  cefTRIAXone   IVPB      cefTRIAXone   IVPB 1 Gram(s) IV Intermittent every 24 hours    Cardiovascular:  phenylephrine    Infusion 0.1 MICROgram(s)/kG/Min IV Continuous <Continuous>  tamsulosin 0.4 milliGRAM(s) Oral at bedtime    Pulmonary:  ALBUTerol    90 MICROgram(s) HFA Inhaler 2 Puff(s) Inhalation every 6 hours    Hematalogic:  heparin  Injectable 5000 Unit(s) SubCutaneous every 8 hours    Other:  artificial  tears Solution 1 Drop(s) Both EYES two times a day  dexmedetomidine Infusion 0.5 MICROgram(s)/kG/Hr IV Continuous <Continuous>  docusate sodium 100 milliGRAM(s) Oral two times a day  docusate sodium Liquid 100 milliGRAM(s) Oral two times a day  famotidine Injectable 20 milliGRAM(s) IV Push daily  fentaNYL   Infusion. 0.5 MICROgram(s)/kG/Hr IV Continuous <Continuous>  finasteride 5 milliGRAM(s) Oral daily  senna 2 Tablet(s) Oral at bedtime    ALBUTerol    90 MICROgram(s) HFA Inhaler 2 Puff(s) Inhalation every 6 hours  artificial  tears Solution 1 Drop(s) Both EYES two times a day  azithromycin  IVPB      azithromycin  IVPB 500 milliGRAM(s) IV Intermittent every 24 hours  cefTRIAXone   IVPB      cefTRIAXone   IVPB 1 Gram(s) IV Intermittent every 24 hours  dexmedetomidine Infusion 0.5 MICROgram(s)/kG/Hr IV Continuous <Continuous>  docusate sodium 100 milliGRAM(s) Oral two times a day  docusate sodium Liquid 100 milliGRAM(s) Oral two times a day  famotidine Injectable 20 milliGRAM(s) IV Push daily  fentaNYL   Infusion. 0.5 MICROgram(s)/kG/Hr IV Continuous <Continuous>  finasteride 5 milliGRAM(s) Oral daily  heparin  Injectable 5000 Unit(s) SubCutaneous every 8 hours  phenylephrine    Infusion 0.1 MICROgram(s)/kG/Min IV Continuous <Continuous>  senna 2 Tablet(s) Oral at bedtime  tamsulosin 0.4 milliGRAM(s) Oral at bedtime    Drug Dosing Weight  Height (cm): 165.1 (11 Feb 2019 22:00)  Weight (kg): 59.6 (11 Feb 2019 22:00)  BMI (kg/m2): 21.9 (11 Feb 2019 22:00)  BSA (m2): 1.65 (11 Feb 2019 22:00)    CENTRAL LINE: [X] YES [ ] NO  LOCATION: right femoral DATE INSERTED:  2/13/19  REMOVE: [ ] YES [ ] NO  EXPLAIN:    ZAFAR: [x ] YES [ ] NO    DATE INSERTED: 2/7/19  REMOVE:  [ ] YES [ ] NO  EXPLAIN:    PMH -reviewed admission note, no change since admission  PAST MEDICAL & SURGICAL HISTORY:  Dementia  BPH (benign prostatic hyperplasia)  Hypertension  Closed hip fracture      ICU Vital Signs Last 24 Hrs  T(C): 36.2 (13 Feb 2019 11:30), Max: 37.4 (12 Feb 2019 20:21)  T(F): 97.2 (13 Feb 2019 11:30), Max: 99.4 (12 Feb 2019 20:21)  HR: 63 (13 Feb 2019 13:00) (63 - 117)  BP: 132/65 (13 Feb 2019 13:00) (73/37 - 181/80)  BP(mean): 80 (13 Feb 2019 13:00) (46 - 94)  ABP: --  ABP(mean): --  RR: 19 (13 Feb 2019 13:00) (16 - 29)  SpO2: 100% (13 Feb 2019 13:00) (82% - 100%)      ABG - ( 13 Feb 2019 03:51 )  pH, Arterial: 7.48  pH, Blood: x     /  pCO2: 39    /  pO2: 92    / HCO3: 28    / Base Excess: 5.0   /  SaO2: 98            02-12 @ 07:01  -  02-13 @ 07:00  --------------------------------------------------------  IN: 2019.8 mL / OUT: 4772 mL / NET: -2752.2 mL        Mode: AC/ CMV (Assist Control/ Continuous Mandatory Ventilation)  RR (machine): 12  TV (machine): 450  FiO2: 40  PEEP: 5  ITime: 1  MAP: 8  PIP: 17      PHYSICAL EXAM:    GENERAL:  NAD; on ventilator  HEAD:  Atraumatic, Normocephalic  EYES:  PERRL, conjunctiva and sclera clear  ENMT: No tonsillar erythema, exudates, or enlargement; dry mucous membranes,   NECK: Supple, normal appearance, No JVD; Normal thyroid; Trachea midline  NERVOUS SYSTEM: awakens to verbal stimuli when prompted  CHEST/LUNG: bilateral rales   HEART: Regular rate and rhythm; No murmurs, rubs, or gallops  ABDOMEN: Soft, Nontender, Nondistended; Bowel sounds present  EXTREMITIES: marked edema of all four extremities; right femoral line in place  LYMPH: No lymphadenopathy noted      LABS:  CBC Full  -  ( 13 Feb 2019 05:38 )  WBC Count : 13.83 K/uL  Hemoglobin : 9.4 g/dL  Hematocrit : 30.6 %  Platelet Count - Automated : 149 K/uL  Mean Cell Volume : 95.9 fl  Mean Cell Hemoglobin : 29.5 pg  Mean Cell Hemoglobin Concentration : 30.7 gm/dL  Auto Neutrophil # : 12.72 K/uL  Auto Lymphocyte # : 0.55 K/uL  Auto Monocyte # : 0.55 K/uL  Auto Eosinophil # : 0.00 K/uL  Auto Basophil # : 0.00 K/uL  Auto Neutrophil % : 80.0 %  Auto Lymphocyte % : 4.0 %  Auto Monocyte % : 4.0 %  Auto Eosinophil % : 0.0 %  Auto Basophil % : 0.0 %    02-13    146<H>  |  110<H>  |  27<H>  ----------------------------<  93  2.6<LL>   |  29  |  0.62    Ca    7.5<L>      13 Feb 2019 05:38  Phos  2.3     02-13  Mg     1.6     02-13      Culture Results:   No growth to date. (02-11 @ 11:40)  Culture Results:   No growth to date. (02-11 @ 11:38)        CRITICAL CARE TIME SPENT: 35 minutes

## 2019-02-13 NOTE — PROCEDURE NOTE - NSPROCDETAILS_GEN_ALL_CORE
sterile dressing applied/sterile technique, catheter placed/ultrasound guidance/guidewire recovered/lumen(s) aspirated and flushed

## 2019-02-13 NOTE — CONSULT NOTE ADULT - PROBLEM SELECTOR RECOMMENDATION 3
2/2 dementia, comorbidities. nutrition eval. started on TF. albumin 1.4, anasarca. High risk for skin failure.

## 2019-02-13 NOTE — PROGRESS NOTE ADULT - SUBJECTIVE AND OBJECTIVE BOX
pt seen and examined.pts current chart reviewed and case discussed with resident covering.    SUBJECTIVE:  pt feels fine and denies cp,sob,gi or gu/uremic  symptons    REVIEW OF SYSTEMS:  CONSTITUTIONAL: No weakness, fevers or chills  EYES/ENT: No visual changes;  No vertigo or throat pain   NECK: No pain or stiffness  RESPIRATORY: No cough, wheezing, hemoptysis; No shortness of breath  CARDIOVASCULAR: No chest pain or palpitations  GASTROINTESTINAL: No abdominal or epigastric pain. No nausea, vomiting, or hematemesis; No diarrhea or constipation. No melena or hematochezia.  GENITOURINARY: No dysuria, frequency , hematuria, flank pain or nocturia  NEUROLOGICAL: No numbness or weakness  SKIN: No itching, burning, rashes, or lesions   All other review of systems is negative unless indicated above    Current meds:    ALBUTerol    90 MICROgram(s) HFA Inhaler 2 Puff(s) Inhalation every 6 hours  artificial  tears Solution 1 Drop(s) Both EYES two times a day  azithromycin  IVPB      azithromycin  IVPB 500 milliGRAM(s) IV Intermittent every 24 hours  cefTRIAXone   IVPB      cefTRIAXone   IVPB 1 Gram(s) IV Intermittent every 24 hours  dexmedetomidine Infusion 0.5 MICROgram(s)/kG/Hr IV Continuous <Continuous>  docusate sodium 100 milliGRAM(s) Oral two times a day  docusate sodium Liquid 100 milliGRAM(s) Oral two times a day  famotidine Injectable 20 milliGRAM(s) IV Push daily  fentaNYL   Infusion. 0.5 MICROgram(s)/kG/Hr IV Continuous <Continuous>  finasteride 5 milliGRAM(s) Oral daily  heparin  Injectable 5000 Unit(s) SubCutaneous every 8 hours  phenylephrine    Infusion 0.1 MICROgram(s)/kG/Min IV Continuous <Continuous>  senna 2 Tablet(s) Oral at bedtime  tamsulosin 0.4 milliGRAM(s) Oral at bedtime      Vital Signs    T(F): 97.2 (19 @ 11:30), Max: 99.4 (19 @ 20:21)  HR: 63 (19 @ 13:00) (63 - 117)  BP: 132/65 (19 @ 13:00) (73/37 - 181/80)  ABP: --  RR: 19 (19 @ 13:00) (16 - 29)  SpO2: 100% (19 @ 13:00) (82% - 100%)  Wt(kg): --  CVP(cm H2O): --  CO: --  PCWP: --    I and O's:     @ 07:01  -   @ 07:00  --------------------------------------------------------  IN:    dextrose 5%.: 450 mL    Enteral Tube Flush: 700 mL    fentaNYL Infusion.: 384 mL    Free Water: 1500 mL    Glucerna 1.5: 440 mL    Osmolite 1.2: 520 mL    Solution: 250 mL  Total IN: 4244 mL    OUT:    Indwelling Catheter - Urethral: 1425 mL  Total OUT: 1425 mL    Total NET: 2819 mL       @ 07:01   @ 07:00  --------------------------------------------------------  IN:    dexmedetomidine Infusion: 182.4 mL    Enteral Tube Flush: 400 mL    fentaNYL Infusion.: 54 mL    Free Water: 300 mL    Osmolite 1.2: 960 mL    phenylephrine   Infusion: 123.4 mL  Total IN: 2019.8 mL    OUT:    Indwelling Catheter - Urethral: 4770 mL    Stool: 2 mL  Total OUT: 4772 mL    Total NET: -2752.2 mL       @ 07:01  -   @ 13:40  --------------------------------------------------------  IN:    dexmedetomidine Infusion: 80.5 mL    Enteral Tube Flush: 60 mL    IV PiggyBack: 499.8 mL    Osmolite 1.2: 280 mL    phenylephrine   Infusion: 104.6 mL    Solution: 100 mL    Solution: 300 mL  Total IN: 1424.9 mL    OUT:    Indwelling Catheter - Urethral: 620 mL  Total OUT: 620 mL    Total NET: 804.9 mL        Daily     Daily Weight in k.7 (2019 07:00)    PHYSICAL EXAM:  Constitutional: well developed, Mal-nourished  and in nad  HEENT: PERRLA,  no icteric sclera and mild pallor of conjunctiva noted  Neck: No JVD, thyromegaly or adenopathy  Respiratory: reduced air entry lower lungs with few rhonchi  Cardiovascular: S1 and S2 normally heard  Gastrointestinal: soft, nondistended, nontender and normal bowel sounds heard  Extremities: 1 plus B/L LE and upper thigh edema noted, no cyanosis  Neurological: awake/alert   Skin: No rashes      LABS:    CBC:                          9.4    13.83 )-----------( 149      ( 2019 05:38 )             30.6           BMP:        146<H>  |  110<H>  |  27<H>  ----------------------------<  93  2.6<LL>   |  29  |  0.62      143  |  110<H>  |  32<H>  ----------------------------<  149<H>  3.5   |  28  |  0.72      145  |  114<H>  |  32<H>  ----------------------------<  104<H>  4.0   |  25  |  0.62      146<H>  |  115<H>  |  34<H>  ----------------------------<  104<H>  3.3<L>   |  25  |  0.64  11    151<H>  |  118<H>  |  36<H>  ----------------------------<  110<H>  3.8   |  25  |  0.71  11    153<H>  |  119<H>  |  37<H>  ----------------------------<  91  3.5   |  27  |  0.73  02-11    150<H>  |  117<H>  |  37<H>  ----------------------------<  81  3.4<L>   |  27  |  0.76  02-11    154<H>  |  123<H>  |  39<H>  ----------------------------<  84  3.9   |  24  |  0.70  02-11    153<H>  |  122<H>  |  38<H>  ----------------------------<  89  3.8   |  26  |  0.68  02-10    153<H>  |  123<H>  |  38<H>  ----------------------------<  91  3.9   |  23  |  0.70  02-10    157<H>  |  125<H>  |  40<H>  ----------------------------<  80  4.0   |  23  |  0.67    Ca    7.5<L>      2019 05:38  Ca    7.9<L>      2019 14:20  Ca    7.7<L>      2019 05:02  Ca    7.6<L>      2019 01:53  Ca    8.0<L>      2019 20:14  Ca    7.9<L>      2019 17:11  Ca    8.4      2019 12:09  Ca    8.0<L>      2019 04:51  Ca    8.6      2019 00:37  Ca    8.3<L>      10 Feb 2019 21:21  Ca    8.1<L>      10 Feb 2019 16:11  Phos  2.3     02-13  Phos  2.3     -12  Phos  2.1     02-11  Mg     1.6     02-13  Mg     1.4     02-12  Mg     1.7     02-11    TPro  5.0<L>  /  Alb  1.4<L>  /  TBili  1.5<H>  /  DBili  x   /  AST  68<H>  /  ALT  27  /  AlkPhos  484<H>            URINE STUDIES:        Sodium, Random Urine: 20 mmol/L ( @ 03:44)  Osmolality, Random Urine: 462 mos/kg ( @ 03:44)  Creatinine, Random Urine: 62 mg/dL ( @ 03:44)                  RADIOLOGY & ADDITIONAL STUDIES: pt seen and examined    SUBJECTIVE:  pt is sedated on vent , now with low bp on pressor  u/o ok      Current meds:    ALBUTerol    90 MICROgram(s) HFA Inhaler 2 Puff(s) Inhalation every 6 hours  artificial  tears Solution 1 Drop(s) Both EYES two times a day  azithromycin  IVPB      azithromycin  IVPB 500 milliGRAM(s) IV Intermittent every 24 hours  cefTRIAXone   IVPB      cefTRIAXone   IVPB 1 Gram(s) IV Intermittent every 24 hours  dexmedetomidine Infusion 0.5 MICROgram(s)/kG/Hr IV Continuous <Continuous>  docusate sodium 100 milliGRAM(s) Oral two times a day  docusate sodium Liquid 100 milliGRAM(s) Oral two times a day  famotidine Injectable 20 milliGRAM(s) IV Push daily  fentaNYL   Infusion. 0.5 MICROgram(s)/kG/Hr IV Continuous <Continuous>  finasteride 5 milliGRAM(s) Oral daily  heparin  Injectable 5000 Unit(s) SubCutaneous every 8 hours  phenylephrine    Infusion 0.1 MICROgram(s)/kG/Min IV Continuous <Continuous>  senna 2 Tablet(s) Oral at bedtime  tamsulosin 0.4 milliGRAM(s) Oral at bedtime      Vital Signs    T(F): 97.2 (19 @ 11:30), Max: 99.4 (19 @ 20:21)  HR: 63 (19 @ 13:00) (63 - 117)  BP: 132/65 (19 @ 13:00) (73/37 - 181/80)  ABP: --  RR: 19 (19 @ 13:00) (16 - 29)  SpO2: 100% (19 @ 13:00) (82% - 100%)  Wt(kg): --  CVP(cm H2O): --  CO: --  PCWP: --    I and O's:     @ 07:01  -   @ 07:00  --------------------------------------------------------  IN:    dextrose 5%.: 450 mL    Enteral Tube Flush: 700 mL    fentaNYL Infusion.: 384 mL    Free Water: 1500 mL    Glucerna 1.5: 440 mL    Osmolite 1.2: 520 mL    Solution: 250 mL  Total IN: 4244 mL    OUT:    Indwelling Catheter - Urethral: 1425 mL  Total OUT: 1425 mL    Total NET: 2819 mL       @ 07:01   @ 07:00  --------------------------------------------------------  IN:    dexmedetomidine Infusion: 182.4 mL    Enteral Tube Flush: 400 mL    fentaNYL Infusion.: 54 mL    Free Water: 300 mL    Osmolite 1.2: 960 mL    phenylephrine   Infusion: 123.4 mL  Total IN: 2019.8 mL    OUT:    Indwelling Catheter - Urethral: 4770 mL    Stool: 2 mL  Total OUT: 4772 mL    Total NET: -2752.2 mL       @ 07: @ 13:40  --------------------------------------------------------  IN:    dexmedetomidine Infusion: 80.5 mL    Enteral Tube Flush: 60 mL    IV PiggyBack: 499.8 mL    Osmolite 1.2: 280 mL    phenylephrine   Infusion: 104.6 mL    Solution: 100 mL    Solution: 300 mL  Total IN: 1424.9 mL    OUT:    Indwelling Catheter - Urethral: 620 mL  Total OUT: 620 mL    Total NET: 804.9 mL        Daily     Daily Weight in k.7 (2019 07:00)    PHYSICAL EXAM:  Constitutional: well developed, Mal-nourished  and in nad  HEENT: PERRLA,  no icteric sclera and mild pallor of conjunctiva noted  Neck: No JVD, thyromegaly or adenopathy  Respiratory: reduced air entry lower lungs with few rhonchi  Cardiovascular: S1 and S2 normally heard  Gastrointestinal: soft, nondistended, nontender and normal bowel sounds heard  Extremities:  B/L LE and upper thigh edema noted, no cyanosis  Neurological: sedated ,responsive to tactile stimuli  Skin: No rashes      LABS:    CBC:                          9.4    13.83 )-----------( 149      ( 2019 05:38 )             30.6           BMP:    0213    146<H>  |  110<H>  |  27<H>  ----------------------------<  93  2.6<LL>   |  29  |  0.62  0212    143  |  110<H>  |  32<H>  ----------------------------<  149<H>  3.5   |  28  |  0.72  0212    145  |  114<H>  |  32<H>  ----------------------------<  104<H>  4.0   |  25  |  0.62  12    146<H>  |  115<H>  |  34<H>  ----------------------------<  104<H>  3.3<L>   |  25  |  0.64  02-11    151<H>  |  118<H>  |  36<H>  ----------------------------<  110<H>  3.8   |  25  |  0.71  0211    153<H>  |  119<H>  |  37<H>  ----------------------------<  91  3.5   |  27  |  0.73  02-11    150<H>  |  117<H>  |  37<H>  ----------------------------<  81  3.4<L>   |  27  |  0.76  02-11    154<H>  |  123<H>  |  39<H>  ----------------------------<  84  3.9   |  24  |  0.70  02-11    153<H>  |  122<H>  |  38<H>  ----------------------------<  89  3.8   |  26  |  0.68  02-10    153<H>  |  123<H>  |  38<H>  ----------------------------<  91  3.9   |  23  |  0.70  02-10    157<H>  |  125<H>  |  40<H>  ----------------------------<  80  4.0   |  23  |  0.67    Ca    7.5<L>      2019 05:38  Ca    7.9<L>      2019 14:20  Ca    7.7<L>      2019 05:02  Ca    7.6<L>      2019 01:53  Ca    8.0<L>      2019 20:14  Ca    7.9<L>      2019 17:11  Ca    8.4      2019 12:09  Ca    8.0<L>      2019 04:51  Ca    8.6      2019 00:37  Ca    8.3<L>      10 Feb 2019 21:21  Ca    8.1<L>      10 Feb 2019 16:11  Phos  2.3     02-13  Phos  2.3     -12  Phos  2.1     02-11  Mg     1.6     02-13  Mg     1.4     02-12  Mg     1.7     02-11    TPro  5.0<L>  /  Alb  1.4<L>  /  TBili  1.5<H>  /  DBili  x   /  AST  68<H>  /  ALT  27  /  AlkPhos  484<H>      Blood Gas Profile - Arterial (19 @ 03:51)    pH, Arterial: 7.48    pCO2, Arterial: 39 mmHg    pO2, Arterial: 92 mmHg    HCO3, Arterial: 28 mmol/L    Base Excess, Arterial: 5.0 mmol/L    Oxygen Saturation, Arterial: 98 %    FIO2, Arterial: 80    Blood Gas Comments Arterial: AC 12/450/80/5, LR                        RADIOLOGY & ADDITIONAL STUDIES:    < from: Xray Chest 1 View- PORTABLE-Routine (19 @ 07:20) >  EXAM:  XR CHEST PORTABLE ROUTINE 1V                            PROCEDURE DATE:  2019          INTERPRETATION:  INDICATION: Respiratory failure    PRIORS: 2019 at 6:51 AM    VIEWS: Portable AP radiography of the chest performed.    FINDINGS: Heart size cannot be quantitated on this portable evaluation.   No superior mediastinal widening is identified. There is no change in   position of the indwelling ETT or NGT. There is no change in extensive   bilateral lung parenchymal airspace opacities and regions of   consolidation. Bilateral pleural effusions are likely present. There is   no evidence for pneumothorax. No mediastinal shift is noted. Degenerative   change of the thoracic spine noted.    IMPRESSION: No significant interval change.      < end of copied text >

## 2019-02-13 NOTE — PROCEDURE NOTE - NSPATIENTPOSTION_GEN_A_CORE
Telephone Encounter by Slime Mayer RN at 03/24/17 08:21 AM     Author:  Slime Mayer RN Service:  (none) Author Type:  Registered Nurse     Filed:  03/24/17 08:31 AM Encounter Date:  3/24/2017 Status:  Signed     :  Slime Mayer RN (Registered Nurse)            Mom states that Kike was seen yesterday by Dr. Mckeon for congestion. Mom states that she had thought that she had an ear infection. But Dr. Mckeon did not think that she had one. Mom states that she was told to do Zyrtec in the morning and benadryl at night, and she was prescribed an antibiotic just in case since they are leaving for [facility]. Mom states that when they got home Kike was running a fever, and her nasal drainage was a green/yellow color so they started the antibiotics. Mom states that she also gave the benadryl and the Zyrtec. The benadryl caused her to be wired at night. But mom states that she seems the same. Advised mom to continue the Zyrtec and the amoxicillin. Use the humidifier, and have her sit in the steamy bathroom. Advised mom that the congestion also may get better while they are in [place] because it is more humid down there. Mom states understanding. Advised mom that if she gets worse she will need to be seen in an Immediate Care in [place]. Mom states understanding and denies questions at this time[CD1.1M]      Revision History        User Key Date/Time User Provider Type Action    > CD1.1 03/24/17 08:31 AM Slime Mayer RN Registered Nurse Sign    M - Manual             Trendelenburg

## 2019-02-13 NOTE — PROGRESS NOTE ADULT - PROBLEM SELECTOR PLAN 2
2/2 PNA and E. coli bacteremia  repeat BCx negative to date  c/w rocephin 1g qd, day 2  c/w zithromax qd, day 2  removed left femoral central line on 2/12/19  ID - Dr. Norton 2/2 PNA and E. coli bacteremia  repeat BCx negative to date  c/w rocephin 1g qd, day 3  c/w zithromax qd, day 3  removed left femoral central line on 2/12/19  right femoral central line inserted on 2/13/19  ID - Dr. Norton

## 2019-02-13 NOTE — PROGRESS NOTE ADULT - ASSESSMENT
85 y/o M from [facility], PMH of HTN, BPH, dementia , had recent fall on 1/22/19 had fracture of Rt intertrochanteric fracture, underwent surgical fixation, sent in from NH for respiratory distress.  In ED, patient was tachycardic to 120/min, BP- 99/58 mmhg, RR- 20/min, spo2- 90 % . EKG-  afib @105 BPM , prolonged QTC - 520, no ST T wave changes. cardiac enzymes x1- 0.059, BNP- 7244, UA- >50 WBC and moderate LE , CXR s/o Patchy right lower lobe infiltrate. Cardiomegaly. Labs pertinent for WBC- 57.6, H.H of 9.3/31.4, lactate of 9.3, K of 6.4, bicarb of 8, AG of 24,bun/ creat- 177/8.69, s/p 1 dose of cefepime, vancomycin and azithromycin with 1.5 L NS bolus  in ED   Pt had barba catheter placed, about 750 cc of cloudy urine drained. ABG - 7.35/ 13/106/7/100% NRB. Pt was placed on NIV BIPAP for work of breathing.     Pt is admitted to ICU on 2/7/18 for hypoxic respiratory failure with metabolic acidosis and respiratory alkalosis and sepsis secondary to UTI and ? PNA, ,2) Septic shock secondary to UTI and pneumonia 3) Acute renal failure secondary to UTI and septic shock.

## 2019-02-13 NOTE — CONSULT NOTE ADULT - PROBLEM SELECTOR RECOMMENDATION 4
2/2 dementia, recent hip fracture s/p repair, minimally ambulating prior to admission, dependent on ADLs

## 2019-02-13 NOTE — PROGRESS NOTE ADULT - ASSESSMENT
A/P:  1.PAULINA: most likley secondary to obstructive uropathy , now improved  -Keep patient euvolemic   -Avoid Nephrotoxic Meds/ Agents such as (NSAIDs, IV contrast, Aminoglycosides such as gentamicin, -Gadolinium contrast, Phosphate containing enemas, etc..)  -Adjust Medications according to eGFR  -f/u bmp daily  2.HYPOKALEMIA: now improved  -keep k >4 and <5  -f/u k level daily  3.HYPERNATREMIA: now improved  -avoid Na correction >8 meq per 24 hrs  -f/u Na level q 12 hrs  -continue  Hctz 12.5 MG daily for fluid overload   4.METABOLIC ACIDOSIS: secondary to septic shock plus paulina induced ,now improved  -f/u co2 daily  5.HYPOPHOSPHATEMIA: secondary to improved renal function  -replace prn to keep phos level >3 and <5  -f/u phos level daily  6.EDEMA: B/L Pleural effusion on cxr  -keep pt evolemic  - continue  diuresis as per bp/na level A/P:  1.PAULINA: most likley secondary to obstructive uropathy , now improved  -Keep patient euvolemic   -Avoid Nephrotoxic Meds/ Agents such as (NSAIDs, IV contrast, Aminoglycosides such as gentamicin, -Gadolinium contrast, Phosphate containing enemas, etc..)  -Adjust Medications according to eGFR  -f/u bmp daily  2.HYPOKALEMIA: now worsening with diuresis  -replace kcl prn  -keep k >4 and <5  -f/u k level daily  3.HYPERNATREMIA: mild .secondary to diuresis  -avoid Na correction >8 meq per 24 hrs  -f/u Na level q 12 hrs  -continue  Hctz 12.5 MG daily for fluid overload   4.METABOLIC ACIDOSIS: now improved  -f/u co2 daily  5.HYPOPHOSPHATEMIA: secondary to improved renal function  -replace prn to keep phos level >3 and <5  -f/u phos level daily  6.EDEMA: B/L LE , secondary to capillary leak ?secondary to sepsis   -keep pt evolemic  - continue  diuresis as per bp/na level

## 2019-02-13 NOTE — CONSULT NOTE ADULT - SUBJECTIVE AND OBJECTIVE BOX
HPI:  85 y/o M from [facility], PMH of HTN, BPH, dementia , had recent fall on 1/22/19 had fracture of Rt intertrochanteric fracture, underwent surgical fixation, sent in from NH for respiratory distress. Pt is demented, hence hx obtained from NH papers and daughter at the bedside using  # [id number]. According to daughter pt is not doing well since surgery and barely walks, with worsening lower limb edema and SOB. Pt was started on Duoneb inhalation and today CXR was done, was told pt has PNA and was sent in for further evaluation. Pt's daughter also reports pt being more confused since last Sunday.      In ED, patient was tachycardic to 120/min, BP- 99/58 mmhg, RR- 20/min, spo2- 90 % . EKG-  afib @105 BPM , prolonged QTC - 520, no ST T wave changes. cardiac enzymes x1- 0.059, BNP- 7244, UA- >50 WBC and moderate LE , CXR s/o Patchy right lower lobe infiltrate. Cardiomegaly. Labs pertinent for WBC- 57.6, H.H of 9.3/31.4, lactate of 9.3, K of 6.4, bicarb of 8, AG of 24,bun/ creat- 177/8.69, s/p 1 dose of cefepime, vancomycin and azithromycin with 1.5 L NS bolus  in ED     Pt had barba catheter placed, about 750 cc of cloudy urine drained. ABG - 7.35/ 13/106/7/100% NRB. Pt was placed on NIV BIPAP for work of breathing.     Pt will be admitted to ICU for hypoxic respiratory failure with metabolic acidosis and respiratory alkalosis and sepsis secondary to UTI and ? PNA, management of Acute renal failure. (07 Feb 2019 19:25)    Interval history: patient seen and examined in ICU. Remains intubated, started on pressors overnight. Palliative team consulted to address goals of care.       PAST MEDICAL & SURGICAL HISTORY:  Dementia  BPH (benign prostatic hyperplasia)  Hypertension  Closed hip fracture      SOCIAL HISTORY:  has 4 children  Admitted from: [facility]    Preferred Language: Korean    Surrogate/HCP/Guardian:   Gerri Be  daughter      Phone#: [phone]    FAMILY HISTORY:    Baseline ADLs (prior to admission): minimally ambulatory since hip fracture repair 1 mo ago, intermittently confused    Allergies    No Known Allergies    Intolerances      Present Symptoms:         Review of Systems:   Unable to obtain due to poor mentation     MEDICATIONS  (STANDING):  ALBUTerol    90 MICROgram(s) HFA Inhaler 2 Puff(s) Inhalation every 6 hours  artificial  tears Solution 1 Drop(s) Both EYES two times a day  azithromycin  IVPB      azithromycin  IVPB 500 milliGRAM(s) IV Intermittent every 24 hours  cefTRIAXone   IVPB      cefTRIAXone   IVPB 1 Gram(s) IV Intermittent every 24 hours  dexmedetomidine Infusion 0.5 MICROgram(s)/kG/Hr (7.45 mL/Hr) IV Continuous <Continuous>  docusate sodium Liquid 100 milliGRAM(s) Oral two times a day  famotidine Injectable 20 milliGRAM(s) IV Push daily  fentaNYL   Infusion. 0.5 MICROgram(s)/kG/Hr (2.98 mL/Hr) IV Continuous <Continuous>  finasteride 5 milliGRAM(s) Oral daily  heparin  Injectable 5000 Unit(s) SubCutaneous every 8 hours  phenylephrine    Infusion 0.1 MICROgram(s)/kG/Min (1.118 mL/Hr) IV Continuous <Continuous>  senna 2 Tablet(s) Oral at bedtime  tamsulosin 0.4 milliGRAM(s) Oral at bedtime    MEDICATIONS  (PRN):      PHYSICAL EXAM:    Vital Signs Last 24 Hrs  T(C): 37.1 (13 Feb 2019 15:00), Max: 37.4 (12 Feb 2019 20:21)  T(F): 98.8 (13 Feb 2019 15:00), Max: 99.4 (12 Feb 2019 20:21)  HR: 64 (13 Feb 2019 15:30) (62 - 109)  BP: 95/45 (13 Feb 2019 15:30) (73/37 - 181/80)  BP(mean): 56 (13 Feb 2019 15:30) (46 - 94)  RR: 24 (13 Feb 2019 15:30) (17 - 29)  SpO2: 100% (13 Feb 2019 15:30) (82% - 100%)       Karnofsky Performance Score/Palliative Performance Status Version2:  20   %     GENERAL:  sedated, intubated, ill appearing, NAD  HEENT: Atraumatic, sclera anicteric,  neck supple  CHEST/LUNG: unlabored  HEART: Regular rate and rhythm    ABDOMEN: Soft, Nontender, Nondistended   MUSCULOSKELETAL:  +anasarca  NERVOUS SYSTEM:  sedated  SKIN: No rashes or lesions noted  Oral intake: npo/TF       LABS:                        9.4    13.83 )-----------( 149      ( 13 Feb 2019 05:38 )             30.6     02-13    146<H>  |  111<H>  |  25<H>  ----------------------------<  97  4.0   |  27  |  0.58    Ca    7.7<L>      13 Feb 2019 14:46  Phos  2.3     02-13  Mg     1.6     02-13          RADIOLOGY & ADDITIONAL STUDIES:    ADVANCE DIRECTIVES:

## 2019-02-13 NOTE — PROGRESS NOTE ADULT - PROBLEM SELECTOR PLAN 3
Secondary to high UO (water losses)---> Na this   d/c D5W and free water as patient is becoming fluid overloaded  s/p lasix 20mg iv once today after albumin to remove excess fluid  Avoid Na correction >8 meq per 24 hrs  nephro - Dr. Berman Secondary to high UO (water losses)---> Na this   d/c D5W and free water as patient is becoming fluid overloaded  s/p lasix 20mg iv once today after albumin to remove excess fluid  Avoid Na correction >8 meq per 24 hrs  nephro - Dr. eBrman

## 2019-02-13 NOTE — CHART NOTE - NSCHARTNOTEFT_GEN_A_CORE
Assessment:   Patient is a 86y old  Male who presents with a chief complaint of respiratory distress (2019 13:39) Pt's TF changed  to Osmolite (1.2) at request of Nephrologist (noted).      Factors impacting intake: [ ] none [ ] nausea  [ ] vomiting [ ] diarrhea [ ] constipation  [ ]chewing problems [ ] swallowing issues  [ ] other:     Diet Presciption: Diet, NPO with Tube Feed:   Tube Feeding Modality: Nasogastric  Osmolite 1.2 Jerod  Total Volume for 24 Hours (mL): 960  Continuous  Starting Tube Feed Rate {mL per Hour}: 10  Increase Tube Feed Rate by (mL): 10     Every 2 hours  Until Goal Tube Feed Rate (mL per Hour): 40  Tube Feed Duration (in Hours): 24  Tube Feed Start Time: 18:10 (19 @ 18:05)    Intake:     Daily Height in cm: 165.1 (2019 22:00)  Height in cm: 165.1 (2019 16:08)      Daily Weight in k.7 (2019 07:00)  Weight in k.5 (2019 07:00)  Weight in k.5 (2019 13:35)  Weight in k.1 (2019 07:00)  Weight in k.1 (2019 07:30)  Weight in k.6 (2019 21:36)    % Weight Change    Pertinent Medications: MEDICATIONS  (STANDING):  ALBUTerol    90 MICROgram(s) HFA Inhaler 2 Puff(s) Inhalation every 6 hours  artificial  tears Solution 1 Drop(s) Both EYES two times a day  azithromycin  IVPB      azithromycin  IVPB 500 milliGRAM(s) IV Intermittent every 24 hours  cefTRIAXone   IVPB      cefTRIAXone   IVPB 1 Gram(s) IV Intermittent every 24 hours  dexmedetomidine Infusion 0.5 MICROgram(s)/kG/Hr (7.45 mL/Hr) IV Continuous <Continuous>  docusate sodium Liquid 100 milliGRAM(s) Oral two times a day  famotidine Injectable 20 milliGRAM(s) IV Push daily  fentaNYL   Infusion. 0.5 MICROgram(s)/kG/Hr (2.98 mL/Hr) IV Continuous <Continuous>  finasteride 5 milliGRAM(s) Oral daily  heparin  Injectable 5000 Unit(s) SubCutaneous every 8 hours  phenylephrine    Infusion 0.1 MICROgram(s)/kG/Min (1.118 mL/Hr) IV Continuous <Continuous>  senna 2 Tablet(s) Oral at bedtime  tamsulosin 0.4 milliGRAM(s) Oral at bedtime    MEDICATIONS  (PRN):    Pertinent Labs:  Na146 mmol/L<H> Glu 97 mg/dL K+ 4.0 mmol/L Cr  0.58 mg/dL BUN 25 mg/dL<H>  Phos 2.3 mg/dL<L>  Alb 1.4 g/dL<L>     CAPILLARY BLOOD GLUCOSE      POCT Blood Glucose.: 117 mg/dL (2019 12:13)  POCT Blood Glucose.: 111 mg/dL (2019 05:06)  POCT Blood Glucose.: 120 mg/dL (2019 17:55)    Skin:     Estimated Needs:   [x ] no change since previous assessment  [ ] recalculated:       Previous Nutrition Diagnosis:   [ ] Altered GI function  [ ]Inadequate Oral Intake [ ] Swallowing Difficulty   [ ] Altered nutrition related labs [ ] Increased Nutrient Needs [ ] Overweight/Obesity   [ ] Unintended Weight Loss [ ] Food & Nutrition Related Knowledge Deficit [ x] Malnutrition (moderate)   [ ] Other:     Nutrition Diagnosis is [x ] ongoing  [ ] resolved [ ] not applicable     New Nutrition Diagnosis: [ ] not applicable       Interventions:   Recommend  [ ] Change Diet To:  [ ] Nutrition Supplement  [x ] Nutrition Support : Osmolite 1.2 50 x24 (goal): 50 ml/hr x 24 hrs: 1200 mls, 1440 kcals, 66.6 gm protein)  [ X] Other: MD to monitor. RD available.     Monitoring and Evaluation:   [ ] PO intake [ x ] Tolerance to diet prescription [ x ] weights [ x ] labs[ x ] follow up per protocol  [ ] other:

## 2019-02-14 DIAGNOSIS — I95.9 HYPOTENSION, UNSPECIFIED: ICD-10-CM

## 2019-02-14 LAB
ANION GAP SERPL CALC-SCNC: 7 MMOL/L — SIGNIFICANT CHANGE UP (ref 5–17)
BASE EXCESS BLDA CALC-SCNC: 4.8 MMOL/L — HIGH (ref -2–2)
BASE EXCESS BLDA CALC-SCNC: 5.1 MMOL/L — HIGH (ref -2–2)
BASE EXCESS BLDA CALC-SCNC: 5.6 MMOL/L — HIGH (ref -2–2)
BASOPHILS # BLD AUTO: 0.03 K/UL — SIGNIFICANT CHANGE UP (ref 0–0.2)
BASOPHILS NFR BLD AUTO: 0.3 % — SIGNIFICANT CHANGE UP (ref 0–2)
BLOOD GAS COMMENTS ARTERIAL: SIGNIFICANT CHANGE UP
BUN SERPL-MCNC: 25 MG/DL — HIGH (ref 7–18)
CALCIUM SERPL-MCNC: 7.8 MG/DL — LOW (ref 8.4–10.5)
CHLORIDE SERPL-SCNC: 112 MMOL/L — HIGH (ref 96–108)
CO2 SERPL-SCNC: 27 MMOL/L — SIGNIFICANT CHANGE UP (ref 22–31)
CREAT SERPL-MCNC: 0.64 MG/DL — SIGNIFICANT CHANGE UP (ref 0.5–1.3)
EOSINOPHIL # BLD AUTO: 0.09 K/UL — SIGNIFICANT CHANGE UP (ref 0–0.5)
EOSINOPHIL NFR BLD AUTO: 0.8 % — SIGNIFICANT CHANGE UP (ref 0–6)
GLUCOSE SERPL-MCNC: 101 MG/DL — HIGH (ref 70–99)
HCO3 BLDA-SCNC: 28 MMOL/L — HIGH (ref 23–27)
HCT VFR BLD CALC: 30.5 % — LOW (ref 39–50)
HGB BLD-MCNC: 9.2 G/DL — LOW (ref 13–17)
HOROWITZ INDEX BLDA+IHG-RTO: 40 — SIGNIFICANT CHANGE UP
IMM GRANULOCYTES NFR BLD AUTO: 1.5 % — SIGNIFICANT CHANGE UP (ref 0–1.5)
LYMPHOCYTES # BLD AUTO: 0.74 K/UL — LOW (ref 1–3.3)
LYMPHOCYTES # BLD AUTO: 7 % — LOW (ref 13–44)
MAGNESIUM SERPL-MCNC: 1.8 MG/DL — SIGNIFICANT CHANGE UP (ref 1.6–2.6)
MCHC RBC-ENTMCNC: 28.9 PG — SIGNIFICANT CHANGE UP (ref 27–34)
MCHC RBC-ENTMCNC: 30.2 GM/DL — LOW (ref 32–36)
MCV RBC AUTO: 95.9 FL — SIGNIFICANT CHANGE UP (ref 80–100)
MONOCYTES # BLD AUTO: 1.03 K/UL — HIGH (ref 0–0.9)
MONOCYTES NFR BLD AUTO: 9.7 % — SIGNIFICANT CHANGE UP (ref 2–14)
NEUTROPHILS # BLD AUTO: 8.55 K/UL — HIGH (ref 1.8–7.4)
NEUTROPHILS NFR BLD AUTO: 80.7 % — HIGH (ref 43–77)
NRBC # BLD: 0 /100 WBCS — SIGNIFICANT CHANGE UP (ref 0–0)
PCO2 BLDA: 35 MMHG — SIGNIFICANT CHANGE UP (ref 32–46)
PH BLDA: 7.51 — HIGH (ref 7.35–7.45)
PH BLDA: 7.51 — HIGH (ref 7.35–7.45)
PH BLDA: 7.52 — HIGH (ref 7.35–7.45)
PHOSPHATE SERPL-MCNC: 2.1 MG/DL — LOW (ref 2.5–4.5)
PLATELET # BLD AUTO: 184 K/UL — SIGNIFICANT CHANGE UP (ref 150–400)
PO2 BLDA: 71 MMHG — LOW (ref 74–108)
PO2 BLDA: 72 MMHG — LOW (ref 74–108)
PO2 BLDA: 79 MMHG — SIGNIFICANT CHANGE UP (ref 74–108)
POTASSIUM SERPL-MCNC: 3.4 MMOL/L — LOW (ref 3.5–5.3)
POTASSIUM SERPL-SCNC: 3.4 MMOL/L — LOW (ref 3.5–5.3)
RBC # BLD: 3.18 M/UL — LOW (ref 4.2–5.8)
RBC # FLD: 19.2 % — HIGH (ref 10.3–14.5)
SAO2 % BLDA: 96 % — SIGNIFICANT CHANGE UP (ref 92–96)
SODIUM SERPL-SCNC: 146 MMOL/L — HIGH (ref 135–145)
WBC # BLD: 10.6 K/UL — HIGH (ref 3.8–10.5)
WBC # FLD AUTO: 10.6 K/UL — HIGH (ref 3.8–10.5)

## 2019-02-14 PROCEDURE — 71045 X-RAY EXAM CHEST 1 VIEW: CPT | Mod: 26

## 2019-02-14 RX ORDER — ALBUMIN HUMAN 25 %
50 VIAL (ML) INTRAVENOUS EVERY 6 HOURS
Qty: 0 | Refills: 0 | Status: COMPLETED | OUTPATIENT
Start: 2019-02-14 | End: 2019-02-15

## 2019-02-14 RX ORDER — MIDODRINE HYDROCHLORIDE 2.5 MG/1
5 TABLET ORAL THREE TIMES A DAY
Qty: 0 | Refills: 0 | Status: DISCONTINUED | OUTPATIENT
Start: 2019-02-14 | End: 2019-02-15

## 2019-02-14 RX ORDER — CHLORHEXIDINE GLUCONATE 213 G/1000ML
1 SOLUTION TOPICAL
Qty: 0 | Refills: 0 | Status: DISCONTINUED | OUTPATIENT
Start: 2019-02-14 | End: 2019-02-18

## 2019-02-14 RX ORDER — POTASSIUM PHOSPHATE, MONOBASIC POTASSIUM PHOSPHATE, DIBASIC 236; 224 MG/ML; MG/ML
30 INJECTION, SOLUTION INTRAVENOUS ONCE
Qty: 0 | Refills: 0 | Status: COMPLETED | OUTPATIENT
Start: 2019-02-14 | End: 2019-02-14

## 2019-02-14 RX ORDER — POTASSIUM CHLORIDE 20 MEQ
20 PACKET (EA) ORAL ONCE
Qty: 0 | Refills: 0 | Status: COMPLETED | OUTPATIENT
Start: 2019-02-14 | End: 2019-02-14

## 2019-02-14 RX ADMIN — POTASSIUM PHOSPHATE, MONOBASIC POTASSIUM PHOSPHATE, DIBASIC 85 MILLIMOLE(S): 236; 224 INJECTION, SOLUTION INTRAVENOUS at 07:39

## 2019-02-14 RX ADMIN — ALBUTEROL 2 PUFF(S): 90 AEROSOL, METERED ORAL at 08:49

## 2019-02-14 RX ADMIN — DEXMEDETOMIDINE HYDROCHLORIDE IN 0.9% SODIUM CHLORIDE 7.45 MICROGRAM(S)/KG/HR: 4 INJECTION INTRAVENOUS at 02:01

## 2019-02-14 RX ADMIN — Medication 1 DROP(S): at 15:20

## 2019-02-14 RX ADMIN — AZITHROMYCIN 250 MILLIGRAM(S): 500 TABLET, FILM COATED ORAL at 17:46

## 2019-02-14 RX ADMIN — Medication 1 DROP(S): at 05:34

## 2019-02-14 RX ADMIN — FINASTERIDE 5 MILLIGRAM(S): 5 TABLET, FILM COATED ORAL at 12:11

## 2019-02-14 RX ADMIN — CEFTRIAXONE 100 GRAM(S): 500 INJECTION, POWDER, FOR SOLUTION INTRAMUSCULAR; INTRAVENOUS at 13:39

## 2019-02-14 RX ADMIN — Medication 100 MILLIGRAM(S): at 17:46

## 2019-02-14 RX ADMIN — MIDODRINE HYDROCHLORIDE 5 MILLIGRAM(S): 2.5 TABLET ORAL at 13:40

## 2019-02-14 RX ADMIN — ENOXAPARIN SODIUM 40 MILLIGRAM(S): 100 INJECTION SUBCUTANEOUS at 12:10

## 2019-02-14 RX ADMIN — ALBUTEROL 2 PUFF(S): 90 AEROSOL, METERED ORAL at 16:23

## 2019-02-14 RX ADMIN — FAMOTIDINE 20 MILLIGRAM(S): 10 INJECTION INTRAVENOUS at 12:10

## 2019-02-14 RX ADMIN — DEXMEDETOMIDINE HYDROCHLORIDE IN 0.9% SODIUM CHLORIDE 7.45 MICROGRAM(S)/KG/HR: 4 INJECTION INTRAVENOUS at 11:58

## 2019-02-14 RX ADMIN — Medication 20 MILLIEQUIVALENT(S): at 07:39

## 2019-02-14 RX ADMIN — Medication 50 MILLILITER(S): at 12:09

## 2019-02-14 RX ADMIN — SENNA PLUS 2 TABLET(S): 8.6 TABLET ORAL at 21:04

## 2019-02-14 RX ADMIN — PHENYLEPHRINE HYDROCHLORIDE 1.12 MICROGRAM(S)/KG/MIN: 10 INJECTION INTRAVENOUS at 19:49

## 2019-02-14 RX ADMIN — ALBUTEROL 2 PUFF(S): 90 AEROSOL, METERED ORAL at 20:42

## 2019-02-14 RX ADMIN — Medication 50 MILLILITER(S): at 17:45

## 2019-02-14 RX ADMIN — TAMSULOSIN HYDROCHLORIDE 0.4 MILLIGRAM(S): 0.4 CAPSULE ORAL at 21:05

## 2019-02-14 RX ADMIN — DEXMEDETOMIDINE HYDROCHLORIDE IN 0.9% SODIUM CHLORIDE 7.45 MICROGRAM(S)/KG/HR: 4 INJECTION INTRAVENOUS at 18:38

## 2019-02-14 RX ADMIN — ALBUTEROL 2 PUFF(S): 90 AEROSOL, METERED ORAL at 02:09

## 2019-02-14 RX ADMIN — MIDODRINE HYDROCHLORIDE 5 MILLIGRAM(S): 2.5 TABLET ORAL at 21:05

## 2019-02-14 RX ADMIN — DEXMEDETOMIDINE HYDROCHLORIDE IN 0.9% SODIUM CHLORIDE 7.45 MICROGRAM(S)/KG/HR: 4 INJECTION INTRAVENOUS at 07:40

## 2019-02-14 RX ADMIN — CHLORHEXIDINE GLUCONATE 1 APPLICATION(S): 213 SOLUTION TOPICAL at 15:21

## 2019-02-14 NOTE — PROGRESS NOTE ADULT - SUBJECTIVE AND OBJECTIVE BOX
pt seen and examined.pts current chart reviewed and case discussed with resident covering.    SUBJECTIVE:  pt feels fine and denies cp,sob,gi or gu/uremic  symptons    REVIEW OF SYSTEMS:  CONSTITUTIONAL: No weakness, fevers or chills  EYES/ENT: No visual changes;  No vertigo or throat pain   NECK: No pain or stiffness  RESPIRATORY: No cough, wheezing, hemoptysis; No shortness of breath  CARDIOVASCULAR: No chest pain or palpitations  GASTROINTESTINAL: No abdominal or epigastric pain. No nausea, vomiting, or hematemesis; No diarrhea or constipation. No melena or hematochezia.  GENITOURINARY: No dysuria, frequency , hematuria, flank pain or nocturia  NEUROLOGICAL: No numbness or weakness  SKIN: No itching, burning, rashes, or lesions   All other review of systems is negative unless indicated above    Current meds:    albumin human 25% IVPB 50 milliLiter(s) IV Intermittent every 6 hours  ALBUTerol    90 MICROgram(s) HFA Inhaler 2 Puff(s) Inhalation every 6 hours  artificial  tears Solution 1 Drop(s) Both EYES two times a day  azithromycin  IVPB      azithromycin  IVPB 500 milliGRAM(s) IV Intermittent every 24 hours  cefTRIAXone   IVPB      cefTRIAXone   IVPB 1 Gram(s) IV Intermittent every 24 hours  chlorhexidine 2% Cloths 1 Application(s) Topical two times a day  dexmedetomidine Infusion 0.5 MICROgram(s)/kG/Hr IV Continuous <Continuous>  docusate sodium Liquid 100 milliGRAM(s) Oral two times a day  enoxaparin Injectable 40 milliGRAM(s) SubCutaneous daily  famotidine Injectable 20 milliGRAM(s) IV Push daily  finasteride 5 milliGRAM(s) Oral daily  midodrine 5 milliGRAM(s) Oral three times a day  phenylephrine    Infusion 0.1 MICROgram(s)/kG/Min IV Continuous <Continuous>  senna 2 Tablet(s) Oral at bedtime  tamsulosin 0.4 milliGRAM(s) Oral at bedtime      Vital Signs    T(F): 97.5 (02-14-19 @ 08:00), Max: 100.5 (02-13-19 @ 22:00)  HR: 54 (02-14-19 @ 14:00) (51 - 80)  BP: 93/50 (02-14-19 @ 14:00) (92/50 - 142/75)  ABP: --  RR: 25 (02-14-19 @ 14:00) (16 - 26)  SpO2: 99% (02-14-19 @ 14:00) (91% - 100%)  Wt(kg): --  CVP(cm H2O): --  CO: --  PCWP: --    I and O's:    02-12 @ 07:01  -  02-13 @ 07:00  --------------------------------------------------------  IN:    dexmedetomidine Infusion: 182.4 mL    Enteral Tube Flush: 400 mL    fentaNYL Infusion.: 54 mL    Free Water: 300 mL    Osmolite 1.2: 960 mL    phenylephrine   Infusion: 123.4 mL  Total IN: 2019.8 mL    OUT:    Indwelling Catheter - Urethral: 4770 mL    Stool: 2 mL  Total OUT: 4772 mL    Total NET: -2752.2 mL      02-13 @ 07:01 - 02-14 @ 07:00  --------------------------------------------------------  IN:    dexmedetomidine Infusion: 253.9 mL    Enteral Tube Flush: 120 mL    Enteral Tube Flush: 200 mL    Free Water: 250 mL    IV PiggyBack: 499.8 mL    Osmolite 1.2: 960 mL    phenylephrine   Infusion: 280.7 mL    Solution: 300 mL    Solution: 400 mL  Total IN: 3264.4 mL    OUT:    Indwelling Catheter - Urethral: 1730 mL  Total OUT: 1730 mL    Total NET: 1534.4 mL      02-14 @ 07:01  -  02-14 @ 14:33  --------------------------------------------------------  IN:    dexmedetomidine Infusion: 53 mL    Enteral Tube Flush: 50 mL    Free Water: 250 mL    IV PiggyBack: 500 mL    Osmolite 1.2: 320 mL    phenylephrine   Infusion: 50 mL  Total IN: 1223 mL    OUT:    Indwelling Catheter - Urethral: 240 mL  Total OUT: 240 mL    Total NET: 983 mL        Daily     Daily     PHYSICAL EXAM:  Constitutional: well developed, Mal-nourished  and in nad  HEENT: PERRLA,  no icteric sclera and mild pallor of conjunctiva noted  Neck: No JVD, thyromegaly or adenopathy  Respiratory: reduced air entry lower lungs with few rhonchi  Cardiovascular: S1 and S2 normally heard  Gastrointestinal: soft, nondistended, nontender and normal bowel sounds heard  Extremities:  B/L LE and upper thigh edema noted, no cyanosis  Neurological: sedated ,responsive to tactile stimuli  Skin: No rashes      LABS:    CBC:                          9.2    10.60 )-----------( 184      ( 14 Feb 2019 04:55 )             30.5           BMP:    02-14    146<H>  |  112<H>  |  25<H>  ----------------------------<  101<H>  3.4<L>   |  27  |  0.64  02-13    146<H>  |  111<H>  |  25<H>  ----------------------------<  97  4.0   |  27  |  0.58  02-13    146<H>  |  110<H>  |  27<H>  ----------------------------<  93  2.6<LL>   |  29  |  0.62  02-12    143  |  110<H>  |  32<H>  ----------------------------<  149<H>  3.5   |  28  |  0.72  02-12    145  |  114<H>  |  32<H>  ----------------------------<  104<H>  4.0   |  25  |  0.62  02-12    146<H>  |  115<H>  |  34<H>  ----------------------------<  104<H>  3.3<L>   |  25  |  0.64  02-11    151<H>  |  118<H>  |  36<H>  ----------------------------<  110<H>  3.8   |  25  |  0.71  02-11    153<H>  |  119<H>  |  37<H>  ----------------------------<  91  3.5   |  27  |  0.73    Ca    7.8<L>      14 Feb 2019 04:55  Ca    7.7<L>      13 Feb 2019 14:46  Ca    7.5<L>      13 Feb 2019 05:38  Ca    7.9<L>      12 Feb 2019 14:20  Ca    7.7<L>      12 Feb 2019 05:02  Ca    7.6<L>      12 Feb 2019 01:53  Ca    8.0<L>      11 Feb 2019 20:14  Ca    7.9<L>      11 Feb 2019 17:11  Phos  2.1     02-14  Phos  2.3     02-13  Phos  2.3     02-12  Mg     1.8     02-14  Mg     1.6     02-13  Mg     1.4     02-12            URINE STUDIES:        Sodium, Random Urine: 20 mmol/L (02-08 @ 03:44)  Osmolality, Random Urine: 462 mos/kg (02-08 @ 03:44)  Creatinine, Random Urine: 62 mg/dL (02-08 @ 03:44)                  RADIOLOGY & ADDITIONAL STUDIES: pt seen and examined.    SUBJECTIVE:  pt is awake, responsive on vent and in nad  pts daughter at bedside      Current meds:    albumin human 25% IVPB 50 milliLiter(s) IV Intermittent every 6 hours  ALBUTerol    90 MICROgram(s) HFA Inhaler 2 Puff(s) Inhalation every 6 hours  artificial  tears Solution 1 Drop(s) Both EYES two times a day  azithromycin  IVPB      azithromycin  IVPB 500 milliGRAM(s) IV Intermittent every 24 hours  cefTRIAXone   IVPB      cefTRIAXone   IVPB 1 Gram(s) IV Intermittent every 24 hours  chlorhexidine 2% Cloths 1 Application(s) Topical two times a day  dexmedetomidine Infusion 0.5 MICROgram(s)/kG/Hr IV Continuous <Continuous>  docusate sodium Liquid 100 milliGRAM(s) Oral two times a day  enoxaparin Injectable 40 milliGRAM(s) SubCutaneous daily  famotidine Injectable 20 milliGRAM(s) IV Push daily  finasteride 5 milliGRAM(s) Oral daily  midodrine 5 milliGRAM(s) Oral three times a day  phenylephrine    Infusion 0.1 MICROgram(s)/kG/Min IV Continuous <Continuous>  senna 2 Tablet(s) Oral at bedtime  tamsulosin 0.4 milliGRAM(s) Oral at bedtime      Vital Signs    T(F): 97.5 (02-14-19 @ 08:00), Max: 100.5 (02-13-19 @ 22:00)  HR: 54 (02-14-19 @ 14:00) (51 - 80)  BP: 93/50 (02-14-19 @ 14:00) (92/50 - 142/75)  ABP: --  RR: 25 (02-14-19 @ 14:00) (16 - 26)  SpO2: 99% (02-14-19 @ 14:00) (91% - 100%)  Wt(kg): --  CVP(cm H2O): --  CO: --  PCWP: --    I and O's:    02-12 @ 07:01  -  02-13 @ 07:00  --------------------------------------------------------  IN:    dexmedetomidine Infusion: 182.4 mL    Enteral Tube Flush: 400 mL    fentaNYL Infusion.: 54 mL    Free Water: 300 mL    Osmolite 1.2: 960 mL    phenylephrine   Infusion: 123.4 mL  Total IN: 2019.8 mL    OUT:    Indwelling Catheter - Urethral: 4770 mL    Stool: 2 mL  Total OUT: 4772 mL    Total NET: -2752.2 mL      02-13 @ 07:01  - 02-14 @ 07:00  --------------------------------------------------------  IN:    dexmedetomidine Infusion: 253.9 mL    Enteral Tube Flush: 120 mL    Enteral Tube Flush: 200 mL    Free Water: 250 mL    IV PiggyBack: 499.8 mL    Osmolite 1.2: 960 mL    phenylephrine   Infusion: 280.7 mL    Solution: 300 mL    Solution: 400 mL  Total IN: 3264.4 mL    OUT:    Indwelling Catheter - Urethral: 1730 mL  Total OUT: 1730 mL    Total NET: 1534.4 mL      02-14 @ 07:01 - 02-14 @ 14:33  --------------------------------------------------------  IN:    dexmedetomidine Infusion: 53 mL    Enteral Tube Flush: 50 mL    Free Water: 250 mL    IV PiggyBack: 500 mL    Osmolite 1.2: 320 mL    phenylephrine   Infusion: 50 mL  Total IN: 1223 mL    OUT:    Indwelling Catheter - Urethral: 240 mL  Total OUT: 240 mL    Total NET: 983 mL        Daily     Daily     PHYSICAL EXAM:  Constitutional: well developed, Mal-nourished  and in nad  HEENT: PERRLA,  no icteric sclera and mild pallor of conjunctiva noted  Neck: No JVD, thyromegaly or adenopathy  Respiratory: reduced air entry lower lungs with few rhonchi  Cardiovascular: S1 and S2 normally heard  Gastrointestinal: soft, nondistended, nontender and normal bowel sounds heard  Extremities:  B/L LE and upper thigh edema noted, no cyanosis  Neurological: awake  and responsive   Skin: No rashes      LABS:    CBC:                   9.2    10.60 )-----------( 184      ( 14 Feb 2019 04:55 )             30.5       BMP:    02-14    146<H>  |  112<H>  |  25<H>  ----------------------------<  101<H>  3.4<L>   |  27  |  0.64  02-13    146<H>  |  111<H>  |  25<H>  ----------------------------<  97  4.0   |  27  |  0.58  02-13    146<H>  |  110<H>  |  27<H>  ----------------------------<  93  2.6<LL>   |  29  |  0.62  02-12    143  |  110<H>  |  32<H>  ----------------------------<  149<H>  3.5   |  28  |  0.72  02-12    145  |  114<H>  |  32<H>  ----------------------------<  104<H>  4.0   |  25  |  0.62  02-12    146<H>  |  115<H>  |  34<H>  ----------------------------<  104<H>  3.3<L>   |  25  |  0.64  02-11    151<H>  |  118<H>  |  36<H>  ----------------------------<  110<H>  3.8   |  25  |  0.71  02-11    153<H>  |  119<H>  |  37<H>  ----------------------------<  91  3.5   |  27  |  0.73    Ca    7.8<L>      14 Feb 2019 04:55  Ca    7.7<L>      13 Feb 2019 14:46  Ca    7.5<L>      13 Feb 2019 05:38  Ca    7.9<L>      12 Feb 2019 14:20  Ca    7.7<L>      12 Feb 2019 05:02  Ca    7.6<L>      12 Feb 2019 01:53  Ca    8.0<L>      11 Feb 2019 20:14  Ca    7.9<L>      11 Feb 2019 17:11  Phos  2.1     02-14  Phos  2.3     02-13  Phos  2.3     02-12  Mg     1.8     02-14  Mg     1.6     02-13  Mg     1.4     02-12            URINE STUDIES:  Sodium, Random Urine: 20 mmol/L (02-08 @ 03:44)  Osmolality, Random Urine: 462 mos/kg (02-08 @ 03:44)  Creatinine, Random Urine: 62 mg/dL (02-08 @ 03:44)                  RADIOLOGY & ADDITIONAL STUDIES:      < from: Xray Chest 1 View- PORTABLE-Routine (02.14.19 @ 07:08) >  EXAM:  XR CHEST PORTABLE ROUTINE 1V                            PROCEDURE DATE:  02/14/2019          INTERPRETATION:  CLINICAL HISTORY: 86 years  Male with intubated.    COMPARISON: 2/13/2019    The ET tube and NG tube remain in good position.    APview of the chest demonstrates worsening bilateral lower lobe   infiltrates on the basis of pulmonary edema or pneumonia. Small bilateral   pleural effusions are unchanged.. There is no pleural effusion. There is   no pneumothorax.    The heart is normal in size. There is no mediastinal mass. The hilar   obscured and cannot be assessed..     The pulmonary vasculature is normal.     Mild thoracic degenerative changes are present.    IMPRESSION:    Worsening bilateral lower lobe infiltrates on the basis of pulmonary   edema or pneumonia. Small pleural effusions.    ET tube and NG tube in good position.    < end of copied text >

## 2019-02-14 NOTE — PROGRESS NOTE ADULT - SUBJECTIVE AND OBJECTIVE BOX
INTERVAL HPI/OVERNIGHT EVENTS:       Antimicrobial:  azithromycin  IVPB      azithromycin  IVPB 500 milliGRAM(s) IV Intermittent every 24 hours  cefTRIAXone   IVPB      cefTRIAXone   IVPB 1 Gram(s) IV Intermittent every 24 hours    Cardiovascular:  midodrine 5 milliGRAM(s) Oral three times a day  phenylephrine    Infusion 0.1 MICROgram(s)/kG/Min IV Continuous <Continuous>  tamsulosin 0.4 milliGRAM(s) Oral at bedtime    Pulmonary:  ALBUTerol    90 MICROgram(s) HFA Inhaler 2 Puff(s) Inhalation every 6 hours    Hematalogic:  enoxaparin Injectable 40 milliGRAM(s) SubCutaneous daily    Other:  albumin human 25% IVPB 50 milliLiter(s) IV Intermittent every 6 hours  artificial  tears Solution 1 Drop(s) Both EYES two times a day  chlorhexidine 2% Cloths 1 Application(s) Topical two times a day  dexmedetomidine Infusion 0.5 MICROgram(s)/kG/Hr IV Continuous <Continuous>  docusate sodium Liquid 100 milliGRAM(s) Oral two times a day  famotidine Injectable 20 milliGRAM(s) IV Push daily  finasteride 5 milliGRAM(s) Oral daily  senna 2 Tablet(s) Oral at bedtime      Drug Dosing Weight  Height (cm): 165.1 (11 Feb 2019 22:00)  Weight (kg): 59.6 (11 Feb 2019 22:00)  BMI (kg/m2): 21.9 (11 Feb 2019 22:00)  BSA (m2): 1.65 (11 Feb 2019 22:00)    CENTRAL LINE: [ ] YES [ ] NO  LOCATION:   DATE INSERTED:    BARBA: [ ] YES [ ] NO    DATE INSERTED:    A-LINE:  [ ] YES [ ] NO  LOCATION:   DATE INSERTED:    PMH/Social Hx/Fam Hx -reviewed admission note, no change since admission  PAST MEDICAL & SURGICAL HISTORY:  Dementia  BPH (benign prostatic hyperplasia)  Hypertension  Closed hip fracture      T(C): 36.4 (02-14-19 @ 08:00), Max: 38.1 (02-13-19 @ 22:00)  HR: 54 (02-14-19 @ 14:00)  BP: 93/50 (02-14-19 @ 14:00)  BP(mean): 59 (02-14-19 @ 14:00)  ABP: --  ABP(mean): --  RR: 25 (02-14-19 @ 14:00)  SpO2: 99% (02-14-19 @ 14:00)  Wt(kg): --    ABG - ( 14 Feb 2019 12:53 )  pH, Arterial: 7.51  pH, Blood: x     /  pCO2: 35    /  pO2: 72    / HCO3: 28    / Base Excess: 5.1   /  SaO2: 96                    02-13 @ 07:01  -  02-14 @ 07:00  --------------------------------------------------------  IN: 3264.4 mL / OUT: 1730 mL / NET: 1534.4 mL        Mode: AC/ CMV (Assist Control/ Continuous Mandatory Ventilation)  RR (machine): 12  TV (machine): 420  FiO2: 40  PEEP: 5  ITime: 1  MAP: 11  PIP: 23      PHYSICAL EXAM:    GENERAL: No signs of distress, comfortable  HEAD: Atraumatic, Normocephalic  EYES: EOMI, PERRLA  ENMT: No erythema, exudates, or enlargement, Moist mucous membranes +ETT  NECK: Supple, normal appearance, No JVD; [  ] central line (if applicable)  CHEST/LUNG: No chest deformity, fair bilateral air entry; No rales, rhonchi, wheezing; crackles  HEART: Regular rate and rhythm; No murmurs, rubs, or gallops;   ABDOMEN: Soft, Nontender, Nondistended; Bowel sounds present  EXTREMITIES:  + Peripheral Pulses, No clubbing, cyanosis, or edema  NERVOUS SYSTEM: sedated  LYMPH: No lymphadenopathy noted  SKIN: No rashes or lesions; good turgor, warm, dry      LABS:  CBC Full  -  ( 14 Feb 2019 04:55 )  WBC Count : 10.60 K/uL  Hemoglobin : 9.2 g/dL  Hematocrit : 30.5 %  Platelet Count - Automated : 184 K/uL  Mean Cell Volume : 95.9 fl  Mean Cell Hemoglobin : 28.9 pg  Mean Cell Hemoglobin Concentration : 30.2 gm/dL  Auto Neutrophil # : 8.55 K/uL  Auto Lymphocyte # : 0.74 K/uL  Auto Monocyte # : 1.03 K/uL  Auto Eosinophil # : 0.09 K/uL  Auto Basophil # : 0.03 K/uL  Auto Neutrophil % : 80.7 %  Auto Lymphocyte % : 7.0 %  Auto Monocyte % : 9.7 %  Auto Eosinophil % : 0.8 %  Auto Basophil % : 0.3 %    02-14    146<H>  |  112<H>  |  25<H>  ----------------------------<  101<H>  3.4<L>   |  27  |  0.64    Ca    7.8<L>      14 Feb 2019 04:55  Phos  2.1     02-14  Mg     1.8     02-14              RADIOLOGY & ADDITIONAL STUDIES REVIEWED     CXR:< from: Xray Chest 1 View- PORTABLE-Routine (02.14.19 @ 07:08) >    EXAM:  XR CHEST PORTABLE ROUTINE 1V                            PROCEDURE DATE:  02/14/2019          INTERPRETATION:  CLINICAL HISTORY: 86 years  Male with intubated.    COMPARISON: 2/13/2019    The ET tube and NG tube remain in good position.    APview of the chest demonstrates worsening bilateral lower lobe   infiltrates on the basis of pulmonary edema or pneumonia. Small bilateral   pleural effusions are unchanged.. There is no pleural effusion. There is   no pneumothorax.    The heart is normal in size. There is no mediastinal mass. The hilar   obscured and cannot be assessed..     The pulmonary vasculature is normal.     Mild thoracic degenerative changes are present.    IMPRESSION:    Worsening bilateral lower lobe infiltrates on the basis of pulmonary   edema or pneumonia. Small pleural effusions.    ET tube and NG tube in good position.                PAT LANGSTON M.D., ATTENDING RADIOLOGIST  This document has been electronically signed. Feb 14 2019  8:55AM                < end of copied text >      IMPRESSION:  PAST MEDICAL & SURGICAL HISTORY:  Dementia  BPH (benign prostatic hyperplasia)  Hypertension  Closed hip fracture   p/w         IMP: This a 87 y/o M from [facility], PMH of HTN, BPH, dementia , had recent fall on 1/22/19 had fracture of Rt intertrochanteric fracture, underwent surgical fixation, sent in from NH for respiratory distress.  In ED, patient was tachycardic to 120/min, BP- 99/58 mmhg, RR- 20/min, spo2- 90 % . EKG-  afib @105 BPM , prolonged QTC - 520, no ST T wave changes. cardiac enzymes x1- 0.059, BNP- 7244, UA- >50 WBC and moderate LE , CXR s/o Patchy right lower lobe infiltrate. Cardiomegaly. Labs pertinent for WBC- 57.6, H.H of 9.3/31.4, lactate of 9.3, K of 6.4, bicarb of 8, AG of 24,bun/ creat- 177/8.69, s/p 1 dose of cefepime, vancomycin and azithromycin with 1.5 L NS bolus  in ED   Pt had barba catheter placed, about 750 cc of cloudy urine drained. ABG - 7.35/ 13/106/7/100% NRB. Pt was placed on NIV BIPAP for work of breathing.     Pt is admitted to ICU on 2/7/18 for hypoxic respiratory failure with metabolic acidosis and respiratory alkalosis and sepsis secondary to UTI and ? PNA, ,2) Septic shock secondary to UTI and pneumonia 3) Acute renal failure secondary to UTI and septic shock. Re- intubated 2/10    BP stable on phenylephrine. Will give albumin to increase plasma oncotic pressure and start midodrine to bridge off pressors.          Plan:      CNS: sedate as needed    PULMONARY: continue vent support    CARDIAC: monitor     GI: feed    RENAL: monitor 1/o    SKIN: care as per nursing    MUSCULOSKELETAL: boots    ID: continuue antibx    HEME: monitor    DVT and GI Prophylaxis    GOALS OF CARE DISCUSSION WITH PATIENT/FAMILY/PROXY/ icu team on rounds    CRITICAL CARE TIME SPENT: 35 minutes

## 2019-02-14 NOTE — PROGRESS NOTE ADULT - ASSESSMENT
A/P:  1.PAULINA: most likley secondary to obstructive uropathy , now improved  -Keep patient euvolemic   -Avoid Nephrotoxic Meds/ Agents such as (NSAIDs, IV contrast, Aminoglycosides such as gentamicin, -Gadolinium contrast, Phosphate containing enemas, etc..)  -Adjust Medications according to eGFR  -f/u bmp daily  2.HYPOKALEMIA: now worsening with diuresis  -replace kcl prn  -keep k >4 and <5  -f/u k level daily  3.HYPERNATREMIA: mild .secondary to diuresis  -avoid Na correction >8 meq per 24 hrs  -f/u Na level q 12 hrs  -continue  Hctz 12.5 MG daily for fluid overload   4.METABOLIC ACIDOSIS: now improved  -f/u co2 daily  5.HYPOPHOSPHATEMIA: secondary to improved renal function  -replace prn to keep phos level >3 and <5  -f/u phos level daily  6.EDEMA: B/L LE , secondary to capillary leak ?secondary to sepsis   -keep pt evolemic  - continue  diuresis as per bp/na level A/P:  1.PAULINA: most likley secondary to obstructive uropathy , now improved and renal function at baseline  -Keep patient euvolemic   -Avoid Nephrotoxic Meds/ Agents such as (NSAIDs, IV contrast, Aminoglycosides such as gentamicin, -Gadolinium contrast, Phosphate containing enemas, etc..)  -Adjust Medications according to eGFR  -f/u bmp daily  2.HYPOKALEMIA: now worsening with diuresis  -replace kcl prn  -keep k >4 and <5  -f/u k level daily  3.HYPERNATREMIA: mild .secondary to diuresis  -avoid Na correction >8 meq per 24 hrs  -f/u Na level q 12 hrs  -continue  Hctz 12.5 MG daily for fluid overload   4.METABOLIC ACIDOSIS: now improved  -f/u co2 daily  5.HYPOPHOSPHATEMIA: secondary to improved renal function  -replace prn to keep phos level >3 and <5  -f/u phos level daily  6.EDEMA: B/L LE , secondary to capillary leak ?secondary to sepsis   -keep pt evolemic  - continue  diuresis as per bp/na level

## 2019-02-14 NOTE — PROGRESS NOTE ADULT - SUBJECTIVE AND OBJECTIVE BOX
INTERVAL HPI/OVERNIGHT EVENTS: No acute events overnight.    PRESSORS: [X ] YES [ ] NO  WHICH: phenylephrine    ANTIBIOTICS: rocephin 1g qd, zithromax 500mg qd    Antimicrobial:  azithromycin  IVPB      azithromycin  IVPB 500 milliGRAM(s) IV Intermittent every 24 hours  cefTRIAXone   IVPB      cefTRIAXone   IVPB 1 Gram(s) IV Intermittent every 24 hours    Cardiovascular:  midodrine 5 milliGRAM(s) Oral three times a day  phenylephrine    Infusion 0.1 MICROgram(s)/kG/Min IV Continuous <Continuous>  tamsulosin 0.4 milliGRAM(s) Oral at bedtime    Pulmonary:  ALBUTerol    90 MICROgram(s) HFA Inhaler 2 Puff(s) Inhalation every 6 hours    Hematalogic:  enoxaparin Injectable 40 milliGRAM(s) SubCutaneous daily    Other:  albumin human 25% IVPB 50 milliLiter(s) IV Intermittent every 6 hours  artificial  tears Solution 1 Drop(s) Both EYES two times a day  chlorhexidine 2% Cloths 1 Application(s) Topical two times a day  dexmedetomidine Infusion 0.5 MICROgram(s)/kG/Hr IV Continuous <Continuous>  docusate sodium Liquid 100 milliGRAM(s) Oral two times a day  famotidine Injectable 20 milliGRAM(s) IV Push daily  finasteride 5 milliGRAM(s) Oral daily  senna 2 Tablet(s) Oral at bedtime    albumin human 25% IVPB 50 milliLiter(s) IV Intermittent every 6 hours  ALBUTerol    90 MICROgram(s) HFA Inhaler 2 Puff(s) Inhalation every 6 hours  artificial  tears Solution 1 Drop(s) Both EYES two times a day  azithromycin  IVPB      azithromycin  IVPB 500 milliGRAM(s) IV Intermittent every 24 hours  cefTRIAXone   IVPB      cefTRIAXone   IVPB 1 Gram(s) IV Intermittent every 24 hours  chlorhexidine 2% Cloths 1 Application(s) Topical two times a day  dexmedetomidine Infusion 0.5 MICROgram(s)/kG/Hr IV Continuous <Continuous>  docusate sodium Liquid 100 milliGRAM(s) Oral two times a day  enoxaparin Injectable 40 milliGRAM(s) SubCutaneous daily  famotidine Injectable 20 milliGRAM(s) IV Push daily  finasteride 5 milliGRAM(s) Oral daily  midodrine 5 milliGRAM(s) Oral three times a day  phenylephrine    Infusion 0.1 MICROgram(s)/kG/Min IV Continuous <Continuous>  senna 2 Tablet(s) Oral at bedtime  tamsulosin 0.4 milliGRAM(s) Oral at bedtime    Drug Dosing Weight  Height (cm): 165.1 (11 Feb 2019 22:00)  Weight (kg): 59.6 (11 Feb 2019 22:00)  BMI (kg/m2): 21.9 (11 Feb 2019 22:00)  BSA (m2): 1.65 (11 Feb 2019 22:00)    CENTRAL LINE: [X] YES [ ] NO  LOCATION: right femoral DATE INSERTED:  2/13/19  REMOVE: [ ] YES [ ] NO  EXPLAIN:    ZAFAR: [x ] YES [ ] NO    DATE INSERTED: 2/7/19  REMOVE:  [ ] YES [ ] NO  EXPLAIN:    PMH -reviewed admission note, no change since admission  PAST MEDICAL & SURGICAL HISTORY:  Dementia  BPH (benign prostatic hyperplasia)  Hypertension  Closed hip fracture      ICU Vital Signs Last 24 Hrs  T(C): 36.4 (14 Feb 2019 08:00), Max: 38.1 (13 Feb 2019 22:00)  T(F): 97.5 (14 Feb 2019 08:00), Max: 100.5 (13 Feb 2019 22:00)  HR: 55 (14 Feb 2019 12:30) (51 - 80)  BP: 97/52 (14 Feb 2019 12:30) (92/50 - 142/75)  BP(mean): 62 (14 Feb 2019 12:30) (56 - 95)  ABP: --  ABP(mean): --  RR: 25 (14 Feb 2019 12:30) (16 - 26)  SpO2: 100% (14 Feb 2019 12:30) (91% - 100%)      ABG - ( 14 Feb 2019 12:53 )  pH, Arterial: 7.51  pH, Blood: x     /  pCO2: 35    /  pO2: 72    / HCO3: 28    / Base Excess: 5.1   /  SaO2: 96                02-13 @ 07:01  -  02-14 @ 07:00  --------------------------------------------------------  IN: 3264.4 mL / OUT: 1730 mL / NET: 1534.4 mL        Mode: AC/ CMV (Assist Control/ Continuous Mandatory Ventilation)  RR (machine): 12  TV (machine): 420  FiO2: 40  PEEP: 5  ITime: 1  MAP: 11  PIP: 23      PHYSICAL EXAM:    GENERAL:  NAD; on ventilator  HEAD:  Atraumatic, Normocephalic  EYES:  PERRL, conjunctiva and sclera clear  ENMT: No tonsillar erythema, exudates, or enlargement; dry mucous membranes,   NECK: Supple, normal appearance, No JVD; Normal thyroid; Trachea midline  NERVOUS SYSTEM: awakens to verbal stimuli when prompted  CHEST/LUNG: bilateral rales   HEART: Regular rate and rhythm; No murmurs, rubs, or gallops  ABDOMEN: Soft, Nontender, Nondistended; Bowel sounds present  EXTREMITIES: marked edema of all four extremities; right femoral line in place  LYMPH: No lymphadenopathy noted      LABS:  CBC Full  -  ( 14 Feb 2019 04:55 )  WBC Count : 10.60 K/uL  Hemoglobin : 9.2 g/dL  Hematocrit : 30.5 %  Platelet Count - Automated : 184 K/uL  Mean Cell Volume : 95.9 fl  Mean Cell Hemoglobin : 28.9 pg  Mean Cell Hemoglobin Concentration : 30.2 gm/dL  Auto Neutrophil # : 8.55 K/uL  Auto Lymphocyte # : 0.74 K/uL  Auto Monocyte # : 1.03 K/uL  Auto Eosinophil # : 0.09 K/uL  Auto Basophil # : 0.03 K/uL  Auto Neutrophil % : 80.7 %  Auto Lymphocyte % : 7.0 %  Auto Monocyte % : 9.7 %  Auto Eosinophil % : 0.8 %  Auto Basophil % : 0.3 %    02-14    146<H>  |  112<H>  |  25<H>  ----------------------------<  101<H>  3.4<L>   |  27  |  0.64    Ca    7.8<L>      14 Feb 2019 04:55  Phos  2.1     02-14  Mg     1.8     02-14        CRITICAL CARE TIME SPENT: 35 minutes

## 2019-02-14 NOTE — PROGRESS NOTE ADULT - PROBLEM SELECTOR PLAN 2
2/2 PNA and E. coli bacteremia  repeat BCx negative to date  c/w rocephin 1g qd, day 4  c/w zithromax qd, day 3  removed left femoral central line on 2/12/19  right femoral central line inserted on 2/13/19  ID - Dr. Norton

## 2019-02-14 NOTE — PROGRESS NOTE ADULT - ASSESSMENT
85 y/o M from [facility], PMH of HTN, BPH, dementia , had recent fall on 1/22/19 had fracture of Rt intertrochanteric fracture, underwent surgical fixation, sent in from NH for respiratory distress.  In ED, patient was tachycardic to 120/min, BP- 99/58 mmhg, RR- 20/min, spo2- 90 % . EKG-  afib @105 BPM , prolonged QTC - 520, no ST T wave changes. cardiac enzymes x1- 0.059, BNP- 7244, UA- >50 WBC and moderate LE , CXR s/o Patchy right lower lobe infiltrate. Cardiomegaly. Labs pertinent for WBC- 57.6, H.H of 9.3/31.4, lactate of 9.3, K of 6.4, bicarb of 8, AG of 24,bun/ creat- 177/8.69, s/p 1 dose of cefepime, vancomycin and azithromycin with 1.5 L NS bolus  in ED   Pt had barba catheter placed, about 750 cc of cloudy urine drained. ABG - 7.35/ 13/106/7/100% NRB. Pt was placed on NIV BIPAP for work of breathing.     Pt is admitted to ICU on 2/7/18 for hypoxic respiratory failure with metabolic acidosis and respiratory alkalosis and sepsis secondary to UTI and ? PNA, ,2) Septic shock secondary to UTI and pneumonia 3) Acute renal failure secondary to UTI and septic shock.    BP stable on phenylephrine. Will give albumin to increase plasma oncotic pressure and start midodrine to bridge off pressors.

## 2019-02-14 NOTE — PROGRESS NOTE ADULT - PROBLEM SELECTOR PLAN 3
pt continues third spacing fluids  will give albumin today and start midodrine to bridge off pressors

## 2019-02-15 LAB
ANION GAP SERPL CALC-SCNC: 4 MMOL/L — LOW (ref 5–17)
BASE EXCESS BLDA CALC-SCNC: 5.6 MMOL/L — HIGH (ref -2–2)
BASOPHILS # BLD AUTO: 0.02 K/UL — SIGNIFICANT CHANGE UP (ref 0–0.2)
BASOPHILS NFR BLD AUTO: 0.2 % — SIGNIFICANT CHANGE UP (ref 0–2)
BLOOD GAS COMMENTS ARTERIAL: SIGNIFICANT CHANGE UP
BUN SERPL-MCNC: 26 MG/DL — HIGH (ref 7–18)
CALCIUM SERPL-MCNC: 7.9 MG/DL — LOW (ref 8.4–10.5)
CHLORIDE SERPL-SCNC: 111 MMOL/L — HIGH (ref 96–108)
CO2 SERPL-SCNC: 29 MMOL/L — SIGNIFICANT CHANGE UP (ref 22–31)
CREAT SERPL-MCNC: 0.58 MG/DL — SIGNIFICANT CHANGE UP (ref 0.5–1.3)
EOSINOPHIL # BLD AUTO: 0.05 K/UL — SIGNIFICANT CHANGE UP (ref 0–0.5)
EOSINOPHIL NFR BLD AUTO: 0.6 % — SIGNIFICANT CHANGE UP (ref 0–6)
GLUCOSE SERPL-MCNC: 94 MG/DL — SIGNIFICANT CHANGE UP (ref 70–99)
HCO3 BLDA-SCNC: 28 MMOL/L — HIGH (ref 23–27)
HCT VFR BLD CALC: 28.8 % — LOW (ref 39–50)
HGB BLD-MCNC: 8.6 G/DL — LOW (ref 13–17)
HOROWITZ INDEX BLDA+IHG-RTO: 40 — SIGNIFICANT CHANGE UP
IMM GRANULOCYTES NFR BLD AUTO: 1.3 % — SIGNIFICANT CHANGE UP (ref 0–1.5)
LYMPHOCYTES # BLD AUTO: 0.69 K/UL — LOW (ref 1–3.3)
LYMPHOCYTES # BLD AUTO: 8 % — LOW (ref 13–44)
MAGNESIUM SERPL-MCNC: 1.9 MG/DL — SIGNIFICANT CHANGE UP (ref 1.6–2.6)
MCHC RBC-ENTMCNC: 29 PG — SIGNIFICANT CHANGE UP (ref 27–34)
MCHC RBC-ENTMCNC: 29.9 GM/DL — LOW (ref 32–36)
MCV RBC AUTO: 97 FL — SIGNIFICANT CHANGE UP (ref 80–100)
MONOCYTES # BLD AUTO: 0.94 K/UL — HIGH (ref 0–0.9)
MONOCYTES NFR BLD AUTO: 10.9 % — SIGNIFICANT CHANGE UP (ref 2–14)
NEUTROPHILS # BLD AUTO: 6.8 K/UL — SIGNIFICANT CHANGE UP (ref 1.8–7.4)
NEUTROPHILS NFR BLD AUTO: 79 % — HIGH (ref 43–77)
NRBC # BLD: 0 /100 WBCS — SIGNIFICANT CHANGE UP (ref 0–0)
PCO2 BLDA: 35 MMHG — SIGNIFICANT CHANGE UP (ref 32–46)
PH BLDA: 7.52 — HIGH (ref 7.35–7.45)
PHOSPHATE SERPL-MCNC: 2.2 MG/DL — LOW (ref 2.5–4.5)
PLATELET # BLD AUTO: 211 K/UL — SIGNIFICANT CHANGE UP (ref 150–400)
PO2 BLDA: 77 MMHG — SIGNIFICANT CHANGE UP (ref 74–108)
POTASSIUM SERPL-MCNC: 3.4 MMOL/L — LOW (ref 3.5–5.3)
POTASSIUM SERPL-SCNC: 3.4 MMOL/L — LOW (ref 3.5–5.3)
RBC # BLD: 2.97 M/UL — LOW (ref 4.2–5.8)
RBC # FLD: 19.2 % — HIGH (ref 10.3–14.5)
SAO2 % BLDA: 97 % — HIGH (ref 92–96)
SODIUM SERPL-SCNC: 144 MMOL/L — SIGNIFICANT CHANGE UP (ref 135–145)
WBC # BLD: 8.61 K/UL — SIGNIFICANT CHANGE UP (ref 3.8–10.5)
WBC # FLD AUTO: 8.61 K/UL — SIGNIFICANT CHANGE UP (ref 3.8–10.5)

## 2019-02-15 PROCEDURE — 71045 X-RAY EXAM CHEST 1 VIEW: CPT | Mod: 26

## 2019-02-15 RX ORDER — MIDODRINE HYDROCHLORIDE 2.5 MG/1
10 TABLET ORAL THREE TIMES A DAY
Qty: 0 | Refills: 0 | Status: DISCONTINUED | OUTPATIENT
Start: 2019-02-15 | End: 2019-02-18

## 2019-02-15 RX ORDER — POTASSIUM PHOSPHATE, MONOBASIC POTASSIUM PHOSPHATE, DIBASIC 236; 224 MG/ML; MG/ML
30 INJECTION, SOLUTION INTRAVENOUS ONCE
Qty: 0 | Refills: 0 | Status: COMPLETED | OUTPATIENT
Start: 2019-02-15 | End: 2019-02-15

## 2019-02-15 RX ORDER — ALBUMIN HUMAN 25 %
50 VIAL (ML) INTRAVENOUS EVERY 6 HOURS
Qty: 0 | Refills: 0 | Status: COMPLETED | OUTPATIENT
Start: 2019-02-15 | End: 2019-02-16

## 2019-02-15 RX ORDER — POTASSIUM CHLORIDE 20 MEQ
20 PACKET (EA) ORAL ONCE
Qty: 0 | Refills: 0 | Status: COMPLETED | OUTPATIENT
Start: 2019-02-15 | End: 2019-02-15

## 2019-02-15 RX ORDER — MAGNESIUM SULFATE 500 MG/ML
1 VIAL (ML) INJECTION ONCE
Qty: 0 | Refills: 0 | Status: COMPLETED | OUTPATIENT
Start: 2019-02-15 | End: 2019-02-15

## 2019-02-15 RX ADMIN — CHLORHEXIDINE GLUCONATE 1 APPLICATION(S): 213 SOLUTION TOPICAL at 12:12

## 2019-02-15 RX ADMIN — FINASTERIDE 5 MILLIGRAM(S): 5 TABLET, FILM COATED ORAL at 11:25

## 2019-02-15 RX ADMIN — Medication 20 MILLIEQUIVALENT(S): at 08:20

## 2019-02-15 RX ADMIN — Medication 100 MILLIGRAM(S): at 17:25

## 2019-02-15 RX ADMIN — ENOXAPARIN SODIUM 40 MILLIGRAM(S): 100 INJECTION SUBCUTANEOUS at 11:24

## 2019-02-15 RX ADMIN — Medication 1 DROP(S): at 05:22

## 2019-02-15 RX ADMIN — DEXMEDETOMIDINE HYDROCHLORIDE IN 0.9% SODIUM CHLORIDE 7.45 MICROGRAM(S)/KG/HR: 4 INJECTION INTRAVENOUS at 08:20

## 2019-02-15 RX ADMIN — ALBUTEROL 2 PUFF(S): 90 AEROSOL, METERED ORAL at 02:17

## 2019-02-15 RX ADMIN — Medication 50 MILLILITER(S): at 17:24

## 2019-02-15 RX ADMIN — DEXMEDETOMIDINE HYDROCHLORIDE IN 0.9% SODIUM CHLORIDE 7.45 MICROGRAM(S)/KG/HR: 4 INJECTION INTRAVENOUS at 03:05

## 2019-02-15 RX ADMIN — CHLORHEXIDINE GLUCONATE 1 APPLICATION(S): 213 SOLUTION TOPICAL at 05:22

## 2019-02-15 RX ADMIN — Medication 100 GRAM(S): at 09:02

## 2019-02-15 RX ADMIN — MIDODRINE HYDROCHLORIDE 10 MILLIGRAM(S): 2.5 TABLET ORAL at 13:22

## 2019-02-15 RX ADMIN — CEFTRIAXONE 100 GRAM(S): 500 INJECTION, POWDER, FOR SOLUTION INTRAMUSCULAR; INTRAVENOUS at 13:23

## 2019-02-15 RX ADMIN — FAMOTIDINE 20 MILLIGRAM(S): 10 INJECTION INTRAVENOUS at 11:25

## 2019-02-15 RX ADMIN — Medication 50 MILLILITER(S): at 11:23

## 2019-02-15 RX ADMIN — Medication 50 MILLILITER(S): at 05:21

## 2019-02-15 RX ADMIN — Medication 100 MILLIGRAM(S): at 05:21

## 2019-02-15 RX ADMIN — DEXMEDETOMIDINE HYDROCHLORIDE IN 0.9% SODIUM CHLORIDE 7.45 MICROGRAM(S)/KG/HR: 4 INJECTION INTRAVENOUS at 00:18

## 2019-02-15 RX ADMIN — TAMSULOSIN HYDROCHLORIDE 0.4 MILLIGRAM(S): 0.4 CAPSULE ORAL at 22:25

## 2019-02-15 RX ADMIN — Medication 50 MILLILITER(S): at 00:18

## 2019-02-15 RX ADMIN — ALBUTEROL 2 PUFF(S): 90 AEROSOL, METERED ORAL at 16:18

## 2019-02-15 RX ADMIN — MIDODRINE HYDROCHLORIDE 5 MILLIGRAM(S): 2.5 TABLET ORAL at 05:20

## 2019-02-15 RX ADMIN — SENNA PLUS 2 TABLET(S): 8.6 TABLET ORAL at 22:25

## 2019-02-15 RX ADMIN — MIDODRINE HYDROCHLORIDE 10 MILLIGRAM(S): 2.5 TABLET ORAL at 22:25

## 2019-02-15 RX ADMIN — AZITHROMYCIN 250 MILLIGRAM(S): 500 TABLET, FILM COATED ORAL at 17:24

## 2019-02-15 RX ADMIN — ALBUTEROL 2 PUFF(S): 90 AEROSOL, METERED ORAL at 08:52

## 2019-02-15 RX ADMIN — ALBUTEROL 2 PUFF(S): 90 AEROSOL, METERED ORAL at 20:54

## 2019-02-15 RX ADMIN — Medication 1 DROP(S): at 16:56

## 2019-02-15 RX ADMIN — POTASSIUM PHOSPHATE, MONOBASIC POTASSIUM PHOSPHATE, DIBASIC 63.75 MILLIMOLE(S): 236; 224 INJECTION, SOLUTION INTRAVENOUS at 08:24

## 2019-02-15 RX ADMIN — DEXMEDETOMIDINE HYDROCHLORIDE IN 0.9% SODIUM CHLORIDE 7.45 MICROGRAM(S)/KG/HR: 4 INJECTION INTRAVENOUS at 05:21

## 2019-02-15 NOTE — PROGRESS NOTE ADULT - PROBLEM SELECTOR PLAN 2
2/2 PNA and E. coli bacteremia  repeat BCx negative to date  c/w rocephin 1g qd, day 5  c/w zithromax qd, day 5  removed left femoral central line on 2/12/19  right femoral central line inserted on 2/13/19  ID - Dr. Norton

## 2019-02-15 NOTE — PROGRESS NOTE ADULT - SUBJECTIVE AND OBJECTIVE BOX
INTERVAL HPI/OVERNIGHT EVENTS:       Antimicrobial:  azithromycin  IVPB      azithromycin  IVPB 500 milliGRAM(s) IV Intermittent every 24 hours  cefTRIAXone   IVPB      cefTRIAXone   IVPB 1 Gram(s) IV Intermittent every 24 hours    Cardiovascular:  midodrine 10 milliGRAM(s) Oral three times a day  phenylephrine    Infusion 0.1 MICROgram(s)/kG/Min IV Continuous <Continuous>  tamsulosin 0.4 milliGRAM(s) Oral at bedtime    Pulmonary:  ALBUTerol    90 MICROgram(s) HFA Inhaler 2 Puff(s) Inhalation every 6 hours    Hematalogic:  enoxaparin Injectable 40 milliGRAM(s) SubCutaneous daily    Other:  albumin human 25% IVPB 50 milliLiter(s) IV Intermittent every 6 hours  artificial  tears Solution 1 Drop(s) Both EYES two times a day  chlorhexidine 2% Cloths 1 Application(s) Topical two times a day  dexmedetomidine Infusion 0.5 MICROgram(s)/kG/Hr IV Continuous <Continuous>  docusate sodium Liquid 100 milliGRAM(s) Oral two times a day  famotidine Injectable 20 milliGRAM(s) IV Push daily  finasteride 5 milliGRAM(s) Oral daily  senna 2 Tablet(s) Oral at bedtime      Drug Dosing Weight  Height (cm): 165.1 (11 Feb 2019 22:00)  Weight (kg): 59.6 (11 Feb 2019 22:00)  BMI (kg/m2): 21.9 (11 Feb 2019 22:00)  BSA (m2): 1.65 (11 Feb 2019 22:00)    CENTRAL LINE: [ ] YES [ ] NO  LOCATION:   DATE INSERTED:    BARBA: [ ] YES [ ] NO    DATE INSERTED:    A-LINE:  [ ] YES [ ] NO  LOCATION:   DATE INSERTED:    PMH/Social Hx/Fam Hx -reviewed admission note, no change since admission  PAST MEDICAL & SURGICAL HISTORY:  Dementia  BPH (benign prostatic hyperplasia)  Hypertension  Closed hip fracture      T(C): 37.3 (02-15-19 @ 05:00), Max: 37.4 (02-14-19 @ 16:29)  HR: 54 (02-15-19 @ 14:30)  BP: 109/56 (02-15-19 @ 14:30)  BP(mean): 69 (02-15-19 @ 14:30)  ABP: --  ABP(mean): --  RR: 26 (02-15-19 @ 14:30)  SpO2: 95% (02-15-19 @ 14:30)  Wt(kg): --    ABG - ( 15 Feb 2019 04:41 )  pH, Arterial: 7.52  pH, Blood: x     /  pCO2: 35    /  pO2: 77    / HCO3: 28    / Base Excess: 5.6   /  SaO2: 97                    02-14 @ 07:01  -  02-15 @ 07:00  --------------------------------------------------------  IN: 2060 mL / OUT: 1080 mL / NET: 980 mL        Mode: AC/ CMV (Assist Control/ Continuous Mandatory Ventilation)  RR (machine): 12  TV (machine): 400  FiO2: 40  PEEP: 5  ITime: 1  MAP: 15  PIP: 35      PHYSICAL EXAM:    GENERAL: No signs of distress, comfortable  HEAD: Atraumatic, Normocephalic  EYES: EOMI, PERRLA  ENMT: No erythema, exudates, or enlargement, Moist mucous membranes +ETT  NECK: Supple, normal appearance, No JVD; [  ] central line (if applicable)  CHEST/LUNG: No chest deformity, fair bilateral air entry; No rales, rhonchi, wheezing; crackles  HEART: Regular rate and rhythm; No murmurs, rubs, or gallops;   ABDOMEN: Soft, Nontender, Nondistended; Bowel sounds present  EXTREMITIES:  + Peripheral Pulses, No clubbing, cyanosis, or edema  NERVOUS SYSTEM: sedated  LYMPH: No lymphadenopathy noted  SKIN: No rashes or lesions; good turgor, warm, dry      LABS:  CBC Full  -  ( 15 Feb 2019 06:35 )  WBC Count : 8.61 K/uL  Hemoglobin : 8.6 g/dL  Hematocrit : 28.8 %  Platelet Count - Automated : 211 K/uL  Mean Cell Volume : 97.0 fl  Mean Cell Hemoglobin : 29.0 pg  Mean Cell Hemoglobin Concentration : 29.9 gm/dL  Auto Neutrophil # : 6.80 K/uL  Auto Lymphocyte # : 0.69 K/uL  Auto Monocyte # : 0.94 K/uL  Auto Eosinophil # : 0.05 K/uL  Auto Basophil # : 0.02 K/uL  Auto Neutrophil % : 79.0 %  Auto Lymphocyte % : 8.0 %  Auto Monocyte % : 10.9 %  Auto Eosinophil % : 0.6 %  Auto Basophil % : 0.2 %    02-15    144  |  111<H>  |  26<H>  ----------------------------<  94  3.4<L>   |  29  |  0.58    Ca    7.9<L>      15 Feb 2019 06:35  Phos  2.2     02-15  Mg     1.9     02-15              RADIOLOGY & ADDITIONAL STUDIES REVIEWED         IMPRESSION:  PAST MEDICAL & SURGICAL HISTORY:  Dementia  BPH (benign prostatic hyperplasia)  Hypertension  Closed hip fracture   p/w       IMP: This a 85 y/o M from [facility], PMH of HTN, BPH, dementia , had recent fall on 1/22/19 had fracture of Rt intertrochanteric fracture, underwent surgical fixation, sent in from NH for respiratory distress.  In ED, patient was tachycardic to 120/min, BP- 99/58 mmhg, RR- 20/min, spo2- 90 % . EKG-  afib @105 BPM , prolonged QTC - 520, no ST T wave changes. cardiac enzymes x1- 0.059, BNP- 7244, UA- >50 WBC and moderate LE , CXR s/o Patchy right lower lobe infiltrate. Cardiomegaly. Labs pertinent for WBC- 57.6, H.H of 9.3/31.4, lactate of 9.3, K of 6.4, bicarb of 8, AG of 24,bun/ creat- 177/8.69, s/p 1 dose of cefepime, vancomycin and azithromycin with 1.5 L NS bolus  in ED   Pt had barba catheter placed, about 750 cc of cloudy urine drained. ABG - 7.35/ 13/106/7/100% NRB. Pt was placed on NIV BIPAP for work of breathing.     Pt is admitted to ICU on 2/7/18 for hypoxic respiratory failure with metabolic acidosis and respiratory alkalosis and sepsis secondary to UTI and ? PNA, ,2) Septic shock secondary to UTI and pneumonia 3) Acute renal failure secondary to UTI and septic shock. Re- intubated 2/10    BP stable on phenylephrine. Will give albumin to increase plasma oncotic pressure and start midodrine to bridge off pressors.            Plan:      CNS: sedated    PULMONARY: continue vent support    CARDIAC: monitor, albumin and mitodrine and taper off pressors    GI: feed    RENAL: monitor i/o . keep neg fluid balance    SKIN: wound care    MUSCULOSKELETAL: boots    ID: continue antibx    HEME: monitor    DVT and GI Prophylaxis    GOALS OF CARE DISCUSSION WITH PATIENT/FAMILY/PROXY/ icu team on rounds    CRITICAL CARE TIME SPENT: 35 minutes

## 2019-02-15 NOTE — PROGRESS NOTE ADULT - ASSESSMENT
87 y/o M from [facility], PMH of HTN, BPH, dementia , had recent fall on 1/22/19 had fracture of Rt intertrochanteric fracture, underwent surgical fixation, sent in from NH for respiratory distress.  In ED, patient was tachycardic to 120/min, BP- 99/58 mmhg, RR- 20/min, spo2- 90 % . EKG-  afib @105 BPM , prolonged QTC - 520, no ST T wave changes. cardiac enzymes x1- 0.059, BNP- 7244, UA- >50 WBC and moderate LE , CXR s/o Patchy right lower lobe infiltrate. Cardiomegaly. Labs pertinent for WBC- 57.6, H.H of 9.3/31.4, lactate of 9.3, K of 6.4, bicarb of 8, AG of 24,bun/ creat- 177/8.69, s/p 1 dose of cefepime, vancomycin and azithromycin with 1.5 L NS bolus  in ED   Pt had barba catheter placed, about 750 cc of cloudy urine drained. ABG - 7.35/ 13/106/7/100% NRB. Pt was placed on NIV BIPAP for work of breathing.     Pt is admitted to ICU on 2/7/18 for hypoxic respiratory failure with metabolic acidosis and respiratory alkalosis and sepsis secondary to UTI and ? PNA, ,2) Septic shock secondary to UTI and pneumonia 3) Acute renal failure secondary to UTI and septic shock.    BP stable on phenylephrine. Will give albumin again to increase plasma oncotic pressure and increase midodrine dose to bridge off pressors.    ETT: 2/10  Right femoral line: 2/13  Barba catheter: 2/7

## 2019-02-15 NOTE — PROGRESS NOTE ADULT - ASSESSMENT
1.PAULINA: most likley secondary to obstructive uropathy ,   -resolved and normal renal function.  -Keep patient euvolemic   -Avoid Nephrotoxic Meds/ Agents such as (NSAIDs, IV contrast, Aminoglycosides such as gentamicin, -Gadolinium contrast, Phosphate containing enemas, etc..)  -Adjust Medications according to eGFR  -f/u bmp daily  2.HYPOKALEMIA: now worsening with diuresis  -give kcl 40meq once.  -replace kcl prn  -keep k >4 and <5  -f/u k level daily  3.HYPERNATREMIA: mild .secondary to diuresis  -improved.  -avoid Na correction >8 meq per 24 hrs  -f/u Na level daily  -continue  Hctz 12.5 MG daily for fluid overload   4.METABOLIC ACIDOSIS: now improved  -f/u co2 daily  5.HYPOPHOSPHATEMIA: secondary to improved renal function  -replace prn to keep phos level >3 and <5  -f/u phos level daily

## 2019-02-15 NOTE — PROGRESS NOTE ADULT - PROBLEM SELECTOR PLAN 3
pt continues third spacing fluids  increasing midodrine to 10mg tid  will give albumin again today and aim to wean off pressor

## 2019-02-15 NOTE — PROGRESS NOTE ADULT - SUBJECTIVE AND OBJECTIVE BOX
INTERVAL HPI/OVERNIGHT EVENTS: No acute events overnight. Prognosis remains guarded. As requested, medical updates to be provided to HCP (patient's daughter) Gerri Vidal and no other family members.    PRESSORS: [X ] YES [ ] NO  WHICH: phenylephrine    ANTIBIOTICS: rocephin 1g qd, zithromax 500mg qd    Antimicrobial:  azithromycin  IVPB      azithromycin  IVPB 500 milliGRAM(s) IV Intermittent every 24 hours  cefTRIAXone   IVPB      cefTRIAXone   IVPB 1 Gram(s) IV Intermittent every 24 hours    Cardiovascular:  midodrine 10 milliGRAM(s) Oral three times a day  phenylephrine    Infusion 0.1 MICROgram(s)/kG/Min IV Continuous <Continuous>  tamsulosin 0.4 milliGRAM(s) Oral at bedtime    Pulmonary:  ALBUTerol    90 MICROgram(s) HFA Inhaler 2 Puff(s) Inhalation every 6 hours    Hematalogic:  enoxaparin Injectable 40 milliGRAM(s) SubCutaneous daily    Other:  albumin human 25% IVPB 50 milliLiter(s) IV Intermittent every 6 hours  artificial  tears Solution 1 Drop(s) Both EYES two times a day  chlorhexidine 2% Cloths 1 Application(s) Topical two times a day  dexmedetomidine Infusion 0.5 MICROgram(s)/kG/Hr IV Continuous <Continuous>  docusate sodium Liquid 100 milliGRAM(s) Oral two times a day  famotidine Injectable 20 milliGRAM(s) IV Push daily  finasteride 5 milliGRAM(s) Oral daily  senna 2 Tablet(s) Oral at bedtime    albumin human 25% IVPB 50 milliLiter(s) IV Intermittent every 6 hours  ALBUTerol    90 MICROgram(s) HFA Inhaler 2 Puff(s) Inhalation every 6 hours  artificial  tears Solution 1 Drop(s) Both EYES two times a day  azithromycin  IVPB      azithromycin  IVPB 500 milliGRAM(s) IV Intermittent every 24 hours  cefTRIAXone   IVPB      cefTRIAXone   IVPB 1 Gram(s) IV Intermittent every 24 hours  chlorhexidine 2% Cloths 1 Application(s) Topical two times a day  dexmedetomidine Infusion 0.5 MICROgram(s)/kG/Hr IV Continuous <Continuous>  docusate sodium Liquid 100 milliGRAM(s) Oral two times a day  enoxaparin Injectable 40 milliGRAM(s) SubCutaneous daily  famotidine Injectable 20 milliGRAM(s) IV Push daily  finasteride 5 milliGRAM(s) Oral daily  midodrine 10 milliGRAM(s) Oral three times a day  phenylephrine    Infusion 0.1 MICROgram(s)/kG/Min IV Continuous <Continuous>  senna 2 Tablet(s) Oral at bedtime  tamsulosin 0.4 milliGRAM(s) Oral at bedtime    Drug Dosing Weight  Height (cm): 165.1 (11 Feb 2019 22:00)  Weight (kg): 59.6 (11 Feb 2019 22:00)  BMI (kg/m2): 21.9 (11 Feb 2019 22:00)  BSA (m2): 1.65 (11 Feb 2019 22:00)    CENTRAL LINE: [X] YES [ ] NO  LOCATION: right femoral DATE INSERTED:  2/13/19  REMOVE: [ ] YES [ ] NO  EXPLAIN:    ZAFAR: [x ] YES [ ] NO    DATE INSERTED: 2/7/19  REMOVE:  [ ] YES [ ] NO  EXPLAIN:    PMH -reviewed admission note, no change since admission  PAST MEDICAL & SURGICAL HISTORY:  Dementia  BPH (benign prostatic hyperplasia)  Hypertension  Closed hip fracture      ICU Vital Signs Last 24 Hrs  T(C): 37.3 (15 Feb 2019 05:00), Max: 37.4 (14 Feb 2019 16:29)  T(F): 99.1 (15 Feb 2019 05:00), Max: 99.3 (14 Feb 2019 16:29)  HR: 58 (15 Feb 2019 12:00) (51 - 75)  BP: 132/56 (15 Feb 2019 12:00) (91/47 - 140/65)  BP(mean): 73 (15 Feb 2019 12:00) (57 - 101)  ABP: --  ABP(mean): --  RR: 18 (15 Feb 2019 12:00) (18 - 33)  SpO2: 100% (15 Feb 2019 12:00) (96% - 100%)      ABG - ( 15 Feb 2019 04:41 )  pH, Arterial: 7.52  pH, Blood: x     /  pCO2: 35    /  pO2: 77    / HCO3: 28    / Base Excess: 5.6   /  SaO2: 97            02-14 @ 07:01  -  02-15 @ 07:00  --------------------------------------------------------  IN: 2060 mL / OUT: 1080 mL / NET: 980 mL        Mode: AC/ CMV (Assist Control/ Continuous Mandatory Ventilation)  RR (machine): 12  TV (machine): 400  FiO2: 40  PEEP: 5  ITime: 1  MAP: 14  PIP: 39      PHYSICAL EXAM:    GENERAL:  NAD; on ventilator  HEAD:  Atraumatic, Normocephalic  EYES:  PERRL, conjunctiva and sclera clear  ENMT: No tonsillar erythema, exudates, or enlargement; dry mucous membranes,   NECK: Supple, normal appearance, No JVD; Normal thyroid; Trachea midline  NERVOUS SYSTEM: awakens to verbal stimuli when prompted  CHEST/LUNG: bilateral rales   HEART: Regular rate and rhythm; No murmurs, rubs, or gallops  ABDOMEN: Soft, Nontender, Nondistended; Bowel sounds present  EXTREMITIES: marked edema of all four extremities; right femoral line in place  LYMPH: No lymphadenopathy noted      LABS:  CBC Full  -  ( 15 Feb 2019 06:35 )  WBC Count : 8.61 K/uL  Hemoglobin : 8.6 g/dL  Hematocrit : 28.8 %  Platelet Count - Automated : 211 K/uL  Mean Cell Volume : 97.0 fl  Mean Cell Hemoglobin : 29.0 pg  Mean Cell Hemoglobin Concentration : 29.9 gm/dL  Auto Neutrophil # : 6.80 K/uL  Auto Lymphocyte # : 0.69 K/uL  Auto Monocyte # : 0.94 K/uL  Auto Eosinophil # : 0.05 K/uL  Auto Basophil # : 0.02 K/uL  Auto Neutrophil % : 79.0 %  Auto Lymphocyte % : 8.0 %  Auto Monocyte % : 10.9 %  Auto Eosinophil % : 0.6 %  Auto Basophil % : 0.2 %    02-15    144  |  111<H>  |  26<H>  ----------------------------<  94  3.4<L>   |  29  |  0.58    Ca    7.9<L>      15 Feb 2019 06:35  Phos  2.2     02-15  Mg     1.9     02-15        CRITICAL CARE TIME SPENT: 35 minutes

## 2019-02-15 NOTE — PROGRESS NOTE ADULT - SUBJECTIVE AND OBJECTIVE BOX
NEPHROLOGY MEDICAL CARE, [facility] - Dr. Dajuan Oseguera/ Dr. Nataliya Berman/ Dr. Lennox uYsuf/ Dr. Kasie Lagunas    Patient was seen and examined at bedside.    CC: pt awake while intubated and off pressors.    Vital Signs Last 24 Hrs  T(C): 37.3 (15 Feb 2019 05:00), Max: 37.3 (15 Feb 2019 05:00)  T(F): 99.1 (15 Feb 2019 05:00), Max: 99.1 (15 Feb 2019 05:00)  HR: 78 (15 Feb 2019 19:00) (51 - 78)  BP: 103/47 (15 Feb 2019 19:00) (95/47 - 140/65)  BP(mean): 61 (15 Feb 2019 19:00) (57 - 101)  RR: 26 (15 Feb 2019 19:00) (18 - 33)  SpO2: 100% (15 Feb 2019 19:00) (89% - 100%)    02-14 @ 07:01  -  02-15 @ 07:00  --------------------------------------------------------  IN: 2060 mL / OUT: 1080 mL / NET: 980 mL    02-15 @ 07:01  -  02-15 @ 19:09  --------------------------------------------------------  IN: 1557 mL / OUT: 480 mL / NET: 1077 mL        PHYSICAL EXAM:  Constitutional: well developed, Mal-nourished  and in nad  HEENT: PERRLA,  no icteric sclera and mild pallor of conjunctiva noted; ET.  Neck: No JVD, thyromegaly or adenopathy  Respiratory: reduced air entry lower lungs with few rhonchi  Cardiovascular: S1 and S2 normally heard  Gastrointestinal: soft, nondistended, nontender and normal bowel sounds heard  Extremities:  B/L LE and upper thigh edema noted  Neurological: awake   Skin: No rashes    MEDICATIONS:  albumin human 25% IVPB 50 milliLiter(s) IV Intermittent every 6 hours  ALBUTerol    90 MICROgram(s) HFA Inhaler 2 Puff(s) Inhalation every 6 hours  artificial  tears Solution 1 Drop(s) Both EYES two times a day  azithromycin  IVPB      azithromycin  IVPB 500 milliGRAM(s) IV Intermittent every 24 hours  cefTRIAXone   IVPB      cefTRIAXone   IVPB 1 Gram(s) IV Intermittent every 24 hours  chlorhexidine 2% Cloths 1 Application(s) Topical two times a day  dexmedetomidine Infusion 0.5 MICROgram(s)/kG/Hr IV Continuous <Continuous>  docusate sodium Liquid 100 milliGRAM(s) Oral two times a day  enoxaparin Injectable 40 milliGRAM(s) SubCutaneous daily  famotidine Injectable 20 milliGRAM(s) IV Push daily  finasteride 5 milliGRAM(s) Oral daily  midodrine 10 milliGRAM(s) Oral three times a day  phenylephrine    Infusion 0.1 MICROgram(s)/kG/Min IV Continuous <Continuous>  senna 2 Tablet(s) Oral at bedtime  tamsulosin 0.4 milliGRAM(s) Oral at bedtime      LABS:                        8.6    8.61  )-----------( 211      ( 15 Feb 2019 06:35 )             28.8     02-15    144  |  111<H>  |  26<H>  ----------------------------<  94  3.4<L>   |  29  |  0.58    Ca    7.9<L>      15 Feb 2019 06:35  Phos  2.2     02-15  Mg     1.9     02-15          Magnesium, Serum: 1.9 mg/dL (02-15 @ 06:35)  Phosphorus Level, Serum: 2.2 mg/dL (02-15 @ 06:35)    Urine studies    PTH and Vit D:

## 2019-02-15 NOTE — PROGRESS NOTE ADULT - PROBLEM SELECTOR PLAN 1
2/2 multifocal PNA  initially placed on BiPAP, intubated today for pulmonary edema-CT chest shows bilateral pleural effusions and superimposed pneumonia  f/u repeat CXR in AM  c/w rocephin  c/w zithromax  c/w precedex

## 2019-02-16 LAB
ANION GAP SERPL CALC-SCNC: 8 MMOL/L — SIGNIFICANT CHANGE UP (ref 5–17)
BASOPHILS # BLD AUTO: 0.01 K/UL — SIGNIFICANT CHANGE UP (ref 0–0.2)
BASOPHILS NFR BLD AUTO: 0.1 % — SIGNIFICANT CHANGE UP (ref 0–2)
BUN SERPL-MCNC: 21 MG/DL — HIGH (ref 7–18)
CALCIUM SERPL-MCNC: 8.6 MG/DL — SIGNIFICANT CHANGE UP (ref 8.4–10.5)
CHLORIDE SERPL-SCNC: 112 MMOL/L — HIGH (ref 96–108)
CO2 SERPL-SCNC: 26 MMOL/L — SIGNIFICANT CHANGE UP (ref 22–31)
CREAT SERPL-MCNC: 0.55 MG/DL — SIGNIFICANT CHANGE UP (ref 0.5–1.3)
CULTURE RESULTS: SIGNIFICANT CHANGE UP
CULTURE RESULTS: SIGNIFICANT CHANGE UP
EOSINOPHIL # BLD AUTO: 0.01 K/UL — SIGNIFICANT CHANGE UP (ref 0–0.5)
EOSINOPHIL NFR BLD AUTO: 0.1 % — SIGNIFICANT CHANGE UP (ref 0–6)
GLUCOSE SERPL-MCNC: 97 MG/DL — SIGNIFICANT CHANGE UP (ref 70–99)
HCT VFR BLD CALC: 27.7 % — LOW (ref 39–50)
HGB BLD-MCNC: 8.3 G/DL — LOW (ref 13–17)
IMM GRANULOCYTES NFR BLD AUTO: 1 % — SIGNIFICANT CHANGE UP (ref 0–1.5)
LYMPHOCYTES # BLD AUTO: 0.7 K/UL — LOW (ref 1–3.3)
LYMPHOCYTES # BLD AUTO: 9.9 % — LOW (ref 13–44)
MAGNESIUM SERPL-MCNC: 2.1 MG/DL — SIGNIFICANT CHANGE UP (ref 1.6–2.6)
MCHC RBC-ENTMCNC: 29.6 PG — SIGNIFICANT CHANGE UP (ref 27–34)
MCHC RBC-ENTMCNC: 30 GM/DL — LOW (ref 32–36)
MCV RBC AUTO: 98.9 FL — SIGNIFICANT CHANGE UP (ref 80–100)
MONOCYTES # BLD AUTO: 0.92 K/UL — HIGH (ref 0–0.9)
MONOCYTES NFR BLD AUTO: 13 % — SIGNIFICANT CHANGE UP (ref 2–14)
NEUTROPHILS # BLD AUTO: 5.37 K/UL — SIGNIFICANT CHANGE UP (ref 1.8–7.4)
NEUTROPHILS NFR BLD AUTO: 75.9 % — SIGNIFICANT CHANGE UP (ref 43–77)
NRBC # BLD: 0 /100 WBCS — SIGNIFICANT CHANGE UP (ref 0–0)
PHOSPHATE SERPL-MCNC: 2.2 MG/DL — LOW (ref 2.5–4.5)
PLATELET # BLD AUTO: 214 K/UL — SIGNIFICANT CHANGE UP (ref 150–400)
POTASSIUM SERPL-MCNC: 3.6 MMOL/L — SIGNIFICANT CHANGE UP (ref 3.5–5.3)
POTASSIUM SERPL-SCNC: 3.6 MMOL/L — SIGNIFICANT CHANGE UP (ref 3.5–5.3)
RBC # BLD: 2.8 M/UL — LOW (ref 4.2–5.8)
RBC # FLD: 19.6 % — HIGH (ref 10.3–14.5)
SODIUM SERPL-SCNC: 146 MMOL/L — HIGH (ref 135–145)
SPECIMEN SOURCE: SIGNIFICANT CHANGE UP
SPECIMEN SOURCE: SIGNIFICANT CHANGE UP
WBC # BLD: 7.08 K/UL — SIGNIFICANT CHANGE UP (ref 3.8–10.5)
WBC # FLD AUTO: 7.08 K/UL — SIGNIFICANT CHANGE UP (ref 3.8–10.5)

## 2019-02-16 PROCEDURE — 71045 X-RAY EXAM CHEST 1 VIEW: CPT | Mod: 26

## 2019-02-16 RX ORDER — MAGNESIUM SULFATE 500 MG/ML
1 VIAL (ML) INJECTION ONCE
Qty: 0 | Refills: 0 | Status: COMPLETED | OUTPATIENT
Start: 2019-02-16 | End: 2019-02-16

## 2019-02-16 RX ORDER — FUROSEMIDE 40 MG
40 TABLET ORAL ONCE
Qty: 0 | Refills: 0 | Status: COMPLETED | OUTPATIENT
Start: 2019-02-16 | End: 2019-02-16

## 2019-02-16 RX ORDER — POTASSIUM PHOSPHATE, MONOBASIC POTASSIUM PHOSPHATE, DIBASIC 236; 224 MG/ML; MG/ML
15 INJECTION, SOLUTION INTRAVENOUS ONCE
Qty: 0 | Refills: 0 | Status: COMPLETED | OUTPATIENT
Start: 2019-02-16 | End: 2019-02-16

## 2019-02-16 RX ADMIN — FINASTERIDE 5 MILLIGRAM(S): 5 TABLET, FILM COATED ORAL at 11:39

## 2019-02-16 RX ADMIN — ENOXAPARIN SODIUM 40 MILLIGRAM(S): 100 INJECTION SUBCUTANEOUS at 11:39

## 2019-02-16 RX ADMIN — FAMOTIDINE 20 MILLIGRAM(S): 10 INJECTION INTRAVENOUS at 11:42

## 2019-02-16 RX ADMIN — MIDODRINE HYDROCHLORIDE 10 MILLIGRAM(S): 2.5 TABLET ORAL at 05:15

## 2019-02-16 RX ADMIN — Medication 1 DROP(S): at 05:19

## 2019-02-16 RX ADMIN — ALBUTEROL 2 PUFF(S): 90 AEROSOL, METERED ORAL at 09:11

## 2019-02-16 RX ADMIN — Medication 50 MILLILITER(S): at 00:08

## 2019-02-16 RX ADMIN — SENNA PLUS 2 TABLET(S): 8.6 TABLET ORAL at 21:00

## 2019-02-16 RX ADMIN — TAMSULOSIN HYDROCHLORIDE 0.4 MILLIGRAM(S): 0.4 CAPSULE ORAL at 21:01

## 2019-02-16 RX ADMIN — MIDODRINE HYDROCHLORIDE 10 MILLIGRAM(S): 2.5 TABLET ORAL at 21:01

## 2019-02-16 RX ADMIN — POTASSIUM PHOSPHATE, MONOBASIC POTASSIUM PHOSPHATE, DIBASIC 62.5 MILLIMOLE(S): 236; 224 INJECTION, SOLUTION INTRAVENOUS at 11:39

## 2019-02-16 RX ADMIN — Medication 100 GRAM(S): at 11:38

## 2019-02-16 RX ADMIN — CHLORHEXIDINE GLUCONATE 1 APPLICATION(S): 213 SOLUTION TOPICAL at 05:19

## 2019-02-16 RX ADMIN — Medication 100 MILLIGRAM(S): at 17:37

## 2019-02-16 RX ADMIN — AZITHROMYCIN 250 MILLIGRAM(S): 500 TABLET, FILM COATED ORAL at 17:36

## 2019-02-16 RX ADMIN — ALBUTEROL 2 PUFF(S): 90 AEROSOL, METERED ORAL at 15:35

## 2019-02-16 RX ADMIN — Medication 40 MILLIGRAM(S): at 17:38

## 2019-02-16 RX ADMIN — Medication 50 MILLILITER(S): at 05:19

## 2019-02-16 RX ADMIN — ALBUTEROL 2 PUFF(S): 90 AEROSOL, METERED ORAL at 02:25

## 2019-02-16 RX ADMIN — ALBUTEROL 2 PUFF(S): 90 AEROSOL, METERED ORAL at 21:18

## 2019-02-16 RX ADMIN — CEFTRIAXONE 100 GRAM(S): 500 INJECTION, POWDER, FOR SOLUTION INTRAMUSCULAR; INTRAVENOUS at 14:20

## 2019-02-16 RX ADMIN — Medication 100 MILLIGRAM(S): at 05:15

## 2019-02-16 RX ADMIN — Medication 1 DROP(S): at 18:22

## 2019-02-16 NOTE — PROGRESS NOTE ADULT - ASSESSMENT
85 y/o M from [facility], PMH of HTN, BPH, dementia , had recent fall on 1/22/19 had fracture of Rt intertrochanteric fracture, underwent surgical fixation, sent in from NH for respiratory distress.  In ED, patient was tachycardic to 120/min, BP- 99/58 mmhg, RR- 20/min, spo2- 90 % . EKG-  afib @105 BPM , prolonged QTC - 520, no ST T wave changes. cardiac enzymes x1- 0.059, BNP- 7244, UA- >50 WBC and moderate LE , CXR s/o Patchy right lower lobe infiltrate. Cardiomegaly. Labs pertinent for WBC- 57.6, H.H of 9.3/31.4, lactate of 9.3, K of 6.4, bicarb of 8, AG of 24,bun/ creat- 177/8.69, s/p 1 dose of cefepime, vancomycin and azithromycin with 1.5 L NS bolus  in ED   Pt had barba catheter placed, about 750 cc of cloudy urine drained. ABG - 7.35/ 13/106/7/100% NRB. Pt was placed on NIV BIPAP for work of breathing.     Pt is admitted to ICU on 2/7/18 for hypoxic respiratory failure with metabolic acidosis and respiratory alkalosis and sepsis secondary to UTI and ? PNA, ,2) Septic shock secondary to UTI and pneumonia 3) Acute renal failure secondary to UTI and septic shock.    BP stable on phenylephrine. Will give albumin again to increase plasma oncotic pressure and increase midodrine dose to bridge off pressors.    ETT: 2/10  Right femoral line: 2/13  Barba catheter: 2/7

## 2019-02-16 NOTE — PROGRESS NOTE ADULT - SUBJECTIVE AND OBJECTIVE BOX
INTERVAL HPI/OVERNIGHT EVENTS:       Antimicrobial:  azithromycin  IVPB      azithromycin  IVPB 500 milliGRAM(s) IV Intermittent every 24 hours  cefTRIAXone   IVPB      cefTRIAXone   IVPB 1 Gram(s) IV Intermittent every 24 hours    Cardiovascular:  midodrine 10 milliGRAM(s) Oral three times a day  phenylephrine    Infusion 0.1 MICROgram(s)/kG/Min IV Continuous <Continuous>  tamsulosin 0.4 milliGRAM(s) Oral at bedtime    Pulmonary:  ALBUTerol    90 MICROgram(s) HFA Inhaler 2 Puff(s) Inhalation every 6 hours    Hematalogic:  enoxaparin Injectable 40 milliGRAM(s) SubCutaneous daily    Other:  artificial  tears Solution 1 Drop(s) Both EYES two times a day  chlorhexidine 2% Cloths 1 Application(s) Topical two times a day  dexmedetomidine Infusion 0.5 MICROgram(s)/kG/Hr IV Continuous <Continuous>  docusate sodium Liquid 100 milliGRAM(s) Oral two times a day  famotidine Injectable 20 milliGRAM(s) IV Push daily  finasteride 5 milliGRAM(s) Oral daily  senna 2 Tablet(s) Oral at bedtime      Drug Dosing Weight  Height (cm): 165.1 (11 Feb 2019 22:00)  Weight (kg): 59.6 (11 Feb 2019 22:00)  BMI (kg/m2): 21.9 (11 Feb 2019 22:00)  BSA (m2): 1.65 (11 Feb 2019 22:00)    CENTRAL LINE: [ x] YES [ ] NO  LOCATION:   DATE INSERTED:    BARBA: [x ] YES [ ] NO    DATE INSERTED:    A-LINE:  [ ] YES [ ] NO  LOCATION:   DATE INSERTED:    [facility]/Social Hx/Fam Hx -reviewed admission note, no change since admission  PAST MEDICAL & SURGICAL HISTORY:  Dementia  BPH (benign prostatic hyperplasia)  Hypertension  Closed hip fracture      T(C): 36.7 (02-16-19 @ 07:00), Max: 36.7 (02-16-19 @ 07:00)  HR: 105 (02-16-19 @ 10:00)  BP: 127/105 (02-16-19 @ 10:00)  BP(mean): 109 (02-16-19 @ 10:00)  ABP: --  ABP(mean): --  RR: 28 (02-16-19 @ 10:00)  SpO2: 100% (02-16-19 @ 10:00)  Wt(kg): --    ABG - ( 16 Feb 2019 04:49 )  pH, Arterial: 7.52  pH, Blood: x     /  pCO2: 33    /  pO2: 67    / HCO3: 27    / Base Excess: 4.3   /  SaO2: 95                    02-15 @ 07:01  -  02-16 @ 07:00  --------------------------------------------------------  IN: 1877 mL / OUT: 815 mL / NET: 1062 mL        Mode: CPAP with PS  FiO2: 40  PEEP: 5  PS: 8      PHYSICAL EXAM:    GENERAL: No signs of distress, comfortable  HEAD: Atraumatic, Normocephalic  EYES: EOMI, PERRLA  ENMT: No erythema, exudates, or enlargement, Moist mucous membranes +ETT  NECK: Supple, normal appearance, No JVD; [  ] central line (if applicable)  CHEST/LUNG: No chest deformity, fair bilateral air entry; No rales, rhonchi, wheezing; crackles  HEART: Regular rate and rhythm; No murmurs, rubs, or gallops;   ABDOMEN: Soft, Nontender, Nondistended; Bowel sounds present  EXTREMITIES:  + Peripheral Pulses,  +edema  NERVOUS SYSTEM: more awake  LYMPH: No lymphadenopathy noted  SKIN: No rashes or lesions; good turgor, warm, dry      LABS:  CBC Full  -  ( 16 Feb 2019 06:12 )  WBC Count : 7.08 K/uL  Hemoglobin : 8.3 g/dL  Hematocrit : 27.7 %  Platelet Count - Automated : 214 K/uL  Mean Cell Volume : 98.9 fl  Mean Cell Hemoglobin : 29.6 pg  Mean Cell Hemoglobin Concentration : 30.0 gm/dL  Auto Neutrophil # : 5.37 K/uL  Auto Lymphocyte # : 0.70 K/uL  Auto Monocyte # : 0.92 K/uL  Auto Eosinophil # : 0.01 K/uL  Auto Basophil # : 0.01 K/uL  Auto Neutrophil % : 75.9 %  Auto Lymphocyte % : 9.9 %  Auto Monocyte % : 13.0 %  Auto Eosinophil % : 0.1 %  Auto Basophil % : 0.1 %    02-16    146<H>  |  112<H>  |  21<H>  ----------------------------<  97  3.6   |  26  |  0.55    Ca    8.6      16 Feb 2019 06:12  Phos  2.2     02-16  Mg     2.1     02-16              RADIOLOGY & ADDITIONAL STUDIES REVIEWED     CXR: reviewed    IMPRESSION:  PAST MEDICAL & SURGICAL HISTORY:  Dementia  BPH (benign prostatic hyperplasia)  Hypertension  Closed hip fracture   p/w       IMP: This a 85 y/o M from [facility], PMH of HTN, BPH, dementia , had recent fall on 1/22/19 had fracture of Rt intertrochanteric fracture, underwent surgical fixation, sent in from NH for respiratory distress.  In ED, patient was tachycardic to 120/min, BP- 99/58 mmhg, RR- 20/min, spo2- 90 % . EKG-  afib @105 BPM , prolonged QTC - 520, no ST T wave changes. cardiac enzymes x1- 0.059, BNP- 7244, UA- >50 WBC and moderate LE , CXR s/o Patchy right lower lobe infiltrate. Cardiomegaly. Labs pertinent for WBC- 57.6, H.H of 9.3/31.4, lactate of 9.3, K of 6.4, bicarb of 8, AG of 24,bun/ creat- 177/8.69, s/p 1 dose of cefepime, vancomycin and azithromycin with 1.5 L NS bolus  in ED   Pt had barba catheter placed, about 750 cc of cloudy urine drained. ABG - 7.35/ 13/106/7/100% NRB. Pt was placed on NIV BIPAP for work of breathing.     Pt is admitted to ICU on 2/7/18 for hypoxic respiratory failure with metabolic acidosis and respiratory alkalosis and sepsis secondary to UTI and ? PNA, ,2) Septic shock secondary to UTI and pneumonia 3) Acute renal failure secondary to UTI and septic shock. Re- intubated 2/10    BP stable on phenylephrine. Will give albumin to increase plasma oncotic pressure and start midodrine to bridge off pressors. Pat is more awake on SBT but will not extubate until review cxr              Plan:      CNS: no sedation, precedex if needed    PULMONARY: continue vent support    CARDIAC: taper off pressors    GI: feed    RENAL: ngt     SKIN: wound care    MUSCULOSKELETAL: boots/ PT    ID: antibx as per ID    HEME: monitor    DVT and GI Prophylaxis    GOALS OF CARE DISCUSSION WITH PATIENT/FAMILY/PROXY/ icu team on rounds    CRITICAL CARE TIME SPENT: 35 minutes

## 2019-02-16 NOTE — PROGRESS NOTE ADULT - PROBLEM SELECTOR PLAN 4
Na 144 this AM  monitor BMP qd  hold D5W as pt becomes fluid overloaded  nephro - Dr. Berman monitor BMP qd  nephro - Dr. Berman

## 2019-02-16 NOTE — PROGRESS NOTE ADULT - SUBJECTIVE AND OBJECTIVE BOX
INTERVAL HPI/OVERNIGHT EVENTS: No acute events overnight. Prognosis remains guarded. As requested, medical updates to be provided to HCP (patient's daughter) Gerri Vidal and no other family members.    PRESSORS: [X ] YES [ ] NO  WHICH: phenylephrine    MEDICATIONS  (STANDING):  albumin human 25% IVPB 50 milliLiter(s) IV Intermittent every 6 hours  ALBUTerol    90 MICROgram(s) HFA Inhaler 2 Puff(s) Inhalation every 6 hours  artificial  tears Solution 1 Drop(s) Both EYES two times a day  azithromycin  IVPB      azithromycin  IVPB 500 milliGRAM(s) IV Intermittent every 24 hours  cefTRIAXone   IVPB      cefTRIAXone   IVPB 1 Gram(s) IV Intermittent every 24 hours  chlorhexidine 2% Cloths 1 Application(s) Topical two times a day  dexmedetomidine Infusion 0.5 MICROgram(s)/kG/Hr (7.45 mL/Hr) IV Continuous <Continuous>  docusate sodium Liquid 100 milliGRAM(s) Oral two times a day  enoxaparin Injectable 40 milliGRAM(s) SubCutaneous daily  famotidine Injectable 20 milliGRAM(s) IV Push daily  finasteride 5 milliGRAM(s) Oral daily  midodrine 10 milliGRAM(s) Oral three times a day  phenylephrine    Infusion 0.1 MICROgram(s)/kG/Min (1.118 mL/Hr) IV Continuous <Continuous>  senna 2 Tablet(s) Oral at bedtime  tamsulosin 0.4 milliGRAM(s) Oral at bedtime    MEDICATIONS  (PRN):    Drug Dosing Weight  Height (cm): 165.1 (11 Feb 2019 22:00)  Weight (kg): 59.6 (11 Feb 2019 22:00)  BMI (kg/m2): 21.9 (11 Feb 2019 22:00)  BSA (m2): 1.65 (11 Feb 2019 22:00)    CENTRAL LINE: [X] YES [ ] NO  LOCATION: right femoral DATE INSERTED:  2/13/19  REMOVE: [ ] YES [ ] NO  EXPLAIN:    ZAFAR: [x ] YES [ ] NO    DATE INSERTED: 2/7/19  REMOVE:  [ ] YES [ ] NO  EXPLAIN:    PMH -reviewed admission note, no change since admission  PAST MEDICAL & SURGICAL HISTORY:  Dementia  BPH (benign prostatic hyperplasia)  Hypertension  Closed hip fracture    ICU Vital Signs Last 24 Hrs  T(C): 37.3 (15 Feb 2019 05:00), Max: 37.3 (15 Feb 2019 05:00)  T(F): 99.1 (15 Feb 2019 05:00), Max: 99.1 (15 Feb 2019 05:00)  HR: 93 (16 Feb 2019 00:00) (51 - 100)  BP: 126/61 (16 Feb 2019 00:00) (95/47 - 137/69)  BP(mean): 78 (16 Feb 2019 00:00) (57 - 86)  ABP: --  ABP(mean): --  RR: 30 (16 Feb 2019 00:00) (18 - 33)  SpO2: 100% (16 Feb 2019 00:00) (89% - 100%)      ABG - ( 15 Feb 2019 04:41 )  pH, Arterial: 7.52  pH, Blood: x     /  pCO2: 35    /  pO2: 77    / HCO3: 28    / Base Excess: 5.6   /  SaO2: 97            02-14 @ 07:01  -  02-15 @ 07:00  --------------------------------------------------------  IN: 2060 mL / OUT: 1080 mL / NET: 980 mL      Mode: AC/ CMV (Assist Control/ Continuous Mandatory Ventilation)  RR (machine): 12  TV (machine): 400  FiO2: 40  PEEP: 5  ITime: 1  MAP: 10  PIP: 21      PHYSICAL EXAM:    GENERAL:  NAD; on ventilator  HEAD:  Atraumatic, Normocephalic  EYES:  PERRL, conjunctiva and sclera clear  ENMT: No tonsillar erythema, exudates, or enlargement; dry mucous membranes,   NECK: Supple, normal appearance, No JVD; Normal thyroid; Trachea midline  NERVOUS SYSTEM: awakens to verbal stimuli when prompted  CHEST/LUNG: bilateral rales   HEART: Regular rate and rhythm; No murmurs, rubs, or gallops  ABDOMEN: Soft, Nontender, Nondistended; Bowel sounds present  EXTREMITIES: marked edema of all four extremities; right femoral line in place  LYMPH: No lymphadenopathy noted      LABS:  CBC Full  -  ( 15 Feb 2019 06:35 )  WBC Count : 8.61 K/uL  Hemoglobin : 8.6 g/dL  Hematocrit : 28.8 %  Platelet Count - Automated : 211 K/uL  Mean Cell Volume : 97.0 fl  Mean Cell Hemoglobin : 29.0 pg  Mean Cell Hemoglobin Concentration : 29.9 gm/dL  Auto Neutrophil # : 6.80 K/uL  Auto Lymphocyte # : 0.69 K/uL  Auto Monocyte # : 0.94 K/uL  Auto Eosinophil # : 0.05 K/uL  Auto Basophil # : 0.02 K/uL  Auto Neutrophil % : 79.0 %  Auto Lymphocyte % : 8.0 %  Auto Monocyte % : 10.9 %  Auto Eosinophil % : 0.6 %  Auto Basophil % : 0.2 %    02-15    144  |  111<H>  |  26<H>  ----------------------------<  94  3.4<L>   |  29  |  0.58    Ca    7.9<L>      15 Feb 2019 06:35  Phos  2.2     02-15  Mg     1.9     02-15        CRITICAL CARE TIME SPENT: 35 minutes

## 2019-02-16 NOTE — PROGRESS NOTE ADULT - SUBJECTIVE AND OBJECTIVE BOX
NEPHROLOGY MEDICAL CARE, [facility] - Dr. Dajuan Oseguera/ Dr. Nataliya Breman/ Dr. Lennox Yusuf/ Dr. Kasie Lagunas    Patient was seen and examined at bedside.    CC: pt is intubated but awake and no distress.    Vital Signs Last 24 Hrs  T(C): 36.7 (16 Feb 2019 07:00), Max: 36.7 (16 Feb 2019 07:00)  T(F): 98.1 (16 Feb 2019 07:00), Max: 98.1 (16 Feb 2019 07:00)  HR: 100 (16 Feb 2019 12:12) (51 - 109)  BP: 163/124 (16 Feb 2019 12:00) (103/47 - 163/124)  BP(mean): 135 (16 Feb 2019 12:00) (61 - 135)  RR: 30 (16 Feb 2019 12:00) (20 - 39)  SpO2: 100% (16 Feb 2019 12:12) (97% - 100%)    02-15 @ 07:01 - 02-16 @ 07:00  --------------------------------------------------------  IN: 1877 mL / OUT: 815 mL / NET: 1062 mL    02-16 @ 07:01  - 02-16 @ 14:46  --------------------------------------------------------  IN: 450 mL / OUT: 301 mL / NET: 149 mL        PHYSICAL EXAM:  Constitutional: well developed, Mal-nourished  and in nad  HEENT: PERRLA,  no icteric sclera and mild pallor of conjunctiva noted; ET.  Neck: No JVD, thyromegaly or adenopathy  Respiratory: reduced air entry lower lungs with few rhonchi  Cardiovascular: S1 and S2 normally heard  Gastrointestinal: soft, nondistended, nontender and normal bowel sounds heard  Extremities:  B/L LE and upper thigh edema noted  Neurological: awake   Skin: No rashes      MEDICATIONS:  ALBUTerol    90 MICROgram(s) HFA Inhaler 2 Puff(s) Inhalation every 6 hours  artificial  tears Solution 1 Drop(s) Both EYES two times a day  azithromycin  IVPB      azithromycin  IVPB 500 milliGRAM(s) IV Intermittent every 24 hours  cefTRIAXone   IVPB      cefTRIAXone   IVPB 1 Gram(s) IV Intermittent every 24 hours  chlorhexidine 2% Cloths 1 Application(s) Topical two times a day  dexmedetomidine Infusion 0.5 MICROgram(s)/kG/Hr IV Continuous <Continuous>  docusate sodium Liquid 100 milliGRAM(s) Oral two times a day  enoxaparin Injectable 40 milliGRAM(s) SubCutaneous daily  famotidine Injectable 20 milliGRAM(s) IV Push daily  finasteride 5 milliGRAM(s) Oral daily  midodrine 10 milliGRAM(s) Oral three times a day  phenylephrine    Infusion 0.1 MICROgram(s)/kG/Min IV Continuous <Continuous>  senna 2 Tablet(s) Oral at bedtime  tamsulosin 0.4 milliGRAM(s) Oral at bedtime      LABS:                        8.3    7.08  )-----------( 214      ( 16 Feb 2019 06:12 )             27.7     02-16    146<H>  |  112<H>  |  21<H>  ----------------------------<  97  3.6   |  26  |  0.55    Ca    8.6      16 Feb 2019 06:12  Phos  2.2     02-16  Mg     2.1     02-16          Magnesium, Serum: 2.1 mg/dL (02-16 @ 06:12)  Phosphorus Level, Serum: 2.2 mg/dL (02-16 @ 06:12)    Urine studies    PTH and Vit D:

## 2019-02-16 NOTE — PROGRESS NOTE ADULT - PROBLEM SELECTOR PLAN 3
pt continues third spacing fluids  increasing midodrine to 10mg tid  will give albumin again today and aim to wean off pressor pt continues third spacing fluids  c/w midodrine to 10mg tid   aim to wean off pressor

## 2019-02-16 NOTE — PROGRESS NOTE ADULT - ASSESSMENT
1.PAULINA: most likley secondary to obstructive uropathy ,   -resolved and normal renal function.  -Keep patient euvolemic   -Avoid Nephrotoxic Meds/ Agents such as (NSAIDs, IV contrast, Aminoglycosides such as gentamicin, -Gadolinium contrast, Phosphate containing enemas, etc..)  -Adjust Medications according to eGFR  -f/u bmp daily  2.HYPOKALEMIA: now worsening with diuresis  -stable.  -replace kcl prn  -keep k >4 and <5  -f/u k level daily  3.HYPERNATREMIA: mild .secondary to diuresis  -no change and add free water 200cc q6hrs to tube feedings.  -avoid Na correction >8 meq per 24 hrs  -f/u Na level daily  -continue  Hctz 12.5 MG daily for fluid overload   4.METABOLIC ACIDOSIS: now improved  -f/u co2 daily  5.HYPOPHOSPHATEMIA: secondary to improved renal function  -replace prn to keep phos level >3 and <5  -f/u phos level daily

## 2019-02-17 LAB
ANION GAP SERPL CALC-SCNC: 8 MMOL/L — SIGNIFICANT CHANGE UP (ref 5–17)
BASE EXCESS BLDA CALC-SCNC: 4.7 MMOL/L — HIGH (ref -2–2)
BASOPHILS # BLD AUTO: 0.02 K/UL — SIGNIFICANT CHANGE UP (ref 0–0.2)
BASOPHILS NFR BLD AUTO: 0.3 % — SIGNIFICANT CHANGE UP (ref 0–2)
BLOOD GAS COMMENTS ARTERIAL: SIGNIFICANT CHANGE UP
BUN SERPL-MCNC: 16 MG/DL — SIGNIFICANT CHANGE UP (ref 7–18)
CALCIUM SERPL-MCNC: 8.4 MG/DL — SIGNIFICANT CHANGE UP (ref 8.4–10.5)
CHLORIDE SERPL-SCNC: 109 MMOL/L — HIGH (ref 96–108)
CO2 SERPL-SCNC: 28 MMOL/L — SIGNIFICANT CHANGE UP (ref 22–31)
CREAT SERPL-MCNC: 0.54 MG/DL — SIGNIFICANT CHANGE UP (ref 0.5–1.3)
EOSINOPHIL # BLD AUTO: 0.02 K/UL — SIGNIFICANT CHANGE UP (ref 0–0.5)
EOSINOPHIL NFR BLD AUTO: 0.3 % — SIGNIFICANT CHANGE UP (ref 0–6)
GLUCOSE SERPL-MCNC: 79 MG/DL — SIGNIFICANT CHANGE UP (ref 70–99)
HCO3 BLDA-SCNC: 27 MMOL/L — SIGNIFICANT CHANGE UP (ref 23–27)
HCT VFR BLD CALC: 30.7 % — LOW (ref 39–50)
HGB BLD-MCNC: 9.2 G/DL — LOW (ref 13–17)
HOROWITZ INDEX BLDA+IHG-RTO: 40 — SIGNIFICANT CHANGE UP
IMM GRANULOCYTES NFR BLD AUTO: 0.9 % — SIGNIFICANT CHANGE UP (ref 0–1.5)
LYMPHOCYTES # BLD AUTO: 0.56 K/UL — LOW (ref 1–3.3)
LYMPHOCYTES # BLD AUTO: 7.4 % — LOW (ref 13–44)
MAGNESIUM SERPL-MCNC: 2 MG/DL — SIGNIFICANT CHANGE UP (ref 1.6–2.6)
MCHC RBC-ENTMCNC: 29.5 PG — SIGNIFICANT CHANGE UP (ref 27–34)
MCHC RBC-ENTMCNC: 30 GM/DL — LOW (ref 32–36)
MCV RBC AUTO: 98.4 FL — SIGNIFICANT CHANGE UP (ref 80–100)
MONOCYTES # BLD AUTO: 1.16 K/UL — HIGH (ref 0–0.9)
MONOCYTES NFR BLD AUTO: 15.3 % — HIGH (ref 2–14)
NEUTROPHILS # BLD AUTO: 5.73 K/UL — SIGNIFICANT CHANGE UP (ref 1.8–7.4)
NEUTROPHILS NFR BLD AUTO: 75.8 % — SIGNIFICANT CHANGE UP (ref 43–77)
NRBC # BLD: 0 /100 WBCS — SIGNIFICANT CHANGE UP (ref 0–0)
PCO2 BLDA: 33 MMHG — SIGNIFICANT CHANGE UP (ref 32–46)
PH BLDA: 7.52 — HIGH (ref 7.35–7.45)
PHOSPHATE SERPL-MCNC: 2.4 MG/DL — LOW (ref 2.5–4.5)
PLATELET # BLD AUTO: 267 K/UL — SIGNIFICANT CHANGE UP (ref 150–400)
PO2 BLDA: 119 MMHG — HIGH (ref 74–108)
POTASSIUM SERPL-MCNC: 3.3 MMOL/L — LOW (ref 3.5–5.3)
POTASSIUM SERPL-SCNC: 3.3 MMOL/L — LOW (ref 3.5–5.3)
RBC # BLD: 3.12 M/UL — LOW (ref 4.2–5.8)
RBC # FLD: 19.9 % — HIGH (ref 10.3–14.5)
SAO2 % BLDA: 99 % — HIGH (ref 92–96)
SODIUM SERPL-SCNC: 145 MMOL/L — SIGNIFICANT CHANGE UP (ref 135–145)
WBC # BLD: 7.56 K/UL — SIGNIFICANT CHANGE UP (ref 3.8–10.5)
WBC # FLD AUTO: 7.56 K/UL — SIGNIFICANT CHANGE UP (ref 3.8–10.5)

## 2019-02-17 PROCEDURE — 71045 X-RAY EXAM CHEST 1 VIEW: CPT | Mod: 26

## 2019-02-17 RX ORDER — NYSTATIN CREAM 100000 [USP'U]/G
1 CREAM TOPICAL ONCE
Qty: 0 | Refills: 0 | Status: COMPLETED | OUTPATIENT
Start: 2019-02-17 | End: 2019-02-17

## 2019-02-17 RX ORDER — PROPOFOL 10 MG/ML
5 INJECTION, EMULSION INTRAVENOUS
Qty: 500 | Refills: 0 | Status: DISCONTINUED | OUTPATIENT
Start: 2019-02-17 | End: 2019-02-17

## 2019-02-17 RX ORDER — POTASSIUM CHLORIDE 20 MEQ
40 PACKET (EA) ORAL EVERY 4 HOURS
Qty: 0 | Refills: 0 | Status: DISCONTINUED | OUTPATIENT
Start: 2019-02-17 | End: 2019-02-17

## 2019-02-17 RX ORDER — POTASSIUM PHOSPHATE, MONOBASIC POTASSIUM PHOSPHATE, DIBASIC 236; 224 MG/ML; MG/ML
15 INJECTION, SOLUTION INTRAVENOUS ONCE
Qty: 0 | Refills: 0 | Status: COMPLETED | OUTPATIENT
Start: 2019-02-17 | End: 2019-02-17

## 2019-02-17 RX ORDER — POTASSIUM CHLORIDE 20 MEQ
40 PACKET (EA) ORAL EVERY 4 HOURS
Qty: 0 | Refills: 0 | Status: COMPLETED | OUTPATIENT
Start: 2019-02-17 | End: 2019-02-17

## 2019-02-17 RX ORDER — DEXMEDETOMIDINE HYDROCHLORIDE IN 0.9% SODIUM CHLORIDE 4 UG/ML
0.5 INJECTION INTRAVENOUS
Qty: 200 | Refills: 0 | Status: DISCONTINUED | OUTPATIENT
Start: 2019-02-17 | End: 2019-02-17

## 2019-02-17 RX ADMIN — MIDODRINE HYDROCHLORIDE 10 MILLIGRAM(S): 2.5 TABLET ORAL at 05:13

## 2019-02-17 RX ADMIN — CEFTRIAXONE 100 GRAM(S): 500 INJECTION, POWDER, FOR SOLUTION INTRAMUSCULAR; INTRAVENOUS at 14:12

## 2019-02-17 RX ADMIN — FINASTERIDE 5 MILLIGRAM(S): 5 TABLET, FILM COATED ORAL at 12:35

## 2019-02-17 RX ADMIN — Medication 40 MILLIEQUIVALENT(S): at 09:51

## 2019-02-17 RX ADMIN — ALBUTEROL 2 PUFF(S): 90 AEROSOL, METERED ORAL at 02:30

## 2019-02-17 RX ADMIN — FAMOTIDINE 20 MILLIGRAM(S): 10 INJECTION INTRAVENOUS at 12:34

## 2019-02-17 RX ADMIN — SENNA PLUS 2 TABLET(S): 8.6 TABLET ORAL at 22:43

## 2019-02-17 RX ADMIN — CHLORHEXIDINE GLUCONATE 1 APPLICATION(S): 213 SOLUTION TOPICAL at 17:47

## 2019-02-17 RX ADMIN — TAMSULOSIN HYDROCHLORIDE 0.4 MILLIGRAM(S): 0.4 CAPSULE ORAL at 22:43

## 2019-02-17 RX ADMIN — MIDODRINE HYDROCHLORIDE 10 MILLIGRAM(S): 2.5 TABLET ORAL at 14:11

## 2019-02-17 RX ADMIN — AZITHROMYCIN 250 MILLIGRAM(S): 500 TABLET, FILM COATED ORAL at 17:45

## 2019-02-17 RX ADMIN — ENOXAPARIN SODIUM 40 MILLIGRAM(S): 100 INJECTION SUBCUTANEOUS at 12:34

## 2019-02-17 RX ADMIN — Medication 1 DROP(S): at 17:45

## 2019-02-17 RX ADMIN — Medication 100 MILLIGRAM(S): at 17:45

## 2019-02-17 RX ADMIN — POTASSIUM PHOSPHATE, MONOBASIC POTASSIUM PHOSPHATE, DIBASIC 62.5 MILLIMOLE(S): 236; 224 INJECTION, SOLUTION INTRAVENOUS at 09:49

## 2019-02-17 RX ADMIN — CHLORHEXIDINE GLUCONATE 1 APPLICATION(S): 213 SOLUTION TOPICAL at 05:13

## 2019-02-17 RX ADMIN — DEXMEDETOMIDINE HYDROCHLORIDE IN 0.9% SODIUM CHLORIDE 7.45 MICROGRAM(S)/KG/HR: 4 INJECTION INTRAVENOUS at 10:20

## 2019-02-17 RX ADMIN — Medication 100 MILLIGRAM(S): at 05:13

## 2019-02-17 RX ADMIN — NYSTATIN CREAM 1 APPLICATION(S): 100000 CREAM TOPICAL at 17:47

## 2019-02-17 RX ADMIN — MIDODRINE HYDROCHLORIDE 10 MILLIGRAM(S): 2.5 TABLET ORAL at 22:43

## 2019-02-17 RX ADMIN — ALBUTEROL 2 PUFF(S): 90 AEROSOL, METERED ORAL at 08:28

## 2019-02-17 RX ADMIN — Medication 1 DROP(S): at 05:13

## 2019-02-17 RX ADMIN — Medication 40 MILLIEQUIVALENT(S): at 12:35

## 2019-02-17 NOTE — PROGRESS NOTE ADULT - SUBJECTIVE AND OBJECTIVE BOX
NEPHROLOGY MEDICAL CARE, [facility] - Dr. Dajuan Oseguera/ Dr. Nataliya Berman/ Dr. Lennox Yusuf/ Dr. Kasie Lagunas    Patient was seen and examined at bedside.    CC: pt is non-verbal and NAD    Vital Signs Last 24 Hrs  T(C): 37.3 (17 Feb 2019 12:00), Max: 37.9 (17 Feb 2019 00:00)  T(F): 99.1 (17 Feb 2019 12:00), Max: 100.2 (17 Feb 2019 00:00)  HR: 95 (17 Feb 2019 12:00) (77 - 107)  BP: 118/102 (17 Feb 2019 12:00) (112/75 - 180/80)  BP(mean): 106 (17 Feb 2019 12:00) (74 - 108)  RR: 32 (17 Feb 2019 12:00) (24 - 33)  SpO2: 100% (17 Feb 2019 12:00) (99% - 100%)    02-16 @ 07:01 - 02-17 @ 07:00  --------------------------------------------------------  IN: 1900 mL / OUT: 2956 mL / NET: -1056 mL    02-17 @ 07:01 - 02-17 @ 13:48  --------------------------------------------------------  IN: 542.1 mL / OUT: 300 mL / NET: 242.1 mL        PHYSICAL EXAM:  Constitutional: well developed, Mal-nourished  and in nad  HEENT: PERRLA,  no icteric sclera and mild pallor of conjunctiva noted; ET.  Neck: No JVD, thyromegaly or adenopathy  Respiratory: reduced air entry lower lungs with few rhonchi  Cardiovascular: S1 and S2 normally heard  Gastrointestinal: soft, nondistended, nontender and normal bowel sounds heard  Extremities:  B/L LE and upper thigh edema noted  Neurological: sleepy  Skin: No rashes      MEDICATIONS:  ALBUTerol    90 MICROgram(s) HFA Inhaler 2 Puff(s) Inhalation every 6 hours  artificial  tears Solution 1 Drop(s) Both EYES two times a day  azithromycin  IVPB      azithromycin  IVPB 500 milliGRAM(s) IV Intermittent every 24 hours  cefTRIAXone   IVPB      cefTRIAXone   IVPB 1 Gram(s) IV Intermittent every 24 hours  chlorhexidine 2% Cloths 1 Application(s) Topical two times a day  dexmedetomidine Infusion 0.5 MICROgram(s)/kG/Hr IV Continuous <Continuous>  docusate sodium Liquid 100 milliGRAM(s) Oral two times a day  enoxaparin Injectable 40 milliGRAM(s) SubCutaneous daily  famotidine Injectable 20 milliGRAM(s) IV Push daily  finasteride 5 milliGRAM(s) Oral daily  midodrine 10 milliGRAM(s) Oral three times a day  phenylephrine    Infusion 0.1 MICROgram(s)/kG/Min IV Continuous <Continuous>  senna 2 Tablet(s) Oral at bedtime  tamsulosin 0.4 milliGRAM(s) Oral at bedtime      LABS:                        9.2    7.56  )-----------( 267      ( 17 Feb 2019 07:00 )             30.7     02-17    145  |  109<H>  |  16  ----------------------------<  79  3.3<L>   |  28  |  0.54    Ca    8.4      17 Feb 2019 07:00  Phos  2.4     02-17  Mg     2.0     02-17          Magnesium, Serum: 2.0 mg/dL (02-17 @ 07:00)  Phosphorus Level, Serum: 2.4 mg/dL (02-17 @ 07:00)    Urine studies    PTH and Vit D:

## 2019-02-17 NOTE — PROGRESS NOTE ADULT - ASSESSMENT
87 y/o M from [facility], PMH of HTN, BPH, dementia , had recent fall on 1/22/19 had fracture of Rt intertrochanteric fracture, underwent surgical fixation, sent in from NH for respiratory distress.  In ED, patient was tachycardic to 120/min, BP- 99/58 mmhg, RR- 20/min, spo2- 90 % . EKG-  afib @105 BPM , prolonged QTC - 520, no ST T wave changes. cardiac enzymes x1- 0.059, BNP- 7244, UA- >50 WBC and moderate LE , CXR s/o Patchy right lower lobe infiltrate. Cardiomegaly. Labs pertinent for WBC- 57.6, H.H of 9.3/31.4, lactate of 9.3, K of 6.4, bicarb of 8, AG of 24,bun/ creat- 177/8.69, s/p 1 dose of cefepime, vancomycin and azithromycin with 1.5 L NS bolus  in ED   Pt had barba catheter placed, about 750 cc of cloudy urine drained. ABG - 7.35/ 13/106/7/100% NRB. Pt was placed on NIV BIPAP for work of breathing.     Pt is admitted to ICU on 2/7/18 for hypoxic respiratory failure with metabolic acidosis and respiratory alkalosis and sepsis secondary to UTI and ? PNA, ,2) Septic shock secondary to UTI and pneumonia 3) Acute renal failure secondary to UTI and septic shock.    BP stable on phenylephrine. Will give albumin again to increase plasma oncotic pressure and increase midodrine dose to bridge off pressors.    ETT: 2/10  Right femoral line: 2/13  Barba catheter: 2/7

## 2019-02-17 NOTE — PROGRESS NOTE ADULT - PROBLEM SELECTOR PLAN 1
2/2 multifocal PNA  initially placed on BiPAP, intubated today for pulmonary edema-CT chest shows bilateral pleural effusions and superimposed pneumonia  f/u repeat CXR in AM  c/w rocephin  c/w zithromax  c/w precedex 2/2 multifocal PNA  initially placed on BiPAP, intubated today for pulmonary edema-CT chest shows bilateral pleural effusions and superimposed pneumonia  f/u repeat CXR in AM  c/w rocephin  c/w zithromax  back on precedex  SBT daily

## 2019-02-17 NOTE — PROGRESS NOTE ADULT - PROBLEM SELECTOR PLAN 2
2/2 PNA and E. coli bacteremia  repeat BCx negative to date  c/w rocephin 1g qd, day 7  c/w zithromax qd, day 7  removed left femoral central line on 2/12/19  right femoral central line inserted on 2/13/19  ID - Dr. Norton

## 2019-02-17 NOTE — PROGRESS NOTE ADULT - ASSESSMENT
1.PAULINA: most likley secondary to obstructive uropathy  -resolved and normal renal function.  -Keep patient euvolemic   -Avoid Nephrotoxic Meds/ Agents such as (NSAIDs, IV contrast, Aminoglycosides such as gentamicin, -Gadolinium contrast, Phosphate containing enemas, etc..)  -Adjust Medications according to eGFR  -f/u bmp daily  2.HYPOKALEMIA: now worsening with diuresis  -stable.  -replace kcl prn  -keep k >4 and <5  -f/u k level daily  3.HYPERNATREMIA: mild .secondary to diuresis  -improving and continue free water 200cc q6hrs to tube feedings.  -avoid Na correction >8 meq per 24 hrs  -f/u Na level daily  -continue  Hctz 12.5 MG daily for fluid overload   4.METABOLIC ACIDOSIS: now improved  -f/u co2 daily  5.HYPOPHOSPHATEMIA: secondary to improved renal function  -replace prn to keep phos level >3 and <5  -f/u phos level daily

## 2019-02-17 NOTE — PROGRESS NOTE ADULT - SUBJECTIVE AND OBJECTIVE BOX
INTERVAL HPI/OVERNIGHT EVENTS: No acute events overnight.    PRESSORS: [X ] YES [ ] NO  WHICH: phenylephrine    ANTIBIOTICS: rocephin 1g qd, zithromax 500mg qd    Antimicrobial:  azithromycin  IVPB      azithromycin  IVPB 500 milliGRAM(s) IV Intermittent every 24 hours  cefTRIAXone   IVPB      cefTRIAXone   IVPB 1 Gram(s) IV Intermittent every 24 hours    Cardiovascular:  midodrine 10 milliGRAM(s) Oral three times a day  phenylephrine    Infusion 0.1 MICROgram(s)/kG/Min IV Continuous <Continuous>  tamsulosin 0.4 milliGRAM(s) Oral at bedtime    Pulmonary:  ALBUTerol    90 MICROgram(s) HFA Inhaler 2 Puff(s) Inhalation every 6 hours    Hematalogic:  enoxaparin Injectable 40 milliGRAM(s) SubCutaneous daily    Other:  artificial  tears Solution 1 Drop(s) Both EYES two times a day  chlorhexidine 2% Cloths 1 Application(s) Topical two times a day  dexmedetomidine Infusion 0.5 MICROgram(s)/kG/Hr IV Continuous <Continuous>  docusate sodium Liquid 100 milliGRAM(s) Oral two times a day  famotidine Injectable 20 milliGRAM(s) IV Push daily  finasteride 5 milliGRAM(s) Oral daily  senna 2 Tablet(s) Oral at bedtime    ALBUTerol    90 MICROgram(s) HFA Inhaler 2 Puff(s) Inhalation every 6 hours  artificial  tears Solution 1 Drop(s) Both EYES two times a day  azithromycin  IVPB      azithromycin  IVPB 500 milliGRAM(s) IV Intermittent every 24 hours  cefTRIAXone   IVPB      cefTRIAXone   IVPB 1 Gram(s) IV Intermittent every 24 hours  chlorhexidine 2% Cloths 1 Application(s) Topical two times a day  dexmedetomidine Infusion 0.5 MICROgram(s)/kG/Hr IV Continuous <Continuous>  docusate sodium Liquid 100 milliGRAM(s) Oral two times a day  enoxaparin Injectable 40 milliGRAM(s) SubCutaneous daily  famotidine Injectable 20 milliGRAM(s) IV Push daily  finasteride 5 milliGRAM(s) Oral daily  midodrine 10 milliGRAM(s) Oral three times a day  phenylephrine    Infusion 0.1 MICROgram(s)/kG/Min IV Continuous <Continuous>  senna 2 Tablet(s) Oral at bedtime  tamsulosin 0.4 milliGRAM(s) Oral at bedtime    Drug Dosing Weight  Height (cm): 165.1 (11 Feb 2019 22:00)  Weight (kg): 59.6 (11 Feb 2019 22:00)  BMI (kg/m2): 21.9 (11 Feb 2019 22:00)  BSA (m2): 1.65 (11 Feb 2019 22:00)    CENTRAL LINE: [X] YES [ ] NO  LOCATION: right femoral DATE INSERTED:  2/13/19  REMOVE: [ ] YES [ ] NO  EXPLAIN:    ZAFAR: [x ] YES [ ] NO    DATE INSERTED: 2/7/19  REMOVE:  [ ] YES [ ] NO  EXPLAIN:    PMH -reviewed admission note, no change since admission  PAST MEDICAL & SURGICAL HISTORY:  Dementia  BPH (benign prostatic hyperplasia)  Hypertension  Closed hip fracture      ICU Vital Signs Last 24 Hrs  T(C): 37.9 (17 Feb 2019 00:00), Max: 37.9 (17 Feb 2019 00:00)  T(F): 100.2 (17 Feb 2019 00:00), Max: 100.2 (17 Feb 2019 00:00)  HR: 104 (17 Feb 2019 02:28) (77 - 109)  BP: 137/94 (17 Feb 2019 02:00) (112/75 - 180/80)  BP(mean): 99 (17 Feb 2019 02:00) (77 - 135)  ABP: --  ABP(mean): --  RR: 29 (17 Feb 2019 02:00) (26 - 39)  SpO2: 100% (17 Feb 2019 02:28) (97% - 100%)      ABG - ( 16 Feb 2019 04:49 )  pH, Arterial: 7.52  pH, Blood: x     /  pCO2: 33    /  pO2: 67    / HCO3: 27    / Base Excess: 4.3   /  SaO2: 95          02-15 @ 07:01  -  02-16 @ 07:00  --------------------------------------------------------  IN: 1877 mL / OUT: 815 mL / NET: 1062 mL        Mode: AC/ CMV (Assist Control/ Continuous Mandatory Ventilation)  RR (machine): 12  TV (machine): 400  FiO2: 40  PEEP: 5  ITime: 1  MAP: 11  PIP: 22      PHYSICAL EXAM:    GENERAL:  NAD; on ventilator  HEAD:  Atraumatic, Normocephalic  EYES:  PERRL, conjunctiva and sclera clear  ENMT: No tonsillar erythema, exudates, or enlargement; dry mucous membranes,   NECK: Supple, normal appearance, No JVD; Normal thyroid; Trachea midline  NERVOUS SYSTEM: awakens to verbal stimuli when prompted  CHEST/LUNG: bilateral rales   HEART: Regular rate and rhythm; No murmurs, rubs, or gallops  ABDOMEN: Soft, Nontender, Nondistended; Bowel sounds present  EXTREMITIES: marked edema of all four extremities; right femoral line in place  LYMPH: No lymphadenopathy noted      LABS:  CBC Full  -  ( 16 Feb 2019 06:12 )  WBC Count : 7.08 K/uL  Hemoglobin : 8.3 g/dL  Hematocrit : 27.7 %  Platelet Count - Automated : 214 K/uL  Mean Cell Volume : 98.9 fl  Mean Cell Hemoglobin : 29.6 pg  Mean Cell Hemoglobin Concentration : 30.0 gm/dL  Auto Neutrophil # : 5.37 K/uL  Auto Lymphocyte # : 0.70 K/uL  Auto Monocyte # : 0.92 K/uL  Auto Eosinophil # : 0.01 K/uL  Auto Basophil # : 0.01 K/uL  Auto Neutrophil % : 75.9 %  Auto Lymphocyte % : 9.9 %  Auto Monocyte % : 13.0 %  Auto Eosinophil % : 0.1 %  Auto Basophil % : 0.1 %    02-16    146<H>  |  112<H>  |  21<H>  ----------------------------<  97  3.6   |  26  |  0.55    Ca    8.6      16 Feb 2019 06:12  Phos  2.2     02-16  Mg     2.1     02-16      CRITICAL CARE TIME SPENT: 35 minutes INTERVAL HPI/OVERNIGHT EVENTS: No acute events overnight, stated SBT, back on precedex.    PRESSORS: [X ] YES [ ] NO  WHICH: phenylephrine    ANTIBIOTICS: rocephin 1g qd, zithromax 500mg qd    Antimicrobial:  azithromycin  IVPB      azithromycin  IVPB 500 milliGRAM(s) IV Intermittent every 24 hours  cefTRIAXone   IVPB      cefTRIAXone   IVPB 1 Gram(s) IV Intermittent every 24 hours    Cardiovascular:  midodrine 10 milliGRAM(s) Oral three times a day  phenylephrine    Infusion 0.1 MICROgram(s)/kG/Min IV Continuous <Continuous>  tamsulosin 0.4 milliGRAM(s) Oral at bedtime    Pulmonary:  ALBUTerol    90 MICROgram(s) HFA Inhaler 2 Puff(s) Inhalation every 6 hours    Hematalogic:  enoxaparin Injectable 40 milliGRAM(s) SubCutaneous daily    Other:  artificial  tears Solution 1 Drop(s) Both EYES two times a day  chlorhexidine 2% Cloths 1 Application(s) Topical two times a day  dexmedetomidine Infusion 0.5 MICROgram(s)/kG/Hr IV Continuous <Continuous>  docusate sodium Liquid 100 milliGRAM(s) Oral two times a day  famotidine Injectable 20 milliGRAM(s) IV Push daily  finasteride 5 milliGRAM(s) Oral daily  senna 2 Tablet(s) Oral at bedtime    ALBUTerol    90 MICROgram(s) HFA Inhaler 2 Puff(s) Inhalation every 6 hours  artificial  tears Solution 1 Drop(s) Both EYES two times a day  azithromycin  IVPB      azithromycin  IVPB 500 milliGRAM(s) IV Intermittent every 24 hours  cefTRIAXone   IVPB      cefTRIAXone   IVPB 1 Gram(s) IV Intermittent every 24 hours  chlorhexidine 2% Cloths 1 Application(s) Topical two times a day  dexmedetomidine Infusion 0.5 MICROgram(s)/kG/Hr IV Continuous <Continuous>  docusate sodium Liquid 100 milliGRAM(s) Oral two times a day  enoxaparin Injectable 40 milliGRAM(s) SubCutaneous daily  famotidine Injectable 20 milliGRAM(s) IV Push daily  finasteride 5 milliGRAM(s) Oral daily  midodrine 10 milliGRAM(s) Oral three times a day  phenylephrine    Infusion 0.1 MICROgram(s)/kG/Min IV Continuous <Continuous>  senna 2 Tablet(s) Oral at bedtime  tamsulosin 0.4 milliGRAM(s) Oral at bedtime    Drug Dosing Weight  Height (cm): 165.1 (11 Feb 2019 22:00)  Weight (kg): 59.6 (11 Feb 2019 22:00)  BMI (kg/m2): 21.9 (11 Feb 2019 22:00)  BSA (m2): 1.65 (11 Feb 2019 22:00)    CENTRAL LINE: [X] YES [ ] NO  LOCATION: right femoral DATE INSERTED:  2/13/19  REMOVE: [ ] YES [ ] NO  EXPLAIN:    ZAFAR: [x ] YES [ ] NO    DATE INSERTED: 2/7/19  REMOVE:  [ ] YES [ ] NO  EXPLAIN:    PMH -reviewed admission note, no change since admission  PAST MEDICAL & SURGICAL HISTORY:  Dementia  BPH (benign prostatic hyperplasia)  Hypertension  Closed hip fracture      ICU Vital Signs Last 24 Hrs  T(C): 37.9 (17 Feb 2019 00:00), Max: 37.9 (17 Feb 2019 00:00)  T(F): 100.2 (17 Feb 2019 00:00), Max: 100.2 (17 Feb 2019 00:00)  HR: 104 (17 Feb 2019 02:28) (77 - 109)  BP: 137/94 (17 Feb 2019 02:00) (112/75 - 180/80)  BP(mean): 99 (17 Feb 2019 02:00) (77 - 135)  ABP: --  ABP(mean): --  RR: 29 (17 Feb 2019 02:00) (26 - 39)  SpO2: 100% (17 Feb 2019 02:28) (97% - 100%)      ABG - ( 16 Feb 2019 04:49 )  pH, Arterial: 7.52  pH, Blood: x     /  pCO2: 33    /  pO2: 67    / HCO3: 27    / Base Excess: 4.3   /  SaO2: 95          02-15 @ 07:01  -  02-16 @ 07:00  --------------------------------------------------------  IN: 1877 mL / OUT: 815 mL / NET: 1062 mL        Mode: AC/ CMV (Assist Control/ Continuous Mandatory Ventilation)  RR (machine): 12  TV (machine): 400  FiO2: 40  PEEP: 5  ITime: 1  MAP: 11  PIP: 22      PHYSICAL EXAM:    GENERAL:  NAD; on ventilator  HEAD:  Atraumatic, Normocephalic  EYES:  PERRL, conjunctiva and sclera clear  ENMT: No tonsillar erythema, exudates, or enlargement; dry mucous membranes,   NECK: Supple, normal appearance, No JVD; Normal thyroid; Trachea midline  NERVOUS SYSTEM: awakens to verbal stimuli when prompted  CHEST/LUNG: bilateral rales   HEART: Regular rate and rhythm; No murmurs, rubs, or gallops  ABDOMEN: Soft, Nontender, Nondistended; Bowel sounds present  EXTREMITIES: marked edema of all four extremities; right femoral line in place  LYMPH: No lymphadenopathy noted      LABS:  CBC Full  -  ( 16 Feb 2019 06:12 )  WBC Count : 7.08 K/uL  Hemoglobin : 8.3 g/dL  Hematocrit : 27.7 %  Platelet Count - Automated : 214 K/uL  Mean Cell Volume : 98.9 fl  Mean Cell Hemoglobin : 29.6 pg  Mean Cell Hemoglobin Concentration : 30.0 gm/dL  Auto Neutrophil # : 5.37 K/uL  Auto Lymphocyte # : 0.70 K/uL  Auto Monocyte # : 0.92 K/uL  Auto Eosinophil # : 0.01 K/uL  Auto Basophil # : 0.01 K/uL  Auto Neutrophil % : 75.9 %  Auto Lymphocyte % : 9.9 %  Auto Monocyte % : 13.0 %  Auto Eosinophil % : 0.1 %  Auto Basophil % : 0.1 %    02-16    146<H>  |  112<H>  |  21<H>  ----------------------------<  97  3.6   |  26  |  0.55    Ca    8.6      16 Feb 2019 06:12  Phos  2.2     02-16  Mg     2.1     02-16      CRITICAL CARE TIME SPENT: 35 minutes

## 2019-02-17 NOTE — PROGRESS NOTE ADULT - SUBJECTIVE AND OBJECTIVE BOX
INTERVAL HPI/OVERNIGHT EVENTS:       Antimicrobial:  azithromycin  IVPB      azithromycin  IVPB 500 milliGRAM(s) IV Intermittent every 24 hours  cefTRIAXone   IVPB      cefTRIAXone   IVPB 1 Gram(s) IV Intermittent every 24 hours    Cardiovascular:  midodrine 10 milliGRAM(s) Oral three times a day  phenylephrine    Infusion 0.1 MICROgram(s)/kG/Min IV Continuous <Continuous>  tamsulosin 0.4 milliGRAM(s) Oral at bedtime    Pulmonary:  ALBUTerol    90 MICROgram(s) HFA Inhaler 2 Puff(s) Inhalation every 6 hours    Hematalogic:  enoxaparin Injectable 40 milliGRAM(s) SubCutaneous daily    Other:  artificial  tears Solution 1 Drop(s) Both EYES two times a day  chlorhexidine 2% Cloths 1 Application(s) Topical two times a day  dexmedetomidine Infusion 0.5 MICROgram(s)/kG/Hr IV Continuous <Continuous>  docusate sodium Liquid 100 milliGRAM(s) Oral two times a day  famotidine Injectable 20 milliGRAM(s) IV Push daily  finasteride 5 milliGRAM(s) Oral daily  senna 2 Tablet(s) Oral at bedtime      Drug Dosing Weight  Height (cm): 165.1 (11 Feb 2019 22:00)  Weight (kg): 59.6 (11 Feb 2019 22:00)  BMI (kg/m2): 21.9 (11 Feb 2019 22:00)  BSA (m2): 1.65 (11 Feb 2019 22:00)    CENTRAL LINE: [ ] YES [ ] NO  LOCATION:   DATE INSERTED:    BARBA: [ ] YES [ ] NO    DATE INSERTED:    A-LINE:  [ ] YES [ ] NO  LOCATION:   DATE INSERTED:    PMH/Social Hx/Fam Hx -reviewed admission note, no change since admission  PAST MEDICAL & SURGICAL HISTORY:  Dementia  BPH (benign prostatic hyperplasia)  Hypertension  Closed hip fracture      T(C): 37.3 (02-17-19 @ 12:00), Max: 37.9 (02-17-19 @ 00:00)  HR: 95 (02-17-19 @ 12:00)  BP: 118/102 (02-17-19 @ 12:00)  BP(mean): 106 (02-17-19 @ 12:00)  ABP: --  ABP(mean): --  RR: 32 (02-17-19 @ 12:00)  SpO2: 100% (02-17-19 @ 12:00)  Wt(kg): --    ABG - ( 17 Feb 2019 04:49 )  pH, Arterial: 7.52  pH, Blood: x     /  pCO2: 33    /  pO2: 119   / HCO3: 27    / Base Excess: 4.7   /  SaO2: 99                    02-16 @ 07:01  -  02-17 @ 07:00  --------------------------------------------------------  IN: 1900 mL / OUT: 2956 mL / NET: -1056 mL        Mode: Spontaneous/ CPAP (Continuous Positive Airway Pressure)  FiO2: 40  PEEP: 5  PS: 5  MAP: 7      PHYSICAL EXAM:    GENERAL: No signs of distress, comfortable  HEAD: Atraumatic, Normocephalic  EYES: PERRLA  ENMT: No erythema, exudates, or enlargement, Moist mucous membranes + ETT  NECK: Supple, normal appearance, No JVD; [  ] central line (if applicable)  CHEST/LUNG: No chest deformity, fair bilateral air entry; No rales, rhonchi, wheezing; crackles  HEART: Regular rate and rhythm; No murmurs, rubs, or gallops;   ABDOMEN: Soft, Nontender, Nondistended; Bowel sounds present  EXTREMITIES:  + Peripheral Pulses,+ edema  NERVOUS SYSTEM: arousable  LYMPH: No lymphadenopathy noted  SKIN: No rashes or lesions; good turgor, warm, dry      LABS:  CBC Full  -  ( 17 Feb 2019 07:00 )  WBC Count : 7.56 K/uL  Hemoglobin : 9.2 g/dL  Hematocrit : 30.7 %  Platelet Count - Automated : 267 K/uL  Mean Cell Volume : 98.4 fl  Mean Cell Hemoglobin : 29.5 pg  Mean Cell Hemoglobin Concentration : 30.0 gm/dL  Auto Neutrophil # : 5.73 K/uL  Auto Lymphocyte # : 0.56 K/uL  Auto Monocyte # : 1.16 K/uL  Auto Eosinophil # : 0.02 K/uL  Auto Basophil # : 0.02 K/uL  Auto Neutrophil % : 75.8 %  Auto Lymphocyte % : 7.4 %  Auto Monocyte % : 15.3 %  Auto Eosinophil % : 0.3 %  Auto Basophil % : 0.3 %    02-17    145  |  109<H>  |  16  ----------------------------<  79  3.3<L>   |  28  |  0.54    Ca    8.4      17 Feb 2019 07:00  Phos  2.4     02-17  Mg     2.0     02-17              RADIOLOGY & ADDITIONAL STUDIES REVIEWED     CXR:< from: Xray Chest 1 View- PORTABLE-Routine (02.17.19 @ 10:49) >    EXAM:  XR CHEST PORTABLE ROUTINE 1V                            PROCEDURE DATE:  02/17/2019          INTERPRETATION:  History: Intubated, pneumonia    Portable chest radiograph is compared to 2/16/2019.    The heart is not enlarged. Feeding tube again extends below the diaphragm   and ET tube is again seen with the tip above the willie. There is   improved aeration of the left lung. Persistent bilateral patchy densities   are noted suggesting infiltrates versus CHF.    Impression: Support lines stable. Bilateral airspace opacifications are   seen with significant improvement on the left compared to the prior study.                NALINI ADAIR M.D.,ATTENDING RADIOLOGIST  This document has been electronically signed. Feb 17 2019 10:54AM                < end of copied text >      IMPRESSION:  PAST MEDICAL & SURGICAL HISTORY:  Dementia  BPH (benign prostatic hyperplasia)  Hypertension  Closed hip fracture   p/w       IMP: This a 87 y/o M from [facility], PMH of HTN, BPH, dementia , had recent fall on 1/22/19 had fracture of Rt intertrochanteric fracture, underwent surgical fixation, sent in from NH for respiratory distress.  In ED, patient was tachycardic to 120/min, BP- 99/58 mmhg, RR- 20/min, spo2- 90 % . EKG-  afib @105 BPM , prolonged QTC - 520, no ST T wave changes. cardiac enzymes x1- 0.059, BNP- 7244, UA- >50 WBC and moderate LE , CXR s/o Patchy right lower lobe infiltrate. Cardiomegaly. Labs pertinent for WBC- 57.6, H.H of 9.3/31.4, lactate of 9.3, K of 6.4, bicarb of 8, AG of 24,bun/ creat- 177/8.69, s/p 1 dose of cefepime, vancomycin and azithromycin with 1.5 L NS bolus  in ED   Pt had barba catheter placed, about 750 cc of cloudy urine drained. ABG - 7.35/ 13/106/7/100% NRB. Pt was placed on NIV BIPAP for work of breathing.     Pt is admitted to ICU on 2/7/18 for hypoxic respiratory failure with metabolic acidosis and respiratory alkalosis and sepsis secondary to UTI and ? PNA, ,2) Septic shock secondary to UTI and pneumonia 3) Acute renal failure secondary to UTI and septic shock. Re- intubated 2/10    BP stable on phenylephrine. Will give albumin to increase plasma oncotic pressure and start midodrine to bridge off pressors. Pat is more awake on SBT but will not extubate until review cxr              Plan:      CNS: no sedation, precedex if needed    PULMONARY: continue vent support    CARDIAC: taper off pressors    GI: feed    RENAL: ngt     SKIN: wound care    MUSCULOSKELETAL: boots/ PT    ID: antibx as per ID    HEME: monitor    DVT and GI Prophylaxis    GOALS OF CARE DISCUSSION WITH PATIENT/FAMILY/PROXY/ icu team on rounds    CRITICAL CARE TIME SPENT: 35 minutes

## 2019-02-18 LAB
ANION GAP SERPL CALC-SCNC: 6 MMOL/L — SIGNIFICANT CHANGE UP (ref 5–17)
BASOPHILS # BLD AUTO: 0 K/UL — SIGNIFICANT CHANGE UP (ref 0–0.2)
BASOPHILS NFR BLD AUTO: 0 % — SIGNIFICANT CHANGE UP (ref 0–2)
BUN SERPL-MCNC: 15 MG/DL — SIGNIFICANT CHANGE UP (ref 7–18)
CALCIUM SERPL-MCNC: 8.6 MG/DL — SIGNIFICANT CHANGE UP (ref 8.4–10.5)
CHLORIDE SERPL-SCNC: 110 MMOL/L — HIGH (ref 96–108)
CO2 SERPL-SCNC: 26 MMOL/L — SIGNIFICANT CHANGE UP (ref 22–31)
CREAT SERPL-MCNC: 0.47 MG/DL — LOW (ref 0.5–1.3)
EOSINOPHIL # BLD AUTO: 0.12 K/UL — SIGNIFICANT CHANGE UP (ref 0–0.5)
EOSINOPHIL NFR BLD AUTO: 2 % — SIGNIFICANT CHANGE UP (ref 0–6)
GLUCOSE SERPL-MCNC: 88 MG/DL — SIGNIFICANT CHANGE UP (ref 70–99)
HCT VFR BLD CALC: 31.4 % — LOW (ref 39–50)
HGB BLD-MCNC: 9.3 G/DL — LOW (ref 13–17)
LYMPHOCYTES # BLD AUTO: 0.49 K/UL — LOW (ref 1–3.3)
LYMPHOCYTES # BLD AUTO: 8 % — LOW (ref 13–44)
MAGNESIUM SERPL-MCNC: 2 MG/DL — SIGNIFICANT CHANGE UP (ref 1.6–2.6)
MCHC RBC-ENTMCNC: 29.6 GM/DL — LOW (ref 32–36)
MCHC RBC-ENTMCNC: 29.6 PG — SIGNIFICANT CHANGE UP (ref 27–34)
MCV RBC AUTO: 100 FL — SIGNIFICANT CHANGE UP (ref 80–100)
MONOCYTES # BLD AUTO: 1.42 K/UL — HIGH (ref 0–0.9)
MONOCYTES NFR BLD AUTO: 23 % — HIGH (ref 2–14)
NEUTROPHILS # BLD AUTO: 4.14 K/UL — SIGNIFICANT CHANGE UP (ref 1.8–7.4)
NEUTROPHILS NFR BLD AUTO: 66 % — SIGNIFICANT CHANGE UP (ref 43–77)
PHOSPHATE SERPL-MCNC: 2.4 MG/DL — LOW (ref 2.5–4.5)
PLATELET # BLD AUTO: 298 K/UL — SIGNIFICANT CHANGE UP (ref 150–400)
POTASSIUM SERPL-MCNC: 4.1 MMOL/L — SIGNIFICANT CHANGE UP (ref 3.5–5.3)
POTASSIUM SERPL-SCNC: 4.1 MMOL/L — SIGNIFICANT CHANGE UP (ref 3.5–5.3)
RBC # BLD: 3.14 M/UL — LOW (ref 4.2–5.8)
RBC # FLD: 19.5 % — HIGH (ref 10.3–14.5)
SODIUM SERPL-SCNC: 142 MMOL/L — SIGNIFICANT CHANGE UP (ref 135–145)
WBC # BLD: 6.18 K/UL — SIGNIFICANT CHANGE UP (ref 3.8–10.5)
WBC # FLD AUTO: 6.18 K/UL — SIGNIFICANT CHANGE UP (ref 3.8–10.5)

## 2019-02-18 RX ORDER — LANOLIN ALCOHOL/MO/W.PET/CERES
3 CREAM (GRAM) TOPICAL AT BEDTIME
Qty: 0 | Refills: 0 | Status: DISCONTINUED | OUTPATIENT
Start: 2019-02-18 | End: 2019-02-25

## 2019-02-18 RX ORDER — MIDODRINE HYDROCHLORIDE 2.5 MG/1
5 TABLET ORAL THREE TIMES A DAY
Qty: 0 | Refills: 0 | Status: DISCONTINUED | OUTPATIENT
Start: 2019-02-18 | End: 2019-02-19

## 2019-02-18 RX ADMIN — CEFTRIAXONE 100 GRAM(S): 500 INJECTION, POWDER, FOR SOLUTION INTRAMUSCULAR; INTRAVENOUS at 14:50

## 2019-02-18 RX ADMIN — Medication 100 MILLIGRAM(S): at 17:50

## 2019-02-18 RX ADMIN — MIDODRINE HYDROCHLORIDE 10 MILLIGRAM(S): 2.5 TABLET ORAL at 05:24

## 2019-02-18 RX ADMIN — FAMOTIDINE 20 MILLIGRAM(S): 10 INJECTION INTRAVENOUS at 13:47

## 2019-02-18 RX ADMIN — SENNA PLUS 2 TABLET(S): 8.6 TABLET ORAL at 21:51

## 2019-02-18 RX ADMIN — MIDODRINE HYDROCHLORIDE 5 MILLIGRAM(S): 2.5 TABLET ORAL at 14:50

## 2019-02-18 RX ADMIN — AZITHROMYCIN 250 MILLIGRAM(S): 500 TABLET, FILM COATED ORAL at 18:09

## 2019-02-18 RX ADMIN — Medication 3 MILLIGRAM(S): at 21:53

## 2019-02-18 RX ADMIN — FINASTERIDE 5 MILLIGRAM(S): 5 TABLET, FILM COATED ORAL at 14:50

## 2019-02-18 RX ADMIN — TAMSULOSIN HYDROCHLORIDE 0.4 MILLIGRAM(S): 0.4 CAPSULE ORAL at 21:51

## 2019-02-18 RX ADMIN — CHLORHEXIDINE GLUCONATE 1 APPLICATION(S): 213 SOLUTION TOPICAL at 05:26

## 2019-02-18 RX ADMIN — Medication 1 DROP(S): at 05:25

## 2019-02-18 RX ADMIN — ENOXAPARIN SODIUM 40 MILLIGRAM(S): 100 INJECTION SUBCUTANEOUS at 13:47

## 2019-02-18 RX ADMIN — Medication 1 DROP(S): at 17:50

## 2019-02-18 RX ADMIN — Medication 100 MILLIGRAM(S): at 05:24

## 2019-02-18 NOTE — PROGRESS NOTE ADULT - SUBJECTIVE AND OBJECTIVE BOX
NEPHROLOGY MEDICAL CARE, [facility] - Dr. Dajuan Oseguera/ Dr. Nataliya Berman/ Dr. Lennox Yusuf/ Dr. Kasie Lagunas    Patient was seen and examined at bedside.    CC: pt is extubated and NAd    Vital Signs Last 24 Hrs  T(C): 36.8 (18 Feb 2019 13:00), Max: 36.8 (18 Feb 2019 13:00)  T(F): 98.2 (18 Feb 2019 13:00), Max: 98.2 (18 Feb 2019 13:00)  HR: 100 (18 Feb 2019 14:00) (65 - 118)  BP: 142/84 (18 Feb 2019 14:00) (116/70 - 172/88)  BP(mean): 94 (18 Feb 2019 14:00) (78 - 104)  RR: 27 (18 Feb 2019 14:00) (20 - 35)  SpO2: 95% (18 Feb 2019 14:00) (90% - 100%)    02-17 @ 07:01 - 02-18 @ 07:00  --------------------------------------------------------  IN: 1804.2 mL / OUT: 2022 mL / NET: -217.8 mL    02-18 @ 07:01  - 02-18 @ 16:30  --------------------------------------------------------  IN: 0 mL / OUT: 710 mL / NET: -710 mL        PHYSICAL EXAM:  Constitutional: well developed, Mal-nourished  and in nad  HEENT: PERRLA,  no icteric sclera and mild pallor of conjunctiva noted;   Neck: No JVD, thyromegaly or adenopathy  Respiratory: reduced air entry lower lungs with few rhonchi  Cardiovascular: S1 and S2 normally heard  Gastrointestinal: soft, nondistended, nontender and normal bowel sounds heard  Extremities:  B/L LE and upper thigh edema noted  Neurological: awake and alert.  Skin: No rashes      MEDICATIONS:  artificial  tears Solution 1 Drop(s) Both EYES two times a day  azithromycin  IVPB      azithromycin  IVPB 500 milliGRAM(s) IV Intermittent every 24 hours  cefTRIAXone   IVPB      cefTRIAXone   IVPB 1 Gram(s) IV Intermittent every 24 hours  docusate sodium Liquid 100 milliGRAM(s) Oral two times a day  enoxaparin Injectable 40 milliGRAM(s) SubCutaneous daily  famotidine Injectable 20 milliGRAM(s) IV Push daily  finasteride 5 milliGRAM(s) Oral daily  midodrine 5 milliGRAM(s) Oral three times a day  senna 2 Tablet(s) Oral at bedtime  tamsulosin 0.4 milliGRAM(s) Oral at bedtime      LABS:                        9.3    6.18  )-----------( 298      ( 18 Feb 2019 05:58 )             31.4     02-18    142  |  110<H>  |  15  ----------------------------<  88  4.1   |  26  |  0.47<L>    Ca    8.6      18 Feb 2019 05:58  Phos  2.4     02-18  Mg     2.0     02-18          Magnesium, Serum: 2.0 mg/dL (02-18 @ 05:58)  Phosphorus Level, Serum: 2.4 mg/dL (02-18 @ 05:58)    Urine studies    PTH and Vit D:

## 2019-02-18 NOTE — PROGRESS NOTE ADULT - PROBLEM SELECTOR PLAN 2
2/2 PNA and E. coli bacteremia  repeat BCx negative to date  c/w rocephin 1g qd, day 8  c/w zithromax qd, day 8  ID - Dr. Norton

## 2019-02-18 NOTE — PROGRESS NOTE ADULT - SUBJECTIVE AND OBJECTIVE BOX
INTERVAL HPI/OVERNIGHT EVENTS:       Antimicrobial:  azithromycin  IVPB      azithromycin  IVPB 500 milliGRAM(s) IV Intermittent every 24 hours  cefTRIAXone   IVPB      cefTRIAXone   IVPB 1 Gram(s) IV Intermittent every 24 hours    Cardiovascular:  midodrine 5 milliGRAM(s) Oral three times a day  tamsulosin 0.4 milliGRAM(s) Oral at bedtime    Pulmonary:    Hematalogic:  enoxaparin Injectable 40 milliGRAM(s) SubCutaneous daily    Other:  artificial  tears Solution 1 Drop(s) Both EYES two times a day  chlorhexidine 2% Cloths 1 Application(s) Topical two times a day  docusate sodium Liquid 100 milliGRAM(s) Oral two times a day  famotidine Injectable 20 milliGRAM(s) IV Push daily  finasteride 5 milliGRAM(s) Oral daily  senna 2 Tablet(s) Oral at bedtime      Drug Dosing Weight  Height (cm): 165.1 (11 Feb 2019 22:00)  Weight (kg): 59.6 (11 Feb 2019 22:00)  BMI (kg/m2): 21.9 (11 Feb 2019 22:00)  BSA (m2): 1.65 (11 Feb 2019 22:00)    CENTRAL LINE: [ ] YES [ ] NO  LOCATION:   DATE INSERTED:    BARBA: [ ] YES [ ] NO    DATE INSERTED:    A-LINE:  [ ] YES [ ] NO  LOCATION:   DATE INSERTED:    PMH/Social Hx/Fam Hx -reviewed admission note, no change since admission  PAST MEDICAL & SURGICAL HISTORY:  Dementia  BPH (benign prostatic hyperplasia)  Hypertension  Closed hip fracture      T(C): 36.8 (02-18-19 @ 13:00), Max: 36.8 (02-18-19 @ 13:00)  HR: 104 (02-18-19 @ 13:00)  BP: 165/89 (02-18-19 @ 13:00)  BP(mean): 102 (02-18-19 @ 13:00)  ABP: --  ABP(mean): --  RR: 34 (02-18-19 @ 13:00)  SpO2: 87% (02-18-19 @ 13:00)  Wt(kg): --    ABG - ( 17 Feb 2019 04:49 )  pH, Arterial: 7.52  pH, Blood: x     /  pCO2: 33    /  pO2: 119   / HCO3: 27    / Base Excess: 4.7   /  SaO2: 99                    02-17 @ 07:01  -  02-18 @ 07:00  --------------------------------------------------------  IN: 1804.2 mL / OUT: 2022 mL / NET: -217.8 mL        Mode: standby      PHYSICAL EXAM:    GENERAL: No signs of distress, comfortable  HEAD: Atraumatic, Normocephalic  EYES: EOMI, PERRLA  ENMT: No erythema, exudates, or enlargement, Moist mucous membranes  NECK: Supple, normal appearance, No JVD; [  ] central line (if applicable)  CHEST/LUNG: No chest deformity, fair bilateral air entry; No rales, rhonchi, wheezing; crackles  HEART: Regular rate and rhythm; No murmurs, rubs, or gallops;   ABDOMEN: Soft, Nontender, Nondistended; Bowel sounds present  EXTREMITIES:  + Peripheral Pulses, No clubbing, cyanosis, or edema  NERVOUS SYSTEM: awake   LYMPH: No lymphadenopathy noted  SKIN: No rashes or lesions; good turgor, warm, dry      LABS:  CBC Full  -  ( 18 Feb 2019 05:58 )  WBC Count : 6.18 K/uL  Hemoglobin : 9.3 g/dL  Hematocrit : 31.4 %  Platelet Count - Automated : 298 K/uL  Mean Cell Volume : 100.0 fl  Mean Cell Hemoglobin : 29.6 pg  Mean Cell Hemoglobin Concentration : 29.6 gm/dL  Auto Neutrophil # : 4.14 K/uL  Auto Lymphocyte # : 0.49 K/uL  Auto Monocyte # : 1.42 K/uL  Auto Eosinophil # : 0.12 K/uL  Auto Basophil # : 0.00 K/uL  Auto Neutrophil % : 66.0 %  Auto Lymphocyte % : 8.0 %  Auto Monocyte % : 23.0 %  Auto Eosinophil % : 2.0 %  Auto Basophil % : 0.0 %    02-18    142  |  110<H>  |  15  ----------------------------<  88  4.1   |  26  |  0.47<L>    Ca    8.6      18 Feb 2019 05:58  Phos  2.4     02-18  Mg     2.0     02-18              RADIOLOGY & ADDITIONAL STUDIES REVIEWED     CXR:< from: Xray Chest 1 View- PORTABLE-Routine (02.17.19 @ 10:49) >    EXAM:  XR CHEST PORTABLE ROUTINE 1V                            PROCEDURE DATE:  02/17/2019          INTERPRETATION:  History: Intubated, pneumonia    Portable chest radiograph is compared to 2/16/2019.    The heart is not enlarged. Feeding tube again extends below the diaphragm   and ET tube is again seen with the tip above the willie. There is   improved aeration of the left lung. Persistent bilateral patchy densities   are noted suggesting infiltrates versus CHF.    Impression: Support lines stable. Bilateral airspace opacifications are   seen with significant improvement on the left compared to the prior study.                NALINI ADAIR M.D.,ATTENDING RADIOLOGIST  This document has been electronically signed. Feb 17 2019 10:54AM                < end of copied text >      IMPRESSION:  PAST MEDICAL & SURGICAL HISTORY:  Dementia  BPH (benign prostatic hyperplasia)  Hypertension  Closed hip fracture   p/w       IMP: This a 85 y/o M from [facility], PMH of HTN, BPH, dementia , had recent fall on 1/22/19 had fracture of Rt intertrochanteric fracture, underwent surgical fixation, sent in from NH for respiratory distress.  In ED, patient was tachycardic to 120/min, BP- 99/58 mmhg, RR- 20/min, spo2- 90 % . EKG-  afib @105 BPM , prolonged QTC - 520, no ST T wave changes. cardiac enzymes x1- 0.059, BNP- 7244, UA- >50 WBC and moderate LE , CXR s/o Patchy right lower lobe infiltrate. Cardiomegaly. Labs pertinent for WBC- 57.6, H.H of 9.3/31.4, lactate of 9.3, K of 6.4, bicarb of 8, AG of 24,bun/ creat- 177/8.69, s/p 1 dose of cefepime, vancomycin and azithromycin with 1.5 L NS bolus  in ED   Pt had barba catheter placed, about 750 cc of cloudy urine drained. ABG - 7.35/ 13/106/7/100% NRB. Pt was placed on NIV BIPAP for work of breathing.     Pt is admitted to ICU on 2/7/18 for hypoxic respiratory failure with metabolic acidosis and respiratory alkalosis and sepsis secondary to UTI and ? PNA, ,2) Septic shock secondary to UTI and pneumonia 3) Acute renal failure secondary to UTI and septic shock. Re- intubated 2/10    BP stable on phenylephrine. Will give albumin to increase plasma oncotic pressure and start midodrine to bridge off pressors. Pat extubated 2/17              Plan:      CNS: no sedation,     PULMONARY: o2 supp    CARDIAC: off pressors    GI: feed, need swallow eval    RENAL:     SKIN: wound care    MUSCULOSKELETAL: boots/ PT    ID: antibx as per ID    HEME: monitor    DVT and GI Prophylaxis    GOALS OF CARE DISCUSSION WITH PATIENT/FAMILY/PROXY/ icu team on rounds/ daughter is at bedside

## 2019-02-18 NOTE — PROGRESS NOTE ADULT - ASSESSMENT
87 y/o M from [facility], PMH of HTN, BPH, dementia , had recent fall on 1/22/19 had fracture of Rt intertrochanteric fracture, underwent surgical fixation, sent in from NH for respiratory distress.  In ED, patient was tachycardic to 120/min, BP- 99/58 mmhg, RR- 20/min, spo2- 90 % . EKG-  afib @105 BPM , prolonged QTC - 520, no ST T wave changes. cardiac enzymes x1- 0.059, BNP- 7244, UA- >50 WBC and moderate LE , CXR s/o Patchy right lower lobe infiltrate. Cardiomegaly. Labs pertinent for WBC- 57.6, H.H of 9.3/31.4, lactate of 9.3, K of 6.4, bicarb of 8, AG of 24,bun/ creat- 177/8.69, s/p 1 dose of cefepime, vancomycin and azithromycin with 1.5 L NS bolus  in ED   Pt had barba catheter placed, about 750 cc of cloudy urine drained. ABG - 7.35/ 13/106/7/100% NRB. Pt was placed on NIV BIPAP for work of breathing.     Pt is admitted to ICU on 2/7/18 for hypoxic respiratory failure with metabolic acidosis and respiratory alkalosis and sepsis secondary to UTI and ? PNA, ,2) Septic shock secondary to UTI and pneumonia 3) Acute renal failure secondary to UTI and septic shock.    BP stable off pressors. c/w IV abx. TOV today. Remove central line today. Stable for downgrade to medicine.    ETT: 2/10-2/17  Right femoral line: 2/13-2/18  Barba catheter: 2/7-2/18

## 2019-02-18 NOTE — PROGRESS NOTE ADULT - PROBLEM SELECTOR PLAN 1
2/2 multifocal PNA and bacteremia with E. coli  c/w rocephin  c/w zithromax  s/p extubation on 2/17  speech/swallow eval pending  c/w nasal cannula 3L - titrate prn  medically stable for downgrade to medicine

## 2019-02-18 NOTE — CHART NOTE - NSCHARTNOTEFT_GEN_A_CORE
Assessment and Plan:     85 y/o M from [facility], PMH of HTN, BPH, dementia , had recent fall on 1/22/19 had fracture of Rt intertrochanteric fracture, underwent surgical fixation, sent in from NH for respiratory distress.  In ED, patient was tachycardic to 120/min, BP- 99/58 mmhg, RR- 20/min, spo2- 90 % . EKG-  afib @105 BPM , prolonged QTC - 520, no ST T wave changes. cardiac enzymes x1- 0.059, BNP- 7244, UA- >50 WBC and moderate LE , CXR s/o Patchy right lower lobe infiltrate. Cardiomegaly. Labs pertinent for WBC- 57.6, H.H of 9.3/31.4, lactate of 9.3, K of 6.4, bicarb of 8, AG of 24,bun/ creat- 177/8.69, s/p 1 dose of cefepime, vancomycin and azithromycin with 1.5 L NS bolus  in ED. Pt had barba catheter placed, about 750 cc of cloudy urine drained. ABG - 7.35/ 13/106/7/100% NRB. Pt was placed on NIV BIPAP for work of breathing.     Pt was admitted to ICU on 2/7/18 for hypoxic respiratory failure with metabolic acidosis and respiratory alkalosis and sepsis secondary to UTI and PNA, ,2) Septic shock secondary to UTI and pneumonia 3) Acute renal failure secondary to UTI and septic shock.    BP now stable off pressors. c/w IV abx. TOV today. Remove central line today. Stable for downgrade to medicine.    ETT: 2/10-2/17  Right femoral line: 2/13-2/18  Barba catheter: 2/7-2/18    Problem/Plan - 1:  Problem: Respiratory failure with hypoxia.  Plan: 2/2 multifocal PNA and bacteremia with E. coli  c/w rocephin  c/w zithromax  s/p extubation on 2/17  speech/swallow eval pending  c/w nasal cannula 3L - titrate prn  medically stable for downgrade to medicine.     Problem/Plan - 2:  Problem: Sepsis.    Plan: 2/2 PNA and E. coli bacteremia  repeat BCx negative to date  c/w rocephin 1g qd, day 8  c/w zithromax qd, day 8  ID - Dr. Norton.     Problem/Plan - 3:  Problem: Hypotension.   Plan: BP better controlled now off pressors  will decrease midodrine dose to 5mg tid.     Problem/Plan - 4:  Problem: Central line complication, initial encounter.    Plan: right sided central line was malpositioned into common carotid artery with tip in aortic arch, confirmed by CT scan on 2/8/19  s/p removal of central venous catheter and repair of carotid artery on 2/8/19 without complication  vascular - Dr. Hernandez.    Problem/Plan - 5:  Problem: BPH (benign prostatic hyperplasia).    Plan: c/w tamsulosin  d/c Barba catheter today and begin TOV.     Problem/Plan - 6:  Problem: Prophylactic measure.   Plan: DVT ppx with lovenox  GI PPX with pepcid.    Primary team to follow:  1) Speech/swallow recommendations  2) Advance diet as tolerated  3) Continue with antibiotics as per ID  4) Monitor urine output (trial of void started ___) Assessment and Plan:     85 y/o M from [facility], PMH of HTN, BPH, dementia , had recent fall on 1/22/19 had fracture of Rt intertrochanteric fracture, underwent surgical fixation, sent in from NH for respiratory distress.  In ED, patient was tachycardic to 120/min, BP- 99/58 mmhg, RR- 20/min, spo2- 90 % . EKG-  afib @105 BPM , prolonged QTC - 520, no ST T wave changes. cardiac enzymes x1- 0.059, BNP- 7244, UA- >50 WBC and moderate LE , CXR s/o Patchy right lower lobe infiltrate. Cardiomegaly. Labs pertinent for WBC- 57.6, H.H of 9.3/31.4, lactate of 9.3, K of 6.4, bicarb of 8, AG of 24,bun/ creat- 177/8.69, s/p 1 dose of cefepime, vancomycin and azithromycin with 1.5 L NS bolus  in ED. Pt had barba catheter placed, about 750 cc of cloudy urine drained. ABG - 7.35/ 13/106/7/100% NRB. Pt was placed on NIV BIPAP for work of breathing.     Pt was admitted to ICU on 2/7/18 for hypoxic respiratory failure with metabolic acidosis and respiratory alkalosis and sepsis secondary to UTI and PNA, ,2) Septic shock secondary to UTI and pneumonia 3) Acute renal failure secondary to UTI and septic shock.    BP now stable off pressors. c/w IV abx. TOV today. Remove central line today. Stable for downgrade to medicine.    ETT: 2/10-2/17  Right femoral line: 2/13-2/18  Barba catheter: 2/7-2/18    Problem/Plan - 1:  Problem: Respiratory failure with hypoxia.  Plan: 2/2 multifocal PNA and bacteremia with E. coli  c/w rocephin  c/w zithromax  s/p extubation on 2/17  speech/swallow eval pending  c/w nasal cannula 3L - titrate prn  medically stable for downgrade to medicine.     Problem/Plan - 2:  Problem: Sepsis.    Plan: 2/2 PNA and E. coli bacteremia  repeat BCx negative to date  c/w rocephin 1g qd, day 8  c/w zithromax qd, day 8  ID - Dr. Norton.     Problem/Plan - 3:  Problem: Hypotension.   Plan: BP better controlled now off pressors  will decrease midodrine dose to 5mg tid.     Problem/Plan - 4:  Problem: Central line complication, initial encounter.    Plan: right sided central line was malpositioned into common carotid artery with tip in aortic arch, confirmed by CT scan on 2/8/19  s/p removal of central venous catheter and repair of carotid artery on 2/8/19 without complication  vascular - Dr. Hernandez.    Problem/Plan - 5:  Problem: BPH (benign prostatic hyperplasia).    Plan: c/w tamsulosin  d/c Barba catheter today and begin TOV.     Problem/Plan - 6:  Problem: Prophylactic measure.   Plan: DVT ppx with lovenox  GI PPX with pepcid.    Primary team to follow:  1) Speech/swallow recommendations  2) Advance diet as tolerated  3) Continue with antibiotics as per ID  4) Monitor urine output s/p Barba removal on 2/18    Discussed with accepting physician Dr. Mascorro and PGY1 Dr. Wheeler. Assessment and Plan:     85 y/o M from [facility], PMH of HTN, BPH, dementia , had recent fall on 1/22/19 had fracture of Rt intertrochanteric fracture, underwent surgical fixation, sent in from NH for respiratory distress.  In ED, patient was tachycardic to 120/min, BP- 99/58 mmhg, RR- 20/min, spo2- 90 % . EKG-  afib @105 BPM , prolonged QTC - 520, no ST T wave changes. cardiac enzymes x1- 0.059, BNP- 7244, UA- >50 WBC and moderate LE , CXR s/o Patchy right lower lobe infiltrate. Cardiomegaly. Labs pertinent for WBC- 57.6, H.H of 9.3/31.4, lactate of 9.3, K of 6.4, bicarb of 8, AG of 24,bun/ creat- 177/8.69, s/p 1 dose of cefepime, vancomycin and azithromycin with 1.5 L NS bolus  in ED. Pt had barba catheter placed, about 750 cc of cloudy urine drained. ABG - 7.35/ 13/106/7/100% NRB. Pt was placed on NIV BIPAP for work of breathing.     Pt was admitted to ICU on 2/7/18 for hypoxic respiratory failure with metabolic acidosis and respiratory alkalosis and sepsis secondary to UTI and PNA, ,2) Septic shock secondary to UTI and pneumonia 3) Acute renal failure secondary to UTI and septic shock.    BP now stable off pressors. c/w IV abx. TOV today. Remove central line today. Stable for downgrade to medicine.    ETT: 2/10-2/17  Right femoral line: 2/13-2/18  Barba catheter: 2/7-2/18    Problem/Plan - 1:  Problem: Respiratory failure with hypoxia.  Plan: 2/2 multifocal PNA and bacteremia with E. coli  c/w rocephin  c/w zithromax  s/p extubation on 2/17  speech/swallow eval pending  c/w nasal cannula 3L - titrate prn  medically stable for downgrade to medicine.     Problem/Plan - 2:  Problem: Sepsis.    Plan: 2/2 PNA and E. coli bacteremia  repeat BCx negative to date  c/w rocephin 1g qd, day 8  c/w zithromax qd, day 8  ID - Dr. Norton.     Problem/Plan - 3:  Problem: Hypotension.   Plan: BP better controlled now off pressors  will decrease midodrine dose to 5mg tid.     Problem/Plan - 4:  Problem: Central line complication, initial encounter.    Plan: right sided central line was malpositioned into common carotid artery with tip in aortic arch, confirmed by CT scan on 2/8/19  s/p removal of central venous catheter and repair of carotid artery on 2/8/19 without complication  vascular - Dr. Hernandez.    Problem/Plan - 5:  Problem: BPH (benign prostatic hyperplasia).    Plan: c/w tamsulosin  d/c Barba catheter today and begin TOV.     Problem/Plan - 6:  Problem: Prophylactic measure.   Plan: DVT ppx with lovenox  GI PPX with pepcid.    Primary team to follow:  1) Speech/swallow recommendations  2) Advance diet as tolerated  3) Continue with antibiotics as per ID  4) Monitor urine output s/p Barba removal on 2/18    Discussed with accepting physician Dr. Mascorro and PGY1 Dr. Wheeler.    Agree with above assessment and plan as transcribed.

## 2019-02-18 NOTE — PROGRESS NOTE ADULT - SUBJECTIVE AND OBJECTIVE BOX
INTERVAL HPI/OVERNIGHT EVENTS: No acute events overnight. Patient tolerated extubation well. Pt self-removed NG tube this morning. He is currently comfortable on nasal cannula.    PRESSORS: [ ] YES [X] NO  WHICH:    ANTIBIOTICS: rocephin 1g qd, zithromax 500mg qd    Antimicrobial:  azithromycin  IVPB      azithromycin  IVPB 500 milliGRAM(s) IV Intermittent every 24 hours  cefTRIAXone   IVPB      cefTRIAXone   IVPB 1 Gram(s) IV Intermittent every 24 hours    Cardiovascular:  midodrine 5 milliGRAM(s) Oral three times a day  tamsulosin 0.4 milliGRAM(s) Oral at bedtime    Pulmonary:    Hematalogic:  enoxaparin Injectable 40 milliGRAM(s) SubCutaneous daily    Other:  artificial  tears Solution 1 Drop(s) Both EYES two times a day  chlorhexidine 2% Cloths 1 Application(s) Topical two times a day  docusate sodium Liquid 100 milliGRAM(s) Oral two times a day  famotidine Injectable 20 milliGRAM(s) IV Push daily  finasteride 5 milliGRAM(s) Oral daily  senna 2 Tablet(s) Oral at bedtime    artificial  tears Solution 1 Drop(s) Both EYES two times a day  azithromycin  IVPB      azithromycin  IVPB 500 milliGRAM(s) IV Intermittent every 24 hours  cefTRIAXone   IVPB      cefTRIAXone   IVPB 1 Gram(s) IV Intermittent every 24 hours  chlorhexidine 2% Cloths 1 Application(s) Topical two times a day  docusate sodium Liquid 100 milliGRAM(s) Oral two times a day  enoxaparin Injectable 40 milliGRAM(s) SubCutaneous daily  famotidine Injectable 20 milliGRAM(s) IV Push daily  finasteride 5 milliGRAM(s) Oral daily  midodrine 5 milliGRAM(s) Oral three times a day  senna 2 Tablet(s) Oral at bedtime  tamsulosin 0.4 milliGRAM(s) Oral at bedtime    Drug Dosing Weight  Height (cm): 165.1 (11 Feb 2019 22:00)  Weight (kg): 59.6 (11 Feb 2019 22:00)  BMI (kg/m2): 21.9 (11 Feb 2019 22:00)  BSA (m2): 1.65 (11 Feb 2019 22:00)    CENTRAL LINE: [ ] YES [X] NO  LOCATION:   DATE INSERTED:  REMOVE: [ ] YES [ ] NO  EXPLAIN:    ZAFAR: [ ] YES [X] NO    DATE INSERTED:  REMOVE:  [ ] YES [ ] NO  EXPLAIN:      PMH -reviewed admission note, no change since admission  PAST MEDICAL & SURGICAL HISTORY:  Dementia  BPH (benign prostatic hyperplasia)  Hypertension  Closed hip fracture      ICU Vital Signs Last 24 Hrs  T(C): 36.4 (18 Feb 2019 09:00), Max: 37.3 (17 Feb 2019 12:00)  T(F): 97.6 (18 Feb 2019 09:00), Max: 99.1 (17 Feb 2019 12:00)  HR: 102 (18 Feb 2019 10:00) (65 - 102)  BP: 154/83 (18 Feb 2019 10:00) (116/70 - 172/88)  BP(mean): 93 (18 Feb 2019 10:00) (75 - 106)  ABP: --  ABP(mean): --  RR: 21 (18 Feb 2019 10:00) (20 - 34)  SpO2: 96% (18 Feb 2019 10:00) (95% - 100%)      ABG - ( 17 Feb 2019 04:49 )  pH, Arterial: 7.52  pH, Blood: x     /  pCO2: 33    /  pO2: 119   / HCO3: 27    / Base Excess: 4.7   /  SaO2: 99              02-17 @ 07:01  -  02-18 @ 07:00  --------------------------------------------------------  IN: 1804.2 mL / OUT: 2022 mL / NET: -217.8 mL        Mode: standby      PHYSICAL EXAM:    GENERAL:  NAD; comfortable on nasal cannula  HEAD:  Atraumatic, Normocephalic  EYES:  PERRL, conjunctiva and sclera clear  ENMT: No tonsillar erythema, exudates, or enlargement; dry mucous membranes,   NECK: Supple, normal appearance, No JVD; Normal thyroid; Trachea midline  NERVOUS SYSTEM: awakens to verbal stimuli when prompted  CHEST/LUNG: bilateral rales   HEART: Regular rate and rhythm; No murmurs, rubs, or gallops  ABDOMEN: Soft, Nontender, Nondistended; Bowel sounds present  EXTREMITIES: edema of all four extremities improved from yesterday  : edema of testicles noted  LYMPH: No lymphadenopathy noted    LABS:  CBC Full  -  ( 18 Feb 2019 05:58 )  WBC Count : 6.18 K/uL  Hemoglobin : 9.3 g/dL  Hematocrit : 31.4 %  Platelet Count - Automated : 298 K/uL  Mean Cell Volume : 100.0 fl  Mean Cell Hemoglobin : 29.6 pg  Mean Cell Hemoglobin Concentration : 29.6 gm/dL  Auto Neutrophil # : 4.14 K/uL  Auto Lymphocyte # : 0.49 K/uL  Auto Monocyte # : 1.42 K/uL  Auto Eosinophil # : 0.12 K/uL  Auto Basophil # : 0.00 K/uL  Auto Neutrophil % : 66.0 %  Auto Lymphocyte % : 8.0 %  Auto Monocyte % : 23.0 %  Auto Eosinophil % : 2.0 %  Auto Basophil % : 0.0 %    02-18    142  |  110<H>  |  15  ----------------------------<  88  4.1   |  26  |  0.47<L>    Ca    8.6      18 Feb 2019 05:58  Phos  2.4     02-18  Mg     2.0     02-18      CRITICAL CARE TIME SPENT: 35 minutes

## 2019-02-19 LAB
ANION GAP SERPL CALC-SCNC: 9 MMOL/L — SIGNIFICANT CHANGE UP (ref 5–17)
BUN SERPL-MCNC: 17 MG/DL — SIGNIFICANT CHANGE UP (ref 7–18)
CALCIUM SERPL-MCNC: 9.1 MG/DL — SIGNIFICANT CHANGE UP (ref 8.4–10.5)
CHLORIDE SERPL-SCNC: 110 MMOL/L — HIGH (ref 96–108)
CO2 SERPL-SCNC: 25 MMOL/L — SIGNIFICANT CHANGE UP (ref 22–31)
CREAT SERPL-MCNC: 0.77 MG/DL — SIGNIFICANT CHANGE UP (ref 0.5–1.3)
GLUCOSE SERPL-MCNC: 80 MG/DL — SIGNIFICANT CHANGE UP (ref 70–99)
HCT VFR BLD CALC: 32.3 % — LOW (ref 39–50)
HCT VFR BLD CALC: 34.8 % — LOW (ref 39–50)
HGB BLD-MCNC: 10.4 G/DL — LOW (ref 13–17)
HGB BLD-MCNC: 9.7 G/DL — LOW (ref 13–17)
MAGNESIUM SERPL-MCNC: 2.1 MG/DL — SIGNIFICANT CHANGE UP (ref 1.6–2.6)
MCHC RBC-ENTMCNC: 29.8 PG — SIGNIFICANT CHANGE UP (ref 27–34)
MCHC RBC-ENTMCNC: 29.8 PG — SIGNIFICANT CHANGE UP (ref 27–34)
MCHC RBC-ENTMCNC: 29.9 GM/DL — LOW (ref 32–36)
MCHC RBC-ENTMCNC: 30 GM/DL — LOW (ref 32–36)
MCV RBC AUTO: 99.4 FL — SIGNIFICANT CHANGE UP (ref 80–100)
MCV RBC AUTO: 99.7 FL — SIGNIFICANT CHANGE UP (ref 80–100)
NRBC # BLD: 0 /100 WBCS — SIGNIFICANT CHANGE UP (ref 0–0)
NRBC # BLD: 0 /100 WBCS — SIGNIFICANT CHANGE UP (ref 0–0)
PHOSPHATE SERPL-MCNC: 3.2 MG/DL — SIGNIFICANT CHANGE UP (ref 2.5–4.5)
PLATELET # BLD AUTO: 334 K/UL — SIGNIFICANT CHANGE UP (ref 150–400)
PLATELET # BLD AUTO: 357 K/UL — SIGNIFICANT CHANGE UP (ref 150–400)
POTASSIUM SERPL-MCNC: 4.1 MMOL/L — SIGNIFICANT CHANGE UP (ref 3.5–5.3)
POTASSIUM SERPL-SCNC: 4.1 MMOL/L — SIGNIFICANT CHANGE UP (ref 3.5–5.3)
RBC # BLD: 3.25 M/UL — LOW (ref 4.2–5.8)
RBC # BLD: 3.49 M/UL — LOW (ref 4.2–5.8)
RBC # FLD: 19 % — HIGH (ref 10.3–14.5)
RBC # FLD: 19.4 % — HIGH (ref 10.3–14.5)
SODIUM SERPL-SCNC: 144 MMOL/L — SIGNIFICANT CHANGE UP (ref 135–145)
WBC # BLD: 10.33 K/UL — SIGNIFICANT CHANGE UP (ref 3.8–10.5)
WBC # BLD: 9.6 K/UL — SIGNIFICANT CHANGE UP (ref 3.8–10.5)
WBC # FLD AUTO: 10.33 K/UL — SIGNIFICANT CHANGE UP (ref 3.8–10.5)
WBC # FLD AUTO: 9.6 K/UL — SIGNIFICANT CHANGE UP (ref 3.8–10.5)

## 2019-02-19 RX ORDER — MIDODRINE HYDROCHLORIDE 2.5 MG/1
2.5 TABLET ORAL THREE TIMES A DAY
Qty: 0 | Refills: 0 | Status: DISCONTINUED | OUTPATIENT
Start: 2019-02-19 | End: 2019-02-19

## 2019-02-19 RX ORDER — IPRATROPIUM/ALBUTEROL SULFATE 18-103MCG
3 AEROSOL WITH ADAPTER (GRAM) INHALATION EVERY 6 HOURS
Qty: 0 | Refills: 0 | Status: DISCONTINUED | OUTPATIENT
Start: 2019-02-19 | End: 2019-02-25

## 2019-02-19 RX ADMIN — Medication 100 MILLIGRAM(S): at 17:44

## 2019-02-19 RX ADMIN — ENOXAPARIN SODIUM 40 MILLIGRAM(S): 100 INJECTION SUBCUTANEOUS at 13:43

## 2019-02-19 RX ADMIN — Medication 1 DROP(S): at 05:37

## 2019-02-19 RX ADMIN — FAMOTIDINE 20 MILLIGRAM(S): 10 INJECTION INTRAVENOUS at 13:43

## 2019-02-19 RX ADMIN — Medication 100 MILLIGRAM(S): at 05:37

## 2019-02-19 RX ADMIN — AZITHROMYCIN 250 MILLIGRAM(S): 500 TABLET, FILM COATED ORAL at 17:42

## 2019-02-19 RX ADMIN — Medication 3 MILLILITER(S): at 20:22

## 2019-02-19 RX ADMIN — FINASTERIDE 5 MILLIGRAM(S): 5 TABLET, FILM COATED ORAL at 13:43

## 2019-02-19 RX ADMIN — CEFTRIAXONE 100 GRAM(S): 500 INJECTION, POWDER, FOR SOLUTION INTRAMUSCULAR; INTRAVENOUS at 13:43

## 2019-02-19 RX ADMIN — Medication 1 DROP(S): at 17:42

## 2019-02-19 NOTE — PROGRESS NOTE ADULT - SUBJECTIVE AND OBJECTIVE BOX
INTERVAL HPI/OVERNIGHT EVENTS: Seen and examined with family in room . He ate fine . Sleepy.   Vital Signs Last 24 Hrs  T(C): 36.6 (19 Feb 2019 14:16), Max: 36.6 (18 Feb 2019 20:47)  T(F): 97.8 (19 Feb 2019 14:16), Max: 97.8 (18 Feb 2019 20:47)  HR: 80 (19 Feb 2019 14:16) (80 - 98)  BP: 164/77 (19 Feb 2019 14:16) (150/86 - 170/87)  BP(mean): --  RR: 18 (19 Feb 2019 14:16) (17 - 18)  SpO2: 98% (19 Feb 2019 14:16) (95% - 100%)  I&O's Summary    18 Feb 2019 07:01  -  19 Feb 2019 07:00  --------------------------------------------------------  IN: 0 mL / OUT: 870 mL / NET: -870 mL      MEDICATIONS  (STANDING):  ALBUTerol/ipratropium for Nebulization 3 milliLiter(s) Nebulizer every 6 hours  artificial  tears Solution 1 Drop(s) Both EYES two times a day  azithromycin  IVPB      azithromycin  IVPB 500 milliGRAM(s) IV Intermittent every 24 hours  cefTRIAXone   IVPB      cefTRIAXone   IVPB 1 Gram(s) IV Intermittent every 24 hours  docusate sodium Liquid 100 milliGRAM(s) Oral two times a day  enoxaparin Injectable 40 milliGRAM(s) SubCutaneous daily  famotidine Injectable 20 milliGRAM(s) IV Push daily  finasteride 5 milliGRAM(s) Oral daily  melatonin 3 milliGRAM(s) Oral at bedtime  midodrine 2.5 milliGRAM(s) Oral three times a day  senna 2 Tablet(s) Oral at bedtime  tamsulosin 0.4 milliGRAM(s) Oral at bedtime    MEDICATIONS  (PRN):  guaiFENesin    Syrup 100 milliGRAM(s) Oral every 6 hours PRN Cough    LABS:                        10.4   10.33 )-----------( 357      ( 19 Feb 2019 07:36 )             34.8     02-19    144  |  110<H>  |  17  ----------------------------<  80  4.1   |  25  |  0.77    Ca    9.1      19 Feb 2019 07:36  Phos  3.2     02-19  Mg     2.1     02-19          CAPILLARY BLOOD GLUCOSE                  Consultant(s) Notes Reviewed:  [x ] YES  [ ] NO    PHYSICAL EXAM:  GENERAL: NAD, Cachetic ,not in any distress ,  HEAD:  Atraumatic, Normocephalic  NECK: Supple, No JVD, Normal thyroid  NERVOUS SYSTEM:  Alert & Oriented X1,   CHEST/LUNG: Good air entry bilateral with  rales,   HEART: Regular rate and rhythm; No murmurs, rubs, or gallops  ABDOMEN: Soft, Nontender, Nondistended; Bowel sounds present  EXTREMITIES:   No clubbing, cyanosis, or edema  Swollen testicle+    Care Discussed with Consultants/Other Providers [ x] YES  [ ] NO

## 2019-02-19 NOTE — PROGRESS NOTE ADULT - PROBLEM SELECTOR PLAN 2
2/2 PNA and E. coli bacteremia which is pan sensitive   repeat BCx negative to date  c/w Rocephin 1g qd, day 8  c/w Zithromax qd, day 9  pt will need 2 weeks of abx   ID - Dr. Norton

## 2019-02-19 NOTE — PROGRESS NOTE ADULT - SUBJECTIVE AND OBJECTIVE BOX
NEPHROLOGY MEDICAL CARE, [facility] - Dr. Dajuan Oseguera/ Dr. Nataliya Berman/ Dr. Lennox Yusuf/ Dr. Kasie Lagunas    Patient was seen and examined at bedside.    CC: pt is not in distress.    Vital Signs Last 24 Hrs  T(C): 36.6 (19 Feb 2019 05:00), Max: 36.8 (18 Feb 2019 16:00)  T(F): 97.8 (19 Feb 2019 05:00), Max: 98.2 (18 Feb 2019 16:00)  HR: 98 (19 Feb 2019 11:03) (95 - 102)  BP: 163/91 (19 Feb 2019 11:03) (144/84 - 170/87)  BP(mean): 102 (18 Feb 2019 16:00) (97 - 102)  RR: 17 (19 Feb 2019 05:00) (17 - 30)  SpO2: 95% (19 Feb 2019 11:03) (95% - 100%)    02-18 @ 07:01  -  02-19 @ 07:00  --------------------------------------------------------  IN: 0 mL / OUT: 870 mL / NET: -870 mL        PHYSICAL EXAM:  Constitutional: well developed, Mal-nourished  and in nad  HEENT: PERRLA,  no icteric sclera and mild pallor of conjunctiva noted;   Neck: No JVD, thyromegaly or adenopathy  Respiratory: reduced air entry lower lungs with few rhonchi  Cardiovascular: S1 and S2 normally heard  Gastrointestinal: soft, nondistended, nontender and normal bowel sounds heard  Extremities:  B/L LE and upper thigh edema noted  Neurological: awake and alert.  Skin: No rashes      MEDICATIONS:  artificial  tears Solution 1 Drop(s) Both EYES two times a day  azithromycin  IVPB      azithromycin  IVPB 500 milliGRAM(s) IV Intermittent every 24 hours  cefTRIAXone   IVPB      cefTRIAXone   IVPB 1 Gram(s) IV Intermittent every 24 hours  docusate sodium Liquid 100 milliGRAM(s) Oral two times a day  enoxaparin Injectable 40 milliGRAM(s) SubCutaneous daily  famotidine Injectable 20 milliGRAM(s) IV Push daily  finasteride 5 milliGRAM(s) Oral daily  melatonin 3 milliGRAM(s) Oral at bedtime  midodrine 5 milliGRAM(s) Oral three times a day  senna 2 Tablet(s) Oral at bedtime  tamsulosin 0.4 milliGRAM(s) Oral at bedtime      LABS:                        10.4   10.33 )-----------( 357      ( 19 Feb 2019 07:36 )             34.8     02-19    144  |  110<H>  |  17  ----------------------------<  80  4.1   |  25  |  0.77    Ca    9.1      19 Feb 2019 07:36  Phos  3.2     02-19  Mg     2.1     02-19          Magnesium, Serum: 2.1 mg/dL (02-19 @ 07:36)  Phosphorus Level, Serum: 3.2 mg/dL (02-19 @ 07:36)    Urine studies    PTH and Vit D:

## 2019-02-19 NOTE — PROGRESS NOTE ADULT - PROBLEM SELECTOR PLAN 6
IMPROVE VTE Individual Risk Assessment    RISK                                                          Points  [] Previous VTE                                           3  [] Thrombophilia                                        2  [] Lower limb paralysis                              2   [] Current Cancer                                       2   [x] Immobilization > 24 hrs                        1  [] ICU/CCU stay > 24 hours                       1  x[] Age > 60                                                   1    IMPROVE VTE Score: 2    Lovenox

## 2019-02-19 NOTE — PROGRESS NOTE ADULT - ASSESSMENT
1.PAULINA: most likley secondary to obstructive uropathy  -resolved and normal renal function.  -Keep patient euvolemic   -Avoid Nephrotoxic Meds/ Agents such as (NSAIDs, IV contrast, Aminoglycosides such as gentamicin, -Gadolinium contrast, Phosphate containing enemas, etc..)  -Adjust Medications according to eGFR  -f/u bmp daily  2.HYPOKALEMIA: now worsening with diuresis  -stable.  -replace kcl prn  -keep k >4 and <5  -f/u k level daily  3.HYPERNATREMIA: mild .secondary to diuresis  -na is stable and encourage free water in take.   -avoid Na correction >8 meq per 24 hrs  -f/u Na level daily  -continue  Hctz 12.5 MG daily for fluid overload   4.METABOLIC ACIDOSIS: now improved  -f/u co2 daily  5.HYPOPHOSPHATEMIA: secondary to improved renal function  -replace prn to keep phos level >3 and <5  -f/u phos level daily    will signoff and re-consult prn.

## 2019-02-19 NOTE — PROGRESS NOTE ADULT - ASSESSMENT
Patient is a 86y old  Male who presents with a chief complaint of acute hypoxic respiratory distress 2/2 PNA and UTI s/p intubation. Pt also with septic shock now off pressors.

## 2019-02-19 NOTE — CHART NOTE - NSCHARTNOTEFT_GEN_A_CORE
Nurse informed me that patient was straight cath at 2pm to remove 700ml urine. Now patient has not voided and is retained >200ml. Replacing barba.

## 2019-02-19 NOTE — PROGRESS NOTE ADULT - PROBLEM SELECTOR PLAN 1
2/2 multifocal PNA  s/p intubation   c/w Rocephin and Zithromax  speech/swallow eval pending  c/w nasal cannula 3L - titrate prn

## 2019-02-19 NOTE — PROGRESS NOTE ADULT - PROBLEM SELECTOR PLAN 5
c/w tamsulosin  pt with 605 ml on bladder scan   will straight cath  and measure in 8 hrs if still above 200 will placed Lenz cath

## 2019-02-19 NOTE — PROGRESS NOTE ADULT - ASSESSMENT
86y old  Male who presents with a chief complaint of acute hypoxic respiratory distress 2/2 PNA and UTI s/p intubation. Pt also with septic shock now off pressors.      Problem/Plan - 1:  ·  Problem: Acute Respiratory failure with hypoxia.  Plan: S/P Extubation and now on NC Oxygen .      Problem/Plan - 2:  ·  Problem: Sepsis.  Plan: 2/2 PNA and E. coli bacteremia which is pan sensitive . ID helping.   repeat BCx negative to date  c/w Rocephin 1g qd, day 8  c/w Zithromax qd, day 9  pt will need 2 weeks of abx      Problem/Plan - 3:  ·  Problem: Hypotension.  Plan: Resolved so Midodrine DCD. If BP keeps running high will need antihypertensive.      Problem/Plan - 4:  ·  Problem: Central line complication, initial encounter.  Plan: right sided central line was malpositioned into common carotid artery with tip in aortic arch, confirmed by CT scan on 2/8/19  s/p removal of central venous catheter and repair of carotid artery on 2/8/19 without complication  vascular - Dr. Hernandez.      Problem/Plan - 5:  ·  Problem: BPH (benign prostatic hyperplasia) with penile and testicular swelling .  Plan: c/w tamsulosin  If retaining will need barba .      Problem/Plan - 6:  Problem: Hyperkalemia /PAULINA Metabolic Acidosis . Plan: Resolved. Renal help appreciated.      Problem/Plan - 7:  Problem: Anemia . Plan: HH stable.

## 2019-02-20 DIAGNOSIS — R33.9 RETENTION OF URINE, UNSPECIFIED: ICD-10-CM

## 2019-02-20 LAB
ALBUMIN SERPL ELPH-MCNC: 1.8 G/DL — LOW (ref 3.5–5)
ALP SERPL-CCNC: 551 U/L — HIGH (ref 40–120)
ALT FLD-CCNC: 39 U/L DA — SIGNIFICANT CHANGE UP (ref 10–60)
ANION GAP SERPL CALC-SCNC: 7 MMOL/L — SIGNIFICANT CHANGE UP (ref 5–17)
AST SERPL-CCNC: 39 U/L — SIGNIFICANT CHANGE UP (ref 10–40)
BILIRUB SERPL-MCNC: 0.7 MG/DL — SIGNIFICANT CHANGE UP (ref 0.2–1.2)
BUN SERPL-MCNC: 21 MG/DL — HIGH (ref 7–18)
CALCIUM SERPL-MCNC: 8.5 MG/DL — SIGNIFICANT CHANGE UP (ref 8.4–10.5)
CHLORIDE SERPL-SCNC: 112 MMOL/L — HIGH (ref 96–108)
CO2 SERPL-SCNC: 25 MMOL/L — SIGNIFICANT CHANGE UP (ref 22–31)
CREAT SERPL-MCNC: 0.63 MG/DL — SIGNIFICANT CHANGE UP (ref 0.5–1.3)
GLUCOSE SERPL-MCNC: 88 MG/DL — SIGNIFICANT CHANGE UP (ref 70–99)
HCT VFR BLD CALC: 30.5 % — LOW (ref 39–50)
HGB BLD-MCNC: 9.1 G/DL — LOW (ref 13–17)
MCHC RBC-ENTMCNC: 29.8 GM/DL — LOW (ref 32–36)
MCHC RBC-ENTMCNC: 29.9 PG — SIGNIFICANT CHANGE UP (ref 27–34)
MCV RBC AUTO: 100.3 FL — HIGH (ref 80–100)
NRBC # BLD: 0 /100 WBCS — SIGNIFICANT CHANGE UP (ref 0–0)
PLATELET # BLD AUTO: 316 K/UL — SIGNIFICANT CHANGE UP (ref 150–400)
POTASSIUM SERPL-MCNC: 3.7 MMOL/L — SIGNIFICANT CHANGE UP (ref 3.5–5.3)
POTASSIUM SERPL-SCNC: 3.7 MMOL/L — SIGNIFICANT CHANGE UP (ref 3.5–5.3)
PROT SERPL-MCNC: 5.1 G/DL — LOW (ref 6–8.3)
RBC # BLD: 3.04 M/UL — LOW (ref 4.2–5.8)
RBC # FLD: 19.5 % — HIGH (ref 10.3–14.5)
SODIUM SERPL-SCNC: 144 MMOL/L — SIGNIFICANT CHANGE UP (ref 135–145)
WBC # BLD: 7.27 K/UL — SIGNIFICANT CHANGE UP (ref 3.8–10.5)
WBC # FLD AUTO: 7.27 K/UL — SIGNIFICANT CHANGE UP (ref 3.8–10.5)

## 2019-02-20 PROCEDURE — 99223 1ST HOSP IP/OBS HIGH 75: CPT

## 2019-02-20 RX ADMIN — FINASTERIDE 5 MILLIGRAM(S): 5 TABLET, FILM COATED ORAL at 11:50

## 2019-02-20 RX ADMIN — ENOXAPARIN SODIUM 40 MILLIGRAM(S): 100 INJECTION SUBCUTANEOUS at 11:50

## 2019-02-20 RX ADMIN — Medication 1 DROP(S): at 17:24

## 2019-02-20 RX ADMIN — Medication 3 MILLILITER(S): at 10:00

## 2019-02-20 RX ADMIN — FAMOTIDINE 20 MILLIGRAM(S): 10 INJECTION INTRAVENOUS at 11:50

## 2019-02-20 RX ADMIN — Medication 100 MILLIGRAM(S): at 17:24

## 2019-02-20 RX ADMIN — Medication 3 MILLILITER(S): at 15:28

## 2019-02-20 RX ADMIN — CEFTRIAXONE 100 GRAM(S): 500 INJECTION, POWDER, FOR SOLUTION INTRAMUSCULAR; INTRAVENOUS at 15:42

## 2019-02-20 RX ADMIN — Medication 3 MILLILITER(S): at 21:24

## 2019-02-20 RX ADMIN — Medication 3 MILLILITER(S): at 02:11

## 2019-02-20 RX ADMIN — AZITHROMYCIN 250 MILLIGRAM(S): 500 TABLET, FILM COATED ORAL at 17:24

## 2019-02-20 NOTE — PROGRESS NOTE ADULT - ASSESSMENT
_________________________________________________________________________________________  ========>>  M E D I C A L   A T T E N D I N G  (covering)  F O L L O W  U P  N O T E  <<=========  -----------------------------------------------------------------------------------------------------    - Patient seen and examined by me earlier today.   - Patient today overall doing ok, comfortable, eating poorly per staff    ==================>> REVIEW OF SYSTEM <<=================    limited ROS due to dementia    ==================>> PHYSICAL EXAM <<=================    GEN: Awake and responsive,, NAD , comfortable in chair   HEENT: NCAT, PERRL, MMM, hearing intact  Neck: supple , no JVD  CVS: S1S2 , regular , No M/R/G appreciated  PULM: CTA B/L,  no W/R/R appreciated  ABD.: soft. non tender, non distended,  bowel sounds present  Extrem: intact pulses , no edema , + barba in place       ==================>> MEDICATIONS <<====================    MEDICATIONS  (STANDING):  ALBUTerol/ipratropium for Nebulization 3 milliLiter(s) Nebulizer every 6 hours  artificial  tears Solution 1 Drop(s) Both EYES two times a day  azithromycin  IVPB      azithromycin  IVPB 500 milliGRAM(s) IV Intermittent every 24 hours  cefTRIAXone   IVPB      cefTRIAXone   IVPB 1 Gram(s) IV Intermittent every 24 hours  docusate sodium Liquid 100 milliGRAM(s) Oral two times a day  enoxaparin Injectable 40 milliGRAM(s) SubCutaneous daily  famotidine Injectable 20 milliGRAM(s) IV Push daily  finasteride 5 milliGRAM(s) Oral daily  melatonin 3 milliGRAM(s) Oral at bedtime  senna 2 Tablet(s) Oral at bedtime  tamsulosin 0.4 milliGRAM(s) Oral at bedtime    MEDICATIONS  (PRN):  guaiFENesin    Syrup 100 milliGRAM(s) Oral every 6 hours PRN Cough    ==================>> VITAL SIGNS <<==================    T(C): 36.7 (02-20-19 @ 14:26), Max: 36.9 (02-19-19 @ 19:45)  HR: 90 (02-20-19 @ 14:26) (76 - 101)  BP: 120/64 (02-20-19 @ 14:26) (120/64 - 153/73)  RR: 16 (02-20-19 @ 14:26) (16 - 18)  SpO2: 94% (02-20-19 @ 14:26) (94% - 100%)    I&O's Summary    19 Feb 2019 07:01  -  20 Feb 2019 07:00  --------------------------------------------------------  IN: 0 mL / OUT: 1200 mL / NET: -1200 mL     ==================>> LAB AND IMAGING <<==================                        9.1    7.27  )-----------( 316      ( 20 Feb 2019 07:30 )             30.5        Hemoglobin:   9.1 <<==,  9.7 <<==,  10.4 <<==,  9.3 <<==,  9.2 <<==,  8.3 <<==    144  |  112<H>  |  21<H>  ----------------------------<  88  3.7   |  25  |  0.63    Ca    8.5      20 Feb 2019 07:29  Phos  3.2     02-19  Mg     2.1     02-19    TPro  5.1<L>  /  Alb  1.8<L>  /  TBili  0.7  /  DBili  x   /  AST  39  /  ALT  39  /  AlkPhos  551<H>  02-20    ___________________________________________________________________________________  ===============>>  A S S E S S M E N T   A N D   P L A N <<===============  ------------------------------------------------------------------------------------------    Patient is an 86y old  man on treatment for acute hypoxic respiratory distress 2/2 PNA and UTI s/p intubation    Problem/Plan - 1:  ·  Problem: Respiratory failure with hypoxia.  Plan: 2/2 multifocal PNA , septic shock 2/2 bacteremia   s/p intubation   c/w Rocephin and Zithromax   overall doing well   Tolerating po diet , passed s/s : mech soft ,honey thick.   Id f/u     Problem/Plan - 2:  ·  Problem: Sepsis, resolved   2/2 PNA and E. coli bacteremia which is pan sensitive   c/w Rocephin 1g qd  c/w Zithromax qd  ID f/u    Problem/Plan - 3:  ·  Problem: Urine retention (retention > 600 ml )  Barba care  Urology f/u    Problem/Plan - 4:  ·  Problem: Central line complication, ( central line was malpositioned into common carotid artery with tip in aortic arch, confirmed by CT scan on 2/8/19)  s/p removal of central venous catheter and repair of carotid artery on 2/8/19 without complication  vascular - Dr. Hernandez.  Problem/Plan - 5:  Problem: BPH   c/w tamsulosin  pt with urinary retention as above likley to go rehab with barba     -GI/DVT Prophylaxis.    --------------------------------------------  Case discussed with staff  Education given on findings and plan of care  ___________________________  H. EFFIE Hollins (covering)  Pager: [phone]

## 2019-02-20 NOTE — DISCHARGE NOTE ADULT - CARE PLAN
Principal Discharge DX:	Septic shock  Goal:	Resolution of infection  Assessment and plan of treatment:	You came in with septic shock , were treated with iv antibiotics, pressors. Blood cultures showed E.coli bacteremia likely from UTI and Pneumonia. You were tapered off pressors and completed course of abx.   Repeat blood cultures were negative. Follow up with PMD In 1 week of discharge.  Secondary Diagnosis:	Central line complication, initial encounter  Assessment and plan of treatment:	Patient had a central line insertion in Right IJ, however was accidentally in common carotid. Vascular surgery was consulted , line removed and patient taken to OR for repair.  Secondary Diagnosis:	Acute renal failure  Assessment and plan of treatment:	improved with IVF.  Secondary Diagnosis:	BPH (benign prostatic hyperplasia)  Assessment and plan of treatment:	c/w flomax.  Secondary Diagnosis:	Severe protein-calorie malnutrition  Assessment and plan of treatment:	c/w adequate nutrition and diet. Principal Discharge DX:	Septic shock  Goal:	Resolution of infection  Assessment and plan of treatment:	You came in with septic shock , were treated with iv antibiotics, pressors. Blood cultures showed E.coli bacteremia likely from UTI and Pneumonia. You were tapered off pressors and completed course of abx.   Repeat blood cultures were negative. Follow up with PMD In 1 week of discharge.  Secondary Diagnosis:	Central line complication, initial encounter  Assessment and plan of treatment:	Patient had a central line insertion in Right IJ, however was accidentally in common carotid. Vascular surgery was consulted , line removed and patient taken to OR for repair.  Secondary Diagnosis:	Acute renal failure  Assessment and plan of treatment:	improved with IVF.  Secondary Diagnosis:	BPH (benign prostatic hyperplasia)  Assessment and plan of treatment:	c/w flomax.  Secondary Diagnosis:	Severe protein-calorie malnutrition  Assessment and plan of treatment:	c/w adequate nutrition and diet.  Secondary Diagnosis:	Urine retention  Assessment and plan of treatment:	You were found to have urinary retention. Phimosis was reduced and barba placed. Flomax and Proscar started. You will be discharged with the barba and will need to follow up with Urology as an outpatient.

## 2019-02-20 NOTE — PROGRESS NOTE ADULT - PROBLEM SELECTOR PLAN 2
2/2 PNA and E. coli bacteremia which is pan sensitive   repeat BCx negative to date  c/w Rocephin 1g qd, day 8  c/w Zithromax qd, day 10  pt will need 2 weeks of abx   ID - Dr. Norton 2/2 PNA and E. coli bacteremia which is pan sensitive   repeat BCx negative to date  c/w Rocephin 1g qd  c/w Zithromax qd  ID - Dr. Norton

## 2019-02-20 NOTE — PROGRESS NOTE ADULT - PROBLEM SELECTOR PLAN 4
right sided central line was malpositioned into common carotid artery with tip in aortic arch, confirmed by CT scan on 2/8/19  s/p removal of central venous catheter and repair of carotid artery on 2/8/19 without complication  vascular - Dr. Hernandez resolved

## 2019-02-20 NOTE — PROGRESS NOTE ADULT - PROBLEM SELECTOR PLAN 5
c/w tamsulosin  pt with 605 ml on bladder scan   will straight cath  and measure in 8 hrs if still above 200 will placed Lenz cath right sided central line was malpositioned into common carotid artery with tip in aortic arch, confirmed by CT scan on 2/8/19  s/p removal of central venous catheter and repair of carotid artery on 2/8/19 without complication  vascular - Dr. Hernandez

## 2019-02-20 NOTE — PROGRESS NOTE ADULT - PROBLEM SELECTOR PLAN 1
2/2 multifocal PNA , septic shock 2/2 bacteremia   s/p intubation   c/w Rocephin and Zithromax , day 10   c/w nasal cannula 3L - titrate prn  Tolerating po diet , passed s/s : mech soft ,honey thick 2/2 multifocal PNA , septic shock 2/2 bacteremia   s/p intubation   c/w Rocephin and Zithromax   c/w nasal cannula 3L - titrate prn  Tolerating po diet , passed s/s : mech soft ,honey thick

## 2019-02-20 NOTE — CONSULT NOTE ADULT - ATTENDING COMMENTS
Extensive discussion w pt family and hcp.  I explained that there was potential for embolus and stroke given the position of the catheter.  Although he has significant comorbidities, I explained the safest thing to do is to remove it in the OR.  They agreed, and will proceed with removal.
on exam pt with paraphimosis with mild pressure ulcer   paraphimisos manually reduced - will monitor   cont barba   cont tamsulosin /fniasteride

## 2019-02-20 NOTE — CONSULT NOTE ADULT - SUBJECTIVE AND OBJECTIVE BOX
HPI:  87 y/o M from [facility], PMH of HTN, BPH, dementia, had recent fall on 1/22/19 had fracture of Rt intertrochanteric fracture, underwent surgical fixation, sent in from NH for respiratory distress. Pt is demented, hence hx obtained from NH papers and daughter at the bedside using  # [id number]. According to daughter pt is not doing well since surgery and barely walks, with worsening lower limb edema and SOB. Pt was started on Duoneb inhalation and today CXR was done, was told pt has PNA and was sent in for further evaluation. Pt's daughter also reports pt being more confused since last Sunday.      In ED, patient was tachycardic to 120/min, BP- 99/58 mmhg, RR- 20/min, spo2- 90 % . EKG-  afib @105 BPM , prolonged QTC - 520, no ST T wave changes. cardiac enzymes x1- 0.059, BNP- 7244, UA- >50 WBC and moderate LE , CXR s/o Patchy right lower lobe infiltrate. Cardiomegaly. Labs pertinent for WBC- 57.6, H.H of 9.3/31.4, lactate of 9.3, K of 6.4, bicarb of 8, AG of 24,bun/ creat- 177/8.69, s/p 1 dose of cefepime, vancomycin and azithromycin with 1.5 L NS bolus  in ED     Pt had barba catheter placed, about 750 cc of cloudy urine drained. ABG - 7.35/ 13/106/7/100% NRB. Pt was placed on NIV BIPAP for work of breathing.     Pt will be admitted to ICU for hypoxic respiratory failure with metabolic acidosis and respiratory alkalosis and sepsis secondary to UTI and ? PNA, management of Acute renal failure. (07 Feb 2019 19:25)    Patient seen and examined at bedside for urology consult for urinary retention and scrotal edema. Patient unable to provide history at time of exam due to dementia. Per care team patient developed urinary retention overnight and PVR bladder scan shower residual of 700ml. Patient had barba catheter in place since admission and it was removed on 2/18 for TOV, after barba removal he had to be straight catheterized twice before ultimately having catheter replaced.     PAST MEDICAL & SURGICAL HISTORY:  Dementia  BPH (benign prostatic hyperplasia)  Hypertension  Closed hip fracture    Review of Systems: Unable to obtain     MEDICATIONS  (STANDING):  ALBUTerol/ipratropium for Nebulization 3 milliLiter(s) Nebulizer every 6 hours  artificial  tears Solution 1 Drop(s) Both EYES two times a day  azithromycin  IVPB      azithromycin  IVPB 500 milliGRAM(s) IV Intermittent every 24 hours  cefTRIAXone   IVPB      cefTRIAXone   IVPB 1 Gram(s) IV Intermittent every 24 hours  docusate sodium Liquid 100 milliGRAM(s) Oral two times a day  enoxaparin Injectable 40 milliGRAM(s) SubCutaneous daily  famotidine Injectable 20 milliGRAM(s) IV Push daily  finasteride 5 milliGRAM(s) Oral daily  melatonin 3 milliGRAM(s) Oral at bedtime  senna 2 Tablet(s) Oral at bedtime  tamsulosin 0.4 milliGRAM(s) Oral at bedtime    MEDICATIONS  (PRN):  guaiFENesin Syrup 100 milliGRAM(s) Oral every 6 hours PRN Cough    Allergies: No Known Allergies    Vital Signs Last 24 Hrs  T(C): 36.7 (20 Feb 2019 14:26), Max: 36.9 (19 Feb 2019 19:45)  T(F): 98 (20 Feb 2019 14:26), Max: 98.4 (19 Feb 2019 19:45)  HR: 90 (20 Feb 2019 14:26) (76 - 101)  BP: 120/64 (20 Feb 2019 14:26) (120/64 - 153/73)  RR: 16 (20 Feb 2019 14:26) (16 - 18)  SpO2: 94% (20 Feb 2019 14:26) (94% - 100%)    Physical Exam:    General:  Appears stated age, well-groomed, well-nourished, no distress  Eyes: EOMI  HENT:  WNL, no JVD  Chest: respirations nonlabored  Abdomen:  soft, NT/ND  : Barba catheter in place draining clear yellow urine.   Extremities: no edema bilaterally  Skin: warm and dry  Musculoskeletal: no calf tenderness  Neuro:  Alert, oriented tp time, place and person   Psych: normal affect    LABS:                        9.1    7.27  )-----------( 316      ( 20 Feb 2019 07:30 )             30.5     02-20    144  |  112<H>  |  21<H>  ----------------------------<  88  3.7   |  25  |  0.63    Ca    8.5      20 Feb 2019 07:29  Phos  3.2     02-19  Mg     2.1     02-19    TPro  5.1<L>  /  Alb  1.8<L>  /  TBili  0.7  /  DBili  x   /  AST  39  /  ALT  39  /  AlkPhos  551<H>  02-20    RADIOLOGY & ADDITIONAL STUDIES:  < from: CT Abdomen and Pelvis No Cont (02.07.19 @ 21:40) >  IMPRESSION:  Chest CT: Presumed small bilateral pleural effusions with atelectasis and   likely superimposed pneumonia in the lung bases. Cardiomegaly.    CT abdomen/pelvis: No bowel obstruction. Mild bilateral   hydroureteronephrosis without obstructing ureteral calculus. Please   correlate clinically with urinalysis to exclude infection as an etiology.  Asymmetric soft tissue swelling over the right hip which could be due to   posttraumatic or postprocedural sequela versus infection. No evidence of   abscess or intramuscular hematoma.    CONNIE CORTEZ M.D., RADIOLOGIST  This document has been electronically signed. Feb 8 2019 12:40AM    < end of copied text > HPI:  85 y/o M from [facility], PMH of HTN, BPH, dementia, had recent fall on 1/22/19 had fracture of Rt intertrochanteric fracture, underwent surgical fixation, sent in from NH for respiratory distress. Pt is demented, hence hx obtained from NH papers and daughter at the bedside using  # [id number]. According to daughter pt is not doing well since surgery and barely walks, with worsening lower limb edema and SOB. Pt was started on Duoneb inhalation and today CXR was done, was told pt has PNA and was sent in for further evaluation. Pt's daughter also reports pt being more confused since last Sunday.      In ED, patient was tachycardic to 120/min, BP- 99/58 mmhg, RR- 20/min, spo2- 90 % . EKG-  afib @105 BPM , prolonged QTC - 520, no ST T wave changes. cardiac enzymes x1- 0.059, BNP- 7244, UA- >50 WBC and moderate LE , CXR s/o Patchy right lower lobe infiltrate. Cardiomegaly. Labs pertinent for WBC- 57.6, H.H of 9.3/31.4, lactate of 9.3, K of 6.4, bicarb of 8, AG of 24,bun/ creat- 177/8.69, s/p 1 dose of cefepime, vancomycin and azithromycin with 1.5 L NS bolus  in ED     Pt had barba catheter placed, about 750 cc of cloudy urine drained. ABG - 7.35/ 13/106/7/100% NRB. Pt was placed on NIV BIPAP for work of breathing.     Pt will be admitted to ICU for hypoxic respiratory failure with metabolic acidosis and respiratory alkalosis and sepsis secondary to UTI and ? PNA, management of Acute renal failure. (07 Feb 2019 19:25)    Patient seen and examined at bedside for urology consult for urinary retention and scrotal edema. Patient unable to provide history at time of exam due to dementia. Per care team patient developed urinary retention overnight and PVR bladder scan shower residual of 700ml. Patient had barba catheter in place since admission and it was removed on 2/18 for TOV, after barba removal he had to be straight catheterized twice before ultimately having catheter replaced.     PAST MEDICAL & SURGICAL HISTORY:  Dementia  BPH (benign prostatic hyperplasia)  Hypertension  Closed hip fracture    Review of Systems: Unable to obtain     MEDICATIONS  (STANDING):  ALBUTerol/ipratropium for Nebulization 3 milliLiter(s) Nebulizer every 6 hours  artificial  tears Solution 1 Drop(s) Both EYES two times a day  azithromycin  IVPB      azithromycin  IVPB 500 milliGRAM(s) IV Intermittent every 24 hours  cefTRIAXone   IVPB      cefTRIAXone   IVPB 1 Gram(s) IV Intermittent every 24 hours  docusate sodium Liquid 100 milliGRAM(s) Oral two times a day  enoxaparin Injectable 40 milliGRAM(s) SubCutaneous daily  famotidine Injectable 20 milliGRAM(s) IV Push daily  finasteride 5 milliGRAM(s) Oral daily  melatonin 3 milliGRAM(s) Oral at bedtime  senna 2 Tablet(s) Oral at bedtime  tamsulosin 0.4 milliGRAM(s) Oral at bedtime    MEDICATIONS  (PRN):  guaiFENesin Syrup 100 milliGRAM(s) Oral every 6 hours PRN Cough    Allergies: No Known Allergies    Vital Signs Last 24 Hrs  T(C): 36.7 (20 Feb 2019 14:26), Max: 36.9 (19 Feb 2019 19:45)  T(F): 98 (20 Feb 2019 14:26), Max: 98.4 (19 Feb 2019 19:45)  HR: 90 (20 Feb 2019 14:26) (76 - 101)  BP: 120/64 (20 Feb 2019 14:26) (120/64 - 153/73)  RR: 16 (20 Feb 2019 14:26) (16 - 18)  SpO2: 94% (20 Feb 2019 14:26) (94% - 100%)    Physical Exam:    General:  Appears stated age, well-groomed, well-nourished, no distress  Eyes: EOMI  HENT:  WNL, no JVD  Chest: respirations nonlabored  Abdomen:  soft, NT/ND  : Barba catheter in place draining clear yellow urine. Penile and scrotal edema, paraphimosis noted with some slough to anterior penile shaft. Paraphimosis reduced at bedside by Dr. Suárez  Extremities: no edema bilaterally  Skin: warm and dry  Musculoskeletal: no calf tenderness  Neuro:  Alert, oriented tp time, place and person   Psych: normal affect    LABS:                        9.1    7.27  )-----------( 316      ( 20 Feb 2019 07:30 )             30.5     02-20    144  |  112<H>  |  21<H>  ----------------------------<  88  3.7   |  25  |  0.63    Ca    8.5      20 Feb 2019 07:29  Phos  3.2     02-19  Mg     2.1     02-19    TPro  5.1<L>  /  Alb  1.8<L>  /  TBili  0.7  /  DBili  x   /  AST  39  /  ALT  39  /  AlkPhos  551<H>  02-20    RADIOLOGY & ADDITIONAL STUDIES:  < from: CT Abdomen and Pelvis No Cont (02.07.19 @ 21:40) >  IMPRESSION:  Chest CT: Presumed small bilateral pleural effusions with atelectasis and   likely superimposed pneumonia in the lung bases. Cardiomegaly.    CT abdomen/pelvis: No bowel obstruction. Mild bilateral   hydroureteronephrosis without obstructing ureteral calculus. Please   correlate clinically with urinalysis to exclude infection as an etiology.  Asymmetric soft tissue swelling over the right hip which could be due to   posttraumatic or postprocedural sequela versus infection. No evidence of   abscess or intramuscular hematoma.    CONNIE CORTEZ M.D., RADIOLOGIST  This document has been electronically signed. Feb 8 2019 12:40AM    < end of copied text >

## 2019-02-20 NOTE — DISCHARGE NOTE ADULT - SECONDARY DIAGNOSIS.
Acute renal failure BPH (benign prostatic hyperplasia) Severe protein-calorie malnutrition Central line complication, initial encounter Urine retention

## 2019-02-20 NOTE — CONSULT NOTE ADULT - ASSESSMENT
86 year old male with urinary retention and scrotal edema. Admitted with septic shock secondary to E. coli bacteremia and UTI.     - continue barba catheter and monitor urine output  - continue IV antibiotics for UTI  - continue Flomax and Proscar   - continue medical management  - discussed with Dr. Suárez 86 year old male with urinary retention and paraphimosis. Admitted with septic shock secondary to E. coli bacteremia and UTI.     - continue barba catheter and monitor urine output  - continue IV antibiotics for UTI  - continue Flomax and Proscar   - monitor penis for necrosis and paraphimosis  - continue medical management  - discussed with Dr. Suárez

## 2019-02-20 NOTE — PROGRESS NOTE ADULT - PROBLEM SELECTOR PLAN 6
IMPROVE VTE Individual Risk Assessment    RISK                                                          Points  [] Previous VTE                                           3  [] Thrombophilia                                        2  [] Lower limb paralysis                              2   [] Current Cancer                                       2   [x] Immobilization > 24 hrs                        1  [] ICU/CCU stay > 24 hours                       1  x[] Age > 60                                                   1    IMPROVE VTE Score: 2    Lovenox c/w tamsulosin  pt with 605 ml on bladder scan   will straight cath  and measure in 8 hrs if still above 200 will placed Lenz cath

## 2019-02-20 NOTE — DISCHARGE NOTE ADULT - MEDICATION SUMMARY - MEDICATIONS TO TAKE
I will START or STAY ON the medications listed below when I get home from the hospital:    finasteride 5 mg oral tablet  -- 1 tab(s) by mouth once a day  -- Indication: For BPH (benign prostatic hyperplasia)    Tylenol  -- Indication: For Pain    lisinopril 20 mg oral tablet  -- 1 tab(s) by mouth once a day  -- Indication: For Htn    Flomax 0.4 mg oral capsule  -- 1 cap(s) by mouth once a day  -- Indication: For BPH (benign prostatic hyperplasia)    ipratropium  -- Indication: For Shortness of breath    albuterol  -- Indication: For Shortness of breath    Colace 100 mg oral capsule  -- 1 cap(s) by mouth once a day (at bedtime)  -- Indication: For Constipation    senna oral tablet  -- 2 tab(s) by mouth once a day (at bedtime)  -- Indication: For Constipation

## 2019-02-20 NOTE — DISCHARGE NOTE ADULT - PLAN OF CARE
improved with IVF. c/w flomax. c/w adequate nutrition and diet. Resolution of infection You came in with septic shock , were treated with iv antibiotics, pressors. Blood cultures showed E.coli bacteremia likely from UTI and Pneumonia. You were tapered off pressors and completed course of abx.   Repeat blood cultures were negative. Follow up with PMD In 1 week of discharge. Patient had a central line insertion in Right IJ, however was accidentally in common carotid. Vascular surgery was consulted , line removed and patient taken to OR for repair. You were found to have urinary retention. Phimosis was reduced and barba placed. Flomax and Proscar started. You will be discharged with the barba and will need to follow up with Urology as an outpatient.

## 2019-02-20 NOTE — PROGRESS NOTE ADULT - SUBJECTIVE AND OBJECTIVE BOX
PGY2 note discussed with Primary Attending     Patient is a 86y old  Male who presents with a chief complaint of respiratory distress (19 Feb 2019 19:23)    INTERVAL HPI/OVERNIGHT EVENTS: offers no new complaints; current symptoms resolving    MEDICATIONS  (STANDING):  ALBUTerol/ipratropium for Nebulization 3 milliLiter(s) Nebulizer every 6 hours  artificial  tears Solution 1 Drop(s) Both EYES two times a day  azithromycin  IVPB      azithromycin  IVPB 500 milliGRAM(s) IV Intermittent every 24 hours  cefTRIAXone   IVPB      cefTRIAXone   IVPB 1 Gram(s) IV Intermittent every 24 hours  docusate sodium Liquid 100 milliGRAM(s) Oral two times a day  enoxaparin Injectable 40 milliGRAM(s) SubCutaneous daily  famotidine Injectable 20 milliGRAM(s) IV Push daily  finasteride 5 milliGRAM(s) Oral daily  melatonin 3 milliGRAM(s) Oral at bedtime  senna 2 Tablet(s) Oral at bedtime  tamsulosin 0.4 milliGRAM(s) Oral at bedtime    MEDICATIONS  (PRN):  guaiFENesin    Syrup 100 milliGRAM(s) Oral every 6 hours PRN Cough      __________________________________________________  REVIEW OF SYSTEMS:    CONSTITUTIONAL: No fever,   EYES: no acute visual disturbances  NECK: No pain or stiffness  RESPIRATORY: No cough; No shortness of breath  CARDIOVASCULAR: No chest pain, no palpitations  GASTROINTESTINAL: No pain. No nausea or vomiting; No diarrhea   NEUROLOGICAL: No headache or numbness, no tremors  MUSCULOSKELETAL: No joint pain, no muscle pain  GENITOURINARY: no dysuria, no frequency, no hesitancy  PSYCHIATRY: no depression , no anxiety  ALL OTHER  ROS negative        Vital Signs Last 24 Hrs  T(C): 36.6 (20 Feb 2019 05:29), Max: 36.9 (19 Feb 2019 19:45)  T(F): 97.9 (20 Feb 2019 05:29), Max: 98.4 (19 Feb 2019 19:45)  HR: 101 (20 Feb 2019 05:29) (80 - 101)  BP: 148/81 (20 Feb 2019 05:29) (133/70 - 164/77)  BP(mean): --  RR: 16 (20 Feb 2019 05:29) (16 - 18)  SpO2: 100% (20 Feb 2019 05:29) (95% - 100%)    ________________________________________________  PHYSICAL EXAM:  GENERAL: NAD  HEENT: Normocephalic;  conjunctivae and sclerae clear; moist mucous membranes;   NECK : supple  CHEST/LUNG: Clear to auscultation bilaterally with good air entry   HEART: S1 S2  regular; no murmurs, gallops or rubs  ABDOMEN: Soft, Nontender, Nondistended; Bowel sounds present  EXTREMITIES: no cyanosis; no edema; no calf tenderness  SKIN: warm and dry; no rash  NERVOUS SYSTEM:  Awake and alert; Oriented *1     _________________________________________________  LABS:                        9.1    7.27  )-----------( 316      ( 20 Feb 2019 07:30 )             30.5     02-20    144  |  112<H>  |  21<H>  ----------------------------<  88  3.7   |  25  |  0.63    Ca    8.5      20 Feb 2019 07:29  Phos  3.2     02-19  Mg     2.1     02-19    TPro  5.1<L>  /  Alb  1.8<L>  /  TBili  0.7  /  DBili  x   /  AST  39  /  ALT  39  /  AlkPhos  551<H>  02-20      Imaging Personally Reviewed:  YES    Consultant(s) Notes Reviewed:   YES    Care Discussed with Consultants : YES    Plan of care was discussed with patient and /or primary care giver; all questions and concerns were addressed and care was aligned with patient's wishes. PGY2 note discussed with Primary Attending     Patient is a 86y old  Male who presents with a chief complaint of respiratory distress (19 Feb 2019 19:23)    INTERVAL HPI/OVERNIGHT EVENTS: offers no new complaints; current symptoms resolving    MEDICATIONS  (STANDING):  ALBUTerol/ipratropium for Nebulization 3 milliLiter(s) Nebulizer every 6 hours  artificial  tears Solution 1 Drop(s) Both EYES two times a day  azithromycin  IVPB      azithromycin  IVPB 500 milliGRAM(s) IV Intermittent every 24 hours  cefTRIAXone   IVPB      cefTRIAXone   IVPB 1 Gram(s) IV Intermittent every 24 hours  docusate sodium Liquid 100 milliGRAM(s) Oral two times a day  enoxaparin Injectable 40 milliGRAM(s) SubCutaneous daily  famotidine Injectable 20 milliGRAM(s) IV Push daily  finasteride 5 milliGRAM(s) Oral daily  melatonin 3 milliGRAM(s) Oral at bedtime  senna 2 Tablet(s) Oral at bedtime  tamsulosin 0.4 milliGRAM(s) Oral at bedtime    MEDICATIONS  (PRN):  guaiFENesin    Syrup 100 milliGRAM(s) Oral every 6 hours PRN Cough      __________________________________________________  REVIEW OF SYSTEMS:    CONSTITUTIONAL: No fever,   EYES: no acute visual disturbances  NECK: No pain or stiffness  RESPIRATORY: No cough; No shortness of breath  CARDIOVASCULAR: No chest pain, no palpitations  GASTROINTESTINAL: No pain. No nausea or vomiting; No diarrhea   NEUROLOGICAL: No headache or numbness, no tremors  MUSCULOSKELETAL: No joint pain, no muscle pain  GENITOURINARY: no dysuria, no frequency, no hesitancy  PSYCHIATRY: no depression , no anxiety  ALL OTHER  ROS negative        Vital Signs Last 24 Hrs  T(C): 36.6 (20 Feb 2019 05:29), Max: 36.9 (19 Feb 2019 19:45)  T(F): 97.9 (20 Feb 2019 05:29), Max: 98.4 (19 Feb 2019 19:45)  HR: 101 (20 Feb 2019 05:29) (80 - 101)  BP: 148/81 (20 Feb 2019 05:29) (133/70 - 164/77)  BP(mean): --  RR: 16 (20 Feb 2019 05:29) (16 - 18)  SpO2: 100% (20 Feb 2019 05:29) (95% - 100%)    ________________________________________________  PHYSICAL EXAM:  GENERAL: NAD  HEENT: Normocephalic;  conjunctivae and sclerae clear; moist mucous membranes;   NECK : supple  CHEST/LUNG: Clear to auscultation bilaterally with good air entry   HEART: S1 S2  regular; no murmurs, gallops or rubs  ABDOMEN: Soft, Nontender, Nondistended; Bowel sounds present; scrotal swelling +  EXTREMITIES: no cyanosis; no edema; no calf tenderness  SKIN: warm and dry; no rash  NERVOUS SYSTEM:  Awake and alert; Oriented *1     _________________________________________________  LABS:                        9.1    7.27  )-----------( 316      ( 20 Feb 2019 07:30 )             30.5     02-20    144  |  112<H>  |  21<H>  ----------------------------<  88  3.7   |  25  |  0.63    Ca    8.5      20 Feb 2019 07:29  Phos  3.2     02-19  Mg     2.1     02-19    TPro  5.1<L>  /  Alb  1.8<L>  /  TBili  0.7  /  DBili  x   /  AST  39  /  ALT  39  /  AlkPhos  551<H>  02-20      Imaging Personally Reviewed:  YES    Consultant(s) Notes Reviewed:   YES    Care Discussed with Consultants : YES    Plan of care was discussed with patient and /or primary care giver; all questions and concerns were addressed and care was aligned with patient's wishes.

## 2019-02-20 NOTE — DISCHARGE NOTE ADULT - PATIENT PORTAL LINK FT
You can access the [facility] Patient Portal, offered by [facility], by registering with the following website: [email or web address]

## 2019-02-20 NOTE — DISCHARGE NOTE ADULT - HOSPITAL COURSE
5 y/o M from [facility], PMH of HTN, BPH, dementia , had recent fall on 1/22/19 had fracture of Rt intertrochanteric fracture, underwent surgical fixation, sent in from NH for respiratory distress.  In ED, patient was tachycardic to 120/min, BP- 99/58 mmhg, RR- 20/min, spo2- 90 % . EKG-  afib @105 BPM , prolonged QTC - 520, no ST T wave changes. cardiac enzymes x1- 0.059, BNP- 7244, UA- >50 WBC and moderate LE , CXR s/o Patchy right lower lobe infiltrate. Cardiomegaly. Labs pertinent for WBC- 57.6, H.H of 9.3/31.4, lactate of 9.3, K of 6.4, bicarb of 8, AG of 24,bun/ creat- 177/8.69, s/p 1 dose of cefepime, vancomycin and azithromycin with 1.5 L NS bolus  in ED  Pt had barba catheter placed, about 750 cc of cloudy urine drained. ABG - 7.35/ 13/106/7/100% NRB. Pt was placed on NIV BIPAP for work of breathing.   Pt was admitted to ICU for hypoxic respiratory failure with metabolic acidosis and respiratory alkalosis and sepsis secondary to UTI and ? PNA, management of Acute renal failure.   Patient was later intubated and on mech ventilation.   Was started on pressor support.   Patient had a central line insertion in Right IJ, however was accidentally in common carotid. Vascular surgery was consulted , line removed and patient taken to OR for repair.   Patient was treated with Rocephin , zithro for Pneumonia and UTI.   Urine cx positive for E.coli and Blood cultures also for E.coli.   IVF given, kidney function improved.   Patient had hypernatremia , which was corrected.   After extubation , was downgraded to medicine floor.   Completed 2 weeks of abx.     After stabilization, decision was made to discharge patient. 7 y/o M from [facility], PMH of HTN, BPH, dementia , had recent fall on 1/22/19 had fracture of Rt intertrochanteric fracture, underwent surgical fixation, sent in from NH for respiratory distress.  In ED, patient was tachycardic to 120/min, BP- 99/58 mmhg, RR- 20/min, spo2- 90 % . EKG-  afib @105 BPM , prolonged QTC - 520, no ST T wave changes. cardiac enzymes x1- 0.059, BNP- 7244, UA- >50 WBC and moderate LE , CXR s/o Patchy right lower lobe infiltrate. Cardiomegaly. Labs pertinent for WBC- 57.6, H.H of 9.3/31.4, lactate of 9.3, K of 6.4, bicarb of 8, AG of 24,bun/ creat- 177/8.69, s/p 1 dose of cefepime, vancomycin and azithromycin with 1.5 L NS bolus  in ED  Pt had barba catheter placed, about 750 cc of cloudy urine drained. ABG - 7.35/ 13/106/7/100% NRB. Pt was placed on NIV BIPAP for work of breathing.   Pt was admitted to ICU for hypoxic respiratory failure with metabolic acidosis and respiratory alkalosis and sepsis secondary to UTI and ? PNA, management of Acute renal failure.   Patient was later intubated and on mech ventilation.   Was started on pressor support.   Patient had a central line insertion in Right IJ, however was accidentally in common carotid. Vascular surgery was consulted , line removed and patient taken to OR for repair.   Patient was treated with Rocephin , zithro for Pneumonia and UTI.   Urine cx positive for E.coli and Blood cultures also for E.coli.   IVF given, kidney function improved.   Patient had hypernatremia , which was corrected.   After extubation , was downgraded to medicine floor.   Completed 2 weeks of abx.   Urology consulted for urinary retention. Phimosis was reduced, barba placed, Flomax and Proscar started. Will be discharged with barba and follow up with urology outpatient.     After stabilization, decision was made to discharge patient. 87 y/o M from [facility], PMH of HTN, BPH, dementia , had recent fall on 1/22/19 had fracture of Rt intertrochanteric fracture, underwent surgical fixation, sent in from NH for respiratory distress.  In ED, patient was tachycardic to 120/min, BP- 99/58 mmhg, RR- 20/min, spo2- 90 % . EKG-  afib @105 BPM , prolonged QTC - 520, no ST T wave changes. cardiac enzymes x1- 0.059, BNP- 7244, UA- >50 WBC and moderate LE , CXR s/o Patchy right lower lobe infiltrate. Cardiomegaly. Labs pertinent for WBC- 57.6, H.H of 9.3/31.4, lactate of 9.3, K of 6.4, bicarb of 8, AG of 24,bun/ creat- 177/8.69, s/p 1 dose of cefepime, vancomycin and azithromycin with 1.5 L NS bolus  in ED  Pt had barba catheter placed, about 750 cc of cloudy urine drained. ABG - 7.35/ 13/106/7/100% NRB. Pt was placed on NIV BIPAP for work of breathing.   Pt was admitted to ICU for hypoxic respiratory failure with metabolic acidosis and respiratory alkalosis and sepsis secondary to UTI and ? PNA, management of Acute renal failure.   Patient was later intubated and on mech ventilation.   Was started on pressor support.   Patient had a central line insertion in Right IJ, however was accidentally in common carotid. Vascular surgery was consulted , line removed and patient taken to OR for repair.   Patient was treated with Rocephin , zithro for Pneumonia and UTI.   Urine cx positive for E.coli and Blood cultures also for E.coli.   IVF given, kidney function improved.   Patient had hypernatremia , which was corrected.   After extubation , was downgraded to medicine floor.   Completed 2 weeks of abx.   Urology consulted for urinary retention. Phimosis was reduced, barba placed, Flomax and Proscar started. Will be discharged with barba and follow up with urology outpatient.     After stabilization, decision was made to discharge patient.

## 2019-02-20 NOTE — PROGRESS NOTE ADULT - PROBLEM SELECTOR PLAN 3
resolved Bladder scan yest showed urine retention > 600 cc   Lenz placed   could be from UTI / scrotal swelling   Urology consulted

## 2019-02-21 LAB
ALBUMIN SERPL ELPH-MCNC: 1.8 G/DL — LOW (ref 3.5–5)
ALP SERPL-CCNC: 521 U/L — HIGH (ref 40–120)
ALT FLD-CCNC: 32 U/L DA — SIGNIFICANT CHANGE UP (ref 10–60)
ANION GAP SERPL CALC-SCNC: 7 MMOL/L — SIGNIFICANT CHANGE UP (ref 5–17)
AST SERPL-CCNC: 33 U/L — SIGNIFICANT CHANGE UP (ref 10–40)
BASOPHILS # BLD AUTO: 0.07 K/UL — SIGNIFICANT CHANGE UP (ref 0–0.2)
BASOPHILS NFR BLD AUTO: 1.1 % — SIGNIFICANT CHANGE UP (ref 0–2)
BILIRUB SERPL-MCNC: 0.7 MG/DL — SIGNIFICANT CHANGE UP (ref 0.2–1.2)
BUN SERPL-MCNC: 17 MG/DL — SIGNIFICANT CHANGE UP (ref 7–18)
CALCIUM SERPL-MCNC: 8.7 MG/DL — SIGNIFICANT CHANGE UP (ref 8.4–10.5)
CHLORIDE SERPL-SCNC: 111 MMOL/L — HIGH (ref 96–108)
CO2 SERPL-SCNC: 25 MMOL/L — SIGNIFICANT CHANGE UP (ref 22–31)
CREAT SERPL-MCNC: 0.54 MG/DL — SIGNIFICANT CHANGE UP (ref 0.5–1.3)
EOSINOPHIL # BLD AUTO: 0.05 K/UL — SIGNIFICANT CHANGE UP (ref 0–0.5)
EOSINOPHIL NFR BLD AUTO: 0.8 % — SIGNIFICANT CHANGE UP (ref 0–6)
GLUCOSE SERPL-MCNC: 76 MG/DL — SIGNIFICANT CHANGE UP (ref 70–99)
HCT VFR BLD CALC: 33.2 % — LOW (ref 39–50)
HGB BLD-MCNC: 9.7 G/DL — LOW (ref 13–17)
IMM GRANULOCYTES NFR BLD AUTO: 2.7 % — HIGH (ref 0–1.5)
LYMPHOCYTES # BLD AUTO: 0.81 K/UL — LOW (ref 1–3.3)
LYMPHOCYTES # BLD AUTO: 12.4 % — LOW (ref 13–44)
MAGNESIUM SERPL-MCNC: 1.9 MG/DL — SIGNIFICANT CHANGE UP (ref 1.6–2.6)
MCHC RBC-ENTMCNC: 29.2 GM/DL — LOW (ref 32–36)
MCHC RBC-ENTMCNC: 29.9 PG — SIGNIFICANT CHANGE UP (ref 27–34)
MCV RBC AUTO: 102.5 FL — HIGH (ref 80–100)
MONOCYTES # BLD AUTO: 1.14 K/UL — HIGH (ref 0–0.9)
MONOCYTES NFR BLD AUTO: 17.4 % — HIGH (ref 2–14)
NEUTROPHILS # BLD AUTO: 4.3 K/UL — SIGNIFICANT CHANGE UP (ref 1.8–7.4)
NEUTROPHILS NFR BLD AUTO: 65.6 % — SIGNIFICANT CHANGE UP (ref 43–77)
NRBC # BLD: 0 /100 WBCS — SIGNIFICANT CHANGE UP (ref 0–0)
PHOSPHATE SERPL-MCNC: 2.7 MG/DL — SIGNIFICANT CHANGE UP (ref 2.5–4.5)
PLATELET # BLD AUTO: 336 K/UL — SIGNIFICANT CHANGE UP (ref 150–400)
POTASSIUM SERPL-MCNC: 3.7 MMOL/L — SIGNIFICANT CHANGE UP (ref 3.5–5.3)
POTASSIUM SERPL-SCNC: 3.7 MMOL/L — SIGNIFICANT CHANGE UP (ref 3.5–5.3)
PROT SERPL-MCNC: 5.4 G/DL — LOW (ref 6–8.3)
RBC # BLD: 3.24 M/UL — LOW (ref 4.2–5.8)
RBC # FLD: 19.6 % — HIGH (ref 10.3–14.5)
SODIUM SERPL-SCNC: 143 MMOL/L — SIGNIFICANT CHANGE UP (ref 135–145)
WBC # BLD: 6.55 K/UL — SIGNIFICANT CHANGE UP (ref 3.8–10.5)
WBC # FLD AUTO: 6.55 K/UL — SIGNIFICANT CHANGE UP (ref 3.8–10.5)

## 2019-02-21 PROCEDURE — 99232 SBSQ HOSP IP/OBS MODERATE 35: CPT

## 2019-02-21 RX ORDER — CIPROFLOXACIN LACTATE 400MG/40ML
500 VIAL (ML) INTRAVENOUS EVERY 12 HOURS
Qty: 0 | Refills: 0 | Status: DISCONTINUED | OUTPATIENT
Start: 2019-02-21 | End: 2019-02-25

## 2019-02-21 RX ADMIN — Medication 3 MILLILITER(S): at 14:56

## 2019-02-21 RX ADMIN — Medication 1 DROP(S): at 17:37

## 2019-02-21 RX ADMIN — SENNA PLUS 2 TABLET(S): 8.6 TABLET ORAL at 21:45

## 2019-02-21 RX ADMIN — Medication 3 MILLILITER(S): at 21:02

## 2019-02-21 RX ADMIN — FAMOTIDINE 20 MILLIGRAM(S): 10 INJECTION INTRAVENOUS at 12:39

## 2019-02-21 RX ADMIN — TAMSULOSIN HYDROCHLORIDE 0.4 MILLIGRAM(S): 0.4 CAPSULE ORAL at 21:45

## 2019-02-21 RX ADMIN — FINASTERIDE 5 MILLIGRAM(S): 5 TABLET, FILM COATED ORAL at 12:39

## 2019-02-21 RX ADMIN — Medication 500 MILLIGRAM(S): at 17:37

## 2019-02-21 RX ADMIN — Medication 3 MILLIGRAM(S): at 21:45

## 2019-02-21 RX ADMIN — Medication 3 MILLILITER(S): at 09:49

## 2019-02-21 RX ADMIN — ENOXAPARIN SODIUM 40 MILLIGRAM(S): 100 INJECTION SUBCUTANEOUS at 12:39

## 2019-02-21 RX ADMIN — Medication 3 MILLILITER(S): at 02:08

## 2019-02-21 NOTE — PROGRESS NOTE ADULT - ASSESSMENT
_________________________________________________________________________________________  ========>>  M E D I C A L   A T T E N D I N G  (covering)  F O L L O W  U P  N O T E  <<=========  -----------------------------------------------------------------------------------------------------    - Patient seen and examined by me approximately sixty minutes ago.   - Patient today overall doing ok, comfortable, eating poorly per staff    ==================>> REVIEW OF SYSTEM <<=================    limited ROS due to dementia    ==================>> PHYSICAL EXAM <<=================    GEN: Awake and responsive,, NAD , comfortable in bed  HEENT: NCAT, PERRL, MMM, hearing intact  Neck: supple , no JVD  CVS: S1S2 , regular , No M/R/G appreciated  PULM: CTA B/L,  no W/R/R appreciated  ABD.: soft. non tender, non distended,  bowel sounds present  Extrem: intact pulses , mild hand edema , + barba in place        ==================>> MEDICATIONS <<====================    ALBUTerol/ipratropium for Nebulization 3 milliLiter(s) Nebulizer every 6 hours  artificial  tears Solution 1 Drop(s) Both EYES two times a day  ciprofloxacin     Tablet 500 milliGRAM(s) Oral every 12 hours  docusate sodium Liquid 100 milliGRAM(s) Oral two times a day  enoxaparin Injectable 40 milliGRAM(s) SubCutaneous daily  famotidine Injectable 20 milliGRAM(s) IV Push daily  finasteride 5 milliGRAM(s) Oral daily  melatonin 3 milliGRAM(s) Oral at bedtime  senna 2 Tablet(s) Oral at bedtime  tamsulosin 0.4 milliGRAM(s) Oral at bedtime    MEDICATIONS  (PRN):  guaiFENesin    Syrup 100 milliGRAM(s) Oral every 6 hours PRN Cough    ==================>> VITAL SIGNS <<==================    Vital Signs Last 24 Hrs  T(C): 36.3 (02-21-19 @ 15:00)  T(F): 97.3 (02-21-19 @ 15:00), Max: 98.2 (02-20-19 @ 21:35)  HR: 96 (02-21-19 @ 15:00) (87 - 98)  BP: 119/72 (02-21-19 @ 15:00)  RR: 17 (02-21-19 @ 15:00) (16 - 17)  SpO2: 98% (02-21-19 @ 15:00) (94% - 100%)      POCT Blood Glucose.: 103 mg/dL (21 Feb 2019 16:33)  POCT Blood Glucose.: 88 mg/dL (21 Feb 2019 11:59)     ==================>> LAB AND IMAGING <<==================                        9.7    6.55  )-----------( 336      ( 21 Feb 2019 08:13 )             33.2        02-21    143  |  111<H>  |  17  ----------------------------<  76  3.7   |  25  |  0.54    Ca    8.7      21 Feb 2019 08:13  Phos  2.7     02-21  Mg     1.9     02-21    TPro  5.4<L>  /  Alb  1.8<L>  /  TBili  0.7  /  DBili  x   /  AST  33  /  ALT  32  /  AlkPhos  521<H>  02-21    WBC count:   6.55 <<== ,  7.27 <<== ,  9.60 <<== ,  10.33 <<== ,  6.18 <<== ,  7.56 <<==   Hemoglobin:   9.7 <<==,  9.1 <<==,  9.7 <<==,  10.4 <<==,  9.3 <<==,  9.2 <<==  platelets:  336 <==, 316 <==, 334 <==, 357 <==, 298 <==, 267 <==, 214 <==    Creatinine:  0.54  <<==, 0.63  <<==, 0.77  <<==, 0.47  <<==, 0.54  <<==, 0.55  <<==  Sodium:   143  <==, 144  <==, 144  <==, 142  <==, 145  <==, 146  <==       AST:          33 <== , 39 <==      ALT:        32  <== , 39  <==      AP:        521  <=, 551  <=     Bili:        0.7  <=, 0.7  <=    ___________________________________________________________________________________  ===============>>  A S S E S S M E N T   A N D   P L A N <<===============  ------------------------------------------------------------------------------------------    Patient is an 86y old  man on treatment for acute hypoxic respiratory distress 2/2 PNA and UTI s/p intubation    Problem/Plan - 1:  ·  Problem: Respiratory failure with hypoxia.  Plan: 2/2 multifocal PNA , septic shock 2/2 bacteremia   s/p intubation   pt on ora abx now per ID  overall doing well   Tolerating po diet , passed s/s : mech soft ,honey thick.     Problem/Plan - 2:  ·  Problem: Sepsis, resolved   2/2 PNA and E. coli bacteremia which is pan sensitive   on levaquin now   ID f/u appreciated     Problem/Plan - 3:  ·  Problem: Urine retention (retention > 600 ml )  Barba care  Urology f/u    Problem/Plan - 4:  ·  Problem: Central line complication, ( central line was malpositioned into common carotid artery with tip in aortic arch, confirmed by CT scan on 2/8/19)  s/p removal of central venous catheter and repair of carotid artery on 2/8/19 without complication  vascular - Dr. Hernandez.    Problem/Plan - 5:  Problem: BPH   c/w tamsulosin  pt with urinary retention as above graceley to go rehab with barba     -GI/DVT Prophylaxis.  - Dispo planing   --------------------------------------------  Case discussed with staff  Education given on findings and plan of care  ___________________________  HKam Hollins D.O. (covering)  Pager: [phone]

## 2019-02-21 NOTE — SWALLOW BEDSIDE ASSESSMENT ADULT - ASR SWALLOW ASPIRATION MONITOR
change of breathing pattern/throat clearing/fever/gurgly voice/cough/pneumonia/upper respiratory infection

## 2019-02-21 NOTE — SWALLOW BEDSIDE ASSESSMENT ADULT - SWALLOW EVAL: DIAGNOSIS
Pt p/w s&s oropharyngeal dysphagia, c/b anterior spillage 2/2 weak labial seal, increased a/p transit time, delayed initiation of pharyngeal swallow, & reduced hyoid excursion. Overt s&s of penetration/aspiration (e.g., some increased WOB, delayed weak cough) observed during this eval.

## 2019-02-21 NOTE — PROGRESS NOTE ADULT - ASSESSMENT
86 year old male with urinary retention and paraphimosis. Admitted with septic shock secondary to E. coli bacteremia and UTI.     - continue barba catheter and monitor urine output  - continue IV antibiotics for UTI  - continue Flomax and Proscar   - monitor penis for necrosis and paraphimosis  - continue medical management  - discussed with Dr. Suárez

## 2019-02-21 NOTE — SWALLOW BEDSIDE ASSESSMENT ADULT - COMMENTS
Pt awake & alert w/ HOB elevated to 45° +gen weakness +cough at baseline.   Preferred Language to Address Healthcare: Citizen of the [place]  Pt & Pt's family denied free translation services offered; pt's grandson preferred to offer interpretation as needed.  As per pt's family report, pt has not been eating as much and prefers pureed/ soft foods (pudding, applesauce, soup, etc...) Pt p/w clear vocal quality post-swallow x2 As pt/ pt's family expressed preference for puree diet & pt presented w/ increased work of breath & x1 coughing episode on puree, PO trials were d/c'd.

## 2019-02-21 NOTE — PROGRESS NOTE ADULT - SUBJECTIVE AND OBJECTIVE BOX
PGY2 note discussed with Primary Attending     Patient is a 86y old  Male who presents with a chief complaint of respiratory distress (21 Feb 2019 10:06)      INTERVAL HPI/OVERNIGHT EVENTS: offers no new complaints; current symptoms resolving    MEDICATIONS  (STANDING):  ALBUTerol/ipratropium for Nebulization 3 milliLiter(s) Nebulizer every 6 hours  artificial  tears Solution 1 Drop(s) Both EYES two times a day  ciprofloxacin     Tablet 500 milliGRAM(s) Oral every 12 hours  docusate sodium Liquid 100 milliGRAM(s) Oral two times a day  enoxaparin Injectable 40 milliGRAM(s) SubCutaneous daily  famotidine Injectable 20 milliGRAM(s) IV Push daily  finasteride 5 milliGRAM(s) Oral daily  melatonin 3 milliGRAM(s) Oral at bedtime  senna 2 Tablet(s) Oral at bedtime  tamsulosin 0.4 milliGRAM(s) Oral at bedtime    MEDICATIONS  (PRN):  guaiFENesin    Syrup 100 milliGRAM(s) Oral every 6 hours PRN Cough      __________________________________________________  REVIEW OF SYSTEMS:    CONSTITUTIONAL: No fever,   EYES: no acute visual disturbances  NECK: No pain or stiffness  RESPIRATORY: No cough; No shortness of breath  CARDIOVASCULAR: No chest pain, no palpitations  GASTROINTESTINAL: No pain. No nausea or vomiting; No diarrhea   NEUROLOGICAL: No headache or numbness, no tremors  MUSCULOSKELETAL: No joint pain, no muscle pain  GENITOURINARY: no dysuria, no frequency, no hesitancy  PSYCHIATRY: no depression , no anxiety  ALL OTHER  ROS negative        Vital Signs Last 24 Hrs  T(C): 36.3 (21 Feb 2019 05:23), Max: 36.8 (20 Feb 2019 21:35)  T(F): 97.4 (21 Feb 2019 05:23), Max: 98.2 (20 Feb 2019 21:35)  HR: 98 (21 Feb 2019 11:47) (87 - 98)  BP: 117/75 (21 Feb 2019 11:47) (117/75 - 143/82)  BP(mean): --  RR: 16 (21 Feb 2019 05:23) (16 - 17)  SpO2: 94% (21 Feb 2019 11:47) (94% - 100%)    ________________________________________________  PHYSICAL EXAM:  GENERAL: NAD  HEENT: Normocephalic;  conjunctivae and sclerae clear; moist mucous membranes;   NECK : supple  CHEST/LUNG: Clear to auscultation bilaterally with good air entry   HEART: S1 S2  regular; no murmurs, gallops or rubs  ABDOMEN: Soft, Nontender, Nondistended; Bowel sounds present ; scrotal swelling + ; barba in place+  EXTREMITIES: no cyanosis; no edema; no calf tenderness  SKIN: warm and dry; no rash  NERVOUS SYSTEM:  Awake and alert; Oriented  to place, person and time ; no new deficits    _________________________________________________  LABS:                        9.7    6.55  )-----------( 336      ( 21 Feb 2019 08:13 )             33.2     02-21    143  |  111<H>  |  17  ----------------------------<  76  3.7   |  25  |  0.54    Ca    8.7      21 Feb 2019 08:13  Phos  2.7     02-21  Mg     1.9     02-21    TPro  5.4<L>  /  Alb  1.8<L>  /  TBili  0.7  /  DBili  x   /  AST  33  /  ALT  32  /  AlkPhos  521<H>  02-21        CAPILLARY BLOOD GLUCOSE      POCT Blood Glucose.: 88 mg/dL (21 Feb 2019 11:59)            Consultant(s) Notes Reviewed:   YES    Care Discussed with Consultants : YES    Plan of care was discussed with patient and /or primary care giver; all questions and concerns were addressed and care was aligned with patient's wishes.

## 2019-02-21 NOTE — PROGRESS NOTE ADULT - PROBLEM SELECTOR PLAN 3
Bladder scan yest showed urine retention  Barba placed   could be from UTI / scrotal swelling   Urology consulted , bedside reduction of paraphimosis done   Will f/u Dr. Suárez today for any need of debridement   c/w barba , proscar , flomax

## 2019-02-21 NOTE — PROGRESS NOTE ADULT - PROBLEM SELECTOR PLAN 1
2/2 multifocal PNA , septic shock 2/2 bacteremia   s/p intubation   s/p iv abx , 3 more days of oral cipro 500 bid   c/w nasal cannula 3L - titrate prn  Tolerating po diet , passed s/s : mech soft ,honey thick

## 2019-02-21 NOTE — CHART NOTE - NSCHARTNOTEFT_GEN_A_CORE
Assessment:   86yMalePatient is a 86y old  Male who presents with a chief complaint of respiratory distress (21 Feb 2019 12:22)      Factors impacting intake: [ ] none [ ] nausea  [ ] vomiting [ ] diarrhea [ ] constipation  [ ]chewing problems [ x] swallowing issues  patient is ordered for a swallow evaluation, provide diet as per swallow [x ] other: per daughter and grandson, patient with poor po intake . oral supplementation as per swallow.     Diet Presciption: Diet, Dysphagia 2 Mechanical Soft-Honey Consistency Fluid (02-18-19 @ 15:29)    Intake: inadequate     Current Weight: 65.8kg(2/18)  % Weight change: negligible     Pertinent Medications: MEDICATIONS  (STANDING):  ALBUTerol/ipratropium for Nebulization 3 milliLiter(s) Nebulizer every 6 hours  artificial  tears Solution 1 Drop(s) Both EYES two times a day  ciprofloxacin     Tablet 500 milliGRAM(s) Oral every 12 hours  docusate sodium Liquid 100 milliGRAM(s) Oral two times a day  enoxaparin Injectable 40 milliGRAM(s) SubCutaneous daily  famotidine Injectable 20 milliGRAM(s) IV Push daily  finasteride 5 milliGRAM(s) Oral daily  melatonin 3 milliGRAM(s) Oral at bedtime  senna 2 Tablet(s) Oral at bedtime  tamsulosin 0.4 milliGRAM(s) Oral at bedtime    MEDICATIONS  (PRN):  guaiFENesin    Syrup 100 milliGRAM(s) Oral every 6 hours PRN Cough    Pertinent Labs: 02-21 Na143 mmol/L Glu 76 mg/dL K+ 3.7 mmol/L Cr  0.54 mg/dL BUN 17 mg/dL 02-21 Phos 2.7 mg/dL 02-21 Alb 1.8 g/dL<L>     CAPILLARY BLOOD GLUCOSE      POCT Blood Glucose.: 88 mg/dL (21 Feb 2019 11:59)    Skin: new 3 pressure ulcers     Estimated Needs:   [ x] no change since previous assessment  [ ] recalculated:     Previous Nutrition Diagnosis:   [ ] Inadequate Energy Intake [ ]Inadequate Oral Intake [ ] Excessive Energy Intake   [ ] Underweight [ ] Increased Nutrient Needs [ ] Overweight/Obesity   [ ] Altered GI Function [ ] Unintended Weight Loss [ ] Food & Nutrition Related Knowledge Deficit [ x] Malnutrition , moderate     Nutrition Diagnosis is [x ] ongoing  [ ] resolved [ ] not applicable     New Nutrition Diagnosis: [ ] not applicable       Interventions:   Recommend  [ ] Change Diet To:  [ ] Nutrition Supplement  [ ] Nutrition Support  [ ] Other:  provide diet as per swallow. oral supplementation as per swallow. add multivitamin/minerals 1 tab/d and vitamin C 500mg bid to assist with healing       Monitoring and Evaluation:   [x ] PO intake [ x ] Tolerance to diet prescription [ x ] weights [ x ] labs[ x ] follow up per protocol  [ ] other:

## 2019-02-21 NOTE — PROGRESS NOTE ADULT - SUBJECTIVE AND OBJECTIVE BOX
86y Male with no overnight complaints. Tolerating reg diet.     Vital Signs:  T(C): 36.3 (02-21-19 @ 05:23), Max: 36.8 (02-20-19 @ 21:35)  HR: 88 (02-21-19 @ 05:23) (76 - 90)  BP: 143/82 (02-21-19 @ 05:23) (120/64 - 153/73)  RR: 16 (02-21-19 @ 05:23) (16 - 17)  SpO2: 99% (02-21-19 @ 05:23) (94% - 100%)  Wt(kg): --    Physical Exam:  General: NAD, comfortable  : edematous foreskin, folded over the penile head. unchanged barba in place    Ins/Outs:    02-20 @ 07:01  -  02-21 @ 07:00  --------------------------------------------------------  IN:  Total IN: 0 mL    OUT:    Indwelling Catheter - Urethral: 1250 mL  Total OUT: 1250 mL    Total NET: -1250 mL                            9.7    6.55  )-----------( 336      ( 21 Feb 2019 08:13 )             33.2

## 2019-02-21 NOTE — SWALLOW BEDSIDE ASSESSMENT ADULT - SLP PERTINENT HISTORY OF CURRENT PROBLEM
Patient is an 86y old  man on treatment for acute hypoxic respiratory distress 2/2 PNA and UTI s/p intubation

## 2019-02-21 NOTE — SWALLOW BEDSIDE ASSESSMENT ADULT - PHARYNGEAL PHASE
Pt p/w cough on 2nd trial of ice chip Delayed pharyngeal swallow/reduced hyoid excursion Increased WOB; Pt p/w delayed cough post puree trials/Delayed pharyngeal swallow/Multiple swallows

## 2019-02-22 LAB
ALBUMIN SERPL ELPH-MCNC: 1.9 G/DL — LOW (ref 3.5–5)
ALP SERPL-CCNC: 550 U/L — HIGH (ref 40–120)
ALT FLD-CCNC: 34 U/L DA — SIGNIFICANT CHANGE UP (ref 10–60)
ANION GAP SERPL CALC-SCNC: 7 MMOL/L — SIGNIFICANT CHANGE UP (ref 5–17)
AST SERPL-CCNC: 48 U/L — HIGH (ref 10–40)
BASOPHILS # BLD AUTO: 0.05 K/UL — SIGNIFICANT CHANGE UP (ref 0–0.2)
BASOPHILS NFR BLD AUTO: 0.8 % — SIGNIFICANT CHANGE UP (ref 0–2)
BILIRUB SERPL-MCNC: 0.7 MG/DL — SIGNIFICANT CHANGE UP (ref 0.2–1.2)
BUN SERPL-MCNC: 15 MG/DL — SIGNIFICANT CHANGE UP (ref 7–18)
CALCIUM SERPL-MCNC: 8.5 MG/DL — SIGNIFICANT CHANGE UP (ref 8.4–10.5)
CHLORIDE SERPL-SCNC: 111 MMOL/L — HIGH (ref 96–108)
CO2 SERPL-SCNC: 25 MMOL/L — SIGNIFICANT CHANGE UP (ref 22–31)
CREAT SERPL-MCNC: 0.53 MG/DL — SIGNIFICANT CHANGE UP (ref 0.5–1.3)
EOSINOPHIL # BLD AUTO: 0.04 K/UL — SIGNIFICANT CHANGE UP (ref 0–0.5)
EOSINOPHIL NFR BLD AUTO: 0.6 % — SIGNIFICANT CHANGE UP (ref 0–6)
GLUCOSE SERPL-MCNC: 87 MG/DL — SIGNIFICANT CHANGE UP (ref 70–99)
HCT VFR BLD CALC: 32 % — LOW (ref 39–50)
HGB BLD-MCNC: 9.6 G/DL — LOW (ref 13–17)
IMM GRANULOCYTES NFR BLD AUTO: 2 % — HIGH (ref 0–1.5)
LYMPHOCYTES # BLD AUTO: 0.83 K/UL — LOW (ref 1–3.3)
LYMPHOCYTES # BLD AUTO: 12.6 % — LOW (ref 13–44)
MAGNESIUM SERPL-MCNC: 1.8 MG/DL — SIGNIFICANT CHANGE UP (ref 1.6–2.6)
MCHC RBC-ENTMCNC: 30 GM/DL — LOW (ref 32–36)
MCHC RBC-ENTMCNC: 30.4 PG — SIGNIFICANT CHANGE UP (ref 27–34)
MCV RBC AUTO: 101.3 FL — HIGH (ref 80–100)
MONOCYTES # BLD AUTO: 1.01 K/UL — HIGH (ref 0–0.9)
MONOCYTES NFR BLD AUTO: 15.3 % — HIGH (ref 2–14)
NEUTROPHILS # BLD AUTO: 4.54 K/UL — SIGNIFICANT CHANGE UP (ref 1.8–7.4)
NEUTROPHILS NFR BLD AUTO: 68.7 % — SIGNIFICANT CHANGE UP (ref 43–77)
NRBC # BLD: 0 /100 WBCS — SIGNIFICANT CHANGE UP (ref 0–0)
PLATELET # BLD AUTO: 282 K/UL — SIGNIFICANT CHANGE UP (ref 150–400)
POTASSIUM SERPL-MCNC: 3.6 MMOL/L — SIGNIFICANT CHANGE UP (ref 3.5–5.3)
POTASSIUM SERPL-SCNC: 3.6 MMOL/L — SIGNIFICANT CHANGE UP (ref 3.5–5.3)
PROT SERPL-MCNC: 5.4 G/DL — LOW (ref 6–8.3)
RBC # BLD: 3.16 M/UL — LOW (ref 4.2–5.8)
RBC # FLD: 19 % — HIGH (ref 10.3–14.5)
SODIUM SERPL-SCNC: 143 MMOL/L — SIGNIFICANT CHANGE UP (ref 135–145)
WBC # BLD: 6.6 K/UL — SIGNIFICANT CHANGE UP (ref 3.8–10.5)
WBC # FLD AUTO: 6.6 K/UL — SIGNIFICANT CHANGE UP (ref 3.8–10.5)

## 2019-02-22 RX ADMIN — Medication 500 MILLIGRAM(S): at 05:21

## 2019-02-22 RX ADMIN — FINASTERIDE 5 MILLIGRAM(S): 5 TABLET, FILM COATED ORAL at 11:59

## 2019-02-22 RX ADMIN — Medication 100 MILLIGRAM(S): at 05:21

## 2019-02-22 RX ADMIN — Medication 3 MILLIGRAM(S): at 21:19

## 2019-02-22 RX ADMIN — Medication 500 MILLIGRAM(S): at 17:15

## 2019-02-22 RX ADMIN — Medication 1 DROP(S): at 05:21

## 2019-02-22 RX ADMIN — FAMOTIDINE 20 MILLIGRAM(S): 10 INJECTION INTRAVENOUS at 11:59

## 2019-02-22 RX ADMIN — Medication 3 MILLILITER(S): at 14:08

## 2019-02-22 RX ADMIN — ENOXAPARIN SODIUM 40 MILLIGRAM(S): 100 INJECTION SUBCUTANEOUS at 11:59

## 2019-02-22 RX ADMIN — Medication 3 MILLILITER(S): at 08:14

## 2019-02-22 RX ADMIN — Medication 1 DROP(S): at 17:15

## 2019-02-22 RX ADMIN — SENNA PLUS 2 TABLET(S): 8.6 TABLET ORAL at 21:19

## 2019-02-22 RX ADMIN — TAMSULOSIN HYDROCHLORIDE 0.4 MILLIGRAM(S): 0.4 CAPSULE ORAL at 21:19

## 2019-02-22 RX ADMIN — Medication 3 MILLILITER(S): at 02:41

## 2019-02-22 RX ADMIN — Medication 100 MILLIGRAM(S): at 17:15

## 2019-02-22 RX ADMIN — Medication 3 MILLILITER(S): at 20:29

## 2019-02-22 NOTE — PROGRESS NOTE ADULT - SUBJECTIVE AND OBJECTIVE BOX
86y Male    Meds:  ciprofloxacin     Tablet 500 milliGRAM(s) Oral every 12 hours    Allergies    No Known Allergies    Intolerances        VITALS:  Vital Signs Last 24 Hrs  T(C): 36.7 (22 Feb 2019 20:27), Max: 36.7 (22 Feb 2019 20:27)  T(F): 98 (22 Feb 2019 20:27), Max: 98 (22 Feb 2019 20:27)  HR: 108 (22 Feb 2019 20:27) (58 - 108)  BP: 153/69 (22 Feb 2019 20:27) (124/69 - 153/69)  BP(mean): --  RR: 17 (22 Feb 2019 20:27) (16 - 18)  SpO2: 97% (22 Feb 2019 20:27) (97% - 100%)    LABS/DIAGNOSTIC TESTS:                          9.6    6.60  )-----------( 282      ( 22 Feb 2019 08:10 )             32.0         02-22    143  |  111<H>  |  15  ----------------------------<  87  3.6   |  25  |  0.53    Ca    8.5      22 Feb 2019 08:10  Phos  2.7     02-21  Mg     1.8     02-22    TPro  5.4<L>  /  Alb  1.9<L>  /  TBili  0.7  /  DBili  x   /  AST  48<H>  /  ALT  34  /  AlkPhos  550<H>  02-22      LIVER FUNCTIONS - ( 22 Feb 2019 08:10 )  Alb: 1.9 g/dL / Pro: 5.4 g/dL / ALK PHOS: 550 U/L / ALT: 34 U/L DA / AST: 48 U/L / GGT: x             CULTURES: .Blood  02-11 @ 11:40   No growth at 5 days.  --  --      .Blood  02-11 @ 11:38   No growth at 5 days.  --  --      .Sputum  02-08 @ 19:52   Normal Respiratory Isa present  --    Few Squamous epithelial cells per low power field  Few polymorphonuclear leukocytes per low power field  Few Yeast like cells per oil power field  Numerous Gram variable coccobacilli per oil power field      .Urine  02-08 @ 10:30   50,000 - 99,000 CFU/mL Escherichia coli Multiple Morphological Strains  --  Escherichia coli      .Urine  02-08 @ 00:24   >100,000 CFU/ml Escherichia coli  --  Escherichia coli      .Blood  02-07 @ 23:09   Growth in aerobic and anaerobic bottles: Escherichia coli  "Due to technical problems, Proteus sp. will Not be reported as part of  the BCID panel until further notice"  ***Blood Panel PCR results on this specimen are available  approximately 3 hours after the Gram stain result.***  Gram stain, PCR, and/or culture results may not always  correspond due to difference in methodologies.  ************************************************************  This PCR assay was performed using [employer].  The following targets are tested for: Enterococcus,  vancomycin resistant enterococci, Listeria monocytogenes,  coagulase negative staphylococci, S. aureus,  methicillin resistant S. aureus, Streptococcus agalactiae  (Group B), S. pneumoniae, S.pyogenes (Group A),  Acinetobacter baumannii, Enterobacter cloacae, E. coli,  Klebsiella oxytoca, K. pneumoniae, Proteus sp.,  Serratia marcescens, Haemophilus influenzae,  Neisseria meningitidis, Pseudomonas aeruginosa, Candida  albicans, C. glabrata, C krusei, C parapsilosis,  C. tropicalis and the KPC resistance gene.  --  Blood Culture PCR  Escherichia coli            RADIOLOGY:      ROS:  [  ] UNABLE TO ELICIT 86y Male who is confused and unable to give any proper history, he is afebrile and his E.coli bacteremia has resolved, he is on D312 of treatment. was switch to po treatment yesterday.    Meds:  ciprofloxacin     Tablet 500 milliGRAM(s) Oral every 12 hours    Allergies    No Known Allergies    Intolerances        VITALS:  Vital Signs Last 24 Hrs  T(C): 36.7 (22 Feb 2019 20:27), Max: 36.7 (22 Feb 2019 20:27)  T(F): 98 (22 Feb 2019 20:27), Max: 98 (22 Feb 2019 20:27)  HR: 108 (22 Feb 2019 20:27) (58 - 108)  BP: 153/69 (22 Feb 2019 20:27) (124/69 - 153/69)  BP(mean): --  RR: 17 (22 Feb 2019 20:27) (16 - 18)  SpO2: 97% (22 Feb 2019 20:27) (97% - 100%)    LABS/DIAGNOSTIC TESTS:                          9.6    6.60  )-----------( 282      ( 22 Feb 2019 08:10 )             32.0         02-22    143  |  111<H>  |  15  ----------------------------<  87  3.6   |  25  |  0.53    Ca    8.5      22 Feb 2019 08:10  Phos  2.7     02-21  Mg     1.8     02-22    TPro  5.4<L>  /  Alb  1.9<L>  /  TBili  0.7  /  DBili  x   /  AST  48<H>  /  ALT  34  /  AlkPhos  550<H>  02-22      LIVER FUNCTIONS - ( 22 Feb 2019 08:10 )  Alb: 1.9 g/dL / Pro: 5.4 g/dL / ALK PHOS: 550 U/L / ALT: 34 U/L DA / AST: 48 U/L / GGT: x             CULTURES: .Blood  02-11 @ 11:40   No growth at 5 days.  --  --      .Blood  02-11 @ 11:38   No growth at 5 days.  --  --      .Sputum  02-08 @ 19:52   Normal Respiratory Isa present  --    Few Squamous epithelial cells per low power field  Few polymorphonuclear leukocytes per low power field  Few Yeast like cells per oil power field  Numerous Gram variable coccobacilli per oil power field      .Urine  02-08 @ 10:30   50,000 - 99,000 CFU/mL Escherichia coli Multiple Morphological Strains  --  Escherichia coli      .Urine  02-08 @ 00:24   >100,000 CFU/ml Escherichia coli  --  Escherichia coli      .Blood  02-07 @ 23:09   Growth in aerobic and anaerobic bottles: Escherichia coli  Escherichia coli            RADIOLOGY:      ROS:  [ x ] UNABLE TO ELICIT

## 2019-02-22 NOTE — PROGRESS NOTE ADULT - SUBJECTIVE AND OBJECTIVE BOX
PGY2 note discussed with Primary Attending     Patient is a 86y old  Male who presents with a chief complaint of respiratory distress (22 Feb 2019 11:16)      INTERVAL HPI/OVERNIGHT EVENTS: offers no new complaints; current symptoms resolving    MEDICATIONS  (STANDING):  ALBUTerol/ipratropium for Nebulization 3 milliLiter(s) Nebulizer every 6 hours  artificial  tears Solution 1 Drop(s) Both EYES two times a day  ciprofloxacin     Tablet 500 milliGRAM(s) Oral every 12 hours  docusate sodium Liquid 100 milliGRAM(s) Oral two times a day  enoxaparin Injectable 40 milliGRAM(s) SubCutaneous daily  famotidine Injectable 20 milliGRAM(s) IV Push daily  finasteride 5 milliGRAM(s) Oral daily  melatonin 3 milliGRAM(s) Oral at bedtime  senna 2 Tablet(s) Oral at bedtime  tamsulosin 0.4 milliGRAM(s) Oral at bedtime    MEDICATIONS  (PRN):  guaiFENesin    Syrup 100 milliGRAM(s) Oral every 6 hours PRN Cough      __________________________________________________  REVIEW OF SYSTEMS:    CONSTITUTIONAL: No fever,   EYES: no acute visual disturbances  NECK: No pain or stiffness  RESPIRATORY: No cough; No shortness of breath  CARDIOVASCULAR: No chest pain, no palpitations  GASTROINTESTINAL: No pain. No nausea or vomiting; No diarrhea   NEUROLOGICAL: No headache or numbness, no tremors  MUSCULOSKELETAL: No joint pain, no muscle pain  GENITOURINARY: no dysuria, no frequency, no hesitancy  PSYCHIATRY: no depression , no anxiety  ALL OTHER  ROS negative        Vital Signs Last 24 Hrs  T(C): 36.6 (22 Feb 2019 12:50), Max: 37.3 (21 Feb 2019 20:55)  T(F): 97.8 (22 Feb 2019 12:50), Max: 99.1 (21 Feb 2019 20:55)  HR: 58 (22 Feb 2019 12:50) (58 - 105)  BP: 124/69 (22 Feb 2019 12:50) (119/72 - 136/64)  BP(mean): --  RR: 18 (22 Feb 2019 12:50) (16 - 18)  SpO2: 100% (22 Feb 2019 12:50) (97% - 100%)    ________________________________________________  PHYSICAL EXAM:  GENERAL: NAD  HEENT: Normocephalic;  conjunctivae and sclerae clear; moist mucous membranes;   NECK : supple  CHEST/LUNG: Clear to auscultation bilaterally with good air entry   HEART: S1 S2  regular; no murmurs, gallops or rubs  ABDOMEN: Soft, Nontender, Nondistended; Bowel sounds present  EXTREMITIES: no cyanosis; no edema; no calf tenderness  SKIN: warm and dry; no rash  NERVOUS SYSTEM:  Awake and alert; Oriented  to place, person and time ; no new deficits    _________________________________________________  LABS:                        9.6    6.60  )-----------( 282      ( 22 Feb 2019 08:10 )             32.0     02-22    143  |  111<H>  |  15  ----------------------------<  87  3.6   |  25  |  0.53    Ca    8.5      22 Feb 2019 08:10  Phos  2.7     02-21  Mg     1.8     02-22    TPro  5.4<L>  /  Alb  1.9<L>  /  TBili  0.7  /  DBili  x   /  AST  48<H>  /  ALT  34  /  AlkPhos  550<H>  02-22        CAPILLARY BLOOD GLUCOSE      POCT Blood Glucose.: 86 mg/dL (22 Feb 2019 07:39)  POCT Blood Glucose.: 95 mg/dL (21 Feb 2019 21:59)  POCT Blood Glucose.: 103 mg/dL (21 Feb 2019 16:33)        RADIOLOGY & ADDITIONAL TESTS:    Imaging Personally Reviewed:  YES    Consultant(s) Notes Reviewed:   YES    Care Discussed with Consultants : YES    Plan of care was discussed with patient and /or primary care giver; all questions and concerns were addressed and care was aligned with patient's wishes.

## 2019-02-22 NOTE — PROGRESS NOTE ADULT - ASSESSMENT
_________________________________________________________________________________________  ========>>  M E D I C A L   A T T E N D I N G  (covering)  F O L L O W  U P  N O T E  <<=========  -----------------------------------------------------------------------------------------------------    - Patient seen and examined by me approximately sixty minutes ago.   - Patient today overall doing ok, comfortable, eating fairly with assistance     ==================>> REVIEW OF SYSTEM <<=================    limited ROS due to dementia    ==================>> PHYSICAL EXAM <<=================    GEN: Awake and alert, not communicative , NAD , comfortable in bed  HEENT: NCAT, PERRL, MMM, hearing intact  Neck: supple , no JVD  CVS: S1S2 , regular , No M/R/G appreciated  PULM: CTA B/L,  no W/R/R appreciated  ABD.: soft. non tender, non distended,  bowel sounds present  Extrem: intact pulses , mild hand edema , + barba in place        ==================>> MEDICATIONS <<====================    ALBUTerol/ipratropium for Nebulization 3 milliLiter(s) Nebulizer every 6 hours  artificial  tears Solution 1 Drop(s) Both EYES two times a day  ciprofloxacin     Tablet 500 milliGRAM(s) Oral every 12 hours  docusate sodium Liquid 100 milliGRAM(s) Oral two times a day  enoxaparin Injectable 40 milliGRAM(s) SubCutaneous daily  famotidine Injectable 20 milliGRAM(s) IV Push daily  finasteride 5 milliGRAM(s) Oral daily  melatonin 3 milliGRAM(s) Oral at bedtime  senna 2 Tablet(s) Oral at bedtime  tamsulosin 0.4 milliGRAM(s) Oral at bedtime    MEDICATIONS  (PRN):  guaiFENesin    Syrup 100 milliGRAM(s) Oral every 6 hours PRN Cough    ==================>> VITAL SIGNS <<==================    Vital Signs Last 24 Hrs  T(C): 36.3 (02-22-19 @ 05:35)  T(F): 97.4 (02-22-19 @ 05:35), Max: 99.1 (02-21-19 @ 20:55)  HR: 105 (02-22-19 @ 05:35) (87 - 105)  BP: 136/64 (02-22-19 @ 05:35)  RR: 16 (02-22-19 @ 05:35) (16 - 17)  SpO2: 97% (02-22-19 @ 05:35) (94% - 100%)      POCT Blood Glucose.: 86 mg/dL (22 Feb 2019 07:39)  POCT Blood Glucose.: 95 mg/dL (21 Feb 2019 21:59)  POCT Blood Glucose.: 103 mg/dL (21 Feb 2019 16:33)  POCT Blood Glucose.: 88 mg/dL (21 Feb 2019 11:59)     ==================>> LAB AND IMAGING <<==================                        9.6    6.60  )-----------( 282      ( 22 Feb 2019 08:10 )             32.0     143  |  111<H>  |  15  ----------------------------<  87  3.6   |  25  |  0.53    Ca    8.5      22 Feb 2019 08:10  Phos  2.7     02-21  Mg     1.8     02-22    TPro  5.4<L>  /  Alb  1.9<L>  /  TBili  0.7  /  DBili  x   /  AST  48<H>  /  ALT  34  /  AlkPhos  550<H>  02-22    WBC count:   6.60 <<== ,  6.55 <<== ,  7.27 <<== ,  9.60 <<== ,  10.33 <<== ,  6.18 <<==   Hemoglobin:   9.6 <<==,  9.7 <<==,  9.1 <<==,  9.7 <<==,  10.4 <<==,  9.3 <<==  platelets:  282 <==, 336 <==, 316 <==, 334 <==, 357 <==, 298 <==, 267 <==    Creatinine:  0.53  <<==, 0.54  <<==, 0.63  <<==, 0.77  <<==, 0.47  <<==, 0.54  <<==  Sodium:   143  <==, 143  <==, 144  <==, 144  <==, 142  <==, 145  <==       AST:          48 <== , 33 <== , 39 <==      ALT:        34  <== , 32  <== , 39  <==      AP:        550  <=, 521  <=, 551  <=     Bili:        0.7  <=, 0.7  <=, 0.7  <=    ___________________________________________________________________________________  ===============>>  A S S E S S M E N T   A N D   P L A N <<===============  ------------------------------------------------------------------------------------------    Patient is an 86y old  man on treatment for acute hypoxic respiratory distress 2/2 PNA and UTI s/p intubation    Problem/Plan - 1:  ·  Problem: Respiratory failure with hypoxia.  Plan: 2/2 multifocal PNA , septic shock 2/2 bacteremia, resolved   pt on oral abx now per ID  overall doing well   Tolerating po diet , passed s/s : mech soft ,honey thick.     Problem/Plan - 2:  ·  Problem: Sepsis, resolved   2/2 PNA and E. 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 Hernandez. follow up upon DC   local wound care ( wound on Rt neck healing well, steristrips in place     Problem/Plan - 5:  Problem: BPH   c/w tamsulosin  pt with urinary retention as above >>  to go rehab with barba     -GI/DVT Prophylaxis.  - Dispo planing     ___________________________  H. EFFIE Hollins (covering)  Pager: [phone]

## 2019-02-22 NOTE — PROGRESS NOTE ADULT - RS GEN PE MLT RESP DETAILS PC
good air movement/no rhonchi/breath sounds equal/no wheezes/rales
rales/no rhonchi/good air movement/no wheezes

## 2019-02-22 NOTE — PROGRESS NOTE ADULT - PROBLEM SELECTOR PLAN 1
2/2 multifocal PNA , septic shock 2/2 bacteremia   s/p intubation   s/p iv abx , 3 more days of oral cipro 500 bid   c/w nasal cannula 3L - titrate prn  Tolerating po diet , passed s/s : mech soft ,honey thick  stable for dc from medicine stand point

## 2019-02-22 NOTE — PROGRESS NOTE ADULT - ASSESSMENT
1. s/p Septic shock  2.  s/p Multifocal pneumonia  3. UTI  4. Bacteremia      Plan:  cont cipro 500mgs po bid x 2 days moredc planning  reconsult prn

## 2019-02-22 NOTE — PROGRESS NOTE ADULT - SUBJECTIVE AND OBJECTIVE BOX
Examined at bedside  No complaints  Lenz in place  Edematous foreskin, no penile edema, glans appears normal in appearance  Lenz should remain in place upon discharge

## 2019-02-22 NOTE — PROGRESS NOTE ADULT - GASTROINTESTINAL DETAILS
no rigidity/soft/nontender/no distention/no guarding/bowel sounds normal
no distention/nontender/soft/bowel sounds normal
no guarding/no organomegaly/no rigidity/soft/nontender/no distention/bowel sounds normal

## 2019-02-23 DIAGNOSIS — N40.1 BENIGN PROSTATIC HYPERPLASIA WITH LOWER URINARY TRACT SYMPTOMS: ICD-10-CM

## 2019-02-23 LAB
ALBUMIN SERPL ELPH-MCNC: 1.9 G/DL — LOW (ref 3.5–5)
ALP SERPL-CCNC: 526 U/L — HIGH (ref 40–120)
ALT FLD-CCNC: 37 U/L DA — SIGNIFICANT CHANGE UP (ref 10–60)
ANION GAP SERPL CALC-SCNC: 6 MMOL/L — SIGNIFICANT CHANGE UP (ref 5–17)
AST SERPL-CCNC: 53 U/L — HIGH (ref 10–40)
BASOPHILS # BLD AUTO: 0.03 K/UL — SIGNIFICANT CHANGE UP (ref 0–0.2)
BASOPHILS NFR BLD AUTO: 0.5 % — SIGNIFICANT CHANGE UP (ref 0–2)
BILIRUB SERPL-MCNC: 0.8 MG/DL — SIGNIFICANT CHANGE UP (ref 0.2–1.2)
BUN SERPL-MCNC: 13 MG/DL — SIGNIFICANT CHANGE UP (ref 7–18)
CALCIUM SERPL-MCNC: 8.4 MG/DL — SIGNIFICANT CHANGE UP (ref 8.4–10.5)
CHLORIDE SERPL-SCNC: 111 MMOL/L — HIGH (ref 96–108)
CO2 SERPL-SCNC: 26 MMOL/L — SIGNIFICANT CHANGE UP (ref 22–31)
CREAT SERPL-MCNC: 0.45 MG/DL — LOW (ref 0.5–1.3)
EOSINOPHIL # BLD AUTO: 0.03 K/UL — SIGNIFICANT CHANGE UP (ref 0–0.5)
EOSINOPHIL NFR BLD AUTO: 0.5 % — SIGNIFICANT CHANGE UP (ref 0–6)
GLUCOSE SERPL-MCNC: 85 MG/DL — SIGNIFICANT CHANGE UP (ref 70–99)
HCT VFR BLD CALC: 32.9 % — LOW (ref 39–50)
HGB BLD-MCNC: 9.9 G/DL — LOW (ref 13–17)
IMM GRANULOCYTES NFR BLD AUTO: 2.6 % — HIGH (ref 0–1.5)
LYMPHOCYTES # BLD AUTO: 1 K/UL — SIGNIFICANT CHANGE UP (ref 1–3.3)
LYMPHOCYTES # BLD AUTO: 15.9 % — SIGNIFICANT CHANGE UP (ref 13–44)
MAGNESIUM SERPL-MCNC: 1.8 MG/DL — SIGNIFICANT CHANGE UP (ref 1.6–2.6)
MCHC RBC-ENTMCNC: 30.1 GM/DL — LOW (ref 32–36)
MCHC RBC-ENTMCNC: 30.1 PG — SIGNIFICANT CHANGE UP (ref 27–34)
MCV RBC AUTO: 100 FL — SIGNIFICANT CHANGE UP (ref 80–100)
MONOCYTES # BLD AUTO: 1 K/UL — HIGH (ref 0–0.9)
MONOCYTES NFR BLD AUTO: 15.9 % — HIGH (ref 2–14)
NEUTROPHILS # BLD AUTO: 4.05 K/UL — SIGNIFICANT CHANGE UP (ref 1.8–7.4)
NEUTROPHILS NFR BLD AUTO: 64.6 % — SIGNIFICANT CHANGE UP (ref 43–77)
NRBC # BLD: 0 /100 WBCS — SIGNIFICANT CHANGE UP (ref 0–0)
PHOSPHATE SERPL-MCNC: 2 MG/DL — LOW (ref 2.5–4.5)
PLATELET # BLD AUTO: 397 K/UL — SIGNIFICANT CHANGE UP (ref 150–400)
POTASSIUM SERPL-MCNC: 3.4 MMOL/L — LOW (ref 3.5–5.3)
POTASSIUM SERPL-SCNC: 3.4 MMOL/L — LOW (ref 3.5–5.3)
PROT SERPL-MCNC: 5.5 G/DL — LOW (ref 6–8.3)
RBC # BLD: 3.29 M/UL — LOW (ref 4.2–5.8)
RBC # FLD: 18.7 % — HIGH (ref 10.3–14.5)
SODIUM SERPL-SCNC: 143 MMOL/L — SIGNIFICANT CHANGE UP (ref 135–145)
WBC # BLD: 6.27 K/UL — SIGNIFICANT CHANGE UP (ref 3.8–10.5)
WBC # FLD AUTO: 6.27 K/UL — SIGNIFICANT CHANGE UP (ref 3.8–10.5)

## 2019-02-23 RX ADMIN — Medication 3 MILLILITER(S): at 15:30

## 2019-02-23 RX ADMIN — Medication 3 MILLIGRAM(S): at 21:38

## 2019-02-23 RX ADMIN — SENNA PLUS 2 TABLET(S): 8.6 TABLET ORAL at 21:38

## 2019-02-23 RX ADMIN — Medication 1 DROP(S): at 17:58

## 2019-02-23 RX ADMIN — Medication 500 MILLIGRAM(S): at 17:58

## 2019-02-23 RX ADMIN — Medication 1 DROP(S): at 06:20

## 2019-02-23 RX ADMIN — Medication 100 MILLIGRAM(S): at 06:21

## 2019-02-23 RX ADMIN — Medication 500 MILLIGRAM(S): at 06:21

## 2019-02-23 RX ADMIN — FAMOTIDINE 20 MILLIGRAM(S): 10 INJECTION INTRAVENOUS at 11:53

## 2019-02-23 RX ADMIN — Medication 3 MILLILITER(S): at 09:39

## 2019-02-23 RX ADMIN — ENOXAPARIN SODIUM 40 MILLIGRAM(S): 100 INJECTION SUBCUTANEOUS at 11:53

## 2019-02-23 RX ADMIN — Medication 3 MILLILITER(S): at 02:34

## 2019-02-23 RX ADMIN — Medication 3 MILLILITER(S): at 20:52

## 2019-02-23 RX ADMIN — Medication 100 MILLIGRAM(S): at 17:58

## 2019-02-23 RX ADMIN — FINASTERIDE 5 MILLIGRAM(S): 5 TABLET, FILM COATED ORAL at 11:53

## 2019-02-23 RX ADMIN — TAMSULOSIN HYDROCHLORIDE 0.4 MILLIGRAM(S): 0.4 CAPSULE ORAL at 21:38

## 2019-02-23 NOTE — PROGRESS NOTE ADULT - ASSESSMENT
_________________________________________________________________________________________  ========>>  M E D I C A L   A T T E N D I N G  (covering)  F O L L O W  U P  N O T E  <<=========  -----------------------------------------------------------------------------------------------------    - Patient seen and examined by me earlier today.   - Patient today overall doing ok, comfortable, eating fairly with assistance     ==================>> REVIEW OF SYSTEM <<=================    limited ROS due to dementia    ==================>> PHYSICAL EXAM <<=================    GEN: Awake and alert, not communicative , NAD , comfortable in bed  HEENT: NCAT, PERRL, MMM, hearing intact  Neck: supple , no JVD  CVS: S1S2 , regular , No M/R/G appreciated  PULM: CTA B/L,  no W/R/R appreciated  ABD.: soft. non tender, non distended,  bowel sounds present  Extrem: intact pulses , mild hand edema , + barba in place        ==================>> MEDICATIONS <<====================    ALBUTerol/ipratropium for Nebulization 3 milliLiter(s) Nebulizer every 6 hours  artificial  tears Solution 1 Drop(s) Both EYES two times a day  ciprofloxacin     Tablet 500 milliGRAM(s) Oral every 12 hours  docusate sodium Liquid 100 milliGRAM(s) Oral two times a day  enoxaparin Injectable 40 milliGRAM(s) SubCutaneous daily  famotidine Injectable 20 milliGRAM(s) IV Push daily  finasteride 5 milliGRAM(s) Oral daily  melatonin 3 milliGRAM(s) Oral at bedtime  senna 2 Tablet(s) Oral at bedtime  tamsulosin 0.4 milliGRAM(s) Oral at bedtime    MEDICATIONS  (PRN):  guaiFENesin    Syrup 100 milliGRAM(s) Oral every 6 hours PRN Cough    ==================>> VITAL SIGNS <<==================    Vital Signs Last 24 Hrs  T(C): 37.1 (02-23-19 @ 05:00)  T(F): 98.8 (02-23-19 @ 05:00), Max: 98.8 (02-23-19 @ 05:00)  HR: 95 (02-23-19 @ 05:00) (95 - 108)  BP: 143/81 (02-23-19 @ 05:00)  RR: 18 (02-23-19 @ 05:00) (17 - 18)  SpO2: 99% (02-23-19 @ 05:00) (97% - 99%)      POCT Blood Glucose.: 112 mg/dL (22 Feb 2019 18:19)     ==================>> LAB AND IMAGING <<==================                        9.9    6.27  )-----------( 397      ( 23 Feb 2019 07:33 )             32.9        143  |  111<H>  |  13  ----------------------------<  85  3.4<L>   |  26  |  0.45<L>    Ca    8.4      23 Feb 2019 07:33  Phos  2.0     02-23  Mg     1.8     02-23    TPro  5.5<L>  /  Alb  1.9<L>  /  TBili  0.8  /  DBili  x   /  AST  53<H>  /  ALT  37  /  AlkPhos  526<H>  02-23    ___________________________________________________________________________________  ===============>>  A S S E S S M E N T   A N D   P L A N <<===============  ------------------------------------------------------------------------------------------    Patient is an 86y old  man on treatment for acute hypoxic respiratory distress 2/2 PNA and UTI s/p intubation    Problem/Plan - 1:  ·  Problem: Respiratory failure with hypoxia.  Plan: 2/2 multifocal PNA , septic shock 2/2 bacteremia, resolved   pt on oral abx now per ID >> through tomorrow and DC   overall doing well   Tolerating po diet , passed s/s : mech soft ,honey thick.     Problem/Plan - 2:  ·  Problem: Sepsis, resolved   2/2 PNA and E. 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 Hernandez. follow up upon DC   local wound care ( wound on Rt neck healing well, steristrips in place     Problem/Plan - 5:  Problem: BPH   c/w tamsulosin  pt with urinary retention as above >>  to go rehab with barba     -GI/DVT Prophylaxis.  - Dispo planing  in process     ___________________________  H. EFFIE Hollins (covering) ( Dr Mascorro back from tomorrow)   Pager: [phone]

## 2019-02-23 NOTE — PROGRESS NOTE ADULT - SUBJECTIVE AND OBJECTIVE BOX
Subjective:86yMale no events      Vital Signs Last 24 Hrs  T(C): 37.1 (23 Feb 2019 05:00), Max: 37.1 (23 Feb 2019 05:00)  T(F): 98.8 (23 Feb 2019 05:00), Max: 98.8 (23 Feb 2019 05:00)  HR: 95 (23 Feb 2019 05:00) (58 - 108)  BP: 143/81 (23 Feb 2019 05:00) (124/69 - 153/69)  BP(mean): --  RR: 18 (23 Feb 2019 05:00) (17 - 18)  SpO2: 99% (23 Feb 2019 05:00) (97% - 100%)  I&O's Detail    22 Feb 2019 07:01  -  23 Feb 2019 07:00  --------------------------------------------------------  IN:  Total IN: 0 mL    OUT:    Indwelling Catheter - Urethral: 1700 mL  Total OUT: 1700 mL    Total NET: -1700 mL          Labs:                        9.9    6.27  )-----------( 397      ( 23 Feb 2019 07:33 )             32.9     02-23    143  |  111<H>  |  13  ----------------------------<  85  3.4<L>   |  26  |  0.45<L>    Ca    8.4      23 Feb 2019 07:33  Phos  2.0     02-23  Mg     1.8     02-23    TPro  5.5<L>  /  Alb  1.9<L>  /  TBili  0.8  /  DBili  x   /  AST  53<H>  /  ALT  37  /  AlkPhos  526<H>  02-23        Physical Exam:nad  Abdomen:soft nt nd  barba clear   foreskin edematous   ulcer some fibrinous exudate no erythema

## 2019-02-23 NOTE — PROGRESS NOTE ADULT - PROBLEM SELECTOR PROBLEM 1
Respiratory failure with hypoxia
Urine retention
Respiratory failure with hypoxia

## 2019-02-23 NOTE — PROGRESS NOTE ADULT - SUBJECTIVE AND OBJECTIVE BOX
PGY2 note discussed with Primary Attending     Patient is a 86y old  Male who presents with a chief complaint of respiratory distress (22 Feb 2019 20:59)      INTERVAL HPI/OVERNIGHT EVENTS: offers no new complaints; current symptoms resolving    MEDICATIONS  (STANDING):  ALBUTerol/ipratropium for Nebulization 3 milliLiter(s) Nebulizer every 6 hours  artificial  tears Solution 1 Drop(s) Both EYES two times a day  ciprofloxacin     Tablet 500 milliGRAM(s) Oral every 12 hours  docusate sodium Liquid 100 milliGRAM(s) Oral two times a day  enoxaparin Injectable 40 milliGRAM(s) SubCutaneous daily  famotidine Injectable 20 milliGRAM(s) IV Push daily  finasteride 5 milliGRAM(s) Oral daily  melatonin 3 milliGRAM(s) Oral at bedtime  senna 2 Tablet(s) Oral at bedtime  tamsulosin 0.4 milliGRAM(s) Oral at bedtime    MEDICATIONS  (PRN):  guaiFENesin    Syrup 100 milliGRAM(s) Oral every 6 hours PRN Cough      __________________________________________________  REVIEW OF SYSTEMS:    CONSTITUTIONAL: No fever,   EYES: no acute visual disturbances  NECK: No pain or stiffness  RESPIRATORY: No cough; No shortness of breath  CARDIOVASCULAR: No chest pain, no palpitations  GASTROINTESTINAL: No pain. No nausea or vomiting; No diarrhea   NEUROLOGICAL: No headache or numbness, no tremors  MUSCULOSKELETAL: No joint pain, no muscle pain  GENITOURINARY: no dysuria, no frequency, no hesitancy  PSYCHIATRY: no depression , no anxiety  ALL OTHER  ROS negative        Vital Signs Last 24 Hrs  T(C): 37.1 (23 Feb 2019 05:00), Max: 37.1 (23 Feb 2019 05:00)  T(F): 98.8 (23 Feb 2019 05:00), Max: 98.8 (23 Feb 2019 05:00)  HR: 95 (23 Feb 2019 05:00) (58 - 108)  BP: 143/81 (23 Feb 2019 05:00) (124/69 - 153/69)  BP(mean): --  RR: 18 (23 Feb 2019 05:00) (17 - 18)  SpO2: 99% (23 Feb 2019 05:00) (97% - 100%)    ________________________________________________  PHYSICAL EXAM:  GENERAL: NAD  HEENT: Normocephalic;  conjunctivae and sclerae clear; moist mucous membranes;   NECK : supple  CHEST/LUNG: Clear to auscultation bilaterally with good air entry   HEART: S1 S2  regular; no murmurs, gallops or rubs  ABDOMEN: Soft, Nontender, Nondistended; Bowel sounds present  EXTREMITIES: no cyanosis; no edema; no calf tenderness  SKIN: warm and dry; no rash  NERVOUS SYSTEM:  Awake and alert; Oriented  to place, person and time ; no new deficits    _________________________________________________  LABS:                        9.9    6.27  )-----------( 397      ( 23 Feb 2019 07:33 )             32.9     02-23    143  |  111<H>  |  13  ----------------------------<  85  3.4<L>   |  26  |  0.45<L>    Ca    8.4      23 Feb 2019 07:33  Phos  2.0     02-23  Mg     1.8     02-23    TPro  5.5<L>  /  Alb  1.9<L>  /  TBili  0.8  /  DBili  x   /  AST  53<H>  /  ALT  37  /  AlkPhos  x   02-23        CAPILLARY BLOOD GLUCOSE      POCT Blood Glucose.: 112 mg/dL (22 Feb 2019 18:19)        Consultant(s) Notes Reviewed:   YES    Care Discussed with Consultants : YES    Plan of care was discussed with patient and /or primary care giver; all questions and concerns were addressed and care was aligned with patient's wishes.

## 2019-02-23 NOTE — PROGRESS NOTE ADULT - PROBLEM SELECTOR PLAN 1
2/2 multifocal PNA , septic shock 2/2 bacteremia   s/p intubation   s/p iv abx , 3 more days of oral cipro 500 bid   c/w nasal cannula 3L - titrate prn  Tolerating po diet , passed s/s : mech soft ,honey thick  stable for dc from medicine stand point ; patient pending auth for [facility]

## 2019-02-23 NOTE — PROGRESS NOTE ADULT - PROBLEM SELECTOR PLAN 7
DVT ppx with lovenox  GI PPX with pepcid
DVT ppx with heparin   GI PPX with pepcid
DVT ppx with heparin   GI PPX with pepcid
DVT ppx with lovenox  GI PPX with pepcid
IMPROVE VTE Individual Risk Assessment    RISK                                                          Points  [] Previous VTE                                           3  [] Thrombophilia                                        2  [] Lower limb paralysis                              2   [] Current Cancer                                       2   [x] Immobilization > 24 hrs                        1  [] ICU/CCU stay > 24 hours                       1  x[] Age > 60                                                   1    IMPROVE VTE Score: 2    Lovenox
DVT ppx with heparin   GI PPX with pepcid
IMPROVE VTE Individual Risk Assessment    RISK                                                          Points  [] Previous VTE                                           3  [] Thrombophilia                                        2  [] Lower limb paralysis                              2   [] Current Cancer                                       2   [x] Immobilization > 24 hrs                        1  [] ICU/CCU stay > 24 hours                       1  x[] Age > 60                                                   1    IMPROVE VTE Score: 2    Lovenox

## 2019-02-24 LAB
ALBUMIN SERPL ELPH-MCNC: 2.1 G/DL — LOW (ref 3.5–5)
ALP SERPL-CCNC: 506 U/L — HIGH (ref 40–120)
ALT FLD-CCNC: 36 U/L DA — SIGNIFICANT CHANGE UP (ref 10–60)
ANION GAP SERPL CALC-SCNC: 7 MMOL/L — SIGNIFICANT CHANGE UP (ref 5–17)
AST SERPL-CCNC: 48 U/L — HIGH (ref 10–40)
BASOPHILS # BLD AUTO: 0.04 K/UL — SIGNIFICANT CHANGE UP (ref 0–0.2)
BASOPHILS NFR BLD AUTO: 0.5 % — SIGNIFICANT CHANGE UP (ref 0–2)
BILIRUB SERPL-MCNC: 0.7 MG/DL — SIGNIFICANT CHANGE UP (ref 0.2–1.2)
BUN SERPL-MCNC: 12 MG/DL — SIGNIFICANT CHANGE UP (ref 7–18)
CALCIUM SERPL-MCNC: 8.6 MG/DL — SIGNIFICANT CHANGE UP (ref 8.4–10.5)
CHLORIDE SERPL-SCNC: 110 MMOL/L — HIGH (ref 96–108)
CO2 SERPL-SCNC: 25 MMOL/L — SIGNIFICANT CHANGE UP (ref 22–31)
CREAT SERPL-MCNC: 0.48 MG/DL — LOW (ref 0.5–1.3)
EOSINOPHIL # BLD AUTO: 0.03 K/UL — SIGNIFICANT CHANGE UP (ref 0–0.5)
EOSINOPHIL NFR BLD AUTO: 0.4 % — SIGNIFICANT CHANGE UP (ref 0–6)
GLUCOSE SERPL-MCNC: 92 MG/DL — SIGNIFICANT CHANGE UP (ref 70–99)
HCT VFR BLD CALC: 34.2 % — LOW (ref 39–50)
HGB BLD-MCNC: 10.2 G/DL — LOW (ref 13–17)
IMM GRANULOCYTES NFR BLD AUTO: 2.8 % — HIGH (ref 0–1.5)
LYMPHOCYTES # BLD AUTO: 1.02 K/UL — SIGNIFICANT CHANGE UP (ref 1–3.3)
LYMPHOCYTES # BLD AUTO: 12.8 % — LOW (ref 13–44)
MAGNESIUM SERPL-MCNC: 1.8 MG/DL — SIGNIFICANT CHANGE UP (ref 1.6–2.6)
MCHC RBC-ENTMCNC: 29.8 GM/DL — LOW (ref 32–36)
MCHC RBC-ENTMCNC: 30.1 PG — SIGNIFICANT CHANGE UP (ref 27–34)
MCV RBC AUTO: 100.9 FL — HIGH (ref 80–100)
MONOCYTES # BLD AUTO: 0.98 K/UL — HIGH (ref 0–0.9)
MONOCYTES NFR BLD AUTO: 12.3 % — SIGNIFICANT CHANGE UP (ref 2–14)
NEUTROPHILS # BLD AUTO: 5.65 K/UL — SIGNIFICANT CHANGE UP (ref 1.8–7.4)
NEUTROPHILS NFR BLD AUTO: 71.2 % — SIGNIFICANT CHANGE UP (ref 43–77)
NRBC # BLD: 0 /100 WBCS — SIGNIFICANT CHANGE UP (ref 0–0)
PHOSPHATE SERPL-MCNC: 2.3 MG/DL — LOW (ref 2.5–4.5)
PLATELET # BLD AUTO: 382 K/UL — SIGNIFICANT CHANGE UP (ref 150–400)
POTASSIUM SERPL-MCNC: 3.4 MMOL/L — LOW (ref 3.5–5.3)
POTASSIUM SERPL-SCNC: 3.4 MMOL/L — LOW (ref 3.5–5.3)
PROT SERPL-MCNC: 5.8 G/DL — LOW (ref 6–8.3)
RBC # BLD: 3.39 M/UL — LOW (ref 4.2–5.8)
RBC # FLD: 18.9 % — HIGH (ref 10.3–14.5)
SODIUM SERPL-SCNC: 142 MMOL/L — SIGNIFICANT CHANGE UP (ref 135–145)
WBC # BLD: 7.94 K/UL — SIGNIFICANT CHANGE UP (ref 3.8–10.5)
WBC # FLD AUTO: 7.94 K/UL — SIGNIFICANT CHANGE UP (ref 3.8–10.5)

## 2019-02-24 RX ORDER — SODIUM,POTASSIUM PHOSPHATES 278-250MG
1 POWDER IN PACKET (EA) ORAL ONCE
Qty: 0 | Refills: 0 | Status: DISCONTINUED | OUTPATIENT
Start: 2019-02-24 | End: 2019-02-24

## 2019-02-24 RX ORDER — SODIUM,POTASSIUM PHOSPHATES 278-250MG
1 POWDER IN PACKET (EA) ORAL ONCE
Qty: 0 | Refills: 0 | Status: COMPLETED | OUTPATIENT
Start: 2019-02-24 | End: 2019-02-24

## 2019-02-24 RX ORDER — POTASSIUM PHOSPHATE, MONOBASIC POTASSIUM PHOSPHATE, DIBASIC 236; 224 MG/ML; MG/ML
15 INJECTION, SOLUTION INTRAVENOUS ONCE
Qty: 0 | Refills: 0 | Status: COMPLETED | OUTPATIENT
Start: 2019-02-24 | End: 2019-02-24

## 2019-02-24 RX ADMIN — Medication 3 MILLILITER(S): at 08:56

## 2019-02-24 RX ADMIN — Medication 3 MILLILITER(S): at 22:12

## 2019-02-24 RX ADMIN — Medication 100 MILLIGRAM(S): at 05:19

## 2019-02-24 RX ADMIN — Medication 500 MILLIGRAM(S): at 17:48

## 2019-02-24 RX ADMIN — Medication 1 DROP(S): at 17:48

## 2019-02-24 RX ADMIN — POTASSIUM PHOSPHATE, MONOBASIC POTASSIUM PHOSPHATE, DIBASIC 62.5 MILLIMOLE(S): 236; 224 INJECTION, SOLUTION INTRAVENOUS at 12:00

## 2019-02-24 RX ADMIN — Medication 100 MILLIGRAM(S): at 17:48

## 2019-02-24 RX ADMIN — Medication 500 MILLIGRAM(S): at 05:19

## 2019-02-24 RX ADMIN — SENNA PLUS 2 TABLET(S): 8.6 TABLET ORAL at 21:36

## 2019-02-24 RX ADMIN — FAMOTIDINE 20 MILLIGRAM(S): 10 INJECTION INTRAVENOUS at 11:25

## 2019-02-24 RX ADMIN — TAMSULOSIN HYDROCHLORIDE 0.4 MILLIGRAM(S): 0.4 CAPSULE ORAL at 21:36

## 2019-02-24 RX ADMIN — Medication 3 MILLIGRAM(S): at 21:36

## 2019-02-24 RX ADMIN — ENOXAPARIN SODIUM 40 MILLIGRAM(S): 100 INJECTION SUBCUTANEOUS at 11:25

## 2019-02-24 RX ADMIN — Medication 1 DROP(S): at 05:19

## 2019-02-24 RX ADMIN — Medication 1 TABLET(S): at 17:48

## 2019-02-24 RX ADMIN — Medication 3 MILLILITER(S): at 02:42

## 2019-02-24 RX ADMIN — Medication 3 MILLILITER(S): at 14:22

## 2019-02-24 RX ADMIN — FINASTERIDE 5 MILLIGRAM(S): 5 TABLET, FILM COATED ORAL at 11:25

## 2019-02-24 NOTE — PROGRESS NOTE ADULT - ASSESSMENT
86y old  Male who presents with a chief complaint of acute hypoxic respiratory distress 2/2 PNA and UTI s/p intubation. Pt also with septic shock now off pressors.      Problem/Plan - 1:  ·  Problem: Acute Respiratory failure with hypoxia.  Plan: 2/2 multifocal PNA , septic shock 2/2 bacteremia   s/p intubation and Extubation   s/p iv abx , 3 more days of oral cipro 500 bid   c/w nasal cannula 3L - titrate prn  Tolerating po diet , passed s/s : mech soft ,honey thick     Problem/Plan - 2:  ·  Problem: Sepsis.  Plan: Resolved . 2/2 PNA and E. coli bacteremia which is pan sensitive   c/w abx as above   ID - Dr. Norton.      Problem/Plan - 3:  ·  Problem: Urine retention.  Plan: Bladder scan yest showed urine retention  Barba placed   Urology consulted , bedside reduction of paraphimosis done   Will f/u Dr. Suárez today for any need of debridement   c/w barba , proscar , flomax.      Problem/Plan - 4:  ·  Problem: Hypotension.  Plan: resolved.      Problem/Plan - 5:  ·  Problem: Central line complication, initial encounter.  Plan: right sided central line was malpositioned into common carotid artery with tip in aortic arch, confirmed by CT scan on 2/8/19  s/p removal of central venous catheter and repair of carotid artery on 2/8/19 without complication  vascular - Dr. Hernandez.      Problem/Plan - 6:  Problem: BPH (benign prostatic hyperplasia). Plan:  helping. c/w tamsulosin , proscar   c/w barba.     Problem/Plan - 7:  ·  Problem: Prophylactic measure.  Plan: IMPROVE VTE  Lovenox.       Disposition : Medically stable for DC pending placement.

## 2019-02-24 NOTE — PROGRESS NOTE ADULT - REASON FOR ADMISSION
respiratory distress

## 2019-02-24 NOTE — PROGRESS NOTE ADULT - SUBJECTIVE AND OBJECTIVE BOX
INTERVAL HPI/OVERNIGHT EVENTS: No new concerns per Nurse.   Vital Signs Last 24 Hrs  T(C): 37.4 (24 Feb 2019 05:07), Max: 37.4 (24 Feb 2019 05:07)  T(F): 99.3 (24 Feb 2019 05:07), Max: 99.3 (24 Feb 2019 05:07)  HR: 97 (24 Feb 2019 05:07) (87 - 97)  BP: 150/81 (24 Feb 2019 05:07) (124/58 - 159/82)  BP(mean): --  RR: 18 (24 Feb 2019 05:07) (17 - 18)  SpO2: 97% (24 Feb 2019 05:07) (97% - 100%)  I&O's Summary    23 Feb 2019 07:01  -  24 Feb 2019 07:00  --------------------------------------------------------  IN: 0 mL / OUT: 1100 mL / NET: -1100 mL      MEDICATIONS  (STANDING):  ALBUTerol/ipratropium for Nebulization 3 milliLiter(s) Nebulizer every 6 hours  artificial  tears Solution 1 Drop(s) Both EYES two times a day  ciprofloxacin     Tablet 500 milliGRAM(s) Oral every 12 hours  docusate sodium Liquid 100 milliGRAM(s) Oral two times a day  enoxaparin Injectable 40 milliGRAM(s) SubCutaneous daily  famotidine Injectable 20 milliGRAM(s) IV Push daily  finasteride 5 milliGRAM(s) Oral daily  melatonin 3 milliGRAM(s) Oral at bedtime  senna 2 Tablet(s) Oral at bedtime  tamsulosin 0.4 milliGRAM(s) Oral at bedtime    MEDICATIONS  (PRN):  guaiFENesin    Syrup 100 milliGRAM(s) Oral every 6 hours PRN Cough    LABS:                        10.2   7.94  )-----------( 382      ( 24 Feb 2019 06:44 )             34.2     02-24    142  |  110<H>  |  12  ----------------------------<  92  3.4<L>   |  25  |  0.48<L>    Ca    8.6      24 Feb 2019 06:44  Phos  2.3     02-24  Mg     1.8     02-24    TPro  5.8<L>  /  Alb  2.1<L>  /  TBili  0.7  /  DBili  x   /  AST  48<H>  /  ALT  36  /  AlkPhos  506<H>  02-24        CAPILLARY BLOOD GLUCOSE            Consultant(s) Notes Reviewed:  [x ] YES  [ ] NO    PHYSICAL EXAM:  GENERAL: NAD, well-groomed, well-developed,not in any distress ,  HEAD:  Atraumatic, Normocephalic  NECK: Supple, No JVD, Normal thyroid  NERVOUS SYSTEM:  Alert & Oriented X1  CHEST/LUNG: Good air entry bilateral with no  rales, rhonchi, wheezing, or rubs  HEART: Regular rate and rhythm; No murmurs, rubs, or gallops  ABDOMEN: Soft, Nontender, Nondistended; Bowel sounds present  EXTREMITIES:  2+ Peripheral Pulses, No clubbing, cyanosis, or edema    Care Discussed with Consultants/Other Providers [ x] YES  [ ] NO

## 2019-02-25 VITALS
DIASTOLIC BLOOD PRESSURE: 77 MMHG | TEMPERATURE: 98 F | SYSTOLIC BLOOD PRESSURE: 125 MMHG | HEART RATE: 101 BPM | RESPIRATION RATE: 17 BRPM | OXYGEN SATURATION: 98 %

## 2019-02-25 LAB
ALBUMIN SERPL ELPH-MCNC: 1.9 G/DL — LOW (ref 3.5–5)
ALP SERPL-CCNC: 468 U/L — HIGH (ref 40–120)
ALT FLD-CCNC: 32 U/L DA — SIGNIFICANT CHANGE UP (ref 10–60)
ANION GAP SERPL CALC-SCNC: 7 MMOL/L — SIGNIFICANT CHANGE UP (ref 5–17)
AST SERPL-CCNC: 48 U/L — HIGH (ref 10–40)
BASOPHILS # BLD AUTO: 0.03 K/UL — SIGNIFICANT CHANGE UP (ref 0–0.2)
BASOPHILS NFR BLD AUTO: 0.3 % — SIGNIFICANT CHANGE UP (ref 0–2)
BILIRUB SERPL-MCNC: 0.5 MG/DL — SIGNIFICANT CHANGE UP (ref 0.2–1.2)
BUN SERPL-MCNC: 12 MG/DL — SIGNIFICANT CHANGE UP (ref 7–18)
CALCIUM SERPL-MCNC: 8.4 MG/DL — SIGNIFICANT CHANGE UP (ref 8.4–10.5)
CHLORIDE SERPL-SCNC: 112 MMOL/L — HIGH (ref 96–108)
CO2 SERPL-SCNC: 25 MMOL/L — SIGNIFICANT CHANGE UP (ref 22–31)
CREAT SERPL-MCNC: 0.51 MG/DL — SIGNIFICANT CHANGE UP (ref 0.5–1.3)
EOSINOPHIL # BLD AUTO: 0.02 K/UL — SIGNIFICANT CHANGE UP (ref 0–0.5)
EOSINOPHIL NFR BLD AUTO: 0.2 % — SIGNIFICANT CHANGE UP (ref 0–6)
GLUCOSE SERPL-MCNC: 89 MG/DL — SIGNIFICANT CHANGE UP (ref 70–99)
HCT VFR BLD CALC: 33.9 % — LOW (ref 39–50)
HGB BLD-MCNC: 10.3 G/DL — LOW (ref 13–17)
IMM GRANULOCYTES NFR BLD AUTO: 1.9 % — HIGH (ref 0–1.5)
LYMPHOCYTES # BLD AUTO: 1.28 K/UL — SIGNIFICANT CHANGE UP (ref 1–3.3)
LYMPHOCYTES # BLD AUTO: 14.2 % — SIGNIFICANT CHANGE UP (ref 13–44)
MAGNESIUM SERPL-MCNC: 1.8 MG/DL — SIGNIFICANT CHANGE UP (ref 1.6–2.6)
MCHC RBC-ENTMCNC: 30 PG — SIGNIFICANT CHANGE UP (ref 27–34)
MCHC RBC-ENTMCNC: 30.4 GM/DL — LOW (ref 32–36)
MCV RBC AUTO: 98.8 FL — SIGNIFICANT CHANGE UP (ref 80–100)
MONOCYTES # BLD AUTO: 1 K/UL — HIGH (ref 0–0.9)
MONOCYTES NFR BLD AUTO: 11.1 % — SIGNIFICANT CHANGE UP (ref 2–14)
NEUTROPHILS # BLD AUTO: 6.49 K/UL — SIGNIFICANT CHANGE UP (ref 1.8–7.4)
NEUTROPHILS NFR BLD AUTO: 72.3 % — SIGNIFICANT CHANGE UP (ref 43–77)
NRBC # BLD: 0 /100 WBCS — SIGNIFICANT CHANGE UP (ref 0–0)
PHOSPHATE SERPL-MCNC: 2.3 MG/DL — LOW (ref 2.5–4.5)
PLATELET # BLD AUTO: 416 K/UL — HIGH (ref 150–400)
POTASSIUM SERPL-MCNC: 3.4 MMOL/L — LOW (ref 3.5–5.3)
POTASSIUM SERPL-SCNC: 3.4 MMOL/L — LOW (ref 3.5–5.3)
PROT SERPL-MCNC: 5.8 G/DL — LOW (ref 6–8.3)
RBC # BLD: 3.43 M/UL — LOW (ref 4.2–5.8)
RBC # FLD: 18.7 % — HIGH (ref 10.3–14.5)
SODIUM SERPL-SCNC: 144 MMOL/L — SIGNIFICANT CHANGE UP (ref 135–145)
WBC # BLD: 8.99 K/UL — SIGNIFICANT CHANGE UP (ref 3.8–10.5)
WBC # FLD AUTO: 8.99 K/UL — SIGNIFICANT CHANGE UP (ref 3.8–10.5)

## 2019-02-25 PROCEDURE — 82570 ASSAY OF URINE CREATININE: CPT

## 2019-02-25 PROCEDURE — 82553 CREATINE MB FRACTION: CPT

## 2019-02-25 PROCEDURE — 93005 ELECTROCARDIOGRAM TRACING: CPT

## 2019-02-25 PROCEDURE — 87040 BLOOD CULTURE FOR BACTERIA: CPT

## 2019-02-25 PROCEDURE — 86901 BLOOD TYPING SEROLOGIC RH(D): CPT

## 2019-02-25 PROCEDURE — 94003 VENT MGMT INPAT SUBQ DAY: CPT

## 2019-02-25 PROCEDURE — 80202 ASSAY OF VANCOMYCIN: CPT

## 2019-02-25 PROCEDURE — 85027 COMPLETE CBC AUTOMATED: CPT

## 2019-02-25 PROCEDURE — 36430 TRANSFUSION BLD/BLD COMPNT: CPT

## 2019-02-25 PROCEDURE — 86923 COMPATIBILITY TEST ELECTRIC: CPT

## 2019-02-25 PROCEDURE — 80048 BASIC METABOLIC PNL TOTAL CA: CPT

## 2019-02-25 PROCEDURE — 88300 SURGICAL PATH GROSS: CPT

## 2019-02-25 PROCEDURE — 85730 THROMBOPLASTIN TIME PARTIAL: CPT

## 2019-02-25 PROCEDURE — 82803 BLOOD GASES ANY COMBINATION: CPT

## 2019-02-25 PROCEDURE — 99285 EMERGENCY DEPT VISIT HI MDM: CPT | Mod: 25

## 2019-02-25 PROCEDURE — 93970 EXTREMITY STUDY: CPT

## 2019-02-25 PROCEDURE — 84156 ASSAY OF PROTEIN URINE: CPT

## 2019-02-25 PROCEDURE — 70450 CT HEAD/BRAIN W/O DYE: CPT

## 2019-02-25 PROCEDURE — 97162 PT EVAL MOD COMPLEX 30 MIN: CPT

## 2019-02-25 PROCEDURE — 87150 DNA/RNA AMPLIFIED PROBE: CPT

## 2019-02-25 PROCEDURE — 72192 CT PELVIS W/O DYE: CPT

## 2019-02-25 PROCEDURE — 94640 AIRWAY INHALATION TREATMENT: CPT

## 2019-02-25 PROCEDURE — 94760 N-INVAS EAR/PLS OXIMETRY 1: CPT

## 2019-02-25 PROCEDURE — 87070 CULTURE OTHR SPECIMN AEROBIC: CPT

## 2019-02-25 PROCEDURE — 97110 THERAPEUTIC EXERCISES: CPT

## 2019-02-25 PROCEDURE — 87186 SC STD MICRODIL/AGAR DIL: CPT

## 2019-02-25 PROCEDURE — 94660 CPAP INITIATION&MGMT: CPT

## 2019-02-25 PROCEDURE — 83880 ASSAY OF NATRIURETIC PEPTIDE: CPT

## 2019-02-25 PROCEDURE — P9047: CPT

## 2019-02-25 PROCEDURE — 70490 CT SOFT TISSUE NECK W/O DYE: CPT

## 2019-02-25 PROCEDURE — 83605 ASSAY OF LACTIC ACID: CPT

## 2019-02-25 PROCEDURE — 87631 RESP VIRUS 3-5 TARGETS: CPT

## 2019-02-25 PROCEDURE — 83735 ASSAY OF MAGNESIUM: CPT

## 2019-02-25 PROCEDURE — 82550 ASSAY OF CK (CPK): CPT

## 2019-02-25 PROCEDURE — 71250 CT THORAX DX C-: CPT

## 2019-02-25 PROCEDURE — 93306 TTE W/DOPPLER COMPLETE: CPT

## 2019-02-25 PROCEDURE — 84300 ASSAY OF URINE SODIUM: CPT

## 2019-02-25 PROCEDURE — 87086 URINE CULTURE/COLONY COUNT: CPT

## 2019-02-25 PROCEDURE — 97530 THERAPEUTIC ACTIVITIES: CPT

## 2019-02-25 PROCEDURE — 71045 X-RAY EXAM CHEST 1 VIEW: CPT

## 2019-02-25 PROCEDURE — 84100 ASSAY OF PHOSPHORUS: CPT

## 2019-02-25 PROCEDURE — 94002 VENT MGMT INPAT INIT DAY: CPT

## 2019-02-25 PROCEDURE — 85610 PROTHROMBIN TIME: CPT

## 2019-02-25 PROCEDURE — 80053 COMPREHEN METABOLIC PANEL: CPT

## 2019-02-25 PROCEDURE — 74176 CT ABD & PELVIS W/O CONTRAST: CPT

## 2019-02-25 PROCEDURE — 86900 BLOOD TYPING SEROLOGIC ABO: CPT

## 2019-02-25 PROCEDURE — 86850 RBC ANTIBODY SCREEN: CPT

## 2019-02-25 PROCEDURE — 36415 COLL VENOUS BLD VENIPUNCTURE: CPT

## 2019-02-25 PROCEDURE — 81001 URINALYSIS AUTO W/SCOPE: CPT

## 2019-02-25 PROCEDURE — 84484 ASSAY OF TROPONIN QUANT: CPT

## 2019-02-25 PROCEDURE — 83935 ASSAY OF URINE OSMOLALITY: CPT

## 2019-02-25 PROCEDURE — 82962 GLUCOSE BLOOD TEST: CPT

## 2019-02-25 PROCEDURE — P9040: CPT

## 2019-02-25 RX ORDER — AMLODIPINE BESYLATE 2.5 MG/1
5 TABLET ORAL ONCE
Qty: 0 | Refills: 0 | Status: COMPLETED | OUTPATIENT
Start: 2019-02-25 | End: 2019-02-25

## 2019-02-25 RX ORDER — POTASSIUM CHLORIDE 20 MEQ
40 PACKET (EA) ORAL ONCE
Qty: 0 | Refills: 0 | Status: DISCONTINUED | OUTPATIENT
Start: 2019-02-25 | End: 2019-02-25

## 2019-02-25 RX ORDER — POTASSIUM CHLORIDE 20 MEQ
40 PACKET (EA) ORAL ONCE
Qty: 0 | Refills: 0 | Status: COMPLETED | OUTPATIENT
Start: 2019-02-25 | End: 2019-02-25

## 2019-02-25 RX ORDER — SENNA PLUS 8.6 MG/1
2 TABLET ORAL
Qty: 0 | Refills: 0 | COMMUNITY
Start: 2019-02-25

## 2019-02-25 RX ORDER — LISINOPRIL 2.5 MG/1
20 TABLET ORAL DAILY
Qty: 0 | Refills: 0 | Status: DISCONTINUED | OUTPATIENT
Start: 2019-02-25 | End: 2019-02-25

## 2019-02-25 RX ORDER — HEPARIN SODIUM 5000 [USP'U]/ML
0 INJECTION INTRAVENOUS; SUBCUTANEOUS
Qty: 0 | Refills: 0 | COMMUNITY

## 2019-02-25 RX ORDER — TRAMADOL HYDROCHLORIDE 50 MG/1
0 TABLET ORAL
Qty: 0 | Refills: 0 | COMMUNITY

## 2019-02-25 RX ORDER — POTASSIUM PHOSPHATE, MONOBASIC POTASSIUM PHOSPHATE, DIBASIC 236; 224 MG/ML; MG/ML
30 INJECTION, SOLUTION INTRAVENOUS ONCE
Qty: 0 | Refills: 0 | Status: COMPLETED | OUTPATIENT
Start: 2019-02-25 | End: 2019-02-25

## 2019-02-25 RX ADMIN — Medication 40 MILLIEQUIVALENT(S): at 11:55

## 2019-02-25 RX ADMIN — Medication 1 DROP(S): at 05:34

## 2019-02-25 RX ADMIN — Medication 3 MILLILITER(S): at 08:46

## 2019-02-25 RX ADMIN — Medication 100 MILLIGRAM(S): at 05:34

## 2019-02-25 RX ADMIN — Medication 500 MILLIGRAM(S): at 05:34

## 2019-02-25 RX ADMIN — ENOXAPARIN SODIUM 40 MILLIGRAM(S): 100 INJECTION SUBCUTANEOUS at 11:55

## 2019-02-25 RX ADMIN — AMLODIPINE BESYLATE 5 MILLIGRAM(S): 2.5 TABLET ORAL at 06:22

## 2019-02-25 RX ADMIN — POTASSIUM PHOSPHATE, MONOBASIC POTASSIUM PHOSPHATE, DIBASIC 63.75 MILLIMOLE(S): 236; 224 INJECTION, SOLUTION INTRAVENOUS at 11:56

## 2019-02-28 ENCOUNTER — EMERGENCY (EMERGENCY)
Facility: HOSPITAL | Age: 84
LOS: 1 days | Discharge: HOME | End: 2019-02-28
Attending: EMERGENCY MEDICINE
Payer: MEDICAID

## 2019-02-28 VITALS
RESPIRATION RATE: 18 BRPM | OXYGEN SATURATION: 98 % | SYSTOLIC BLOOD PRESSURE: 154 MMHG | WEIGHT: 119.93 LBS | DIASTOLIC BLOOD PRESSURE: 98 MMHG | HEART RATE: 98 BPM | TEMPERATURE: 98 F

## 2019-02-28 VITALS
DIASTOLIC BLOOD PRESSURE: 94 MMHG | OXYGEN SATURATION: 100 % | TEMPERATURE: 98 F | SYSTOLIC BLOOD PRESSURE: 143 MMHG | HEART RATE: 106 BPM | RESPIRATION RATE: 19 BRPM

## 2019-02-28 DIAGNOSIS — S72.009A FRACTURE OF UNSPECIFIED PART OF NECK OF UNSPECIFIED FEMUR, INITIAL ENCOUNTER FOR CLOSED FRACTURE: Chronic | ICD-10-CM

## 2019-02-28 PROBLEM — I10 ESSENTIAL (PRIMARY) HYPERTENSION: Chronic | Status: ACTIVE | Noted: 2019-02-07

## 2019-02-28 PROBLEM — N40.0 BENIGN PROSTATIC HYPERPLASIA WITHOUT LOWER URINARY TRACT SYMPTOMS: Chronic | Status: ACTIVE | Noted: 2019-02-07

## 2019-02-28 PROBLEM — F03.90 UNSPECIFIED DEMENTIA WITHOUT BEHAVIORAL DISTURBANCE: Chronic | Status: ACTIVE | Noted: 2019-02-07

## 2019-02-28 PROCEDURE — 51702 INSERT TEMP BLADDER CATH: CPT

## 2019-02-28 PROCEDURE — 99284 EMERGENCY DEPT VISIT MOD MDM: CPT | Mod: 25

## 2019-02-28 PROCEDURE — 99283 EMERGENCY DEPT VISIT LOW MDM: CPT

## 2019-02-28 NOTE — ED PROVIDER NOTE - CLINICAL SUMMARY MEDICAL DECISION MAKING FREE TEXT BOX
replaced barba.  cream for paraphimosis (only partial, on inferior side of penis, nor circumferential).   discussed with nursing home physician.

## 2019-02-28 NOTE — ED PROVIDER NOTE - PROGRESS NOTE DETAILS
had RN gently attempt to place barba, obtaining resistance. Attempted once, unable to pass. consulted urology team. urine draining, slight blood.

## 2019-02-28 NOTE — CONSULT NOTE ADULT - ASSESSMENT
87 y/o male with BPH & hematuria s/p traumatic barba insertion      1. Keep barba  2. Irrigate as needed  3. UA & UCx recommended  4. Flomax daily  5. Needs better genital hygiene. Should be cleaned daily. Wipe with Betadine then clean with saline  6. Foreskin needs to remain reduced  7. No urologic intervention necessary  8. Discussed with Dr. Suárez & he agrees

## 2019-02-28 NOTE — ED PROVIDER NOTE - OBJECTIVE STATEMENT
87 y/o M with a significant PMHx of BPH, dementia, and HTN and no significant PSHx presents to the ED from [facility] with c/o Lenz catheter reinsertion and not draining x today. Pt states he uses a Lenz catheter for an enlarge prostate and when the nursing home staff removed the catheter, they felt resistance when trying to remove it. Pt notes he came to the ED to have it reinserted. Pt denies discomfort  or any other complaints. NKDA.

## 2019-02-28 NOTE — ED PROVIDER NOTE - GENITOURINARY BLADDER
inferior side of penis had edema and localized dryness. appears like partial paraphimosis/non-tender

## 2019-02-28 NOTE — CONSULT NOTE ADULT - SUBJECTIVE AND OBJECTIVE BOX
This is a 87 y/o Male from [facility] with PMHx of HTN, BPH, dementia who was sent to the ED today from the NH because he was not urinating. For an unknown reason the staff at the nursing home removed the barba and since it was removed overnight he was not making urine. Upon arrival to the ED his bladder was distended as per ED MD who performed bedside US. The RN attempted to place a 16 Fr barba and met resistance so the ED physician attempted to place a 3-way & was also unsuccessful. At this point Urology was called, patient was unable to provide any history. Bladder scan was performed at the bedside & he had 280cc in his bladder. A 16 Fr. coude catheter was inserted without any resistance at all. Blood tinged urine was removed, about 550cc before it stopped draining. patient tolerated it well.       PMHx: as above  SurgHx:   Social Hx:  Allergies: No Known Allergies        Vitals: T(F): 98 (02-28-19 @ 11:10), Max: 98.3 (02-28-19 @ 08:22)  HR: 106 (02-28-19 @ 11:10) (98 - 106)  BP: 143/94 (02-28-19 @ 11:10) (143/94 - 154/98)  RR: 19 (02-28-19 @ 11:10) (18 - 19)  SpO2: 100% (02-28-19 @ 11:10) (98% - 100%)      Labs: None      Physical Exam  General: alert, awake, responsive, no acute distress  Abdomen: soft, suprapubic tenderness, suprapubic distention, non tympanic, no rebound, no guarding, +bowel sounds  Back: no CVA tenderness  : foreskin not reduced, very edematous with ulcerations of the inner foreskin. No bleeding, no purulence, No scrotal edema. Foreskin reduced.

## 2019-07-03 NOTE — AIRWAY REMOVAL NOTE  ADULT & PEDS - O2 DELIVERY METHOD
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  still on empiric ABx. all PCRs are neg,  cont on clears, aerosol mask

## 2020-04-20 NOTE — PROGRESS NOTE ADULT - MINUTES
Patient:   DEANN DECKER            MRN: [id number]            FIN: [id number]              Age:   66 years     Sex:  FEMALE     :  54   Associated Diagnoses:   None   Author:   DEL HOOKER     History of Present Illness   seen at bedside, labs/chart reviewed, prior notes appreciated  remains intubated     Histories   Past Med History: Past Medical History   HTN (hypertension)  TIA    Family History:    Osteoarthritis  MOTHER  Stroke  MOTHER  Hypertension  MOTHER  FATHER  Hyperlipidemia  MOTHER  Kidney failure                                                                                                                                                                                                                          14-AUG-2014 14:48:22<$>  FATHER  Dementia  GRANDMOTHER    Procedure History:    Embolization of uterine fibroid using fluoroscopic guidance ([id number]) on 2002 at 47 Years.   section ([id number]) on 1993 at 39 Years.  Myomectomy ([id number]) on 1988 at 34 Years.   Social History       Alcohol  Details: Current, 1-2 times per year, last two months  Substance Abuse  Details: None  Tobacco  Details: No, Never smoker  .       Health Status   Allergies:    Allergic Reactions (All)  NKA, Allergies (ST)   Allergies (1) Active Reaction  NKA None Documented    Current medications:  (Selected)   Inpatient Medications  Ordered  DOPamine 400 mg [20 mcg/kg/min] + premixed in Dextrose 5% 250 mL: 250 mL, IV, TITRATE to MAINTAIN, SBP greater than 90 mmHg, PRN for Symptomatic Hypotension, MAX DOSE 20 mcg/kg/min, RATE: 82.5 mL/hr, Infuse over 3 hr, 250 mL TOTAL Volume, Routine, Order Start: 20 23:08:00 CDT, Weight: 110 kg Clinical Weight  Dextrose 5% - Lactated Ringers 1,000 mL: 1,000 mL, IV, RATE: 80 mL/hr, Infuse over 12.5 hr, 1,000 mL TOTAL Volume, NOW, Order Start: 04/15/20 12:45:00 CDT, IV Soln, Weight: 110 kg Clinical Weight  LORazepam (Ativan).: 1 mg = 0.5 mL,  Slow IV Push, As Directed PRN, PRN Other (see order comments), Order Start: 04/08/20 23:09:00 CDT, Injection  Lovenox: 30 mg = 0.3 mL, Subcutaneous, Q12H, Routine, Order Start: 04/13/20 21:00:00 CDT, Injection  NORepinephrine 8 mg [199 mcg/min] + premixed in Sodium Chloride 0.9% 250 mL: 250 mL, IV, TITRATE to MAINTAIN, SBP greater than 90 mmHg, PRN for Symptomatic Hypotension, MAX DOSE 199 mcg/min, RATE: 373.13 mL/hr, Infuse over 0.7 hr, 250 mL TOTAL Volume, Routine, Order Start: 04/08/20 23:08:00 CDT, Weight: 110 kg Clinical Weight  Pepcid: 20 mg = 2 mL, Slow IV Push, Daily, Routine, Order Start: 04/12/20 9:00:00 CDT, Injection  Potassium ICU Protocol COMMUNICATION.: COMMUNICATION ORDER, Order Start: 04/09/20 9:32:00 CDT  Sodium Chloride 0.9% 250 mL: 250 mL, IV, RATE: 1,500 mL/hr, Infuse over 10 minutes, 250 mL TOTAL Volume, Routine, Order Start: 04/08/20 23:08:00 CDT, IV Soln, PRN for symptomatic hypotension (not due to arrhythmia) one time.  May repeat x 1 if lungs are clear., Weight: 110 kg Cli...  [RESTRICTED] meropenem intermittent infusion [30 minute] (Merrem).: 500 mg, IVPB, Q8H, Rationale: Empiric (suspected infection), Indication: Pneumonia, RATE: 100 mL/hr, Infuse over 30 minutes, mL TOTAL Volume 50, Routine, Order Start: 04/19/20 14:00:00 CDT, x 2 days, Order Stop: 04/21/20 6:00:00 CDT, CrCl EQUAL to or...  acetaminophen (Tylenol).: 650 mg = 1 suppository, Rectal, Q4H, PRN Other (see order comments), Routine, Order Start: 04/08/20 23:09:00 CDT, Suppository  acetaminophen (Tylenol).: 650 mg = 2 tab, Oral, Q4H, PRN Other (see order comments), Routine, Order Start: 04/08/20 23:09:00 CDT, Tab  albuterol HFA inhaler oral 90 mcg/puff: 360 mcg = 4 puff, MDI/DPI, BID, Routine, Order Start: 04/17/20 12:49:00 CDT, Oral Inhalant  amLODIPine oral 10 mg tablet: 10 mg = 1 tab, Oral, Daily, Routine, Order Start: 04/09/20 9:00:00 CDT, Tab  aspirin oral 81 mg chewable tablet: 81 mg = 1 tab, NG-tube, Daily, Routine, Order  35 Start: 04/10/20 9:00:00 CDT, Tab Chew  atropine.: 0.6 mg = 1.5 mL, IV Push, Once (scheduled), PRN Other (see order comments), Routine, Order Start: 04/08/20 23:09:00 CDT, Injection, PRN for Bradycardia associated with hypotension, ischemia, or ventricular escape beats.  May repeat in 5 minutes up to...  chlorhexidine topical ORAL 0.12% rinse (Peridex).: 15 mL, Oral Mucosa, Daily, Routine, Order Start: 04/10/20 9:00:00 CDT, Liquid, Swab Oral Mucosa with solution  cloNIDine topical 0.2 mg/24 hr weekly patch: Apply to chest, 1 patch, Topical, Q7 Days, Routine, Order Start: 04/15/20 9:00:00 CDT, Patch  dexMEDETomidine 400 mcg [0.3 mcg/kg/hr] + Sodium Chloride 0.9% 100 mL: 100 mL, IV, TITRATE to MAINTAIN, per MD titrate to patient tolerate ventilator, RATE: 8.25 mL/hr, Infuse over 12.1 hr, 100 mL TOTAL Volume, Routine, Order Start: 04/19/20 13:06:00 CDT, Weight: 110 kg Clinical Weight  dextrose (glucose) injection 50%.: 12.5 gm = 25 mL, IV Push, As Directed PRN, PRN low blood glucose, Routine, Order Start: 04/15/20 12:29:00 CDT, Injection, Give 12.5 grams first.  If still needed give an additional 12.5 grams.  dextrose (glucose) oral.: 15 gm, Oral, As Directed PRN, PRN low blood glucose, Routine, Order Start: 04/15/20 12:29:00 CDT, Gel  fentaNYL (Sublimaze).: 25 mcg = 0.5 mL, IV Push, Q15 Minutes, PRN Other (see order comments), Routine, Order Start: 04/09/20 14:06:00 CDT, Injection, PRN for BPS score greater than or equal to 5 or RASS score of greater than or equal to +1.  fentaNYL 2,500 mcg [25 mcg/hr] + premixed in Sodium Chloride 0.9% 250 mL: 250 mL, IV, TITRATE to MAINTAIN, Maintain BPS of 3, MAX DOSE 200 mcg/hr, RATE: 2.5 mL/hr, Infuse over 100 hr, 250 mL TOTAL Volume, Routine, Order Start: 04/14/20 7:07:00 CDT, Weight: 110 kg Clinical Weight  furosemide (Lasix).: 40 mg = 4 mL, Slow IV Push, As Directed PRN, PRN respiratory distress, Order Start: 04/08/20 23:09:00 CDT, Injection  glucagon (GlucaGen).: 1 mg = 1 mL,  IM, As Directed PRN, PRN low blood glucose, Routine, Order Start: 04/15/20 12:29:00 CDT, Injection  hydrALAZINE injection (Apresoline): 10 mg = 0.5 mL, Slow IV Push, Q6H, PRN systolic BP over 180 mmHg, Routine, Order Start: 04/13/20 15:15:00 CDT, Injection  hydrALAZINE oral 25 mg tablet (Apresoline): 50 mg = 1 tab, Oral, Q8H, Routine, Order Start: 04/16/20 22:00:00 CDT, Tab  metoCLOPramide injection 5 mg/mL (Reglan): 5 mg = 1 mL, Slow IV Push, Q6H, PRN nausea or vomiting, Routine, Order Start: 04/09/20 14:06:00 CDT, Injection  midazolam (Versed).: 2 mg = 2 mL, Slow IV Push, Q10 Minutes, PRN Other (see order comments), Routine, Order Start: 04/09/20 14:06:00 CDT, Injection, PRN for RASS score greater than or equal to +1  morphine.: 2 mg = 1 mL, IV Push, Q5 Minutes, PRN Other (see order comments), Routine, Order Start: 04/08/20 23:09:00 CDT, Injection  naloxone (Narcan).: 1 mg = 2.5 mL, IV Push, As Directed PRN, PRN Other (see order comments), Routine, Order Start: 04/08/20 23:09:00 CDT, Injection  nitroGLYCERIN sublingual (Nitrostat).: 0.4 mg = 1 tab, SL, As Directed PRN, PRN chest pain, Routine, Order Start: 04/08/20 23:09:00 CDT, Tab SL, May repeat once in 5 minutes if systolic BP greater than 100 mmHg.  ocular lubricant solution (Artificial Tears).: 2 drop, Each Eye, Q12H, Routine, Order Start: 04/09/20 21:00:00 CDT, Ophth Soln  ocular lubricant solution (Artificial Tears).: 2 drop, Each Eye, Q2H, PRN dry eyes, Routine, Order Start: 04/09/20 14:06:00 CDT, Ophth Soln  propofol 1,000 mg [10 mcg/kg/min] + premixed Solution 100 mL: 100 mL, IV, TITRATE to MAINTAIN, Begin at 10 mcg/kg/min then titrate by 5 mcg/kg/min every 10 minutes as needed until goal sedation is achieved.  If ordered, bolus with PRN fentanyl or midazolam prior to each drip rate increase., MAX DOSE 80 mcg/kg/mi...  Prescriptions  Prescribed  atorvastatin oral 20 mg tablet: 20 mg = 1 tab, Oral, Q Bedtime, for cholesterol, Tab, # 30 tab, 0 Refills,  Maintenance  losartan oral 25 mg tablet: 25 mg = 1 tab, Oral, Daily, For blood pressure, Tab, # 30 tab, 0 Refills, Maintenance  potassium CHLORIDE oral 20 mEq ER tablet (K-Dur): 10 mEq = 0.5 tab, Oral, Daily, Tab ER, # 15 tab, 0 Refills, Maintenance, other  Documented Medications  Documented  Lasix oral 40 mg tablet: 40 mg = 1 tab, Oral, Daily, Maintenance  amLODIPine oral 10 mg tablet: 10 mg = 1 tab, Oral, Daily, Maintenance  amLODIPine oral 10 mg tablet: Acute  aspirin oral 325 mg tablet: 325 mg = 1 tab, Oral, Daily, Tab, Maintenance  atorvastatin oral 40 mg tablet: Acute  carvedilol oral 25 mg tablet: 25 mg = 1 tab, Oral, BID, Maintenance  carvedilol oral 25 mg tablet: Acute  furosemide oral 40 mg tablet: Acute  losartan oral 25 mg tablet: Acute,    Medications (34) Active  Scheduled: (11)  *Potassium Protocol Communication  1 each, N/A, Daily  AmLODIPine 10 mg tab  10 mg 1 tab, Oral, Daily  Aspirin 81 mg chew tab  81 mg 1 tab, NG-tube, Daily  Chlorhexidine gluconate 0.12% ORAL RINSE 15 mL repack  15 mL, Oral Mucosa, Daily  CloNIDine TTS 0.2 mg/24 hr weekly ER patch  1 patch, Topical, Q7 Days  Enoxaparin 30 mg/0.3 mL syringe  30 mg 0.3 mL, Subcutaneous, Q12H  Famotidine 20 mg/2 mL inj SDV  20 mg 2 mL, Slow IV Push, Daily  HydrALAZINE 50 mg tab  50 mg 1 tab, Oral, Q8H  Hypromellose tears 0.5% ophth soln 15 mL  2 drop, Each Eye, Q12H  meropenem [RESTRICTED]  500 mg, IVPB, Q8H  NF Albuterol HFA 90 mcg/inh oral MDI (shared)  360 mcg 4 puff, MDI/DPI, BID  Continuous: (7)  dexMEDETomidine 400 mcg [0.3 mcg/kg/hr] + Sodium Chloride 0.9% 100 mL  100 mL, IV, 8.25 mL/hr  Dextrose 5% - Lactated Ringers 1,000 mL  1,000 mL, IV, 80 mL/hr  DOPamine 400 mg [20 mcg/kg/min] + premixed in Dextrose 5% 250 mL  250 mL, IV, 82.5 mL/hr  fentaNYL 2,500 mcg [25 mcg/hr] + premixed in Sodium Chloride 0.9% 250 mL  250 mL, IV, 2.5 mL/hr  NORepinephrine 8 mg [199 mcg/min] + premixed in Sodium Chloride 0.9% 250 mL  250 mL, IV, 373.13 mL/hr   propofol 1,000 mg [10 mcg/kg/min] + premixed Solution 100 mL  100 mL, IV, 6.6 mL/hr  Sodium Chloride 0.9% 250 mL  250 mL, IV, 1,500 mL/hr  PRN: (16)  Acetaminophen 325 mg tab  650 mg 2 tab, Oral, Q4H  Acetaminophen 650 mg suppos  650 mg 1 suppository, Rectal, Q4H  Atropine 0.4 mg/1 mL inj SDV  0.6 mg 1.5 mL, IV Push, Once (scheduled)  Dextrose (glucose) 40% 15 gm/37.5 gm oral gel UD  15 gm, Oral, As Directed PRN  Dextrose (glucose) 50% 25 gm/50 mL inj  12.5 gm 25 mL, IV Push, As Directed PRN  FentaNYL 100 mcg/2 mL inj SDV  25 mcg 0.5 mL, IV Push, Q15 Minutes  Furosemide 40 mg/4 mL inj SDV  40 mg 4 mL, Slow IV Push, As Directed PRN  Glucagon 1 mg/1 mL emergency kit SDV  1 mg 1 mL, IM, As Directed PRN  HydrALAZINE 20 mg/1 mL inj SDV  10 mg 0.5 mL, Slow IV Push, Q6H  Hypromellose tears 0.5% ophth soln 15 mL  2 drop, Each Eye, Q2H  LORazepam 2 mg/1 mL inj SDV  1 mg 0.5 mL, Slow IV Push, As Directed PRN  MetoCLOPramide 10 mg/2 mL inj SDV  5 mg 1 mL, Slow IV Push, Q6H  Midazolam PF 2 mg/2 mL inj SDV  2 mg 2 mL, Slow IV Push, Q10 Minutes  Morphine PF 2 mg/1 mL inj SDV  2 mg 1 mL, IV Push, Q5 Minutes  Naloxone 0.4 mg/1 mL inj SDV  1 mg 2.5 mL, IV Push, As Directed PRN  NitroGLYCERIN 0.4 mg SL tab 25s  0.4 mg 1 tab, SL, As Directed PRN  , Medications (34) Active  Scheduled: (11)  *Potassium Protocol Communication  1 each, N/A, Daily  AmLODIPine 10 mg tab  10 mg 1 tab, Oral, Daily  Aspirin 81 mg chew tab  81 mg 1 tab, NG-tube, Daily  Chlorhexidine gluconate 0.12% ORAL RINSE 15 mL repack  15 mL, Oral Mucosa, Daily  CloNIDine TTS 0.2 mg/24 hr weekly ER patch  1 patch, Topical, Q7 Days  Enoxaparin 30 mg/0.3 mL syringe  30 mg 0.3 mL, Subcutaneous, Q12H  Famotidine 20 mg/2 mL inj SDV  20 mg 2 mL, Slow IV Push, Daily  HydrALAZINE 50 mg tab  50 mg 1 tab, Oral, Q8H  Hypromellose tears 0.5% ophth soln 15 mL  2 drop, Each Eye, Q12H  meropenem [RESTRICTED]  500 mg, IVPB, Q8H  NF Albuterol HFA 90 mcg/inh oral MDI (shared)  360 mcg 4  puff, MDI/DPI, BID  Continuous: (7)  dexMEDETomidine 400 mcg [0.3 mcg/kg/hr] + Sodium Chloride 0.9% 100 mL  100 mL, IV, 8.25 mL/hr  Dextrose 5% - Lactated Ringers 1,000 mL  1,000 mL, IV, 80 mL/hr  DOPamine 400 mg [20 mcg/kg/min] + premixed in Dextrose 5% 250 mL  250 mL, IV, 82.5 mL/hr  fentaNYL 2,500 mcg [25 mcg/hr] + premixed in Sodium Chloride 0.9% 250 mL  250 mL, IV, 2.5 mL/hr  NORepinephrine 8 mg [199 mcg/min] + premixed in Sodium Chloride 0.9% 250 mL  250 mL, IV, 373.13 mL/hr  propofol 1,000 mg [10 mcg/kg/min] + premixed Solution 100 mL  100 mL, IV, 6.6 mL/hr  Sodium Chloride 0.9% 250 mL  250 mL, IV, 1,500 mL/hr  PRN: (16)  Acetaminophen 325 mg tab  650 mg 2 tab, Oral, Q4H  Acetaminophen 650 mg suppos  650 mg 1 suppository, Rectal, Q4H  Atropine 0.4 mg/1 mL inj SDV  0.6 mg 1.5 mL, IV Push, Once (scheduled)  Dextrose (glucose) 40% 15 gm/37.5 gm oral gel UD  15 gm, Oral, As Directed PRN  Dextrose (glucose) 50% 25 gm/50 mL inj  12.5 gm 25 mL, IV Push, As Directed PRN  FentaNYL 100 mcg/2 mL inj SDV  25 mcg 0.5 mL, IV Push, Q15 Minutes  Furosemide 40 mg/4 mL inj SDV  40 mg 4 mL, Slow IV Push, As Directed PRN  Glucagon 1 mg/1 mL emergency kit SDV  1 mg 1 mL, IM, As Directed PRN  HydrALAZINE 20 mg/1 mL inj SDV  10 mg 0.5 mL, Slow IV Push, Q6H  Hypromellose tears 0.5% ophth soln 15 mL  2 drop, Each Eye, Q2H  LORazepam 2 mg/1 mL inj SDV  1 mg 0.5 mL, Slow IV Push, As Directed PRN  MetoCLOPramide 10 mg/2 mL inj SDV  5 mg 1 mL, Slow IV Push, Q6H  Midazolam PF 2 mg/2 mL inj SDV  2 mg 2 mL, Slow IV Push, Q10 Minutes  Morphine PF 2 mg/1 mL inj SDV  2 mg 1 mL, IV Push, Q5 Minutes  Naloxone 0.4 mg/1 mL inj SDV  1 mg 2.5 mL, IV Push, As Directed PRN  NitroGLYCERIN 0.4 mg SL tab 25s  0.4 mg 1 tab, SL, As Directed PRN       Physical Examination   VS/Measurements     Vitals between:   19-APR-2020 09:00:56   TO   20-APR-2020 09:00:56                   LAST RESULT MINIMUM MAXIMUM  Temperature 36.8 36.4 36.9  Heart Rate 81 77  103  Respiratory Rate 14 14 33  NISBP           141 108 162  NIDBP           58 54 92  NIMBP           82 70 106  SpO2                    97 86 97  FiO2                    0.70 0.5 0.70    COVID 19 DISASTER DECLARATION: Full-contact physical examination was not possible due  concern for COVID 19, examination findings include:   General:  Not alert and oriented.    Respiratory:  ventialted.    Neurologic:  Not alert, Not oriented.    Psychiatric:  Not cooperative.      Review / Management   Laboratory results:     Labs between:  19-APR-2020 09:00 to 20-APR-2020 09:00  CBC:                 WBC  HgB  Hct  Plt  MCV  RDW   20-APR-2020 8.0  (L) 9.6  (L) 30.9  293  80.7  (H) 15.7   DIFF:                 Seg  Neutroph//ABS  Lymph//ABS  Mono//ABS  EOS/ABS  20-APR-2020 NOT APPLICABLE  72 // 5.8 18 // 1.4 6 // 0.5 3 // 0.2  BMP:                 Na  Cl  BUN  Glu   20-APR-2020 (H) 146  (H) 109  (H) 24  (H) 113                              K  CO2  Cr  Ca                              (L) 3.3  29  (H) 1.41  8.4   CMP:                 AST  ALT  AlkPhos  Bili  Albumin   20-APR-2020 35  57  61  0.8  (L) 1.5   Other Chem:             Mg  Phos  Triglycerides  GGTP  DirectBili                                      POC GLU:                 Latest Result  Latest Date  Minimum  Min Date  Maximum  Max Date                             (H) 108  20-APR-2020 (H) 108  20-APR-2020 (H) 100  19-APR-2020                 ,   Labs between:  19-APR-2020 09:00 to 20-APR-2020 09:00  CBC:                 WBC  HgB  Hct  Plt  MCV  RDW   20-APR-2020 8.0  (L) 9.6  (L) 30.9  293  80.7  (H) 15.7   DIFF:                 Seg  Neutroph//ABS  Lymph//ABS  Mono//ABS  EOS/ABS  20-APR-2020 NOT APPLICABLE  72 // 5.8 18 // 1.4 6 // 0.5 3 // 0.2  BMP:                 Na  Cl  BUN  Glu   20-APR-2020 (H) 146  (H) 109  (H) 24  (H) 113                              K  CO2  Cr  Ca                              (L) 3.3  29  (H) 1.41  8.4   CMP:                 AST  ALT  AlkPhos   30 Bili  Albumin   20-APR-2020 35  57  61  0.8  (L) 1.5   Other Chem:             Mg  Phos  Triglycerides  GGTP  DirectBili                                      POC GLU:                 Latest Result  Latest Date  Minimum  Min Date  Maximum  Max Date                             (H) 108  20-APR-2020 (H) 108  20-APR-2020 (H) 100  19-APR-2020                 Medications (34) Active  Scheduled: (11)  *Potassium Protocol Communication  1 each, N/A, Daily  AmLODIPine 10 mg tab  10 mg 1 tab, Oral, Daily  Aspirin 81 mg chew tab  81 mg 1 tab, NG-tube, Daily  Chlorhexidine gluconate 0.12% ORAL RINSE 15 mL repack  15 mL, Oral Mucosa, Daily  CloNIDine TTS 0.2 mg/24 hr weekly ER patch  1 patch, Topical, Q7 Days  Enoxaparin 30 mg/0.3 mL syringe  30 mg 0.3 mL, Subcutaneous, Q12H  Famotidine 20 mg/2 mL inj SDV  20 mg 2 mL, Slow IV Push, Daily  HydrALAZINE 50 mg tab  50 mg 1 tab, Oral, Q8H  Hypromellose tears 0.5% ophth soln 15 mL  2 drop, Each Eye, Q12H  meropenem [RESTRICTED]  500 mg, IVPB, Q8H  NF Albuterol HFA 90 mcg/inh oral MDI (shared)  360 mcg 4 puff, MDI/DPI, BID  Continuous: (7)  dexMEDETomidine 400 mcg [0.3 mcg/kg/hr] + Sodium Chloride 0.9% 100 mL  100 mL, IV, 8.25 mL/hr  Dextrose 5% - Lactated Ringers 1,000 mL  1,000 mL, IV, 80 mL/hr  DOPamine 400 mg [20 mcg/kg/min] + premixed in Dextrose 5% 250 mL  250 mL, IV, 82.5 mL/hr  fentaNYL 2,500 mcg [25 mcg/hr] + premixed in Sodium Chloride 0.9% 250 mL  250 mL, IV, 2.5 mL/hr  NORepinephrine 8 mg [199 mcg/min] + premixed in Sodium Chloride 0.9% 250 mL  250 mL, IV, 373.13 mL/hr  propofol 1,000 mg [10 mcg/kg/min] + premixed Solution 100 mL  100 mL, IV, 6.6 mL/hr  Sodium Chloride 0.9% 250 mL  250 mL, IV, 1,500 mL/hr  PRN: (16)  Acetaminophen 325 mg tab  650 mg 2 tab, Oral, Q4H  Acetaminophen 650 mg suppos  650 mg 1 suppository, Rectal, Q4H  Atropine 0.4 mg/1 mL inj SDV  0.6 mg 1.5 mL, IV Push, Once (scheduled)  Dextrose (glucose) 40% 15 gm/37.5 gm oral gel UD  15 gm, Oral, As Directed  PRN  Dextrose (glucose) 50% 25 gm/50 mL inj  12.5 gm 25 mL, IV Push, As Directed PRN  FentaNYL 100 mcg/2 mL inj SDV  25 mcg 0.5 mL, IV Push, Q15 Minutes  Furosemide 40 mg/4 mL inj SDV  40 mg 4 mL, Slow IV Push, As Directed PRN  Glucagon 1 mg/1 mL emergency kit SDV  1 mg 1 mL, IM, As Directed PRN  HydrALAZINE 20 mg/1 mL inj SDV  10 mg 0.5 mL, Slow IV Push, Q6H  Hypromellose tears 0.5% ophth soln 15 mL  2 drop, Each Eye, Q2H  LORazepam 2 mg/1 mL inj SDV  1 mg 0.5 mL, Slow IV Push, As Directed PRN  MetoCLOPramide 10 mg/2 mL inj SDV  5 mg 1 mL, Slow IV Push, Q6H  Midazolam PF 2 mg/2 mL inj SDV  2 mg 2 mL, Slow IV Push, Q10 Minutes  Morphine PF 2 mg/1 mL inj SDV  2 mg 1 mL, IV Push, Q5 Minutes  Naloxone 0.4 mg/1 mL inj SDV  1 mg 2.5 mL, IV Push, As Directed PRN  NitroGLYCERIN 0.4 mg SL tab 25s  0.4 mg 1 tab, SL, As Directed PRN  .    Radiology results   Result title:  XR CHEST 1V  Result status:  Final  Verified by:  EDWIN FLOYD on 04/20/2020 7:22  IMPRESSION:Since the examination of 04/16/2020, there has been the apparent development/progression of right upper lobe and basilar infiltrates.Noted, however, has been apparent partial interval resolution of the previously observed left basilar infiltrative/atelectatic changes.There is residual accentuation of the bilateral perihilar bronchovascular and interstitial markings, which appears unchanged.There are endotracheal and nasogastric  tubes.  There is a right subclavian central venous catheter.  There are degenerative changes of the thoracic spine and bilateral acromioclavicular joints.  The heart size is probably normal.  There is tortuosity of the great vessels.There is no demonstrable pneumothorax.       Impression and Plan   1. LAKIA, b/l Cr unknown, selvin is 0.96  - Cr peak 1.72, LAKIA 2/2 volume depletion  - Cr improved w/ IVF - now slightly wores after d/cof IVF and lasix dose  - will monitor, continue to hold IVF - Cr now improving again  - She is currently  hemodynamically stable and with worsening edema  2. Acute hypoxic resp failure 2/2 covid pneumonia  - oxygenation is stable   - weane FiO2 and keep sats > 92  - completed hydroxychloroquine and Azithromycin  - On zosyn for right LL pneumonia  - s/p lasix 40 mg iv once  - ongoing prone ventilation daily- oxygenation is stable  - LDH improving  3. HTN  - goal <140 SBP  - on amlodipine, Hydralazine and clonidine patch  - Monitor for hypotension  4. AMS  - 2/2 above, w/u in progress  5. Left sided weakness  - neuro c/s appreciate recs  6. HyperNa  -  q6  Thank you for allowing me to participate in M saji Lewis's care, will follow along.

## 2020-06-03 NOTE — PROGRESS NOTE ADULT - ASSESSMENT
Behavioral Health Progress Note    Client Legal Name: Justin Morales   Client Preferred Name: Justin   Service Type: Individual  Length of Visit: 30 minutes  Attendees: patient    Telemedicine Visit: The patient's condition can be safely assessed and treated via synchronous audio and visual telemedicine encounter.      Reason for Telemedicine Visit: Covid-19 restrictions    Originating Site (Patient Location): Patient's home    Distant Site (Provider Location): Provider Remote Setting    Consent:  The patient/guardian has verbally consented to: the potential risks and benefits of telemedicine (video visit) versus in person care; bill my insurance or make self-payment for services provided; and responsibility for payment of non-covered services.     Mode of Communication:  Video Conference via [employer]    As the provider I attest to compliance with applicable laws and regulations related to telemedicine.    Start time: 8:20 AM  Stop time: 8:50 AM    Emergency contact: Manuel Martinez - [phone]  Closest Emergency Room: [facility]  Location at time of call: Home        Identifying Information and Presenting Problem:     The patient is a 28 year old  male who is being seen for problematic symptoms of anxiety, panic attacks, and sleep difficulties.     Treatment Objective(s) Addressed in This Session:  Anxiety: will experience a reduction in anxiety, will develop more effective coping skills to manage anxiety symptoms and will increase ability to function adaptively  Adjustment and coping during Covid-19     Progress on / Status of Treatment Objective(s) / Homework:  Satisfactory progress    PHQ-9 SCORE 12/11/2019 2/10/2020   PHQ-9 Total Score 2 6       EVIE-7 SCORE 12/11/2019 2/10/2020   Total Score 9 4       Topics Discussed/Interventions Provided:  Justin reported that he is doing well and mood has been good. His summer was largely uneventful until a week ago with  Brian Crespo's death. Felt various emotions - anger, frustration, sadness in response. Validated and normalized emotional process. He has focused on advocacy. Attended a couple of peaceful protests in his neighborhood. Handed out segura. 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 His neighborhood has been safe and he feels safe at home.     Justin reported that anxiety improvement has continued. No longer feels like his heart is racing everyday, no recent panic attacks, and better able to sleep. 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 Curious about how it will go. Has been sticking to a routine. Doing more yard work, reading, and work around the house. Reinforced strategies and routine changes that have led to improvement. Praised Justin for maintaining time to do activities that are helpful and keeping to steady routine. Encouraged continued engagement in valued actions.     Assessment: The patient appeared to be active and engaged in today's session and was receptive to feedback.      Mental Status: Justin appeared generally alert and oriented. Dress was casual and appropriate to the weather and occasion. Grooming and hygiene were good. Eye contact was good. Speech was of normal volume and rate and was clear, coherent, and relevant. Mood was good with congruent, euthymic affect. Thought processes were relevant, logical and goal-directed. Thought content was WNL with no evidence of psychotic or paranoid features. No evidence of SI/HI or self-harm, intent, or plans. Memory appeared grossly intact. Insight and judgment appeared good and patient exhibited good impulse control during the appointment.      Does the patient appear to be at imminent risk of harm to self/others at this  time? No     The session was necessary to address problematic symptoms of anxiety and panic attacks that have been interfering with patient's ability to function in daily life.  Ongoing psychotherapy is necessary to improve functioning with daily activities, provide psychoeducation and provide support.     Diagnosis (DSM-5):  Generalized anxiety disorder with panic attacks        Plan:  1. Follow up in 1 month.  2. Continue utilizing on relaxation/mindfulness, routine setting, and thought challenging to manage anxiety.  3. Follow up with PCP as needed.   4. Utilize crisis resources as needed.        NOTE: Briefly reviewed treatment plan on 2/19/2020 with patient. Patient agreed to continue with plan, no changes at this time. Next treatment plan update due 09/03/2020.  Diagnostic assessment update due 12/11/2020.     Stephie Ayala, PhD.  Behavioral Health Fellow     1. Septic shock  2. Multifocal pneumonia  3. UTI  4. Bacteremia  5. Leukocytosis    Plan:  - Continue Meropenem 500 mg IV q 24 hours (day #4)  - increase Vancomycin to 750 mg IV q 24 hours (day #4) 1. Septic shock  2. Multifocal pneumonia  3. UTI  4. Bacteremia  5. Leukocytosis    Plan:  - Continue Meropenem 500 mg IV q 24 hours (day #4)  - increase Vancomycin to 750 mg IV q 24 hours (day #4)  - timee spent - 32 minutes

## 2021-12-14 NOTE — PROGRESS NOTE ADULT - ASSESSMENT
Bedside and Verbal shift change report given to self (oncoming nurse) by Radha Black (offgoing nurse). Report included the following information SBAR, Kardex and MAR. 86yMale s/p rt carotid artery exploration, removal of central line 2/9    -Monitor for neurological deficits   -Care as per ICU   -Reconsult as needed

## 2022-06-27 NOTE — PROGRESS NOTE ADULT - PROBLEM SELECTOR PROBLEM 4
BPH (benign prostatic hyperplasia)
[Time Spent: ___ minutes] : I have spent [unfilled] minutes of time on the encounter.
BPH (benign prostatic hyperplasia)
HTN (hypertension)
BPH (benign prostatic hyperplasia)
HTN (hypertension)

## 2022-11-29 NOTE — PHYSICAL THERAPY INITIAL EVALUATION ADULT - DIAGNOSIS, PT EVAL
PT to monitor pt's vitals and aim to provide maintance PT as indicated and tolerated; pt comes from Rehab verbal cues/nonverbal cues (demo/gestures)/1 person assist

## 2022-12-27 NOTE — PROCEDURE NOTE - NSSITEPREP_SKIN_A_CORE
Per pharmacy recommendation: reduce Norco dose by 50% and watch for opioid toxicity, reduce trazodone by 50% or stop and resume 3 days after finishing Paxlovid, reduce Solifenacin to 5 mg/day and resume normal dose 3 days after finishing Paxlovid.   chlorhexidine

## 2023-01-14 NOTE — CONSULT NOTE ADULT - CONSULT REQUESTED BY NAME
Dr Rodriguez This was a shared visit with the NADINE. I reviewed and verified the documentation and independently performed the documented:

## 2023-03-14 NOTE — PROGRESS NOTE ADULT - ATTENDING COMMENTS
3 (mild pain)
mild edema of foreskin - improving   cont barba of to d/c with barba   cont fiansteride/tamsulosin
pressure ulcer on foreskin healing   cont barba
Dr Oseguera will cover me until 02/24/19
Patient seen and examined with resident, Addendum to above.    87 y/o M from [facility], PMH of HTN, BPH, dementia , had recent fall on 1/22/19 had fracture of Rt intertrochanteric fracture, underwent surgical fixation admitted to the ICU for septic shock. Had an iatrogenic CV line placed into the right  carotid artery, s/p removal by vascular surgery in the OR. Intubated for respiratory distress. UTI with E. Coli noted. Worsening chest xray findings, likely fluid overload.     Assessment:  1. Acute respiratory failure  2. Multiple focal pneumonia  3. Urinary tract infection  4. E. Coli bacteremia   5. Acute kidney injury  6. Hypernatremia   7. Dementia     - Cont. Ventilatory support, daily weaning trials  - Maintain saturation > 90%  - Give lasix x 1   - Start precedex, morphine prn  - Cont. Ceftriaxone/Azithromycin ID recs appreciated  - Monitor urine output  - Free water enterally supplementation, d/c IV fluids  - Trend BMP  - GI/DVT prophylaxis   - Spoke with son at bedside. Prognosis is guarded  - 34 min of critical care time spent on this patient's care
Patient seen and examined with resident, Addendum to above.    87 y/o M from [facility], PMH of HTN, BPH, dementia , had recent fall on 1/22/19 had fracture of Rt intertrochanteric fracture, underwent surgical fixation admitted to the ICU for septic shock. Had an iatrogenic CV line placed into the right  carotid artery, s/p removal by vascular surgery in the OR. This morning patient noted with respiratory distress. Decision made to intubated the patient. Has evidence of gram negative bacteremia, due to urinary tract infection.     Assessment:  1. Acute respiratory failure  2. Multiple focal pneumonia  3. Urinary tract infection  4. E. Coli bacteremia   5. Acute kidney injury  6. Hypernatremia   7. Dementia     - Cont. Ventilatory support  - Maintain saturation > 90%  - Fentanyl for sedation  - Broad spectrum antibiotics per ID recs  - Monitor urine output  - Free water enterally   - D/c IV fluids for now  - Trend BMP  - GI/DVT prophylaxis   - 45 min of critical care time spent on this patient's care
Patient seen and examined with resident, Addendum to above.    87 y/o M from [facility], PMH of HTN, BPH, dementia , had recent fall on 1/22/19 had fracture of Rt intertrochanteric fracture, underwent surgical fixation admitted to the ICU for septic shock. Had an iatrogenic CV line placed into the right  carotid artery, s/p removal by vascular surgery in the OR. Intubated for respiratory distress. UTI with E. Coli noted. This morning with evidence of shock and elevated FiO2 requirements. Clinically patient is worsening.     Assessment:  1. Acute respiratory failure  2. Multiple focal pneumonia  3. Urinary tract infection  4. E. Coli bacteremia   5. Acute kidney injury  6. Hypernatremia   7. Dementia     - Cont. Ventilatory support, taper Fio2 as tolerated   - Maintain saturation > 90%  - Cont. precedex, morphine prn  - Cont. Ceftriaxone/Azithromycin ID recs appreciated  - Monitor urine output  - Free water enterally supplementation, sodium is acceptable  - Supplement potassium today  - Trend BMP  - GI/DVT prophylaxis   - Spoke with son at bedside. Prognosis is guarded. Palliative care consulted, patient is DNR at this time  - 35 min of critical care time spent on this patient's care
Please refer to my note from today.
Patient seen and examined with resident, Addendum to above.    87 y/o M from [place], PMH of HTN, BPH, dementia , had recent fall on 1/22/19 had fracture of Rt intertrochanteric fracture, underwent surgical fixation admitted to the ICU for septic shock. Had an iatrogenic CV line placed into the right  carotid artery, s/p removal by vascular surgery in the OR. Intubated yesterday for respiratory distress. Appears comfortable on fentanyl today. No evidence of hypoxia. UTI with E. Coli noted.     Assessment:  1. Acute respiratory failure  2. Multiple focal pneumonia  3. Urinary tract infection  4. E. Coli bacteremia   5. Acute kidney injury  6. Hypernatremia   7. Dementia     - Cont. Ventilatory support, daily weaning trials  - Maintain saturation > 90%  - Fentanyl for sedation, sedation vacation daily  - Follow up ID regarding narrowing antibiotic coverage  - Monitor urine output  - Free water enterally supplementation   - Trend BMP  - GI/DVT prophylaxis   - Spoke with daughter-in-law at bedside. States since hip fracture patient has been functionally declining. Understands guarded prognosis.   - 35 min of critical care time spent on this patient's care
Please refer to my note from today.
I agree with above

## 2023-04-25 NOTE — PATIENT PROFILE ADULT - NSASFALLNEEDSASSISTWITH_GEN_A_NUR
walking/toileting/standing Non-Graft Cartilage Fenestration Text: The cartilage was fenestrated with a 2mm punch biopsy to help facilitate healing.

## 2023-07-01 NOTE — CONSULT NOTE ADULT - SKIN COMMENTS
left groin central line dressing c/d/i, right hand A-line dressing c/d/i; left hip larch ecchymotic area Alert and oriented to person, place and time. Normal mood and affect, no apparent risk to self or others

## 2023-07-31 NOTE — CONSULT NOTE ADULT - MINUTES
35 Cephalexin Counseling: I counseled the patient regarding use of cephalexin as an antibiotic for prophylactic and/or therapeutic purposes. Cephalexin (commonly prescribed under brand name Keflex) is a cephalosporin antibiotic which is active against numerous classes of bacteria, including most skin bacteria. Side effects may include nausea, diarrhea, gastrointestinal upset, rash, hives, yeast infections, and in rare cases, hepatitis, kidney disease, seizures, fever, confusion, neurologic symptoms, and others. Patients with severe allergies to penicillin medications are cautioned that there is about a 10% incidence of cross-reactivity with cephalosporins. When possible, patients with penicillin allergies should use alternatives to cephalosporins for antibiotic therapy.

## 2024-03-21 NOTE — PROGRESS NOTE ADULT - PROBLEM SELECTOR PLAN 2
Patient tested positive for influenza B  cont tamiflu po.
Patient tested positive for influenza B  cont tamiflu po
Patient tested positive for influenza B  cont tamiflu po
infulenza B noted   started on tamflu from 5/3  droplet isolation
Patient tested positive for influenza B  cont tamiflu po.
monitor BP  cont meds
infulenza B noted   started on tamflu from 5/3  droplet isolation
Patient tested positive for influenza B  cont tamiflu po.
Private car

## 2024-06-08 NOTE — PROGRESS NOTE ADULT - SUBJECTIVE AND OBJECTIVE BOX
Patient is a 86y old  Male who presents with a chief complaint of acute hypoxic respiratory distress 2/2 PNA and UTI s/p intubation. Pt also with septic shock now off pressors.     INTERVAL HPI/OVERNIGHT EVENTS:    urinary retentio    MEDICATIONS  (STANDING):  artificial  tears Solution 1 Drop(s) Both EYES two times a day  azithromycin  IVPB      azithromycin  IVPB 500 milliGRAM(s) IV Intermittent every 24 hours  cefTRIAXone   IVPB      cefTRIAXone   IVPB 1 Gram(s) IV Intermittent every 24 hours  docusate sodium Liquid 100 milliGRAM(s) Oral two times a day  enoxaparin Injectable 40 milliGRAM(s) SubCutaneous daily  famotidine Injectable 20 milliGRAM(s) IV Push daily  finasteride 5 milliGRAM(s) Oral daily  melatonin 3 milliGRAM(s) Oral at bedtime  midodrine 5 milliGRAM(s) Oral three times a day  senna 2 Tablet(s) Oral at bedtime  tamsulosin 0.4 milliGRAM(s) Oral at bedtime    MEDICATIONS  (PRN):      Allergies    No Known Allergies    Intolerances        Vital Signs Last 24 Hrs  T(C): 36.6 (19 Feb 2019 14:16), Max: 36.8 (18 Feb 2019 16:00)  T(F): 97.8 (19 Feb 2019 14:16), Max: 98.2 (18 Feb 2019 16:00)  HR: 80 (19 Feb 2019 14:16) (80 - 102)  BP: 164/77 (19 Feb 2019 14:16) (148/91 - 170/87)  BP(mean): 102 (18 Feb 2019 16:00) (102 - 102)  RR: 18 (19 Feb 2019 14:16) (17 - 30)  SpO2: 98% (19 Feb 2019 14:16) (95% - 100%)    PHYSICAL EXAM:  GENERAL: NAD, well-groomed, well-developed  HEENT: Supple, No JVD, Normal thyroid  NERVOUS SYSTEM:  Alert & Oriented X1  CHEST/LUNG: Clear to percussion bilaterally; No rales, rhonchi, wheezing, or rubs  HEART: Regular rate and rhythm; No murmurs, rubs, or gallops  ABDOMEN: Soft, Nontender, Nondistended; Bowel sounds present  EXTREMITIES:  bilateral UE and LE edema.   SKIN: no rashes      LABS:                        10.4   10.33 )-----------( 357      ( 19 Feb 2019 07:36 )             34.8     02-19    144  |  110<H>  |  17  ----------------------------<  80  4.1   |  25  |  0.77    Ca    9.1      19 Feb 2019 07:36  Phos  3.2     02-19  Mg     2.1     02-19          CAPILLARY BLOOD GLUCOSE          RADIOLOGY & ADDITIONAL TESTS: 2

## 2025-07-14 NOTE — ED ADULT NURSE NOTE - NS ED NRS NURSING HOMES
Spoke with Mr. Jules via telephone.  Jorge A stated he has received his results sent from Dr. Mathias's office at this time.   
[facility]